# Patient Record
Sex: FEMALE | Race: WHITE | Employment: OTHER | URBAN - METROPOLITAN AREA
[De-identification: names, ages, dates, MRNs, and addresses within clinical notes are randomized per-mention and may not be internally consistent; named-entity substitution may affect disease eponyms.]

---

## 2017-01-03 ENCOUNTER — OFFICE VISIT (OUTPATIENT)
Dept: FAMILY MEDICINE CLINIC | Facility: CLINIC | Age: 82
End: 2017-01-03

## 2017-01-03 VITALS
HEART RATE: 84 BPM | DIASTOLIC BLOOD PRESSURE: 78 MMHG | SYSTOLIC BLOOD PRESSURE: 122 MMHG | WEIGHT: 190 LBS | BODY MASS INDEX: 42.74 KG/M2 | RESPIRATION RATE: 22 BRPM | HEIGHT: 56 IN | OXYGEN SATURATION: 92 % | TEMPERATURE: 98 F

## 2017-01-03 DIAGNOSIS — J40 BRONCHITIS: Primary | ICD-10-CM

## 2017-01-03 PROCEDURE — 94640 AIRWAY INHALATION TREATMENT: CPT | Performed by: FAMILY MEDICINE

## 2017-01-03 PROCEDURE — 99213 OFFICE O/P EST LOW 20 MIN: CPT | Performed by: FAMILY MEDICINE

## 2017-01-03 RX ORDER — CEFDINIR 300 MG/1
300 CAPSULE ORAL 2 TIMES DAILY
Qty: 20 CAPSULE | Refills: 0 | Status: SHIPPED | OUTPATIENT
Start: 2017-01-03 | End: 2017-01-24 | Stop reason: ALTCHOICE

## 2017-01-03 RX ORDER — IPRATROPIUM BROMIDE AND ALBUTEROL SULFATE 2.5; .5 MG/3ML; MG/3ML
3 SOLUTION RESPIRATORY (INHALATION) ONCE
Status: COMPLETED | OUTPATIENT
Start: 2017-01-03 | End: 2017-01-03

## 2017-01-03 RX ADMIN — IPRATROPIUM BROMIDE AND ALBUTEROL SULFATE 3 ML: 2.5; .5 SOLUTION RESPIRATORY (INHALATION) at 13:14:00

## 2017-01-03 NOTE — PROGRESS NOTES
HPI:    Patient ID: Azalia Quezada is a 80year old female. HPI Comments: When she was on higher dose steroids she felt much better, less cough, no wheezing, and thus less fatigue.    + history of smoking    Cough  This is a chronic problem.  The cur ONE TABLET BY MOUTH ONCE DAILY Disp: 90 tablet Rfl: 3   Levothyroxine Sodium 75 MCG Oral Tab TAKE ONE TABLET BY MOUTH ONCE DAILY BEFORE BREAKFAST Disp: 90 tablet Rfl: 3   torsemide 20 MG Oral Tab Take 1 tablet (20 mg total) by mouth 3 (three) times daily. encounter. Meds This Visit:  Signed Prescriptions Disp Refills    cefdinir 300 MG Oral Cap 20 capsule 0      Sig: Take 1 capsule (300 mg total) by mouth 2 (two) times daily.            Imaging & Referrals:  None       SQ#[id number]

## 2017-01-19 ENCOUNTER — RT VISIT (OUTPATIENT)
Dept: RESPIRATORY THERAPY | Facility: HOSPITAL | Age: 82
End: 2017-01-19
Attending: FAMILY MEDICINE
Payer: MEDICARE

## 2017-01-19 DIAGNOSIS — R05.9 COUGH: ICD-10-CM

## 2017-01-19 DIAGNOSIS — R06.2 WHEEZING: ICD-10-CM

## 2017-01-19 PROCEDURE — 94010 BREATHING CAPACITY TEST: CPT

## 2017-01-19 PROCEDURE — 94729 DIFFUSING CAPACITY: CPT

## 2017-01-19 PROCEDURE — 94726 PLETHYSMOGRAPHY LUNG VOLUMES: CPT

## 2017-01-20 NOTE — PROCEDURES
Sameer Bunch is an 54-year-old  female who stands 4 feet 10 inches tall and weighs 179 pounds. She underwent standard pulmonary function testing on 1/19/17. She carries the diagnoses of cough and wheezing.   He has a 30-pack-year smoking hist

## 2017-01-24 ENCOUNTER — OFFICE VISIT (OUTPATIENT)
Dept: FAMILY MEDICINE CLINIC | Facility: CLINIC | Age: 82
End: 2017-01-24

## 2017-01-24 VITALS
SYSTOLIC BLOOD PRESSURE: 128 MMHG | TEMPERATURE: 98 F | RESPIRATION RATE: 20 BRPM | WEIGHT: 193 LBS | BODY MASS INDEX: 43.41 KG/M2 | DIASTOLIC BLOOD PRESSURE: 76 MMHG | HEIGHT: 56 IN | HEART RATE: 68 BPM | OXYGEN SATURATION: 96 %

## 2017-01-24 DIAGNOSIS — Z13.820 ENCOUNTER FOR SCREENING FOR OSTEOPOROSIS: ICD-10-CM

## 2017-01-24 DIAGNOSIS — Z78.0 ASYMPTOMATIC MENOPAUSAL STATE: ICD-10-CM

## 2017-01-24 DIAGNOSIS — M54.6 CHRONIC BILATERAL THORACIC BACK PAIN: Primary | ICD-10-CM

## 2017-01-24 DIAGNOSIS — G89.29 CHRONIC BILATERAL THORACIC BACK PAIN: Primary | ICD-10-CM

## 2017-01-24 DIAGNOSIS — R94.2 DECREASED LUNG CAPACITY: ICD-10-CM

## 2017-01-24 PROCEDURE — 99213 OFFICE O/P EST LOW 20 MIN: CPT | Performed by: FAMILY MEDICINE

## 2017-01-24 NOTE — PROGRESS NOTES
HPI:    Patient ID: Jeanette Ortiz is a 80year old female. HPI Comments: HTN. Stable. When stopped clonidine her BP went to 160s. Shortness Of Breath  This is a chronic problem. The current episode started more than 1 month ago.  The problem oc Disp:  Rfl:    Cod Liver Oil 1000 MG Oral Cap Take 1 capsule by mouth daily. Disp:  Rfl:    Calcium Carbonate-Vitamin D (CALTRATE 600+D OR) Take 1 tablet by mouth daily.    Disp:  Rfl:    Multiple Vitamins-Minerals (CENTRUM SILVER OR) Take 1 tablet by mouth

## 2017-02-21 ENCOUNTER — HOSPITAL ENCOUNTER (OUTPATIENT)
Dept: BONE DENSITY | Age: 82
Discharge: HOME | End: 2017-02-21
Attending: FAMILY MEDICINE
Payer: MEDICARE

## 2017-02-21 DIAGNOSIS — M54.6 CHRONIC BILATERAL THORACIC BACK PAIN: ICD-10-CM

## 2017-02-21 DIAGNOSIS — Z78.0 ASYMPTOMATIC MENOPAUSAL STATE: ICD-10-CM

## 2017-02-21 DIAGNOSIS — Z13.820 ENCOUNTER FOR SCREENING FOR OSTEOPOROSIS: ICD-10-CM

## 2017-02-21 DIAGNOSIS — G89.29 CHRONIC BILATERAL THORACIC BACK PAIN: ICD-10-CM

## 2017-02-21 PROCEDURE — 77080 DXA BONE DENSITY AXIAL: CPT

## 2017-02-27 ENCOUNTER — OFFICE VISIT (OUTPATIENT)
Dept: FAMILY MEDICINE CLINIC | Facility: CLINIC | Age: 82
End: 2017-02-27

## 2017-02-27 VITALS
SYSTOLIC BLOOD PRESSURE: 118 MMHG | OXYGEN SATURATION: 93 % | DIASTOLIC BLOOD PRESSURE: 64 MMHG | TEMPERATURE: 98 F | WEIGHT: 185 LBS | RESPIRATION RATE: 18 BRPM | BODY MASS INDEX: 42 KG/M2 | HEART RATE: 76 BPM

## 2017-02-27 DIAGNOSIS — J44.9 CHRONIC OBSTRUCTIVE PULMONARY DISEASE, UNSPECIFIED COPD TYPE (HCC): Primary | ICD-10-CM

## 2017-02-27 PROCEDURE — 99213 OFFICE O/P EST LOW 20 MIN: CPT | Performed by: FAMILY MEDICINE

## 2017-02-27 NOTE — PROGRESS NOTES
Here with dhter      Had been seen by Dr. Ridge Huerta in December for bronchitis. This patient has a history of long-standing cigarette abuse having quit more than 20 years ago. She does occasionally get wheezing if provoked by weather or seasonal change.     Aamir Herrera

## 2017-02-28 ENCOUNTER — PATIENT OUTREACH (OUTPATIENT)
Dept: CASE MANAGEMENT | Age: 82
End: 2017-02-28

## 2017-02-28 NOTE — PROGRESS NOTES
Called Manoj zheng introduce CCM program. Patient is unsure if this program would be something she is interested in. Mailed letter.

## 2017-03-30 ENCOUNTER — TELEPHONE (OUTPATIENT)
Dept: FAMILY MEDICINE CLINIC | Facility: CLINIC | Age: 82
End: 2017-03-30

## 2017-04-03 ENCOUNTER — HOSPITAL ENCOUNTER (OUTPATIENT)
Dept: ULTRASOUND IMAGING | Age: 82
Discharge: HOME | End: 2017-04-03
Attending: OTOLARYNGOLOGY
Payer: MEDICARE

## 2017-04-03 DIAGNOSIS — E04.2 NONTOXIC MULTINODULAR GOITER: ICD-10-CM

## 2017-04-03 PROCEDURE — 76536 US EXAM OF HEAD AND NECK: CPT

## 2017-05-12 ENCOUNTER — APPOINTMENT (OUTPATIENT)
Dept: LAB | Age: 82
End: 2017-05-12
Attending: FAMILY MEDICINE
Payer: MEDICARE

## 2017-05-12 DIAGNOSIS — J44.9 CHRONIC OBSTRUCTIVE PULMONARY DISEASE, UNSPECIFIED COPD TYPE (HCC): ICD-10-CM

## 2017-05-12 PROCEDURE — 83036 HEMOGLOBIN GLYCOSYLATED A1C: CPT

## 2017-05-12 PROCEDURE — 36415 COLL VENOUS BLD VENIPUNCTURE: CPT

## 2017-05-12 PROCEDURE — 80053 COMPREHEN METABOLIC PANEL: CPT

## 2017-05-23 ENCOUNTER — OFFICE VISIT (OUTPATIENT)
Dept: FAMILY MEDICINE CLINIC | Facility: CLINIC | Age: 82
End: 2017-05-23

## 2017-05-23 VITALS
BODY MASS INDEX: 39 KG/M2 | TEMPERATURE: 97 F | HEART RATE: 64 BPM | DIASTOLIC BLOOD PRESSURE: 68 MMHG | WEIGHT: 175 LBS | RESPIRATION RATE: 16 BRPM | SYSTOLIC BLOOD PRESSURE: 96 MMHG

## 2017-05-23 DIAGNOSIS — I10 ESSENTIAL HYPERTENSION: ICD-10-CM

## 2017-05-23 DIAGNOSIS — E66.09 NON MORBID OBESITY DUE TO EXCESS CALORIES: ICD-10-CM

## 2017-05-23 DIAGNOSIS — E78.5 HYPERLIPIDEMIA, UNSPECIFIED HYPERLIPIDEMIA TYPE: Primary | ICD-10-CM

## 2017-05-23 DIAGNOSIS — R73.02 IMPAIRED GLUCOSE TOLERANCE: ICD-10-CM

## 2017-05-23 PROCEDURE — 99213 OFFICE O/P EST LOW 20 MIN: CPT | Performed by: FAMILY MEDICINE

## 2017-05-23 NOTE — PROGRESS NOTES
Here with daughter has been ambitious lately reducing carbs and has lost 15 pounds. Last A1c is a very excellent 5.9. We then turned our attention to her blood pressure which is starting to drop although asymptomatically.   Her systolic today was under 10

## 2017-05-26 ENCOUNTER — APPOINTMENT (OUTPATIENT)
Dept: LAB | Age: 82
End: 2017-05-26
Attending: FAMILY MEDICINE
Payer: MEDICARE

## 2017-05-26 DIAGNOSIS — E78.5 HYPERLIPIDEMIA, UNSPECIFIED HYPERLIPIDEMIA TYPE: ICD-10-CM

## 2017-05-26 DIAGNOSIS — R73.02 IMPAIRED GLUCOSE TOLERANCE: ICD-10-CM

## 2017-05-26 PROCEDURE — 83735 ASSAY OF MAGNESIUM: CPT

## 2017-05-26 PROCEDURE — 80061 LIPID PANEL: CPT

## 2017-05-26 PROCEDURE — 36415 COLL VENOUS BLD VENIPUNCTURE: CPT

## 2017-07-30 ENCOUNTER — APPOINTMENT (OUTPATIENT)
Dept: GENERAL RADIOLOGY | Age: 82
End: 2017-07-30
Attending: EMERGENCY MEDICINE
Payer: MEDICARE

## 2017-07-30 ENCOUNTER — HOSPITAL ENCOUNTER (EMERGENCY)
Age: 82
Discharge: HOME | End: 2017-07-31
Attending: EMERGENCY MEDICINE
Payer: MEDICARE

## 2017-07-30 DIAGNOSIS — J20.9 ACUTE BRONCHITIS, UNSPECIFIED ORGANISM: Primary | ICD-10-CM

## 2017-07-30 LAB
ALBUMIN SERPL-MCNC: 3.6 G/DL (ref 3.5–4.8)
ALP LIVER SERPL-CCNC: 72 U/L (ref 55–142)
ALT SERPL-CCNC: 21 U/L (ref 14–54)
AST SERPL-CCNC: 23 U/L (ref 15–41)
BASOPHILS # BLD AUTO: 0.1 X10(3) UL (ref 0–0.1)
BASOPHILS NFR BLD AUTO: 1 %
BILIRUB SERPL-MCNC: 0.3 MG/DL (ref 0.1–2)
BUN BLD-MCNC: 28 MG/DL (ref 8–20)
CALCIUM BLD-MCNC: 8.8 MG/DL (ref 8.3–10.3)
CHLORIDE: 95 MMOL/L (ref 101–111)
CO2: 30 MMOL/L (ref 22–32)
CREAT BLD-MCNC: 1.07 MG/DL (ref 0.55–1.02)
EOSINOPHIL # BLD AUTO: 0.62 X10(3) UL (ref 0–0.3)
EOSINOPHIL NFR BLD AUTO: 6.4 %
ERYTHROCYTE [DISTWIDTH] IN BLOOD BY AUTOMATED COUNT: 13.8 % (ref 11.5–16)
GLUCOSE BLD-MCNC: 117 MG/DL (ref 70–99)
HCT VFR BLD AUTO: 41.7 % (ref 34–50)
HGB BLD-MCNC: 14.1 G/DL (ref 12–16)
IMMATURE GRANULOCYTE COUNT: 0.02 X10(3) UL (ref 0–1)
IMMATURE GRANULOCYTE RATIO %: 0.2 %
LYMPHOCYTES # BLD AUTO: 3.38 X10(3) UL (ref 0.9–4)
LYMPHOCYTES NFR BLD AUTO: 34.6 %
M PROTEIN MFR SERPL ELPH: 7.9 G/DL (ref 6.1–8.3)
MCH RBC QN AUTO: 31.9 PG (ref 27–33.2)
MCHC RBC AUTO-ENTMCNC: 33.8 G/DL (ref 31–37)
MCV RBC AUTO: 94.3 FL (ref 81–100)
MONOCYTES # BLD AUTO: 0.91 X10(3) UL (ref 0.1–0.6)
MONOCYTES NFR BLD AUTO: 9.3 %
NEUTROPHIL ABS PRELIM: 4.73 X10 (3) UL (ref 1.3–6.7)
NEUTROPHILS # BLD AUTO: 4.73 X10(3) UL (ref 1.3–6.7)
NEUTROPHILS NFR BLD AUTO: 48.5 %
PLATELET # BLD AUTO: 293 10(3)UL (ref 150–450)
POTASSIUM SERPL-SCNC: 3.8 MMOL/L (ref 3.6–5.1)
PRO-BETA NATRIURETIC PEPTIDE: 290 PG/ML (ref ?–450)
RBC # BLD AUTO: 4.42 X10(6)UL (ref 3.8–5.1)
RED CELL DISTRIBUTION WIDTH-SD: 47.8 FL (ref 35.1–46.3)
SODIUM SERPL-SCNC: 132 MMOL/L (ref 136–144)
TROPONIN: <0.046 NG/ML (ref ?–0.05)
WBC # BLD AUTO: 9.8 X10(3) UL (ref 4–13)

## 2017-07-30 PROCEDURE — 93005 ELECTROCARDIOGRAM TRACING: CPT

## 2017-07-30 PROCEDURE — 94640 AIRWAY INHALATION TREATMENT: CPT

## 2017-07-30 PROCEDURE — 71010 XR CHEST AP PORTABLE  (CPT=71010): CPT | Performed by: EMERGENCY MEDICINE

## 2017-07-30 PROCEDURE — 84484 ASSAY OF TROPONIN QUANT: CPT | Performed by: EMERGENCY MEDICINE

## 2017-07-30 PROCEDURE — 96374 THER/PROPH/DIAG INJ IV PUSH: CPT

## 2017-07-30 PROCEDURE — 36415 COLL VENOUS BLD VENIPUNCTURE: CPT

## 2017-07-30 PROCEDURE — 99284 EMERGENCY DEPT VISIT MOD MDM: CPT

## 2017-07-30 PROCEDURE — 85025 COMPLETE CBC W/AUTO DIFF WBC: CPT | Performed by: EMERGENCY MEDICINE

## 2017-07-30 PROCEDURE — 94664 DEMO&/EVAL PT USE INHALER: CPT

## 2017-07-30 PROCEDURE — 80053 COMPREHEN METABOLIC PANEL: CPT | Performed by: EMERGENCY MEDICINE

## 2017-07-30 PROCEDURE — 83880 ASSAY OF NATRIURETIC PEPTIDE: CPT | Performed by: EMERGENCY MEDICINE

## 2017-07-30 PROCEDURE — 87040 BLOOD CULTURE FOR BACTERIA: CPT | Performed by: EMERGENCY MEDICINE

## 2017-07-30 PROCEDURE — 93010 ELECTROCARDIOGRAM REPORT: CPT

## 2017-07-30 RX ORDER — METHYLPREDNISOLONE SODIUM SUCCINATE 125 MG/2ML
125 INJECTION, POWDER, LYOPHILIZED, FOR SOLUTION INTRAMUSCULAR; INTRAVENOUS ONCE
Status: COMPLETED | OUTPATIENT
Start: 2017-07-30 | End: 2017-07-30

## 2017-07-31 VITALS
HEIGHT: 57 IN | SYSTOLIC BLOOD PRESSURE: 112 MMHG | WEIGHT: 175 LBS | HEART RATE: 88 BPM | RESPIRATION RATE: 20 BRPM | OXYGEN SATURATION: 95 % | DIASTOLIC BLOOD PRESSURE: 62 MMHG | BODY MASS INDEX: 37.76 KG/M2 | TEMPERATURE: 98 F

## 2017-07-31 LAB
ATRIAL RATE: 95 BPM
P AXIS: 48 DEGREES
P-R INTERVAL: 138 MS
Q-T INTERVAL: 378 MS
QRS DURATION: 72 MS
QTC CALCULATION (BEZET): 475 MS
R AXIS: -6 DEGREES
T AXIS: 86 DEGREES
VENTRICULAR RATE: 95 BPM

## 2017-07-31 RX ORDER — DOXYCYCLINE HYCLATE 100 MG/1
100 CAPSULE ORAL 2 TIMES DAILY
Qty: 20 CAPSULE | Refills: 0 | Status: SHIPPED | OUTPATIENT
Start: 2017-07-31 | End: 2017-08-10

## 2017-07-31 RX ORDER — ALBUTEROL SULFATE 2.5 MG/3ML
2.5 SOLUTION RESPIRATORY (INHALATION) EVERY 4 HOURS PRN
Qty: 30 AMPULE | Refills: 0 | Status: SHIPPED | OUTPATIENT
Start: 2017-07-31 | End: 2017-08-30

## 2017-07-31 RX ORDER — PREDNISONE 20 MG/1
60 TABLET ORAL DAILY
Qty: 15 TABLET | Refills: 0 | Status: SHIPPED | OUTPATIENT
Start: 2017-07-31 | End: 2017-08-05

## 2017-07-31 NOTE — ED PROVIDER NOTES
Patient Seen in: THE [facility] Emergency Department In [place]    History   Patient presents with:  Dyspnea ZACK SOB (respiratory)    Stated Complaint: zack    HPI    She presents with 3 days of worsening shortness of breath.   She has had cough productive of c Sodium 75 MCG Oral Tab,  TAKE ONE TABLET BY MOUTH ONCE DAILY BEFORE BREAKFAST   torsemide 20 MG Oral Tab,  Take 1 tablet (20 mg total) by mouth 3 (three) times daily.    Potassium Chloride ER 20 MEQ Oral Tab CR,  Take 1 tablet (20 mEq total) by mouth 2 (two crepitations  Heart: Apical pulse 92 and regular without murmur or rub  Abdomen: Soft and nontender without mass or HSM  Extremities: Trace ankle edema. No evidence of DVT. Neuro: Alert and oriented. Speech clear. No facial asymmetry.   Moving all 4 ext roast the mid 90s and was stable. Patient is discharged home with a nebulizer and prescriptions for albuterol, prednisone, doxycycline. Advise follow-up with family doctor in the next day or 2. Return to emergency department if symptoms worsening.

## 2017-07-31 NOTE — ED NOTES
Pt is now resting on cart in no acute resp distress. PT is able to speak in full sentences. Denies pain. Lung sounds CTA bilat. Pt was given home neb machine w/teaching from Rt. Understanding verbalized.  PT d/c home w/daughter who will transport and stay w

## 2017-08-03 ENCOUNTER — OFFICE VISIT (OUTPATIENT)
Dept: FAMILY MEDICINE CLINIC | Facility: CLINIC | Age: 82
End: 2017-08-03

## 2017-08-03 VITALS
HEART RATE: 76 BPM | TEMPERATURE: 98 F | HEIGHT: 56 IN | RESPIRATION RATE: 22 BRPM | OXYGEN SATURATION: 98 % | DIASTOLIC BLOOD PRESSURE: 80 MMHG | SYSTOLIC BLOOD PRESSURE: 138 MMHG | WEIGHT: 173 LBS | BODY MASS INDEX: 38.92 KG/M2

## 2017-08-03 DIAGNOSIS — J45.31 MILD PERSISTENT ASTHMA WITH ACUTE EXACERBATION: Primary | ICD-10-CM

## 2017-08-03 PROCEDURE — 99213 OFFICE O/P EST LOW 20 MIN: CPT | Performed by: FAMILY MEDICINE

## 2017-08-03 NOTE — PROGRESS NOTES
Here for follow-up after a visit to the ER for bronchitis. Was given inhalers sent home on prednisone and doxycycline she is tolerating these medicines well.   She feels significantly better and has been back on taking inhaled Qvar along with as needed Pro

## 2017-10-09 ENCOUNTER — OFFICE VISIT (OUTPATIENT)
Dept: FAMILY MEDICINE CLINIC | Facility: CLINIC | Age: 82
End: 2017-10-09

## 2017-10-09 VITALS
SYSTOLIC BLOOD PRESSURE: 130 MMHG | RESPIRATION RATE: 18 BRPM | HEIGHT: 56 IN | HEART RATE: 78 BPM | OXYGEN SATURATION: 98 % | WEIGHT: 178 LBS | DIASTOLIC BLOOD PRESSURE: 60 MMHG | TEMPERATURE: 98 F | BODY MASS INDEX: 40.04 KG/M2

## 2017-10-09 DIAGNOSIS — Z00.00 ROUTINE GENERAL MEDICAL EXAMINATION AT A HEALTH CARE FACILITY: Primary | ICD-10-CM

## 2017-10-09 DIAGNOSIS — Z23 NEED FOR VACCINATION: ICD-10-CM

## 2017-10-09 PROCEDURE — G0439 PPPS, SUBSEQ VISIT: HCPCS | Performed by: FAMILY MEDICINE

## 2017-10-10 NOTE — PROGRESS NOTES
Chelsi Vazquez is a 80year old female who presents for a Medicare Annual Wellness visit.     Patient Care Team: Patient Care Team:  Annette Piña DO as PCP - [facility])  Vanessa Davis MD (Otolaryngologist (ENT))    Patient Tulio Chloride ER 20 MEQ Oral Tab CR Take 1 tablet (20 mEq total) by mouth 2 (two) times daily.  Disp: 180 tablet Rfl: 3   KLOR-CON M20 20 MEQ Oral Tab CR TAKE ONE TABLET BY MOUTH ONCE DAILY Disp: 90 tablet Rfl: 0   aspirin 81 MG Oral Tab Take 81 mg by mouth madai been poor?: No         How does the patient maintain a good energy level?: Other    How would you describe your daily physical activity?: Light    How would you describe your current health state?: Fair    How do you maintain positive mental well-being?: S Please go to \"Cognitive Assessment\" under Medicare Assessment section in Charting, test patient and document. Then, refresh your progress note to see your input here.   Cognitive Assessment     What day of the week is this?: Correct    What month i Maintenance if applicable    Chlamydia  Annually if high risk No results found for: CHLAMYDIA No flowsheet data found. Screening Mammogram      Mammogram  Annually There are no preventive care reminders to display for this patient.  [facility].    COPD      Spirometry Testing Annually No results found for this or any previous visit. No flowsheet data found.         ALLERGIES:     Ace Inhibitors          Coughing  Bactrim [Sulfametho*    Nausea only    CURRENT MEDICATIONS:     Current Outpat • Visual impairment       Past Surgical History:  No date: KNEE REPLACEMENT SURGERY  No date: OTHER SURGICAL HISTORY  No date: TOTAL KNEE REPLACEMENT Bilateral      Comment: 16 yrs ago   Family History   Problem Relation Age of Onset   • Family history u

## 2017-10-10 NOTE — PROGRESS NOTES
Manda Damon is a 80year old female who presents for a Medicare Annual Wellness visit.     Patient Care Team: Patient Care Team:  Ruben Strickland DO as PCP - [facility])  Cordell Covington MD (Otolaryngologist (ENT))    Patient Acti Chloride ER 20 MEQ Oral Tab CR Take 1 tablet (20 mEq total) by mouth 2 (two) times daily.  Disp: 180 tablet Rfl: 3   KLOR-CON M20 20 MEQ Oral Tab CR TAKE ONE TABLET BY MOUTH ONCE DAILY Disp: 90 tablet Rfl: 0   aspirin 81 MG Oral Tab Take 81 mg by mouth madai been poor?: No         How does the patient maintain a good energy level?: Other    How would you describe your daily physical activity?: Light    How would you describe your current health state?: Fair    How do you maintain positive mental well-being?: S Please go to \"Cognitive Assessment\" under Medicare Assessment section in Charting, test patient and document. Then, refresh your progress note to see your input here.   Cognitive Assessment     What day of the week is this?: Correct    What month i risk No results found for: CHLAMYDIA No flowsheet data found. Screening Mammogram      Mammogram  Annually Patient declines. Update Health Maintenance if applicable   Immunizations      Influenza Patient declines.  Update Immunization Activity if applica ALLERGIES:     Ace Inhibitors          Coughing  Bactrim [Sulfametho*    Nausea only    CURRENT MEDICATIONS:     Current Outpatient Prescriptions:  Albuterol Sulfate HFA (PROAIR HFA) 108 (90 BASE) MCG/ACT Inhalation Aero Soln Inhale 2 puffs into the date:  TOTAL KNEE REPLACEMENT Bilateral      Comment: 16 yrs ago   Family History   Problem Relation Age of Onset   • Family history unknown: Yes      SOCIAL HISTORY:   Smoking status: Former Smoker 12/01/2015, 11/15/2016   • Pneumococcal (Prevnar 13) 11/29/2016   • Zoster (Shingles) 01/01/2013

## 2017-10-10 NOTE — PROGRESS NOTES
Is here today with her loving daughter for an annual Medicare well visit. She has no complaints that she is feeling well but has been struggling a bit with relatively new onset reactive airways disease.   She has been using her Symbicort 161 puff twice a d

## 2018-01-22 RX ORDER — METOPROLOL SUCCINATE 25 MG/1
TABLET, EXTENDED RELEASE ORAL
Qty: 270 TABLET | Refills: 1 | Status: SHIPPED | OUTPATIENT
Start: 2018-01-22 | End: 2018-03-02

## 2018-01-22 RX ORDER — LOSARTAN POTASSIUM 100 MG/1
TABLET ORAL
Qty: 90 TABLET | Refills: 1 | Status: SHIPPED | OUTPATIENT
Start: 2018-01-22 | End: 2018-07-19

## 2018-01-22 RX ORDER — CLONIDINE HYDROCHLORIDE 0.1 MG/1
TABLET ORAL
Qty: 180 TABLET | Refills: 1 | Status: SHIPPED | OUTPATIENT
Start: 2018-01-22 | End: 2018-10-17

## 2018-03-02 ENCOUNTER — OFFICE VISIT (OUTPATIENT)
Dept: FAMILY MEDICINE CLINIC | Facility: CLINIC | Age: 83
End: 2018-03-02

## 2018-03-02 VITALS
HEIGHT: 56 IN | SYSTOLIC BLOOD PRESSURE: 134 MMHG | WEIGHT: 184 LBS | TEMPERATURE: 98 F | HEART RATE: 56 BPM | DIASTOLIC BLOOD PRESSURE: 62 MMHG | RESPIRATION RATE: 16 BRPM | OXYGEN SATURATION: 98 % | BODY MASS INDEX: 41.39 KG/M2

## 2018-03-02 DIAGNOSIS — H35.30 MACULAR DEGENERATION OF BOTH EYES, UNSPECIFIED TYPE: ICD-10-CM

## 2018-03-02 DIAGNOSIS — I10 ESSENTIAL HYPERTENSION: Primary | ICD-10-CM

## 2018-03-02 DIAGNOSIS — R53.83 FATIGUE, UNSPECIFIED TYPE: ICD-10-CM

## 2018-03-02 PROCEDURE — 99214 OFFICE O/P EST MOD 30 MIN: CPT | Performed by: FAMILY MEDICINE

## 2018-03-02 PROCEDURE — G0009 ADMIN PNEUMOCOCCAL VACCINE: HCPCS | Performed by: FAMILY MEDICINE

## 2018-03-02 PROCEDURE — 90732 PPSV23 VACC 2 YRS+ SUBQ/IM: CPT | Performed by: FAMILY MEDICINE

## 2018-03-02 RX ORDER — LEVOTHYROXINE SODIUM 0.07 MG/1
TABLET ORAL
Qty: 90 TABLET | Refills: 3 | Status: SHIPPED | OUTPATIENT
Start: 2018-03-02 | End: 2019-05-14

## 2018-03-02 RX ORDER — BUDESONIDE AND FORMOTEROL FUMARATE DIHYDRATE 160; 4.5 UG/1; UG/1
AEROSOL RESPIRATORY (INHALATION) 2 TIMES DAILY
COMMUNITY
End: 2020-02-04 | Stop reason: ALTCHOICE

## 2018-03-02 RX ORDER — TORSEMIDE 20 MG/1
20 TABLET ORAL 3 TIMES DAILY
Qty: 270 TABLET | Refills: 3 | Status: ON HOLD | OUTPATIENT
Start: 2018-03-02 | End: 2019-01-16

## 2018-03-02 RX ORDER — METOPROLOL SUCCINATE 25 MG/1
TABLET, EXTENDED RELEASE ORAL
Qty: 270 TABLET | Refills: 1 | Status: SHIPPED | OUTPATIENT
Start: 2018-03-02 | End: 2019-01-19

## 2018-03-02 NOTE — PROGRESS NOTES
Here with daughter for blood pressure check. This is a woman who has always had a challenge with maintaining proper blood pressure.   She is on numerous medications at higher doses and has been doing very well with absolutely no symptoms of shortness of br

## 2018-06-09 ENCOUNTER — LAB ENCOUNTER (OUTPATIENT)
Dept: LAB | Age: 83
End: 2018-06-09
Attending: FAMILY MEDICINE
Payer: MEDICARE

## 2018-06-09 DIAGNOSIS — I10 ESSENTIAL HYPERTENSION: ICD-10-CM

## 2018-06-09 DIAGNOSIS — E55.9 VITAMIN D DEFICIENCY: ICD-10-CM

## 2018-06-09 DIAGNOSIS — R53.83 FATIGUE: Primary | ICD-10-CM

## 2018-06-09 PROCEDURE — 85025 COMPLETE CBC W/AUTO DIFF WBC: CPT

## 2018-06-09 PROCEDURE — 80061 LIPID PANEL: CPT

## 2018-06-09 PROCEDURE — 36415 COLL VENOUS BLD VENIPUNCTURE: CPT

## 2018-06-09 PROCEDURE — 82306 VITAMIN D 25 HYDROXY: CPT

## 2018-06-09 PROCEDURE — 80053 COMPREHEN METABOLIC PANEL: CPT

## 2018-06-09 PROCEDURE — 84443 ASSAY THYROID STIM HORMONE: CPT

## 2018-06-14 ENCOUNTER — OFFICE VISIT (OUTPATIENT)
Dept: FAMILY MEDICINE CLINIC | Facility: CLINIC | Age: 83
End: 2018-06-14

## 2018-06-14 VITALS
RESPIRATION RATE: 18 BRPM | HEART RATE: 82 BPM | BODY MASS INDEX: 41.84 KG/M2 | TEMPERATURE: 98 F | OXYGEN SATURATION: 98 % | SYSTOLIC BLOOD PRESSURE: 132 MMHG | WEIGHT: 186 LBS | DIASTOLIC BLOOD PRESSURE: 82 MMHG | HEIGHT: 56 IN

## 2018-06-14 DIAGNOSIS — I10 ESSENTIAL HYPERTENSION: Primary | ICD-10-CM

## 2018-06-14 DIAGNOSIS — Q82.0 HEREDITARY LYMPHEDEMA: ICD-10-CM

## 2018-06-14 PROCEDURE — 99213 OFFICE O/P EST LOW 20 MIN: CPT | Performed by: FAMILY MEDICINE

## 2018-06-14 NOTE — PROGRESS NOTES
Patient is here with loving daughter celebrating her 51-year-old birthday she feels healthy she is hungry she cooks and cleans she visits she is proud of her granddaughters who are now physicians or medical school bowels and bladder is fine and the rest of

## 2018-06-21 RX ORDER — POTASSIUM CHLORIDE 20 MEQ/1
TABLET, EXTENDED RELEASE ORAL
Qty: 180 TABLET | Refills: 3 | Status: SHIPPED | OUTPATIENT
Start: 2018-06-21 | End: 2019-09-27

## 2018-07-19 RX ORDER — LOSARTAN POTASSIUM 100 MG/1
TABLET ORAL
Qty: 90 TABLET | Refills: 1 | Status: SHIPPED | OUTPATIENT
Start: 2018-07-19 | End: 2019-01-26

## 2018-09-15 ENCOUNTER — HOSPITAL ENCOUNTER (OUTPATIENT)
Dept: ULTRASOUND IMAGING | Age: 83
Discharge: HOME | End: 2018-09-15
Attending: OTOLARYNGOLOGY
Payer: MEDICARE

## 2018-09-15 DIAGNOSIS — E04.1 LEFT THYROID NODULE: ICD-10-CM

## 2018-09-15 PROCEDURE — 76536 US EXAM OF HEAD AND NECK: CPT | Performed by: OTOLARYNGOLOGY

## 2018-10-12 ENCOUNTER — OFFICE VISIT (OUTPATIENT)
Dept: FAMILY MEDICINE CLINIC | Facility: CLINIC | Age: 83
End: 2018-10-12
Payer: MEDICARE

## 2018-10-12 VITALS
TEMPERATURE: 98 F | BODY MASS INDEX: 42.74 KG/M2 | RESPIRATION RATE: 16 BRPM | HEIGHT: 56 IN | SYSTOLIC BLOOD PRESSURE: 108 MMHG | WEIGHT: 190 LBS | DIASTOLIC BLOOD PRESSURE: 76 MMHG | OXYGEN SATURATION: 92 % | HEART RATE: 90 BPM

## 2018-10-12 DIAGNOSIS — Z00.00 MEDICARE ANNUAL WELLNESS VISIT, INITIAL: Primary | ICD-10-CM

## 2018-10-12 PROCEDURE — G0439 PPPS, SUBSEQ VISIT: HCPCS | Performed by: FAMILY MEDICINE

## 2018-10-12 NOTE — PROGRESS NOTES
Presents today for annual Medicare wellness visit. She has been experimenting a bit with her antihypertensive medicines and tried to come off of the Klonopin completely.   Instead she wound up on taking 1/day 0.1 mg each and this has resulted in good blood

## 2018-10-17 RX ORDER — CLONIDINE HYDROCHLORIDE 0.1 MG/1
TABLET ORAL
Qty: 180 TABLET | Refills: 1 | Status: ON HOLD | OUTPATIENT
Start: 2018-10-17 | End: 2019-01-15

## 2018-10-17 NOTE — PROGRESS NOTES
Tejinder Pruitt REASON FOR VISIT:    Manda Damon is a 80year old female who presents for a Medicare Annual Wellness visit.          Patient Care Team: Patient Care Team:  Ruben Strickland DO as PCP - [facility])  Cordell Covington MD [facility] Take 1 tablet (20 mg total) by mouth 3 (three) times daily.  (Patient taking differently: Take 20 mg by mouth 2 (two) times daily.  ) Disp: 270 tablet Rfl: 3   CLONIDINE HCL 0.1 MG Oral Tab TAKE ONE TABLET BY MOUTH TWICE DAILY (Patient taking differently: T 7/19/2014 6/15/2013 5/26/2012   TSH 0.350 - 5.500 mIU/mL 3.250 2.910 1.800 5.150 3.500 2.030 3.350        General Health      In the past six months, have you lost more than 10 pounds without trying?: 2 - No  Has your appetite been poor?: No  Type of Diet: HbgA1C   Annually HgbA1C (%)   Date Value   05/12/2017 5.9 (H)       Fasting Blood Sugar (FSB)Annually Glucose (mg/dL)   Date Value   06/09/2018 92     GLUCOSE (mg/dL)   Date Value   07/19/2014 94   09/24/2011 106 (H)      Cardiovascular Disease Screening • Unspecified essential hypertension    • Visual impairment       Past Surgical History:   Procedure Laterality Date   • KNEE REPLACEMENT SURGERY     • OTHER SURGICAL HISTORY     • TOTAL KNEE REPLACEMENT Bilateral     16 yrs ago      Family History   Fam

## 2019-01-15 ENCOUNTER — APPOINTMENT (OUTPATIENT)
Dept: GENERAL RADIOLOGY | Age: 84
End: 2019-01-15
Attending: EMERGENCY MEDICINE
Payer: MEDICARE

## 2019-01-15 ENCOUNTER — HOSPITAL ENCOUNTER (OUTPATIENT)
Facility: HOSPITAL | Age: 84
Setting detail: OBSERVATION
Discharge: HOME | End: 2019-01-16
Attending: EMERGENCY MEDICINE | Admitting: INTERNAL MEDICINE
Payer: MEDICARE

## 2019-01-15 DIAGNOSIS — R06.03 ACUTE RESPIRATORY DISTRESS: ICD-10-CM

## 2019-01-15 DIAGNOSIS — J45.41 MODERATE PERSISTENT ASTHMA WITH EXACERBATION: ICD-10-CM

## 2019-01-15 DIAGNOSIS — J06.9 VIRAL UPPER RESPIRATORY TRACT INFECTION: Primary | ICD-10-CM

## 2019-01-15 DIAGNOSIS — I49.3 VENTRICULAR ECTOPY: ICD-10-CM

## 2019-01-15 PROBLEM — R73.9 HYPERGLYCEMIA: Status: ACTIVE | Noted: 2019-01-15

## 2019-01-15 LAB
ADENOVIRUS PCR:: NEGATIVE
ALBUMIN SERPL-MCNC: 2.9 G/DL (ref 3.1–4.5)
ALBUMIN/GLOB SERPL: 0.8 {RATIO} (ref 1–2)
ALP LIVER SERPL-CCNC: 51 U/L (ref 55–142)
ALT SERPL-CCNC: 22 U/L (ref 14–54)
ANION GAP SERPL CALC-SCNC: 9 MMOL/L (ref 0–18)
AST SERPL-CCNC: 20 U/L (ref 15–41)
ATRIAL RATE: 71 BPM
B PERT DNA SPEC QL NAA+PROBE: NEGATIVE
BASOPHILS # BLD AUTO: 0.04 X10(3) UL (ref 0–0.1)
BASOPHILS NFR BLD AUTO: 0.7 %
BILIRUB SERPL-MCNC: 0.3 MG/DL (ref 0.1–2)
BUN BLD-MCNC: 19 MG/DL (ref 8–20)
BUN/CREAT SERPL: 17.1 (ref 10–20)
C PNEUM DNA SPEC QL NAA+PROBE: NEGATIVE
CALCIUM BLD-MCNC: 8.2 MG/DL (ref 8.3–10.3)
CHLORIDE SERPL-SCNC: 102 MMOL/L (ref 101–111)
CO2 SERPL-SCNC: 27 MMOL/L (ref 22–32)
CORONAVIRUS 229E PCR:: NEGATIVE
CORONAVIRUS HKU1 PCR:: NEGATIVE
CORONAVIRUS NL63 PCR:: NEGATIVE
CORONAVIRUS OC43 PCR:: POSITIVE
CREAT BLD-MCNC: 1.11 MG/DL (ref 0.55–1.02)
EOSINOPHIL # BLD AUTO: 0.18 X10(3) UL (ref 0–0.3)
EOSINOPHIL NFR BLD AUTO: 3.3 %
ERYTHROCYTE [DISTWIDTH] IN BLOOD BY AUTOMATED COUNT: 13.6 % (ref 11.5–16)
FLUAV RNA SPEC QL NAA+PROBE: NEGATIVE
FLUBV RNA SPEC QL NAA+PROBE: NEGATIVE
GLOBULIN PLAS-MCNC: 3.7 G/DL (ref 2.8–4.4)
GLUCOSE BLD-MCNC: 110 MG/DL (ref 70–99)
HCT VFR BLD AUTO: 37.4 % (ref 34–50)
HGB BLD-MCNC: 12 G/DL (ref 12–16)
IMMATURE GRANULOCYTE COUNT: 0.01 X10(3) UL (ref 0–1)
IMMATURE GRANULOCYTE RATIO %: 0.2 %
LYMPHOCYTES # BLD AUTO: 1.98 X10(3) UL (ref 0.9–4)
LYMPHOCYTES NFR BLD AUTO: 35.9 %
M PROTEIN MFR SERPL ELPH: 6.6 G/DL (ref 6.4–8.2)
MCH RBC QN AUTO: 31.2 PG (ref 27–33.2)
MCHC RBC AUTO-ENTMCNC: 32.1 G/DL (ref 31–37)
MCV RBC AUTO: 97.1 FL (ref 81–100)
METAPNEUMOVIRUS PCR:: NEGATIVE
MONOCYTES # BLD AUTO: 0.67 X10(3) UL (ref 0.1–1)
MONOCYTES NFR BLD AUTO: 12.1 %
MYCOPLASMA PNEUMONIA PCR:: NEGATIVE
NEUTROPHIL ABS PRELIM: 2.64 X10 (3) UL (ref 1.3–6.7)
NEUTROPHILS # BLD AUTO: 2.64 X10(3) UL (ref 1.3–6.7)
NEUTROPHILS NFR BLD AUTO: 47.8 %
OSMOLALITY SERPL CALC.SUM OF ELEC: 289 MOSM/KG (ref 275–295)
P AXIS: 40 DEGREES
P-R INTERVAL: 130 MS
PARAINFLUENZA 1 PCR:: NEGATIVE
PARAINFLUENZA 2 PCR:: NEGATIVE
PARAINFLUENZA 3 PCR:: NEGATIVE
PARAINFLUENZA 4 PCR:: NEGATIVE
PLATELET # BLD AUTO: 206 10(3)UL (ref 150–450)
POTASSIUM SERPL-SCNC: 3.6 MMOL/L (ref 3.6–5.1)
POTASSIUM SERPL-SCNC: 4.2 MMOL/L (ref 3.6–5.1)
Q-T INTERVAL: 410 MS
QRS DURATION: 72 MS
QTC CALCULATION (BEZET): 445 MS
R AXIS: -5 DEGREES
RBC # BLD AUTO: 3.85 X10(6)UL (ref 3.8–5.1)
RED CELL DISTRIBUTION WIDTH-SD: 48.3 FL (ref 35.1–46.3)
RHINOVIRUS/ENTERO PCR:: NEGATIVE
RSV RNA SPEC QL NAA+PROBE: NEGATIVE
SODIUM SERPL-SCNC: 138 MMOL/L (ref 136–144)
T AXIS: 54 DEGREES
TROPONIN I SERPL-MCNC: <0.046 NG/ML (ref ?–0.05)
VENTRICULAR RATE: 71 BPM
WBC # BLD AUTO: 5.5 X10(3) UL (ref 4–13)

## 2019-01-15 PROCEDURE — 71046 X-RAY EXAM CHEST 2 VIEWS: CPT | Performed by: EMERGENCY MEDICINE

## 2019-01-15 PROCEDURE — 99220 INITIAL OBSERVATION CARE,LEVL III: CPT | Performed by: INTERNAL MEDICINE

## 2019-01-15 RX ORDER — METHYLPREDNISOLONE SODIUM SUCCINATE 125 MG/2ML
125 INJECTION, POWDER, LYOPHILIZED, FOR SOLUTION INTRAMUSCULAR; INTRAVENOUS ONCE
Status: COMPLETED | OUTPATIENT
Start: 2019-01-15 | End: 2019-01-15

## 2019-01-15 RX ORDER — CLONIDINE HYDROCHLORIDE 0.1 MG/1
0.1 TABLET ORAL NIGHTLY
Status: DISCONTINUED | OUTPATIENT
Start: 2019-01-15 | End: 2019-01-16

## 2019-01-15 RX ORDER — IPRATROPIUM BROMIDE AND ALBUTEROL SULFATE 2.5; .5 MG/3ML; MG/3ML
3 SOLUTION RESPIRATORY (INHALATION) EVERY 6 HOURS
Status: DISCONTINUED | OUTPATIENT
Start: 2019-01-15 | End: 2019-01-16

## 2019-01-15 RX ORDER — LOSARTAN POTASSIUM 100 MG/1
100 TABLET ORAL DAILY
Status: DISCONTINUED | OUTPATIENT
Start: 2019-01-16 | End: 2019-01-16

## 2019-01-15 RX ORDER — ACETAMINOPHEN 325 MG/1
650 TABLET ORAL EVERY 6 HOURS PRN
Status: DISCONTINUED | OUTPATIENT
Start: 2019-01-15 | End: 2019-01-16

## 2019-01-15 RX ORDER — METHYLPREDNISOLONE SODIUM SUCCINATE 40 MG/ML
40 INJECTION, POWDER, LYOPHILIZED, FOR SOLUTION INTRAMUSCULAR; INTRAVENOUS EVERY 8 HOURS
Status: DISCONTINUED | OUTPATIENT
Start: 2019-01-15 | End: 2019-01-16

## 2019-01-15 RX ORDER — IPRATROPIUM BROMIDE AND ALBUTEROL SULFATE 2.5; .5 MG/3ML; MG/3ML
3 SOLUTION RESPIRATORY (INHALATION) ONCE
Status: COMPLETED | OUTPATIENT
Start: 2019-01-15 | End: 2019-01-15

## 2019-01-15 RX ORDER — POTASSIUM CHLORIDE 20 MEQ/1
40 TABLET, EXTENDED RELEASE ORAL EVERY 4 HOURS
Status: COMPLETED | OUTPATIENT
Start: 2019-01-15 | End: 2019-01-15

## 2019-01-15 RX ORDER — ENOXAPARIN SODIUM 100 MG/ML
0.5 INJECTION SUBCUTANEOUS DAILY
Status: DISCONTINUED | OUTPATIENT
Start: 2019-01-15 | End: 2019-01-16

## 2019-01-15 RX ORDER — IPRATROPIUM BROMIDE AND ALBUTEROL SULFATE 2.5; .5 MG/3ML; MG/3ML
3 SOLUTION RESPIRATORY (INHALATION) EVERY 6 HOURS PRN
Status: DISCONTINUED | OUTPATIENT
Start: 2019-01-15 | End: 2019-01-16

## 2019-01-15 RX ORDER — CLONIDINE HYDROCHLORIDE 0.1 MG/1
0.1 TABLET ORAL DAILY
Status: ON HOLD | COMMUNITY
End: 2019-01-16

## 2019-01-15 RX ORDER — LEVOTHYROXINE SODIUM 0.07 MG/1
75 TABLET ORAL
Status: DISCONTINUED | OUTPATIENT
Start: 2019-01-15 | End: 2019-01-16

## 2019-01-15 RX ORDER — ALBUTEROL SULFATE 90 UG/1
2 AEROSOL, METERED RESPIRATORY (INHALATION) EVERY 6 HOURS PRN
Status: DISCONTINUED | OUTPATIENT
Start: 2019-01-15 | End: 2019-01-16

## 2019-01-15 RX ORDER — TORSEMIDE 20 MG/1
20 TABLET ORAL
Status: DISCONTINUED | OUTPATIENT
Start: 2019-01-15 | End: 2019-01-16

## 2019-01-15 NOTE — ED PROVIDER NOTES
Patient Seen in: THE [facility] Emergency Department In [place]    History   Patient presents with:  Dyspnea ZACK SOB (respiratory)    Stated Complaint: cough zack    HPI    Very pleasant 80-year-old presents to the emergency department with her daughter for co zack  Other systems are as noted in HPI. Constitutional and vital signs reviewed. All other systems reviewed and negative except as noted above. Physical Exam     ED Triage Vitals [01/15/19 0555]   BP (!) 169/66   Pulse 54   Resp (!) 28   Temp 98. 8 limits   TROPONIN I - Normal   CBC WITH DIFFERENTIAL WITH PLATELET    Narrative: The following orders were created for panel order CBC WITH DIFFERENTIAL WITH PLATELET.   Procedure                               Abnormality         Status was not willing to stay with us in the hospital not willing to be admitted despite her persistent dyspnea persistent wheezing she is now only speaking in 2-3 word sentences.   Upon ambulation to the bathroom she was audibly wheezing and coughing on her way

## 2019-01-15 NOTE — ED NOTES
Dr. Fong Ring  in the room and explained all results and answered pt's questions. Agreeable for admission after so much convincing. Dr Mitul Chew was also called.

## 2019-01-15 NOTE — H&P
16 patient is Lisa Prazeres [address] Patient Status:  Observation    1932 MRN [id number]   [facility] 0SW-A Attending Sergey Yates Disorder Mother       Reviewed    Social history:   reports that she quit smoking about 27 years ago. she has never used smokeless tobacco. She reports that she does not drink alcohol or use drugs. Allergies:     Ace Inhibitors          Coughing  Bactrim by mouth daily. Disp:  Rfl:  1/14/2019 at 1200       Review of Systems:  A comprehensive 14 point review of systems was completed. Pertinent positives and negatives noted in the the HPI.     Physical Exam:     Vital signs: Blood pressure (!) 168/65, puls nose and productive cough for 1 week. Dyspnea started yesterday. Former smoker, quit 28 yrs ago.         =====  CONCLUSION:    Mild cardiomegaly. Small blunting costophrenic angles. No CHF, or pneumothorax. Mild hyperinflation.   Mild left lower lobe a

## 2019-01-15 NOTE — ED NOTES
Report given to Dia CUENCA. Pt is going to room 0025.  [facility] ambulance called for transport

## 2019-01-15 NOTE — RESPIRATORY THERAPY NOTE
Assessed patient in SSU 11. Came from [facility]. Patient states she is not in distress and breathing is fine. Diminished BS. Baseline for patient.   Flu swab completed

## 2019-01-16 VITALS
HEART RATE: 85 BPM | BODY MASS INDEX: 42.52 KG/M2 | OXYGEN SATURATION: 97 % | TEMPERATURE: 98 F | SYSTOLIC BLOOD PRESSURE: 160 MMHG | WEIGHT: 189 LBS | DIASTOLIC BLOOD PRESSURE: 66 MMHG | RESPIRATION RATE: 16 BRPM | HEIGHT: 56 IN

## 2019-01-16 LAB
ANION GAP SERPL CALC-SCNC: 9 MMOL/L (ref 0–18)
BASOPHILS # BLD AUTO: 0.01 X10(3) UL (ref 0–0.1)
BASOPHILS NFR BLD AUTO: 0.2 %
BUN BLD-MCNC: 26 MG/DL (ref 8–20)
BUN/CREAT SERPL: 23.4 (ref 10–20)
CALCIUM BLD-MCNC: 8.3 MG/DL (ref 8.3–10.3)
CHLORIDE SERPL-SCNC: 100 MMOL/L (ref 101–111)
CO2 SERPL-SCNC: 27 MMOL/L (ref 22–32)
CREAT BLD-MCNC: 1.11 MG/DL (ref 0.55–1.02)
EOSINOPHIL # BLD AUTO: 0 X10(3) UL (ref 0–0.3)
EOSINOPHIL NFR BLD AUTO: 0 %
ERYTHROCYTE [DISTWIDTH] IN BLOOD BY AUTOMATED COUNT: 13.1 % (ref 11.5–16)
GLUCOSE BLD-MCNC: 171 MG/DL (ref 70–99)
HAV IGM SER QL: 2.1 MG/DL (ref 1.8–2.5)
HCT VFR BLD AUTO: 38.7 % (ref 34–50)
HGB BLD-MCNC: 12.5 G/DL (ref 12–16)
IMMATURE GRANULOCYTE COUNT: 0.04 X10(3) UL (ref 0–1)
IMMATURE GRANULOCYTE RATIO %: 0.6 %
LYMPHOCYTES # BLD AUTO: 0.76 X10(3) UL (ref 0.9–4)
LYMPHOCYTES NFR BLD AUTO: 11.5 %
MCH RBC QN AUTO: 31 PG (ref 27–33.2)
MCHC RBC AUTO-ENTMCNC: 32.3 G/DL (ref 31–37)
MCV RBC AUTO: 96 FL (ref 81–100)
MONOCYTES # BLD AUTO: 0.2 X10(3) UL (ref 0.1–1)
MONOCYTES NFR BLD AUTO: 3 %
NEUTROPHIL ABS PRELIM: 5.6 X10 (3) UL (ref 1.3–6.7)
NEUTROPHILS # BLD AUTO: 5.6 X10(3) UL (ref 1.3–6.7)
NEUTROPHILS NFR BLD AUTO: 84.7 %
OSMOLALITY SERPL CALC.SUM OF ELEC: 291 MOSM/KG (ref 275–295)
PLATELET # BLD AUTO: 228 10(3)UL (ref 150–450)
POTASSIUM SERPL-SCNC: 4.3 MMOL/L (ref 3.6–5.1)
RBC # BLD AUTO: 4.03 X10(6)UL (ref 3.8–5.1)
RED CELL DISTRIBUTION WIDTH-SD: 46.6 FL (ref 35.1–46.3)
SODIUM SERPL-SCNC: 136 MMOL/L (ref 136–144)
WBC # BLD AUTO: 6.6 X10(3) UL (ref 4–13)

## 2019-01-16 PROCEDURE — 99217 OBSERVATION CARE DISCHARGE: CPT | Performed by: INTERNAL MEDICINE

## 2019-01-16 RX ORDER — METHYLPREDNISOLONE 4 MG/1
TABLET ORAL
Qty: 21 TABLET | Refills: 0 | Status: SHIPPED | OUTPATIENT
Start: 2019-01-16 | End: 2019-01-21 | Stop reason: ALTCHOICE

## 2019-01-16 RX ORDER — METHYLPREDNISOLONE SODIUM SUCCINATE 40 MG/ML
40 INJECTION, POWDER, LYOPHILIZED, FOR SOLUTION INTRAMUSCULAR; INTRAVENOUS EVERY 12 HOURS
Status: DISCONTINUED | OUTPATIENT
Start: 2019-01-16 | End: 2019-01-16

## 2019-01-16 RX ORDER — TORSEMIDE 20 MG/1
20 TABLET ORAL 2 TIMES DAILY
COMMUNITY
End: 2019-07-03

## 2019-01-16 RX ORDER — CLONIDINE HYDROCHLORIDE 0.1 MG/1
0.1 TABLET ORAL 2 TIMES DAILY
COMMUNITY
End: 2019-12-20

## 2019-01-16 NOTE — DISCHARGE SUMMARY
[facility]  Discharge Summary    Inga Gautam Patient Status:  Observation    1932 MRN [id number]   [facility] 0SW-A Attending Faraz Kelly MD   [facility] Day # 0 PCP Priti Lira DO     Date of Admission: 1/15/2019    Date blood pressure improved to 116//57 after patient took her home medications and after more comfortable with treatment for asthma exacerbation.   Patient did not receive any other  as needed medications for high blood pressure in hospital.   Patient imp PROAIR HFA      Inhale 2 puffs into the lungs every 6 (six) hours as needed for Wheezing.    Quantity:  1 Inhaler  Refills:  0     Budesonide-Formoterol Fumarate 160-4.5 MCG/ACT Aero  Commonly known as:  SYMBICORT      Inhale into the lungs 2 (two) times da PATIENT [42776] 15 min.  Cyril Rosenbaum, BHUMIKA Park MD    Location Instructions:      Dr. Esteban L[address]pez [address], [address] [place] [address] [phone]                     Physical Exam:  /66 (BP Location: L

## 2019-01-16 NOTE — PLAN OF CARE
Patient to 0025 from  ER. Arlyss Parents. RR 28 with activity and 20 at rest. States not as hard to breathe at rest. Lungs diminished t/o with expiratory wheezes to left upper lone noted. Nebs ordered. Patient able to tolerate walking to the BR. Vss.  Will continue t

## 2019-01-16 NOTE — PLAN OF CARE
Maintains optimal cardiac output and hemodynamic stability Progressing    BP stable, skin warm & dry. Electrolytes maintained within normal limits Progressing    K+ 4.3  Achieves optimal ventilation and oxygenation Progressing    O2 sat 100% on ra.     Ass

## 2019-01-16 NOTE — PLAN OF CARE
NURSING DISCHARGE NOTE    Discharged home via wheelchair. Accompanied by daughter. Belongings taken by patient. D/C instructions, meds, follow up appt explained to patient and daughter with understanding verbalized. IV & tele d/c'd.

## 2019-01-17 ENCOUNTER — PATIENT OUTREACH (OUTPATIENT)
Dept: CASE MANAGEMENT | Age: 84
End: 2019-01-17

## 2019-01-17 ENCOUNTER — TELEPHONE (OUTPATIENT)
Dept: FAMILY MEDICINE CLINIC | Facility: CLINIC | Age: 84
End: 2019-01-17

## 2019-01-17 DIAGNOSIS — Z02.9 ENCOUNTERS FOR UNSPECIFIED ADMINISTRATIVE PURPOSE: ICD-10-CM

## 2019-01-17 NOTE — PROGRESS NOTES
Initial Post Discharge Follow Up   Discharge Date: 1/16/19  Contact Date: 1/17/2019    Consent Verification:  Assessment Completed With: Patient  HIPAA Verified?   Yes    Discharge Dx:  Asthma exacerbation r/t viral syndrome    General:   • How have you refused to answer any other questions at this time and stated, \"I'm fine, I don't need anything and will call the Dr if I need something. Thank you for calling,\" and hung up on NCM. Pt to contact office with any further questions or concerns.   Med rec

## 2019-01-17 NOTE — TELEPHONE ENCOUNTER
Spoke to pt for TCM today. Pt does not have HFU appt scheduled at this time. TCM/HFU appt recommended by 1/23/19 as pt is a high risk for readmission. Please advise.     BOOK BY DATE (last date for TCM): 1/30/19    :  Please f/u with pt and try

## 2019-01-19 RX ORDER — METOPROLOL SUCCINATE 25 MG/1
TABLET, EXTENDED RELEASE ORAL
Qty: 270 TABLET | Refills: 1 | Status: SHIPPED | OUTPATIENT
Start: 2019-01-19 | End: 2019-07-19

## 2019-01-21 ENCOUNTER — OFFICE VISIT (OUTPATIENT)
Dept: FAMILY MEDICINE CLINIC | Facility: CLINIC | Age: 84
End: 2019-01-21
Payer: MEDICARE

## 2019-01-21 VITALS
RESPIRATION RATE: 18 BRPM | SYSTOLIC BLOOD PRESSURE: 110 MMHG | WEIGHT: 189 LBS | TEMPERATURE: 98 F | HEART RATE: 75 BPM | DIASTOLIC BLOOD PRESSURE: 84 MMHG | BODY MASS INDEX: 42.52 KG/M2 | OXYGEN SATURATION: 95 % | HEIGHT: 56 IN

## 2019-01-21 DIAGNOSIS — J12.9 PNEUMONIA, VIRAL: Primary | ICD-10-CM

## 2019-01-21 PROCEDURE — 99496 TRANSJ CARE MGMT HIGH F2F 7D: CPT | Performed by: FAMILY MEDICINE

## 2019-01-21 RX ORDER — ALBUTEROL SULFATE 2.5 MG/3ML
2.5 SOLUTION RESPIRATORY (INHALATION) EVERY 6 HOURS PRN
Qty: 75 VIAL | Refills: 3 | Status: ON HOLD | OUTPATIENT
Start: 2019-01-21 | End: 2020-01-04

## 2019-01-21 RX ORDER — AZITHROMYCIN 250 MG/1
TABLET, FILM COATED ORAL
Qty: 6 TABLET | Refills: 0 | Status: SHIPPED | OUTPATIENT
Start: 2019-01-21 | End: 2019-02-15 | Stop reason: ALTCHOICE

## 2019-01-21 NOTE — PROGRESS NOTES
Here with loving daughter. This past weekend was seen in the urgent care for cough shortness of breath and fatigue. She was subsequently hospitalized overnight and has done quite well.   She tested positively for coronavirus and was treated with reactive

## 2019-01-28 RX ORDER — LOSARTAN POTASSIUM 100 MG/1
TABLET ORAL
Qty: 90 TABLET | Refills: 1 | Status: SHIPPED | OUTPATIENT
Start: 2019-01-28 | End: 2019-06-10

## 2019-02-15 ENCOUNTER — OFFICE VISIT (OUTPATIENT)
Dept: FAMILY MEDICINE CLINIC | Facility: CLINIC | Age: 84
End: 2019-02-15
Payer: MEDICARE

## 2019-02-15 VITALS
HEIGHT: 56 IN | OXYGEN SATURATION: 92 % | RESPIRATION RATE: 18 BRPM | DIASTOLIC BLOOD PRESSURE: 80 MMHG | TEMPERATURE: 98 F | WEIGHT: 194 LBS | SYSTOLIC BLOOD PRESSURE: 124 MMHG | BODY MASS INDEX: 43.64 KG/M2 | HEART RATE: 97 BPM

## 2019-02-15 DIAGNOSIS — I10 HYPERTENSION, ACCELERATED: Primary | ICD-10-CM

## 2019-02-15 PROCEDURE — 99213 OFFICE O/P EST LOW 20 MIN: CPT | Performed by: FAMILY MEDICINE

## 2019-02-15 NOTE — PROGRESS NOTES
Follow-up for blood pressure control and peripheral edema. This is a woman who in spite of her age has often developed significant acceleration of blood pressure thankfully without complications.   She presents today however 5 pounds heavier with bilateral

## 2019-04-15 ENCOUNTER — OFFICE VISIT (OUTPATIENT)
Dept: FAMILY MEDICINE CLINIC | Facility: CLINIC | Age: 84
End: 2019-04-15
Payer: MEDICARE

## 2019-04-15 VITALS
BODY MASS INDEX: 41.17 KG/M2 | SYSTOLIC BLOOD PRESSURE: 118 MMHG | WEIGHT: 183 LBS | TEMPERATURE: 98 F | RESPIRATION RATE: 18 BRPM | OXYGEN SATURATION: 98 % | DIASTOLIC BLOOD PRESSURE: 68 MMHG | HEIGHT: 56 IN | HEART RATE: 64 BPM

## 2019-04-15 DIAGNOSIS — J44.9 BRONCHITIS WITH AIRWAY OBSTRUCTION (HCC): Primary | ICD-10-CM

## 2019-04-15 PROBLEM — J40 BRONCHITIS: Status: ACTIVE | Noted: 2019-04-15

## 2019-04-15 PROCEDURE — 99213 OFFICE O/P EST LOW 20 MIN: CPT | Performed by: FAMILY MEDICINE

## 2019-04-15 NOTE — PROGRESS NOTES
Patient is here for reevaluation. She is just now recuperating from a upper respiratory tract infection and feels moderately better. Since instituting a more aggressive program of Lasix for her she is lost 9 pounds and feels much better.   There is no c

## 2019-05-15 RX ORDER — LEVOTHYROXINE SODIUM 0.07 MG/1
TABLET ORAL
Qty: 90 TABLET | Refills: 3 | Status: SHIPPED | OUTPATIENT
Start: 2019-05-15 | End: 2020-06-17

## 2019-06-10 RX ORDER — LOSARTAN POTASSIUM 100 MG/1
TABLET ORAL
Qty: 90 TABLET | Refills: 1 | Status: SHIPPED | OUTPATIENT
Start: 2019-06-10 | End: 2019-12-02

## 2019-06-14 ENCOUNTER — OFFICE VISIT (OUTPATIENT)
Dept: FAMILY MEDICINE CLINIC | Facility: CLINIC | Age: 84
End: 2019-06-14
Payer: MEDICARE

## 2019-06-14 VITALS
HEIGHT: 56 IN | BODY MASS INDEX: 39.82 KG/M2 | DIASTOLIC BLOOD PRESSURE: 88 MMHG | OXYGEN SATURATION: 96 % | HEART RATE: 68 BPM | TEMPERATURE: 97 F | WEIGHT: 177 LBS | SYSTOLIC BLOOD PRESSURE: 130 MMHG | RESPIRATION RATE: 16 BRPM

## 2019-06-14 DIAGNOSIS — L56.8: Primary | ICD-10-CM

## 2019-06-14 PROCEDURE — 99213 OFFICE O/P EST LOW 20 MIN: CPT | Performed by: FAMILY MEDICINE

## 2019-06-14 RX ORDER — KETOCONAZOLE 20 MG/G
2 CREAM TOPICAL DAILY
Qty: 60 G | Refills: 3 | Status: SHIPPED | OUTPATIENT
Start: 2019-06-14 | End: 2021-03-24 | Stop reason: ALTCHOICE

## 2019-06-14 RX ORDER — BUDESONIDE AND FORMOTEROL FUMARATE DIHYDRATE 80; 4.5 UG/1; UG/1
2 AEROSOL RESPIRATORY (INHALATION) 2 TIMES DAILY
Qty: 3 INHALER | Refills: 3 | Status: SHIPPED | OUTPATIENT
Start: 2019-06-14 | End: 2019-12-20

## 2019-06-14 RX ORDER — FLUTICASONE PROPIONATE AND SALMETEROL 100; 50 UG/1; UG/1
1 POWDER RESPIRATORY (INHALATION) 2 TIMES DAILY
Qty: 3 EACH | Refills: 3 | Status: ON HOLD | OUTPATIENT
Start: 2019-06-14 | End: 2020-01-04

## 2019-06-14 RX ORDER — FLUTICASONE PROPIONATE AND SALMETEROL 100; 50 UG/1; UG/1
1 POWDER RESPIRATORY (INHALATION) 2 TIMES DAILY
Qty: 3 EACH | Refills: 3 | Status: SHIPPED | OUTPATIENT
Start: 2019-06-14 | End: 2019-09-20

## 2019-06-14 NOTE — PROGRESS NOTES
Here with daughter has had a chronic come and go rash in her upper extremities and proximal torso. No vesicles.   Is applying topical cortisone given to her by Dr. David Martin local dermatologist today's exam the lesions are morbilliform dermal in nature and bl

## 2019-07-03 RX ORDER — TORSEMIDE 20 MG/1
20 TABLET ORAL 2 TIMES DAILY
Qty: 60 TABLET | Refills: 1 | Status: SHIPPED | OUTPATIENT
Start: 2019-07-03 | End: 2019-09-20

## 2019-07-19 RX ORDER — METOPROLOL SUCCINATE 25 MG/1
TABLET, EXTENDED RELEASE ORAL
Qty: 270 TABLET | Refills: 1 | Status: SHIPPED | OUTPATIENT
Start: 2019-07-19 | End: 2020-01-13

## 2019-08-03 ENCOUNTER — OFFICE VISIT (OUTPATIENT)
Dept: FAMILY MEDICINE CLINIC | Facility: CLINIC | Age: 84
End: 2019-08-03
Payer: MEDICARE

## 2019-08-03 ENCOUNTER — HOSPITAL ENCOUNTER (OUTPATIENT)
Dept: GENERAL RADIOLOGY | Age: 84
Discharge: HOME | End: 2019-08-03
Attending: NURSE PRACTITIONER
Payer: MEDICARE

## 2019-08-03 VITALS
BODY MASS INDEX: 40.53 KG/M2 | DIASTOLIC BLOOD PRESSURE: 64 MMHG | SYSTOLIC BLOOD PRESSURE: 148 MMHG | TEMPERATURE: 98 F | OXYGEN SATURATION: 96 % | HEIGHT: 56 IN | RESPIRATION RATE: 20 BRPM | WEIGHT: 180.19 LBS | HEART RATE: 76 BPM

## 2019-08-03 DIAGNOSIS — J45.901 ACUTE BRONCHITIS WITH ASTHMA WITH ACUTE EXACERBATION: Primary | ICD-10-CM

## 2019-08-03 DIAGNOSIS — J20.9 ACUTE BRONCHITIS WITH ASTHMA WITH ACUTE EXACERBATION: Primary | ICD-10-CM

## 2019-08-03 DIAGNOSIS — R05.9 COUGH: ICD-10-CM

## 2019-08-03 PROCEDURE — 99214 OFFICE O/P EST MOD 30 MIN: CPT | Performed by: NURSE PRACTITIONER

## 2019-08-03 PROCEDURE — 71046 X-RAY EXAM CHEST 2 VIEWS: CPT | Performed by: NURSE PRACTITIONER

## 2019-08-03 RX ORDER — AZITHROMYCIN 250 MG/1
TABLET, FILM COATED ORAL
Qty: 6 TABLET | Refills: 0 | Status: SHIPPED | OUTPATIENT
Start: 2019-08-03 | End: 2019-12-20 | Stop reason: ALTCHOICE

## 2019-08-03 RX ORDER — PREDNISONE 10 MG/1
50 TABLET ORAL DAILY
Qty: 25 TABLET | Refills: 0 | Status: SHIPPED | OUTPATIENT
Start: 2019-08-03 | End: 2019-08-08

## 2019-08-03 NOTE — PROGRESS NOTES
CHIEF COMPLAINT:   Patient presents with:  Chest Congestion: cough, mucus, runny nose x 10 days      HPI:   Tico Nazario is a 80year old female who presents for upper respiratory symptoms for  10 days.  Patient reports congestion, cough with white/ Inhale into the lungs 2 (two) times daily. Disp:  Rfl:    Albuterol Sulfate HFA (PROAIR HFA) 108 (90 BASE) MCG/ACT Inhalation Aero Soln Inhale 2 puffs into the lungs every 6 (six) hours as needed for Wheezing.  Disp: 1 Inhaler Rfl: 0   Cod Liver Oil 1000 MG abdominal pain  NEURO: Denies headaches    EXAM:   /64 (BP Location: Right arm, Patient Position: Sitting, Cuff Size: large)   Pulse 76   Temp 97.9 °F (36.6 °C) (Oral)   Resp 20   Ht 56\"   Wt 180 lb 3.2 oz   SpO2 96%   BMI 40.40 kg/m²   GENERAL: wel return if sx's persist or worsen.

## 2019-09-20 ENCOUNTER — OFFICE VISIT (OUTPATIENT)
Dept: FAMILY MEDICINE CLINIC | Facility: CLINIC | Age: 84
End: 2019-09-20
Payer: MEDICARE

## 2019-09-20 VITALS
SYSTOLIC BLOOD PRESSURE: 128 MMHG | TEMPERATURE: 98 F | DIASTOLIC BLOOD PRESSURE: 78 MMHG | HEIGHT: 56 IN | RESPIRATION RATE: 16 BRPM | HEART RATE: 76 BPM | OXYGEN SATURATION: 98 % | BODY MASS INDEX: 39.59 KG/M2 | WEIGHT: 176 LBS

## 2019-09-20 DIAGNOSIS — M71.20 SYNOVIAL CYST OF POPLITEAL SPACE, UNSPECIFIED LATERALITY: Primary | ICD-10-CM

## 2019-09-20 DIAGNOSIS — M71.162 OTHER INFECTIVE BURSITIS, LEFT KNEE: ICD-10-CM

## 2019-09-20 PROCEDURE — G0008 ADMIN INFLUENZA VIRUS VAC: HCPCS | Performed by: FAMILY MEDICINE

## 2019-09-20 PROCEDURE — 90662 IIV NO PRSV INCREASED AG IM: CPT | Performed by: FAMILY MEDICINE

## 2019-09-20 PROCEDURE — 99214 OFFICE O/P EST MOD 30 MIN: CPT | Performed by: FAMILY MEDICINE

## 2019-09-20 RX ORDER — TORSEMIDE 20 MG/1
20 TABLET ORAL 2 TIMES DAILY
Qty: 180 TABLET | Refills: 3 | Status: SHIPPED | OUTPATIENT
Start: 2019-09-20 | End: 2020-03-18

## 2019-09-20 NOTE — PROGRESS NOTES
Here because of some nontender red spots on her proximal left lateral forearm. She denies specific trauma. She is not on high-dose aspirin nor any other anticoagulants.   She has not fallen    We then spent time discussing her inhalers I renewed her [facility]

## 2019-09-27 RX ORDER — POTASSIUM CHLORIDE 20 MEQ/1
TABLET, EXTENDED RELEASE ORAL
Qty: 180 TABLET | Refills: 3 | Status: SHIPPED | OUTPATIENT
Start: 2019-09-27 | End: 2020-03-17

## 2019-10-17 ENCOUNTER — HOSPITAL ENCOUNTER (OUTPATIENT)
Dept: ULTRASOUND IMAGING | Facility: HOSPITAL | Age: 84
Discharge: HOME | End: 2019-10-17
Attending: FAMILY MEDICINE
Payer: MEDICARE

## 2019-10-17 DIAGNOSIS — M71.20 SYNOVIAL CYST OF POPLITEAL SPACE, UNSPECIFIED LATERALITY: ICD-10-CM

## 2019-10-17 DIAGNOSIS — M71.162 OTHER INFECTIVE BURSITIS, LEFT KNEE: ICD-10-CM

## 2019-10-17 PROCEDURE — 76882 US LMTD JT/FCL EVL NVASC XTR: CPT | Performed by: FAMILY MEDICINE

## 2019-12-02 RX ORDER — CLONIDINE HYDROCHLORIDE 0.1 MG/1
TABLET ORAL
Qty: 180 TABLET | Refills: 1 | Status: ON HOLD | OUTPATIENT
Start: 2019-12-02 | End: 2020-01-03

## 2019-12-02 RX ORDER — LOSARTAN POTASSIUM 100 MG/1
TABLET ORAL
Qty: 90 TABLET | Refills: 1 | Status: SHIPPED | OUTPATIENT
Start: 2019-12-02 | End: 2020-06-08

## 2019-12-20 ENCOUNTER — OFFICE VISIT (OUTPATIENT)
Dept: FAMILY MEDICINE CLINIC | Facility: CLINIC | Age: 84
End: 2019-12-20
Payer: MEDICARE

## 2019-12-20 VITALS
HEART RATE: 108 BPM | HEIGHT: 56 IN | BODY MASS INDEX: 40.94 KG/M2 | DIASTOLIC BLOOD PRESSURE: 40 MMHG | SYSTOLIC BLOOD PRESSURE: 116 MMHG | RESPIRATION RATE: 16 BRPM | WEIGHT: 182 LBS | TEMPERATURE: 98 F | OXYGEN SATURATION: 96 %

## 2019-12-20 DIAGNOSIS — I87.2 EDEMA OF LEFT LOWER EXTREMITY DUE TO PERIPHERAL VENOUS INSUFFICIENCY: Primary | ICD-10-CM

## 2019-12-20 PROCEDURE — 99214 OFFICE O/P EST MOD 30 MIN: CPT | Performed by: FAMILY MEDICINE

## 2019-12-27 ENCOUNTER — OFFICE VISIT (OUTPATIENT)
Dept: FAMILY MEDICINE CLINIC | Facility: CLINIC | Age: 84
End: 2019-12-27
Payer: MEDICARE

## 2019-12-27 VITALS
WEIGHT: 181.19 LBS | HEIGHT: 56 IN | BODY MASS INDEX: 40.76 KG/M2 | TEMPERATURE: 98 F | HEART RATE: 78 BPM | OXYGEN SATURATION: 95 % | DIASTOLIC BLOOD PRESSURE: 58 MMHG | RESPIRATION RATE: 22 BRPM | SYSTOLIC BLOOD PRESSURE: 132 MMHG

## 2019-12-27 DIAGNOSIS — R05.9 COUGH: ICD-10-CM

## 2019-12-27 DIAGNOSIS — J01.00 ACUTE MAXILLARY SINUSITIS, RECURRENCE NOT SPECIFIED: Primary | ICD-10-CM

## 2019-12-27 PROCEDURE — 99213 OFFICE O/P EST LOW 20 MIN: CPT | Performed by: NURSE PRACTITIONER

## 2019-12-27 RX ORDER — PREDNISONE 20 MG/1
TABLET ORAL
Qty: 10 TABLET | Refills: 0 | Status: ON HOLD | OUTPATIENT
Start: 2019-12-27 | End: 2020-01-04

## 2019-12-27 RX ORDER — AZITHROMYCIN 250 MG/1
TABLET, FILM COATED ORAL
Qty: 6 TABLET | Refills: 0 | Status: ON HOLD | OUTPATIENT
Start: 2019-12-27 | End: 2020-01-03

## 2019-12-27 NOTE — PROGRESS NOTES
CHIEF COMPLAINT:   Patient presents with:  Cough: chest congestion, runny nose, over 1 week       HPI:   Heriberto Espinoza is a 80year old female who presents for upper respiratory symptoms for  10 days.  Patient reports nasal congestion, runny nose, coug • ketoconazole 2 % External Cream Apply 2 Application topically daily.  (Patient not taking: Reported on 12/27/2019 ) 60 g 3   • fluticasone-salmeterol (ADVAIR DISKUS) 100-50 MCG/DOSE Inhalation Aerosol Powder, Breath Activated Inhale 1 puff into the lungs EYES: conjunctiva clear, EOM intact  EARS: TM's grey, no bulging, no retraction, no fluid, bony landmarks visible  NOSE: Nostrils patent, clear nasal discharge, nasal mucosa mild erythema   THROAT: Oral mucosa pink, moist. Posterior pharynx is not erythema Drinking extra fluids helps thin your mucus. This lets it drain from your sinuses more easily. Have a glass of water every hour or two. A humidifier helps in much the same way. Fluids can also offset the drying effects of certain medicines.  If you use a hu

## 2019-12-29 ENCOUNTER — HOSPITAL ENCOUNTER (INPATIENT)
Facility: HOSPITAL | Age: 84
LOS: 5 days | Discharge: HOME | DRG: 193 | End: 2020-01-04
Attending: EMERGENCY MEDICINE | Admitting: STUDENT IN AN ORGANIZED HEALTH CARE EDUCATION/TRAINING PROGRAM
Payer: MEDICARE

## 2019-12-29 ENCOUNTER — APPOINTMENT (OUTPATIENT)
Dept: GENERAL RADIOLOGY | Age: 84
DRG: 193 | End: 2019-12-29
Attending: EMERGENCY MEDICINE
Payer: MEDICARE

## 2019-12-29 DIAGNOSIS — J45.52 SEVERE PERSISTENT ASTHMA WITH STATUS ASTHMATICUS: Primary | ICD-10-CM

## 2019-12-29 DIAGNOSIS — J18.9 COMMUNITY ACQUIRED PNEUMONIA OF RIGHT LUNG, UNSPECIFIED PART OF LUNG: ICD-10-CM

## 2019-12-29 DIAGNOSIS — J96.00 ACUTE RESPIRATORY FAILURE, UNSPECIFIED WHETHER WITH HYPOXIA OR HYPERCAPNIA (HCC): ICD-10-CM

## 2019-12-29 PROBLEM — J44.1 COPD EXACERBATION (HCC): Status: ACTIVE | Noted: 2019-12-29

## 2019-12-29 LAB
BASOPHILS # BLD AUTO: 0.03 X10(3) UL (ref 0–0.2)
BASOPHILS NFR BLD AUTO: 0.3 %
DEPRECATED RDW RBC AUTO: 50.6 FL (ref 35.1–46.3)
EOSINOPHIL # BLD AUTO: 0 X10(3) UL (ref 0–0.7)
EOSINOPHIL NFR BLD AUTO: 0 %
ERYTHROCYTE [DISTWIDTH] IN BLOOD BY AUTOMATED COUNT: 14.1 % (ref 11–15)
HCT VFR BLD AUTO: 45 % (ref 35–48)
HGB BLD-MCNC: 14.7 G/DL (ref 12–16)
IMM GRANULOCYTES # BLD AUTO: 0.06 X10(3) UL (ref 0–1)
IMM GRANULOCYTES NFR BLD: 0.6 %
LYMPHOCYTES # BLD AUTO: 1.52 X10(3) UL (ref 1–4)
LYMPHOCYTES NFR BLD AUTO: 15.2 %
MCH RBC QN AUTO: 31.9 PG (ref 26–34)
MCHC RBC AUTO-ENTMCNC: 32.7 G/DL (ref 31–37)
MCV RBC AUTO: 97.6 FL (ref 80–100)
MONOCYTES # BLD AUTO: 0.84 X10(3) UL (ref 0.1–1)
MONOCYTES NFR BLD AUTO: 8.4 %
NEUTROPHILS # BLD AUTO: 7.54 X10 (3) UL (ref 1.5–7.7)
NEUTROPHILS # BLD AUTO: 7.54 X10(3) UL (ref 1.5–7.7)
NEUTROPHILS NFR BLD AUTO: 75.5 %
PLATELET # BLD AUTO: 252 10(3)UL (ref 150–450)
RBC # BLD AUTO: 4.61 X10(6)UL (ref 3.8–5.3)
WBC # BLD AUTO: 10 X10(3) UL (ref 4–11)

## 2019-12-29 PROCEDURE — 71045 X-RAY EXAM CHEST 1 VIEW: CPT | Performed by: EMERGENCY MEDICINE

## 2019-12-29 RX ORDER — METHYLPREDNISOLONE SODIUM SUCCINATE 125 MG/2ML
125 INJECTION, POWDER, LYOPHILIZED, FOR SOLUTION INTRAMUSCULAR; INTRAVENOUS ONCE
Status: COMPLETED | OUTPATIENT
Start: 2019-12-29 | End: 2019-12-30

## 2019-12-29 RX ORDER — SODIUM CHLORIDE 9 MG/ML
125 INJECTION, SOLUTION INTRAVENOUS CONTINUOUS
Status: DISCONTINUED | OUTPATIENT
Start: 2019-12-29 | End: 2019-12-30

## 2019-12-30 ENCOUNTER — APPOINTMENT (OUTPATIENT)
Dept: CV DIAGNOSTICS | Facility: HOSPITAL | Age: 84
DRG: 193 | End: 2019-12-30
Attending: STUDENT IN AN ORGANIZED HEALTH CARE EDUCATION/TRAINING PROGRAM
Payer: MEDICARE

## 2019-12-30 PROBLEM — J96.00 ACUTE RESPIRATORY FAILURE, UNSPECIFIED WHETHER WITH HYPOXIA OR HYPERCAPNIA (HCC): Status: ACTIVE | Noted: 2019-12-30

## 2019-12-30 PROBLEM — J18.9 COMMUNITY ACQUIRED PNEUMONIA OF RIGHT LUNG, UNSPECIFIED PART OF LUNG: Status: ACTIVE | Noted: 2019-12-30

## 2019-12-30 PROBLEM — J45.52 SEVERE PERSISTENT ASTHMA WITH STATUS ASTHMATICUS: Status: ACTIVE | Noted: 2019-12-30

## 2019-12-30 PROBLEM — J45.52 SEVERE PERSISTENT ASTHMA WITH STATUS ASTHMATICUS (HCC): Status: ACTIVE | Noted: 2019-12-30

## 2019-12-30 LAB
ADENOVIRUS PCR:: NEGATIVE
ALBUMIN SERPL-MCNC: 3.4 G/DL (ref 3.4–5)
ALBUMIN/GLOB SERPL: 0.7 {RATIO} (ref 1–2)
ALP LIVER SERPL-CCNC: 74 U/L (ref 55–142)
ALT SERPL-CCNC: 42 U/L (ref 13–56)
ANION GAP SERPL CALC-SCNC: 10 MMOL/L (ref 0–18)
ANION GAP SERPL CALC-SCNC: 12 MMOL/L (ref 0–18)
APTT PPP: 20.7 SECONDS (ref 25.4–36.1)
AST SERPL-CCNC: 43 U/L (ref 15–37)
ATRIAL RATE: 75 BPM
ATRIAL RATE: 93 BPM
ATRIAL RATE: 96 BPM
B PERT DNA SPEC QL NAA+PROBE: NEGATIVE
BILIRUB SERPL-MCNC: 0.3 MG/DL (ref 0.1–2)
BILIRUB UR QL STRIP.AUTO: NEGATIVE
BUN BLD-MCNC: 23 MG/DL (ref 7–18)
BUN BLD-MCNC: 24 MG/DL (ref 7–18)
BUN/CREAT SERPL: 19.2 (ref 10–20)
BUN/CREAT SERPL: 19.7 (ref 10–20)
C PNEUM DNA SPEC QL NAA+PROBE: NEGATIVE
CALCIUM BLD-MCNC: 8.7 MG/DL (ref 8.5–10.1)
CALCIUM BLD-MCNC: 8.9 MG/DL (ref 8.5–10.1)
CHLORIDE SERPL-SCNC: 101 MMOL/L (ref 98–112)
CHLORIDE SERPL-SCNC: 99 MMOL/L (ref 98–112)
CLARITY UR REFRACT.AUTO: CLEAR
CO2 SERPL-SCNC: 24 MMOL/L (ref 21–32)
CO2 SERPL-SCNC: 24 MMOL/L (ref 21–32)
COLOR UR AUTO: YELLOW
CORONAVIRUS 229E PCR:: NEGATIVE
CORONAVIRUS HKU1 PCR:: NEGATIVE
CORONAVIRUS NL63 PCR:: NEGATIVE
CORONAVIRUS OC43 PCR:: NEGATIVE
CREAT BLD-MCNC: 1.2 MG/DL (ref 0.55–1.02)
CREAT BLD-MCNC: 1.22 MG/DL (ref 0.55–1.02)
DEPRECATED RDW RBC AUTO: 47.9 FL (ref 35.1–46.3)
ERYTHROCYTE [DISTWIDTH] IN BLOOD BY AUTOMATED COUNT: 13.5 % (ref 11–15)
FLUAV RNA SPEC QL NAA+PROBE: NEGATIVE
FLUBV RNA SPEC QL NAA+PROBE: NEGATIVE
GLOBULIN PLAS-MCNC: 4.6 G/DL (ref 2.8–4.4)
GLUCOSE BLD-MCNC: 134 MG/DL (ref 70–99)
GLUCOSE BLD-MCNC: 202 MG/DL (ref 70–99)
GLUCOSE UR STRIP.AUTO-MCNC: NEGATIVE MG/DL
HCT VFR BLD AUTO: 41.1 % (ref 35–48)
HGB BLD-MCNC: 13.6 G/DL (ref 12–16)
INR BLD: 0.97 (ref 0.9–1.1)
ISTAT BLOOD GAS BASE EXCESS: 3 MMOL/L (ref ?–30)
ISTAT BLOOD GAS HCO3: 28.1 MEQ/L (ref 22–26)
ISTAT BLOOD GAS O2 SATURATION: 100 % (ref 92–100)
ISTAT BLOOD GAS PCO2: 47.3 MMHG (ref 35–45)
ISTAT BLOOD GAS PH: 7.38 (ref 7.35–7.45)
ISTAT BLOOD GAS PO2: 215 MMHG (ref 80–105)
ISTAT BLOOD GAS TCO2: 30 MMOL/L (ref 22–32)
KETONES UR STRIP.AUTO-MCNC: NEGATIVE MG/DL
L PNEUMO AG UR QL: NEGATIVE
LACTATE SERPL-SCNC: 2.7 MMOL/L (ref 0.4–2)
LACTATE SERPL-SCNC: 2.9 MMOL/L (ref 0.4–2)
LACTATE SERPL-SCNC: 3.7 MMOL/L (ref 0.4–2)
M PROTEIN MFR SERPL ELPH: 8 G/DL (ref 6.4–8.2)
MCH RBC QN AUTO: 31.5 PG (ref 26–34)
MCHC RBC AUTO-ENTMCNC: 33.1 G/DL (ref 31–37)
MCV RBC AUTO: 95.1 FL (ref 80–100)
METAPNEUMOVIRUS PCR:: NEGATIVE
MYCOPLASMA PNEUMONIA PCR:: NEGATIVE
NITRITE UR QL STRIP.AUTO: NEGATIVE
NT-PROBNP SERPL-MCNC: 4292 PG/ML (ref ?–450)
OSMOLALITY SERPL CALC.SUM OF ELEC: 286 MOSM/KG (ref 275–295)
OSMOLALITY SERPL CALC.SUM OF ELEC: 289 MOSM/KG (ref 275–295)
P AXIS: 38 DEGREES
P AXIS: 38 DEGREES
P AXIS: 56 DEGREES
P-R INTERVAL: 118 MS
P-R INTERVAL: 130 MS
P-R INTERVAL: 140 MS
PARAINFLUENZA 1 PCR:: NEGATIVE
PARAINFLUENZA 2 PCR:: NEGATIVE
PARAINFLUENZA 3 PCR:: NEGATIVE
PARAINFLUENZA 4 PCR:: NEGATIVE
PH UR STRIP.AUTO: 5 [PH] (ref 4.5–8)
PLATELET # BLD AUTO: 220 10(3)UL (ref 150–450)
POCT INFLUENZA A: NEGATIVE
POCT INFLUENZA B: NEGATIVE
POTASSIUM SERPL-SCNC: 4 MMOL/L (ref 3.5–5.1)
POTASSIUM SERPL-SCNC: 4 MMOL/L (ref 3.5–5.1)
PROCALCITONIN SERPL-MCNC: 0.29 NG/ML
PROT UR STRIP.AUTO-MCNC: NEGATIVE MG/DL
PSA SERPL DL<=0.01 NG/ML-MCNC: 13 SECONDS (ref 12.2–14.4)
Q-T INTERVAL: 358 MS
Q-T INTERVAL: 376 MS
Q-T INTERVAL: 426 MS
QRS DURATION: 68 MS
QTC CALCULATION (BEZET): 452 MS
QTC CALCULATION (BEZET): 467 MS
QTC CALCULATION (BEZET): 475 MS
R AXIS: -12 DEGREES
R AXIS: -7 DEGREES
R AXIS: 7 DEGREES
RBC # BLD AUTO: 4.32 X10(6)UL (ref 3.8–5.3)
RHINOVIRUS/ENTERO PCR:: NEGATIVE
RSV RNA SPEC QL NAA+PROBE: POSITIVE
SODIUM SERPL-SCNC: 135 MMOL/L (ref 136–145)
SODIUM SERPL-SCNC: 135 MMOL/L (ref 136–145)
SP GR UR STRIP.AUTO: 1.02 (ref 1–1.03)
STREP PNEUMO ANTIGEN, URINE: NEGATIVE
T AXIS: -22 DEGREES
T AXIS: 75 DEGREES
T AXIS: 85 DEGREES
TROPONIN I SERPL-MCNC: 0.35 NG/ML (ref ?–0.04)
TROPONIN I SERPL-MCNC: 0.75 NG/ML (ref ?–0.04)
TSI SER-ACNC: 1.4 MIU/ML (ref 0.36–3.74)
UROBILINOGEN UR STRIP.AUTO-MCNC: <2 MG/DL
VENTRICULAR RATE: 75 BPM
VENTRICULAR RATE: 93 BPM
VENTRICULAR RATE: 96 BPM
WBC # BLD AUTO: 9.2 X10(3) UL (ref 4–11)

## 2019-12-30 PROCEDURE — 93306 TTE W/DOPPLER COMPLETE: CPT | Performed by: STUDENT IN AN ORGANIZED HEALTH CARE EDUCATION/TRAINING PROGRAM

## 2019-12-30 PROCEDURE — 99291 CRITICAL CARE FIRST HOUR: CPT | Performed by: NURSE PRACTITIONER

## 2019-12-30 PROCEDURE — 99221 1ST HOSP IP/OBS SF/LOW 40: CPT | Performed by: INTERNAL MEDICINE

## 2019-12-30 PROCEDURE — 99223 1ST HOSP IP/OBS HIGH 75: CPT | Performed by: STUDENT IN AN ORGANIZED HEALTH CARE EDUCATION/TRAINING PROGRAM

## 2019-12-30 PROCEDURE — 5A09357 ASSISTANCE WITH RESPIRATORY VENTILATION, LESS THAN 24 CONSECUTIVE HOURS, CONTINUOUS POSITIVE AIRWAY PRESSURE: ICD-10-PCS | Performed by: HOSPITALIST

## 2019-12-30 RX ORDER — ACETAMINOPHEN 325 MG/1
650 TABLET ORAL EVERY 6 HOURS PRN
Status: DISCONTINUED | OUTPATIENT
Start: 2019-12-30 | End: 2020-01-04

## 2019-12-30 RX ORDER — SODIUM CHLORIDE 9 MG/ML
INJECTION, SOLUTION INTRAVENOUS CONTINUOUS
Status: DISCONTINUED | OUTPATIENT
Start: 2019-12-30 | End: 2020-01-02

## 2019-12-30 RX ORDER — ENOXAPARIN SODIUM 100 MG/ML
40 INJECTION SUBCUTANEOUS DAILY
Status: DISCONTINUED | OUTPATIENT
Start: 2019-12-30 | End: 2020-01-04

## 2019-12-30 RX ORDER — FAMOTIDINE 10 MG/ML
20 INJECTION, SOLUTION INTRAVENOUS DAILY
Status: DISCONTINUED | OUTPATIENT
Start: 2019-12-30 | End: 2020-01-04

## 2019-12-30 RX ORDER — ALBUTEROL SULFATE 2.5 MG/3ML
2.5 SOLUTION RESPIRATORY (INHALATION) EVERY 2 HOUR PRN
Status: DISCONTINUED | OUTPATIENT
Start: 2019-12-30 | End: 2020-01-04

## 2019-12-30 RX ORDER — FUROSEMIDE 10 MG/ML
20 INJECTION INTRAMUSCULAR; INTRAVENOUS ONCE
Status: COMPLETED | OUTPATIENT
Start: 2019-12-30 | End: 2019-12-30

## 2019-12-30 RX ORDER — DEXTROSE AND SODIUM CHLORIDE 5; .45 G/100ML; G/100ML
INJECTION, SOLUTION INTRAVENOUS CONTINUOUS
Status: DISCONTINUED | OUTPATIENT
Start: 2019-12-30 | End: 2019-12-30

## 2019-12-30 RX ORDER — ASPIRIN 81 MG/1
TABLET, CHEWABLE ORAL
Status: DISPENSED
Start: 2019-12-30 | End: 2019-12-30

## 2019-12-30 RX ORDER — ASPIRIN 325 MG
TABLET ORAL
Status: COMPLETED
Start: 2019-12-30 | End: 2019-12-30

## 2019-12-30 RX ORDER — POLYETHYLENE GLYCOL 3350 17 G/17G
17 POWDER, FOR SOLUTION ORAL DAILY PRN
Status: DISCONTINUED | OUTPATIENT
Start: 2019-12-30 | End: 2020-01-04

## 2019-12-30 RX ORDER — LEVOTHYROXINE SODIUM 0.07 MG/1
75 TABLET ORAL
Status: DISCONTINUED | OUTPATIENT
Start: 2019-12-30 | End: 2020-01-04

## 2019-12-30 RX ORDER — ONDANSETRON 2 MG/ML
4 INJECTION INTRAMUSCULAR; INTRAVENOUS EVERY 6 HOURS PRN
Status: DISCONTINUED | OUTPATIENT
Start: 2019-12-30 | End: 2020-01-04

## 2019-12-30 RX ORDER — METHYLPREDNISOLONE SODIUM SUCCINATE 125 MG/2ML
80 INJECTION, POWDER, LYOPHILIZED, FOR SOLUTION INTRAMUSCULAR; INTRAVENOUS EVERY 6 HOURS
Status: DISCONTINUED | OUTPATIENT
Start: 2019-12-30 | End: 2019-12-31

## 2019-12-30 RX ORDER — IPRATROPIUM BROMIDE AND ALBUTEROL SULFATE 2.5; .5 MG/3ML; MG/3ML
3 SOLUTION RESPIRATORY (INHALATION)
Status: DISCONTINUED | OUTPATIENT
Start: 2019-12-30 | End: 2020-01-04

## 2019-12-30 RX ORDER — METOPROLOL SUCCINATE 50 MG/1
50 TABLET, EXTENDED RELEASE ORAL
Status: DISCONTINUED | OUTPATIENT
Start: 2019-12-30 | End: 2020-01-04

## 2019-12-30 RX ORDER — METOCLOPRAMIDE HYDROCHLORIDE 5 MG/ML
10 INJECTION INTRAMUSCULAR; INTRAVENOUS EVERY 8 HOURS PRN
Status: DISCONTINUED | OUTPATIENT
Start: 2019-12-30 | End: 2020-01-04

## 2019-12-30 RX ORDER — SODIUM PHOSPHATE, DIBASIC AND SODIUM PHOSPHATE, MONOBASIC 7; 19 G/133ML; G/133ML
1 ENEMA RECTAL ONCE AS NEEDED
Status: DISCONTINUED | OUTPATIENT
Start: 2019-12-30 | End: 2020-01-04

## 2019-12-30 RX ORDER — METOPROLOL SUCCINATE 25 MG/1
25 TABLET, EXTENDED RELEASE ORAL NIGHTLY
Status: DISCONTINUED | OUTPATIENT
Start: 2019-12-30 | End: 2020-01-04

## 2019-12-30 RX ORDER — CLONIDINE HYDROCHLORIDE 0.1 MG/1
0.1 TABLET ORAL NIGHTLY
Status: DISCONTINUED | OUTPATIENT
Start: 2019-12-30 | End: 2020-01-04

## 2019-12-30 RX ORDER — BISACODYL 10 MG
10 SUPPOSITORY, RECTAL RECTAL
Status: DISCONTINUED | OUTPATIENT
Start: 2019-12-30 | End: 2020-01-04

## 2019-12-30 RX ORDER — LOSARTAN POTASSIUM 100 MG/1
100 TABLET ORAL
Status: DISCONTINUED | OUTPATIENT
Start: 2019-12-30 | End: 2020-01-02

## 2019-12-30 RX ORDER — METOPROLOL SUCCINATE 25 MG/1
25 TABLET, EXTENDED RELEASE ORAL
Status: DISCONTINUED | OUTPATIENT
Start: 2019-12-30 | End: 2019-12-30

## 2019-12-30 RX ORDER — ASPIRIN 81 MG/1
324 TABLET, CHEWABLE ORAL ONCE
Status: COMPLETED | OUTPATIENT
Start: 2019-12-30 | End: 2019-12-30

## 2019-12-30 RX ORDER — ASPIRIN 81 MG/1
324 TABLET, CHEWABLE ORAL ONCE
Status: DISCONTINUED | OUTPATIENT
Start: 2019-12-30 | End: 2019-12-30

## 2019-12-30 NOTE — CONSULTS
[facility] [address] Note [facility]  Report of Consultation    Nataly Cameron Patient Status:  Inpatient    1932 MRN [id number]   [place] 4SW-A Attending Ramon, about 28 years ago. She has never used smokeless tobacco. She reports current alcohol use. She reports that she does not use drugs. Allergies:     Ace Inhibitors          Coughing  Bactrim [Sulfametho*    NAUSEA ONLY    Medications:  • piperacillin-tazob swallowing, speech problems, gait problems and weakness. : Denies dysuria, hematuria, urinary retention. Behavioral/Psych: Normal affect, mood, speech. No AVH, No SI/HI    All other review of systems are negative.     Vital signs in last 24 hours:  Yohana Normal strength and sensation in all extremities  EXT: No overt deformities, No clubbing, +BLE edema, 2+ DP/PT pulses b/l   SKIN: No rashes, ulcers, nodules    Lab Data Review:  Recent Labs   Lab 12/30/19  0010 12/30/19  0452   * 202*   BUN 24* 23* acquired  · RSV infection  · Asthma exacerbation due to above   · Chest pain/pressure, elevated troponin - likely type II MI due to above, doubt ACS  · HTN  · Hypothyroidism  · obesity    PLAN    · Continue empiric abx for possible pneumonia, zosyn day 1

## 2019-12-30 NOTE — PROGRESS NOTES
12/30/19 0977   Clinical Encounter Type   Visited With Patient and family together   Routine Visit Introduction   Referral From Other (Comment)  (Consult. )   Referral To    [employer] Encounters   Episcopalian Needs Prayer

## 2019-12-30 NOTE — CONSULTS
[facility] Pharmacy Note:  Renal Dose Adjustment    Inga Gautam has been prescribed famotidine (PEPCID) 20 mg intravenously every 12 hours. Estimated Creatinine Clearance: 42.8 mL/min (A) (based on SCr of 1.2 mg/dL (H)).     Her calculated creatinine brannon

## 2019-12-30 NOTE — H&P
[facility]  History and Physical     Inga Gautam Patient Status:  Inpatient    1932 MRN [id number]   [facility] 4SW-A Attending Aylin Yanes MD   Hosp Day # 0 PCP Priti Lira DO     Chief Complaint: SOB    Histo tab PO QD x 4 days, Disp: 6 tablet, Rfl: 0  predniSONE 20 MG Oral Tab, 2 tabs PO QAM x 5 days, Disp: 10 tablet, Rfl: 0  LOSARTAN 100 MG Oral Tab, TAKE 1 TABLET BY MOUTH ONCE DAILY, Disp: 90 tablet, Rfl: 1  CLONIDINE HCL 0.1 MG Oral Tab, TAKE 1 TABLET BY MO times daily. (Patient not taking: Reported on 12/27/2019 ), Disp: 3 each, Rfl: 3        Review of Systems:   A comprehensive 14 point review of systems was completed. Pertinent positives and negatives noted in the HPI.     Physical Exam:    /43 (BP levothyroxine 75 mcg QD   4. CKD  5. Hypertension  1. Resume ARB, clonidine , BB   6. Obesity   7. Elevated troponin  1.  Trend CE  2. Echo  3. CArds CS    Quality:  · DVT Prophylaxis: lovenox  · CODE status: full  · Smith: no    Plan of care discussed with

## 2019-12-30 NOTE — PROGRESS NOTES
ICU  Critical Care APRN Progress Note    NAME: Vanesa Johnson - ROOM: [facility]J  MRN: [id number] - Age: 80year old - :1932    History Of Present Illness:  Vanesa Johnson is a 80year old female with PMHx significant for asthma, hypothyroid 147/78   Pulse 100   Temp 97.2 °F (36.2 °C) (Temporal)   Resp (!) 27   Ht 142.2 cm (4' 8\")   Wt 181 lb (82.1 kg)   SpO2 100%   BMI 40.58 kg/m²     Physical Exam:    General Appearance: Alert, cooperative, on BiPAP, appears stated age  Neck: No JVD, neck s demand but did have chest pressure  -Chest pressure relieved by BiPAP  -EKG nonspecific t wave changes  - mg given in ED  -Repeat level in 6h  -Repeat EKG in am    4. HTN, CHF - right sided with preserved EF  -pBNP 4292  -Resume home meds    5.  COPD

## 2019-12-30 NOTE — PROGRESS NOTES
12/30/19 0524   BiPAP   $ RT Standby Charge (per 15 min) 1   Device V60   BiPAP / CPAP CE# 6085   Mode Spontaneous/Timed   Interface Full face mask   Mask Size Medium   Control Settings   Set Rate 14 breaths/min   Set IPAP 12   Set EPAP 5   Oxygen Perce

## 2019-12-30 NOTE — RESPIRATORY THERAPY NOTE
RT Lynda Miller was contacted with ABG results and Bipap settings. Will call before patient is in transport.

## 2019-12-30 NOTE — RESPIRATORY THERAPY NOTE
i-STAT Testing  Site RR    Time 0012    [facility] test result positive    Clinical data    Ph 7.38  PCO2 47.3  PO2 215  BE 3  HCO3 28.1  sP02 100%    Results called and verified. Results read back.

## 2019-12-30 NOTE — RESPIRATORY THERAPY NOTE
RT Dada Lovelace was informed of ETA as patient left in transport. Patient finished hour long neb. Patient was sent over on Bipap 12/5 R 16 and 30% Fio2.

## 2019-12-30 NOTE — CONSULTS
[facility]    Report of Consultation    Minal Splinter Patient Status:  Inpatient    1932 MRN [id number]   [facility] 4SW-A Attending Mague Daigle MD   [facility] Day # 0 PCP Brian Rivera DO     Date of Admission:   Enoxaparin Sodium (LOVENOX) 40 MG/0.4ML injection 40 mg, 40 mg, Subcutaneous, Daily  •  acetaminophen (TYLENOL) tab 650 mg, 650 mg, Oral, Q6H PRN  •  ondansetron HCl (ZOFRAN) injection 4 mg, 4 mg, Intravenous, Q6H PRN  •  Metoclopramide HCl (REGLAN) inject (82.1 kg), SpO2 97 %, not currently breastfeeding.   Temp (24hrs), Av.9 °F (36.6 °C), Min:97.2 °F (36.2 °C), Max:99 °F (37.2 °C)    Wt Readings from Last 3 Encounters:  19 : 181 lb (82.1 kg)  19 : 181 lb 3.2 oz (82.2 kg)  19 : 182 lb ( Dyspnea, unspecified type     Extremity edema     Cough     Hyperglycemia     Viral upper respiratory tract infection     Moderate persistent asthma with exacerbation     Ventricular ectopy     Acute respiratory distress     Bronchitis     COPD exacerbatio

## 2019-12-30 NOTE — PLAN OF CARE
Received pt this am aox4. Daughter, Pj Aguiar, at bedside. Pt on bipap this am, to nc late this am per RT; tolerating well. Allowing sips and to clear liquid diet per pulm.      Echo completed this am.       Problem: RESPIRATORY - ADULT  Goal: Achieves optima

## 2019-12-30 NOTE — PLAN OF CARE
Pt from [place] ED to room 471. CRISTIAN FALCON aware of pt arrival to unit. Pt alert and oriented x4. Maintaining adequate O2 saturations on BIPAP. No complaints of pain, afebrile. Pt oriented to unit, all questions answered.  Pt resting comfortably in bed,

## 2019-12-30 NOTE — ED PROVIDER NOTES
Patient Seen in: [facility] 4sw-a      History   Patient presents with:  Dyspnea ZACK SOB    Stated Complaint: zack    HPI    This is an 70-year-old female with past medical history of asthma, hypothyroidism, osteoarthritis, pneumonia, hypertension, v 0001 134/31   Pulse 12/30/19 0001 84   Resp 12/30/19 0001 (!) 28   Temp 12/30/19 0108 99 °F (37.2 °C)   Temp src 12/30/19 0108 Temporal   SpO2 12/30/19 0001 (!) 80 %   O2 Device 12/30/19 0001 None (Room air)       Current:/52   Pulse 78   Temp 97.2 ° within normal limits   POCT ISTAT G3 CARTRIDGE - Abnormal; Notable for the following components:    ISTAT Blood Gas PCO2 47.3 (*)     ISTAT Blood Gas PO2 215 (*)     ISTAT Blood Gas HCO3 28.1 (*)     All other components within normal limits   CBC W/ DIFFE She became conversational.  She was able to answer questions. Basic labs were obtained. CBC: White blood cell count 10. Hemoglobin 14.7. Platelet 863. CMP: BUN 24. Creatinine 1.2. Glucose 134. Bicarb 24. Troponin was slightly elevated at 0.353. Discharge Medication List                   Present on Admission  Date Reviewed: 12/27/2019          ICD-10-CM Noted POA    * (Principal) Severe persistent asthma with status asthmaticus J45.52 12/30/2019 Unknown    Acute respiratory failure, unspecified w

## 2019-12-30 NOTE — RESPIRATORY THERAPY NOTE
Patient came in SOB sats were 77%. Patient placed on Bipap V60 12/5 at 50% Fio2. Patient has a continuous Albuterol 10 mg neb going through in line.   ABG was resulted and Fio2 was lowered to 21% while the Continuous neb is still going because of PO2 of 2

## 2019-12-30 NOTE — PROGRESS NOTES
[facility]  Progress Note     Salma Sanders Patient Status:  Inpatient    1932 MRN [id number]   [facility] 4SW-A Attending Heidy Storey MD   Hosp Day # 0 PCP Otf Rabago DO     Chief Complaint: SOB    S: Patient rep 12/30/19  0010   PTP 13.0   INR 0.97       Recent Labs   Lab 12/30/19  0010 12/30/19  0636   TROP 0.353* 0.751*       Lab Results   Component Value Date    TSH 1.400 12/30/2019       Imaging: Imaging data reviewed in Epic.     Medications:   • piperacillin-

## 2019-12-31 ENCOUNTER — APPOINTMENT (OUTPATIENT)
Dept: GENERAL RADIOLOGY | Facility: HOSPITAL | Age: 84
DRG: 193 | End: 2019-12-31
Attending: INTERNAL MEDICINE
Payer: MEDICARE

## 2019-12-31 LAB
ALLENS TEST: POSITIVE
ANION GAP SERPL CALC-SCNC: 7 MMOL/L (ref 0–18)
ARTERIAL BLD GAS O2 SATURATION: 96 % (ref 92–100)
ARTERIAL BLOOD GAS BASE EXCESS: 0.8 MMOL/L (ref ?–2)
ARTERIAL BLOOD GAS HCO3: 26.1 MEQ/L (ref 22–26)
ARTERIAL BLOOD GAS PCO2: 43 MM HG (ref 35–45)
ARTERIAL BLOOD GAS PH: 7.4 (ref 7.35–7.45)
ARTERIAL BLOOD GAS PO2: 97 MM HG (ref 80–105)
ATRIAL RATE: 80 BPM
BASOPHILS # BLD AUTO: 0.01 X10(3) UL (ref 0–0.2)
BASOPHILS NFR BLD AUTO: 0.1 %
BUN BLD-MCNC: 20 MG/DL (ref 7–18)
BUN/CREAT SERPL: 20.6 (ref 10–20)
CALCIUM BLD-MCNC: 8.6 MG/DL (ref 8.5–10.1)
CALCULATED O2 SATURATION: 98 % (ref 92–100)
CARBOXYHEMOGLOBIN: 0.9 % SAT (ref 0–3)
CHLORIDE SERPL-SCNC: 102 MMOL/L (ref 98–112)
CO2 SERPL-SCNC: 30 MMOL/L (ref 21–32)
CREAT BLD-MCNC: 0.97 MG/DL (ref 0.55–1.02)
DEPRECATED RDW RBC AUTO: 49.3 FL (ref 35.1–46.3)
EOSINOPHIL # BLD AUTO: 0 X10(3) UL (ref 0–0.7)
EOSINOPHIL NFR BLD AUTO: 0 %
ERYTHROCYTE [DISTWIDTH] IN BLOOD BY AUTOMATED COUNT: 13.5 % (ref 11–15)
EST. AVERAGE GLUCOSE BLD GHB EST-MCNC: 143 MG/DL (ref 68–126)
GLUCOSE BLD-MCNC: 127 MG/DL (ref 70–99)
GLUCOSE BLD-MCNC: 168 MG/DL (ref 70–99)
GLUCOSE BLD-MCNC: 187 MG/DL (ref 70–99)
GLUCOSE BLD-MCNC: 198 MG/DL (ref 70–99)
GLUCOSE BLD-MCNC: 207 MG/DL (ref 70–99)
HBA1C MFR BLD HPLC: 6.6 % (ref ?–5.7)
HCT VFR BLD AUTO: 39 % (ref 35–48)
HGB BLD-MCNC: 12.7 G/DL (ref 12–16)
IMM GRANULOCYTES # BLD AUTO: 0.03 X10(3) UL (ref 0–1)
IMM GRANULOCYTES NFR BLD: 0.4 %
IONIZED CALCIUM: 1.18 MMOL/L (ref 1.12–1.32)
L PNEUMO AG UR QL: NEGATIVE
L/M: 2 L/MIN
LACTIC ACID ARTERIAL: 3.7 MMOL/L (ref 0.5–2)
LYMPHOCYTES # BLD AUTO: 0.49 X10(3) UL (ref 1–4)
LYMPHOCYTES NFR BLD AUTO: 7.2 %
MCH RBC QN AUTO: 31.9 PG (ref 26–34)
MCHC RBC AUTO-ENTMCNC: 32.6 G/DL (ref 31–37)
MCV RBC AUTO: 98 FL (ref 80–100)
METHEMOGLOBIN: 0.5 % SAT (ref 0.4–1.5)
MONOCYTES # BLD AUTO: 0.24 X10(3) UL (ref 0.1–1)
MONOCYTES NFR BLD AUTO: 3.5 %
NEUTROPHILS # BLD AUTO: 6.03 X10 (3) UL (ref 1.5–7.7)
NEUTROPHILS # BLD AUTO: 6.03 X10(3) UL (ref 1.5–7.7)
NEUTROPHILS NFR BLD AUTO: 88.8 %
OSMOLALITY SERPL CALC.SUM OF ELEC: 296 MOSM/KG (ref 275–295)
P AXIS: 58 DEGREES
P-R INTERVAL: 140 MS
P/F RATIO: 365.3 MMHG
PATIENT TEMPERATURE: 97.1 F
PLATELET # BLD AUTO: 209 10(3)UL (ref 150–450)
POTASSIUM BLOOD GAS: 3.5 MMOL/L (ref 3.6–5.1)
POTASSIUM SERPL-SCNC: 3.5 MMOL/L (ref 3.5–5.1)
POTASSIUM SERPL-SCNC: 4.1 MMOL/L (ref 3.5–5.1)
Q-T INTERVAL: 404 MS
QRS DURATION: 68 MS
QTC CALCULATION (BEZET): 465 MS
R AXIS: -3 DEGREES
RBC # BLD AUTO: 3.98 X10(6)UL (ref 3.8–5.3)
SODIUM BLOOD GAS: 137 MMOL/L (ref 136–144)
SODIUM SERPL-SCNC: 139 MMOL/L (ref 136–145)
STREP PNEUMO ANTIGEN, URINE: NEGATIVE
T AXIS: -44 DEGREES
TOTAL HEMOGLOBIN: 13.3 G/DL (ref 11.7–16)
VENTRICULAR RATE: 80 BPM
WBC # BLD AUTO: 6.8 X10(3) UL (ref 4–11)

## 2019-12-31 PROCEDURE — 99232 SBSQ HOSP IP/OBS MODERATE 35: CPT | Performed by: INTERNAL MEDICINE

## 2019-12-31 PROCEDURE — 71045 X-RAY EXAM CHEST 1 VIEW: CPT | Performed by: INTERNAL MEDICINE

## 2019-12-31 PROCEDURE — 99232 SBSQ HOSP IP/OBS MODERATE 35: CPT | Performed by: HOSPITALIST

## 2019-12-31 RX ORDER — FUROSEMIDE 10 MG/ML
20 INJECTION INTRAMUSCULAR; INTRAVENOUS ONCE
Status: COMPLETED | OUTPATIENT
Start: 2019-12-31 | End: 2019-12-31

## 2019-12-31 RX ORDER — DEXTROSE MONOHYDRATE 25 G/50ML
50 INJECTION, SOLUTION INTRAVENOUS
Status: DISCONTINUED | OUTPATIENT
Start: 2019-12-31 | End: 2020-01-04

## 2019-12-31 RX ORDER — FUROSEMIDE 10 MG/ML
40 INJECTION INTRAMUSCULAR; INTRAVENOUS ONCE
Status: COMPLETED | OUTPATIENT
Start: 2019-12-31 | End: 2019-12-31

## 2019-12-31 RX ORDER — POTASSIUM CHLORIDE 20 MEQ/1
40 TABLET, EXTENDED RELEASE ORAL EVERY 4 HOURS
Status: COMPLETED | OUTPATIENT
Start: 2019-12-31 | End: 2019-12-31

## 2019-12-31 RX ORDER — METHYLPREDNISOLONE SODIUM SUCCINATE 125 MG/2ML
60 INJECTION, POWDER, LYOPHILIZED, FOR SOLUTION INTRAMUSCULAR; INTRAVENOUS EVERY 8 HOURS
Status: DISCONTINUED | OUTPATIENT
Start: 2019-12-31 | End: 2020-01-01

## 2019-12-31 NOTE — PROGRESS NOTES
[facility] Pulmonary/Critical Care Progress Note     SUBJECTIVE/Interval history: All events, procedures, notes reviewed.  No acute events overnight, she was weaned off of bpap yesterday but overnight felt worsening dysp edema   Skin:No rashes or lesions.   Lymph nodes:Not examined          Lab Data Review:   Recent Labs   Lab 12/30/19  0010 12/30/19  0452 12/31/19  0449   * 202* 198*   BUN 24* 23* 20*   CREATSERUM 1.22* 1.20* 0.97   GFRAA 46* 47* 61   GFRNAA 40* 41* 12/30/2019  CONCLUSION:  1. Right basilar opacity representing atelectasis versus infiltrates. 2. Mild interstitial opacities which may represent chronic interstitial changes versus mild edema.      Dictated by: Gisela Oleary MD on 12/30/2019 at 5:30 Obtain ABG in am  · Wean o2 for sats >89%  · Appreciate cardiology input - echo without acute findings  · Agree with continued diuretic  · Mobilize/OOBTC as tolerated  · Ppx: SCDs, LMWH, H2B  · Dispo: transfer to floor today    Karuna Gould MD  [facility]

## 2019-12-31 NOTE — RESPIRATORY THERAPY NOTE
Patient received on nasal cannula sating 97%. Breath sounds- diminished/expiratory wheezes. Neb treatments given as ordered. Will continue to monitor patient.

## 2019-12-31 NOTE — PROGRESS NOTES
Seen and examined earlier today. Feeling better. Was on NC oxygen when I saw her.     Good urine output yesterday    Afebrile  132/55  81 regular    Lungs clear anteriorly - mild expiratory wheeze  Ht RRR  abd soft  Ext SCD's    20/1.0    CXR rotated - left

## 2019-12-31 NOTE — PLAN OF CARE
Patient received alert and oriented x4. No c/o pain or discomfort. SOB noted with exertion. Weaned to 2L/NC. Plan to transfer out of the ICU. Daughter and son at bedside throughout the day, updated and agreeable with plan of care.

## 2019-12-31 NOTE — CDS QUERY
Pneumonia Specificity  CLINICAL DOCUMENTATION CLARIFICATION FORM    Clinical information (provided below) indicates pneumonia.  For accurate ICD-10-CM code assignment to reflect severity of illness and risk of mortality,   PLEASE (X) ALL DIAGNOSES THAT APPL PERMANENT PART OF THE MEDICAL RECORD

## 2019-12-31 NOTE — PROGRESS NOTES
12/31/19 0521   BiPAP   $ RT Standby Charge (per 15 min) 1   Device V60   BiPAP / CPAP CE# [id number]   Mode Spontaneous/Timed   Interface Full face mask   Mask Size Medium   Control Settings   Set Rate 14 breaths/min   Set IPAP 12   Set EPAP 5   Oxygen P

## 2019-12-31 NOTE — PLAN OF CARE
Received pt resting in the bed, visiting with her daughter Carolann Hernandezs, alert, oriented x 4, follows all commands, denies pain. Pt requested to use bedside commode, needs assist with getting out of bed, short of breath and wheezy with activities.  For the night heart rate control medications as ordered  - Initiate emergency measures for life threatening arrhythmias  - Monitor electrolytes and administer replacement therapy as ordered  Outcome: Progressing

## 2019-12-31 NOTE — PROGRESS NOTES
[facility]  Progress Note     Laya Kam Patient Status:  Inpatient    1932 MRN [id number]   [facility] 4SW-A Attending Dariusz Noel MD   [facility] Day # 1 PCP Fritz Ackermanch,      Chief Complaint: SOB    S: Patient rep 0.97 mg/dL). Recent Labs   Lab 12/30/19  0010   PTP 13.0   INR 0.97       Recent Labs   Lab 12/30/19  0010 12/30/19  0636   TROP 0.353* 0.751*            Imaging: Imaging data reviewed in Epic.     Medications:   • Insulin Aspart Pen  1-5 Units Subcutane Resp 24   Ht 142.2 cm (4' 8\")   Wt 186 lb 15.2 oz (84.8 kg)   SpO2 97%   BMI 41.91 kg/m²   Gen: NAD  CV: RRR  Lungs: diminished  GI: soft, Samantha Gallardo MD

## 2020-01-01 ENCOUNTER — APPOINTMENT (OUTPATIENT)
Dept: GENERAL RADIOLOGY | Facility: HOSPITAL | Age: 85
DRG: 193 | End: 2020-01-01
Attending: INTERNAL MEDICINE
Payer: MEDICARE

## 2020-01-01 LAB
ANION GAP SERPL CALC-SCNC: 8 MMOL/L (ref 0–18)
BUN BLD-MCNC: 30 MG/DL (ref 7–18)
BUN/CREAT SERPL: 24.4 (ref 10–20)
CALCIUM BLD-MCNC: 8.2 MG/DL (ref 8.5–10.1)
CHLORIDE SERPL-SCNC: 104 MMOL/L (ref 98–112)
CO2 SERPL-SCNC: 27 MMOL/L (ref 21–32)
CREAT BLD-MCNC: 1.23 MG/DL (ref 0.55–1.02)
GLUCOSE BLD-MCNC: 132 MG/DL (ref 70–99)
GLUCOSE BLD-MCNC: 158 MG/DL (ref 70–99)
GLUCOSE BLD-MCNC: 173 MG/DL (ref 70–99)
GLUCOSE BLD-MCNC: 180 MG/DL (ref 70–99)
GLUCOSE BLD-MCNC: 197 MG/DL (ref 70–99)
HISTOPLASMA AG DETECTION,URINE: NOT DETECTED
HISTOPLASMA AG EIA, URINE: NOT DETECTED NG/ML
OSMOLALITY SERPL CALC.SUM OF ELEC: 300 MOSM/KG (ref 275–295)
POTASSIUM SERPL-SCNC: 4.1 MMOL/L (ref 3.5–5.1)
SODIUM SERPL-SCNC: 139 MMOL/L (ref 136–145)

## 2020-01-01 PROCEDURE — 99232 SBSQ HOSP IP/OBS MODERATE 35: CPT | Performed by: HOSPITALIST

## 2020-01-01 PROCEDURE — 71045 X-RAY EXAM CHEST 1 VIEW: CPT | Performed by: INTERNAL MEDICINE

## 2020-01-01 PROCEDURE — 99232 SBSQ HOSP IP/OBS MODERATE 35: CPT | Performed by: INTERNAL MEDICINE

## 2020-01-01 RX ORDER — FUROSEMIDE 10 MG/ML
40 INJECTION INTRAMUSCULAR; INTRAVENOUS ONCE
Status: COMPLETED | OUTPATIENT
Start: 2020-01-01 | End: 2020-01-01

## 2020-01-01 RX ORDER — TORSEMIDE 20 MG/1
20 TABLET ORAL DAILY
Status: DISCONTINUED | OUTPATIENT
Start: 2020-01-02 | End: 2020-01-02

## 2020-01-01 RX ORDER — METHYLPREDNISOLONE SODIUM SUCCINATE 40 MG/ML
40 INJECTION, POWDER, LYOPHILIZED, FOR SOLUTION INTRAMUSCULAR; INTRAVENOUS EVERY 8 HOURS
Status: COMPLETED | OUTPATIENT
Start: 2020-01-01 | End: 2020-01-02

## 2020-01-01 NOTE — PROGRESS NOTES
[facility] Pulmonary/Critical Care Progress Note     SUBJECTIVE/Interval history: All events, procedures, notes reviewed. No acute events overnight, transferred out of ICU. Feels better today, less dyspnea and cough.  Am nodes:Not examined          Lab Data Review:   Recent Labs   Lab 12/30/19  0452 12/31/19  0449 12/31/19  1734 01/01/20  0703   * 198*  --  197*   BUN 23* 20*  --  30*   CREATSERUM 1.20* 0.97  --  1.23*   GFRAA 47* 61  --  46*   GFRNAA 41* 53*  --  4 Hyperexpansion of the lungs. 2. Interstitial opacities bilaterally likely representing mild edema. 3. Small left-sided pleural effusion. 4. Bilateral basilar opacities, left greater than right, representing atelectasis versus infiltrates.  5. Stable cardio respiratory failure due to pneumonia and RSV infection  · Possible pneumonia, community acquired  · RSV infection  · Asthma exacerbation due to above   · Chest pain/pressure, elevated troponin - likely type II MI due to above, doubt ACS  · HTN  · Hypothyro

## 2020-01-01 NOTE — OCCUPATIONAL THERAPY NOTE
OCCUPATIONAL THERAPY EVALUATION - INPATIENT     Room Number: 000/426-X  Evaluation Date: 1/1/2020  Type of Evaluation: Initial  Presenting Problem: resp.  distress    Physician Order: IP Consult to Occupational Therapy  Reason for Therapy: ADL/IADL Dysfunct home     OBJECTIVE     Fall Risk: High fall risk    WEIGHT BEARING RESTRICTION  Weight Bearing Restriction: None       PAIN ASSESSMENT  Rating: Unable to rate  Location: R shoulder  Management Techniques:  Other (Comment)(heating pack)    [facility] is a 80year old female admitted on 12/29/2019 for respiratory distress . Complete medical history and occupational profile noted above.  Functional outcome measures completed include The AM-KIM ' '6-Clicks' Inpatient Daily Activity Short Form was completed toileting: with supervision    Functional Transfer Goals  Patient will transfer from sit to supine:  with supervision  Patient will transfer from supine to sit:  with supervision  Patient will transfer to toilet:  with supervision

## 2020-01-01 NOTE — PLAN OF CARE
Problem: RESPIRATORY - ADULT  Goal: Achieves optimal ventilation and oxygenation  Description  INTERVENTIONS:  - Assess for changes in respiratory status  - Assess for changes in mentation and behavior  - Position to facilitate oxygenation and minimize r any c/o pain or n/v/d. Will continue to monitor. 0330: pt request 2LNC at this time Bipap as needed at bedside. Maintaining sats >95% at this time. WCTM.

## 2020-01-01 NOTE — PHYSICAL THERAPY NOTE
PHYSICAL THERAPY EVALUATION - INPATIENT     Room Number: 594/072-F  Evaluation Date: 1/1/2020  Type of Evaluation: Initial  Physician Order: PT Eval and Treat    Presenting Problem: resp distress  Reason for Therapy: Mobility Dysfunction and Discharg Fall Risk: Standard fall risk    WEIGHT BEARING RESTRICTION  Weight Bearing Restriction: None                PAIN ASSESSMENT  Ratin  Location: denies at this time  Management Techniques:  Activity promotion    COGNITION  · Overall Cognitive Status:  W Pt recd in bedside chair, dtr present. Pt educated in role of PT, goals for session, ankle pumps for DVT prevention. Pt stood to rw with supervision, gait training with rw with emphasis on pacing, upright posture.   Pt required standing rest breaks x3 due Recommendations: Home with home health PT(24 hour supervision)    PLAN  PT Treatment Plan: Bed mobility; Endurance; Energy conservation;Patient education;Gait training;Range of motion;Strengthening;Stoop training  Rehab Potential : Good  Frequency (Obs): 5x/

## 2020-01-01 NOTE — PLAN OF CARE
Pt transferred from ICU, alert and oriented, denies any chest pain or shortness of breath, some wheezing upon ascultation, saturating in hte high 90's on 2L of O2 via nasal cannula. Pt instructed on floor routine and made comfortable.

## 2020-01-01 NOTE — PROGRESS NOTES
Sleeping comfortably at present.  Not requiring Bipap support    Afebrile  119/61  93 regular    Lungs mild end expiratory wheeze  Ht RRR  abd soft  Ext no edema    Labs pending    CXR reviewed - probable small left effusion    A/P: Acute respiratory failur

## 2020-01-01 NOTE — PROGRESS NOTES
[facility]  Progress Note     [facility] Patient Status:  Inpatient    1932 MRN [id number]   [facility] 4SW-A Attending Yuridia Mcduffie MD   [facility] Day # 2 PCP Liana Rodas DO     Chief Complaint: SOB    S: Patient report --   --   --    ALT 42  --   --   --   --    BILT 0.3  --   --   --   --    TP 8.0  --   --   --   --        Estimated Creatinine Clearance: 43.7 mL/min (A) (based on SCr of 1.23 mg/dL (H)).     Recent Labs   Lab 12/30/19  0010   PTP 13.0   INR 0.97       R baseline.      CRESENCIO Alves  10:43 AM   Patient seen and examined agree with the above  Chest decreased bs b/l  LEs mild edema  Asthma exac  CAP  Volume overload  Improving slowly on steroids nebs abx

## 2020-01-02 LAB
ANION GAP SERPL CALC-SCNC: 5 MMOL/L (ref 0–18)
BUN BLD-MCNC: 34 MG/DL (ref 7–18)
BUN/CREAT SERPL: 29.1 (ref 10–20)
CALCIUM BLD-MCNC: 8.6 MG/DL (ref 8.5–10.1)
CHLORIDE SERPL-SCNC: 101 MMOL/L (ref 98–112)
CO2 SERPL-SCNC: 32 MMOL/L (ref 21–32)
CREAT BLD-MCNC: 1.17 MG/DL (ref 0.55–1.02)
GLUCOSE BLD-MCNC: 140 MG/DL (ref 70–99)
GLUCOSE BLD-MCNC: 141 MG/DL (ref 70–99)
GLUCOSE BLD-MCNC: 166 MG/DL (ref 70–99)
GLUCOSE BLD-MCNC: 166 MG/DL (ref 70–99)
GLUCOSE BLD-MCNC: 174 MG/DL (ref 70–99)
OSMOLALITY SERPL CALC.SUM OF ELEC: 297 MOSM/KG (ref 275–295)
POTASSIUM SERPL-SCNC: 3.3 MMOL/L (ref 3.5–5.1)
POTASSIUM SERPL-SCNC: 3.9 MMOL/L (ref 3.5–5.1)
SODIUM SERPL-SCNC: 138 MMOL/L (ref 136–145)

## 2020-01-02 PROCEDURE — 99233 SBSQ HOSP IP/OBS HIGH 50: CPT | Performed by: HOSPITALIST

## 2020-01-02 PROCEDURE — 99232 SBSQ HOSP IP/OBS MODERATE 35: CPT | Performed by: INTERNAL MEDICINE

## 2020-01-02 RX ORDER — LOSARTAN POTASSIUM 100 MG/1
100 TABLET ORAL EVERY EVENING
Status: DISCONTINUED | OUTPATIENT
Start: 2020-01-02 | End: 2020-01-03

## 2020-01-02 RX ORDER — TORSEMIDE 20 MG/1
20 TABLET ORAL
Status: DISCONTINUED | OUTPATIENT
Start: 2020-01-02 | End: 2020-01-04

## 2020-01-02 RX ORDER — GUAIFENESIN 600 MG
600 TABLET, EXTENDED RELEASE 12 HR ORAL 2 TIMES DAILY
Status: DISCONTINUED | OUTPATIENT
Start: 2020-01-02 | End: 2020-01-04

## 2020-01-02 RX ORDER — POTASSIUM CHLORIDE 20 MEQ/1
40 TABLET, EXTENDED RELEASE ORAL EVERY 4 HOURS
Status: COMPLETED | OUTPATIENT
Start: 2020-01-02 | End: 2020-01-02

## 2020-01-02 RX ORDER — PREDNISONE 20 MG/1
40 TABLET ORAL
Status: DISCONTINUED | OUTPATIENT
Start: 2020-01-03 | End: 2020-01-04

## 2020-01-02 NOTE — PROGRESS NOTES
[facility] Pulmonary/Critical Care Progress Note     SUBJECTIVE/Interval history: All events, procedures, notes reviewed. No acute events overnight, feels a bit better.  Still w cough      Review of Systems:   A comprehe 34*   CREATSERUM 0.97  --  1.23* 1.17*   GFRAA 61  --  46* 48*   GFRNAA 53*  --  40* 42*   CA 8.6  --  8.2* 8.6     --  139 138   K 3.5 4.1 4.1 3.3*     --  104 101   CO2 30.0  --  27.0 32.0     Recent Labs   Lab 12/29/19  9227 12/30/19  0452 1 greater than right, representing atelectasis versus infiltrates. 5. Stable cardiomegaly.      Dictated by: Kory Bates MD on 12/31/2019 at 6:00     Approved by: Kory Bates MD on 12/31/2019 at 6:01            • guaiFENesin ER  600 mg Oral BID   • without acute findings  · Continue lasix diuresis  · Mobilize/OOBTC as tolerated  · Ppx: SCDs, LMWH, H2B    Jono Jose MD  [facility]

## 2020-01-02 NOTE — CM/SW NOTE
ELLIOTT followed up with the [facility] order. Patient lives with her daughter in 1 level home with 1 step to get in. She is independent with all ADL's but is not driving, she is using a rollator  walker PRN.  Patient  states that since she lives with her daught

## 2020-01-02 NOTE — PROGRESS NOTES
[facility]  Cardiology Progress Note    Subjective:  Very pleasant patient sitting comfortably in the room. She looks great. Improving daily. Still having some SOB when ambulating.      Objective:  BP (!) 165/63 (BP Location: Left arm)   Pulse 82   Tem was no diagnostic evidence for regional wall motion     abnormalities. Doppler parameters are consistent with abnormal left     ventricular relaxation - grade 1 diastolic dysfunction. 2. Aortic valve: Trivial regurgitation. 3. Mitral valve:  Moderately ca

## 2020-01-02 NOTE — PROGRESS NOTES
SPO2 % ON ROOM AIR AT REST  92  SPO2 % AMBULATION ON ROOM AIR 86  SPO2 % AMBULATION ON 02 95 ON: 2 LITERS PER MINUTE

## 2020-01-02 NOTE — RESPIRATORY THERAPY NOTE
DANE - Equipment Use Daily Summary:                  . Set Mode:                . Usage in hours:                . 90% Pressure (EPAP) level:                . 90% Insp. Pressure (IPAP): Aranza Pizarro AHI:                .  Supplemental Oxygen:    LPM

## 2020-01-02 NOTE — PROGRESS NOTES
[facility]  Progress Note     Ethel Ken Patient Status:  Inpatient    1932 MRN [id number]   [facility] 4SW-A Attending Constantino Roy MD   [facility] Day # 3 PCP Lisa Bolaños DO     Chief Complaint: SOB    S: Patient report --   --   --    AST 43*  --   --   --   --   --    ALT 42  --   --   --   --   --    BILT 0.3  --   --   --   --   --    TP 8.0  --   --   --   --   --     < > = values in this interval not displayed.        Estimated Creatinine Clearance: 44.7 mL/min (A) ( overload  Improving slowly

## 2020-01-02 NOTE — OCCUPATIONAL THERAPY NOTE
OCCUPATIONAL THERAPY TREATMENT NOTE - INPATIENT     Room Number: 194/631-H  Session: 1  Number of Visits to Meet Established Goals: 2    Presenting Problem: resp.  distress    History related to current admission: Pt is a 80year old female with PMHx signif Toileting, which includes using toilet, bedpan or urinal? : None  -   Putting on and taking off regular upper body clothing?: None  -   Taking care of personal grooming such as brushing teeth?: None  -   Eating meals?: None    AM-PAC Score:  Score: 24  A supervision  Patient will perform toileting: with supervision     Functional Transfer Goals  Patient will transfer from sit to supine:  with supervision  Patient will transfer from supine to sit:  with supervision  Patient will transfer to toilet:  with younger

## 2020-01-03 LAB
ANION GAP SERPL CALC-SCNC: 8 MMOL/L (ref 0–18)
BUN BLD-MCNC: 40 MG/DL (ref 7–18)
BUN/CREAT SERPL: 33.1 (ref 10–20)
CALCIUM BLD-MCNC: 8.5 MG/DL (ref 8.5–10.1)
CHLORIDE SERPL-SCNC: 98 MMOL/L (ref 98–112)
CO2 SERPL-SCNC: 29 MMOL/L (ref 21–32)
CREAT BLD-MCNC: 1.21 MG/DL (ref 0.55–1.02)
GLUCOSE BLD-MCNC: 138 MG/DL (ref 70–99)
GLUCOSE BLD-MCNC: 159 MG/DL (ref 70–99)
GLUCOSE BLD-MCNC: 169 MG/DL (ref 70–99)
GLUCOSE BLD-MCNC: 191 MG/DL (ref 70–99)
GLUCOSE BLD-MCNC: 203 MG/DL (ref 70–99)
OSMOLALITY SERPL CALC.SUM OF ELEC: 294 MOSM/KG (ref 275–295)
POTASSIUM SERPL-SCNC: 3.7 MMOL/L (ref 3.5–5.1)
SODIUM SERPL-SCNC: 135 MMOL/L (ref 136–145)

## 2020-01-03 PROCEDURE — 99239 HOSP IP/OBS DSCHRG MGMT >30: CPT | Performed by: HOSPITALIST

## 2020-01-03 PROCEDURE — 99232 SBSQ HOSP IP/OBS MODERATE 35: CPT | Performed by: INTERNAL MEDICINE

## 2020-01-03 RX ORDER — POTASSIUM CHLORIDE 20 MEQ/1
40 TABLET, EXTENDED RELEASE ORAL ONCE
Status: COMPLETED | OUTPATIENT
Start: 2020-01-03 | End: 2020-01-03

## 2020-01-03 RX ORDER — CLONIDINE HYDROCHLORIDE 0.1 MG/1
0.1 TABLET ORAL NIGHTLY
Qty: 30 TABLET | Refills: 3 | Status: SHIPPED | OUTPATIENT
Start: 2020-01-03 | End: 2020-12-28

## 2020-01-03 RX ORDER — LOSARTAN POTASSIUM 100 MG/1
100 TABLET ORAL DAILY
Status: DISCONTINUED | OUTPATIENT
Start: 2020-01-03 | End: 2020-01-04

## 2020-01-03 RX ORDER — GUAIFENESIN 600 MG
600 TABLET, EXTENDED RELEASE 12 HR ORAL 2 TIMES DAILY
Qty: 14 TABLET | Refills: 0 | Status: SHIPPED | OUTPATIENT
Start: 2020-01-03 | End: 2020-01-10

## 2020-01-03 NOTE — PROGRESS NOTES
[facility]  Cardiology Progress Note    Subjective:  No chest pain or shortness of breath. Doing great this AM. We talked about her historical roots and immigration to this [place] in the 1950s.     Objective:  BP (!) 169/66 (BP Location: Left arm)   P Plan:  · Breathing better today. Walking in the hallway without issue. · Zosyn/steroids/bronchodilators per pulm. · Restart home dose cozaar 100mg daily. · Patient can be discharged home when ok with pulmonary.  Cardiology will follow peripherally at

## 2020-01-03 NOTE — PROGRESS NOTES
[facility] Pulmonary/Critical Care Progress Note     SUBJECTIVE/Interval history: All events, procedures, notes reviewed. No acute events overnight, she feels better today. Less dyspnea and cough. No pain. No fevers.  A * 166*  --  169*   BUN 30* 34*  --  40*   CREATSERUM 1.23* 1.17*  --  1.21*   GFRAA 46* 48*  --  46*   GFRNAA 40* 42*  --  40*   CA 8.2* 8.6  --  8.5    138  --  135*   K 4.1 3.3* 3.9 3.7    101  --  98   CO2 27.0 32.0  --  29.0     Re Subcutaneous TID CC and HS   • piperacillin-tazobactam  3.375 g Intravenous Q8H   • enoxaparin  40 mg Subcutaneous Daily   • ipratropium-albuterol  3 mL Nebulization 6 times per day   • Fluticasone Furoate-Vilanterol  1 puff Inhalation Daily   • cloNIDine

## 2020-01-03 NOTE — PLAN OF CARE
AxO x4. Weaned to room air this AM, lungs clear bilaterally but diminished, non-productive cough. PO steroids, nebs. Ambulated on room air this AM. On tele, NSR. BP elevated this AM, home meds adjusted per cards. IV abx. Voids. BM today. Denies pain.  Up SB

## 2020-01-03 NOTE — PROGRESS NOTES
[facility]  Progress Note     Jess Sharp Patient Status:  Inpatient    1932 MRN [id number]   [facility] 4SW-A Attending Cedrick Christian MD   [facility] Day # 4 PCP Ildefonso George DO     Chief Complaint: SOB    S: Patient report AST 43*  --   --   --   --   --    ALT 42  --   --   --   --   --    BILT 0.3  --   --   --   --   --    TP 8.0  --   --   --   --   --     < > = values in this interval not displayed.        Estimated Creatinine Clearance: 41.8 mL/min (A) (based on SCr

## 2020-01-04 VITALS
HEIGHT: 56 IN | SYSTOLIC BLOOD PRESSURE: 159 MMHG | OXYGEN SATURATION: 92 % | WEIGHT: 178.19 LBS | DIASTOLIC BLOOD PRESSURE: 79 MMHG | RESPIRATION RATE: 20 BRPM | BODY MASS INDEX: 40.09 KG/M2 | HEART RATE: 67 BPM | TEMPERATURE: 98 F

## 2020-01-04 LAB
GLUCOSE BLD-MCNC: 101 MG/DL (ref 70–99)
GLUCOSE BLD-MCNC: 147 MG/DL (ref 70–99)
POTASSIUM SERPL-SCNC: 3.4 MMOL/L (ref 3.5–5.1)

## 2020-01-04 RX ORDER — PREDNISONE 10 MG/1
TABLET ORAL
Qty: 30 TABLET | Refills: 0 | Status: SHIPPED | OUTPATIENT
Start: 2020-01-04 | End: 2020-02-04 | Stop reason: ALTCHOICE

## 2020-01-04 RX ORDER — POTASSIUM CHLORIDE 20 MEQ/1
40 TABLET, EXTENDED RELEASE ORAL EVERY 4 HOURS
Status: COMPLETED | OUTPATIENT
Start: 2020-01-04 | End: 2020-01-04

## 2020-01-04 RX ORDER — AMOXICILLIN AND CLAVULANATE POTASSIUM 875; 125 MG/1; MG/1
1 TABLET, FILM COATED ORAL 2 TIMES DAILY
Qty: 2 TABLET | Refills: 0 | Status: SHIPPED | OUTPATIENT
Start: 2020-01-04 | End: 2020-01-05

## 2020-01-04 RX ORDER — IPRATROPIUM BROMIDE AND ALBUTEROL SULFATE 2.5; .5 MG/3ML; MG/3ML
3 SOLUTION RESPIRATORY (INHALATION)
Qty: 90 VIAL | Refills: 2 | Status: SHIPPED | OUTPATIENT
Start: 2020-01-04

## 2020-01-04 NOTE — PROGRESS NOTES
[facility] Pulmonary/Critical Care Progress Note     SUBJECTIVE/Interval history: All events, procedures, notes reviewed. No acute events overnight, she feels well today. Cough and dyspnea continue to improve.   No pain 169*  --    BUN 30* 34*  --  40*  --    CREATSERUM 1.23* 1.17*  --  1.21*  --    GFRAA 46* 48*  --  46*  --    GFRNAA 40* 42*  --  40*  --    CA 8.2* 8.6  --  8.5  --     138  --  135*  --    K 4.1 3.3* 3.9 3.7 3.4*    101  --  98  --    CO2 27 Furoate-Vilanterol  1 puff Inhalation Daily   • cloNIDine HCl  0.1 mg Oral Nightly   • Levothyroxine Sodium  75 mcg Oral Before breakfast   • Metoprolol Succinate ER  50 mg Oral Daily Beta Blocker   • famoTIDine  20 mg Intravenous Daily   • metoprolol succ

## 2020-01-04 NOTE — PROGRESS NOTES
Pt is being discharged home with scripts and f/u appts, IVs dcd, daughter verb understanding of all dc instructions, waiting for resp to bring tank from [facility]

## 2020-01-04 NOTE — CM/SW NOTE
O2 confirmed. Asked respiratory to bring portable tank to the pt's room.      Pt aware to contact Serena at [employer]   once you are discharged from the hospital so they can deliver your oxygen concentrator to you home

## 2020-01-04 NOTE — CM/SW NOTE
Isreal Boykin stating documentation is in correct. Order states COPD, but CODP dx is not documented in the chart. They said the MD's note needs to indicate a dx of COPD. They said it also needs to indicate the O2 is for COPD and not pneumonia.     RN page

## 2020-01-04 NOTE — PROGRESS NOTES
SPO2 % ON ROOM AIR AT REST 92  SPO2 % AMBULATION ON ROOM AIR 86  SPO2 % AMBULATION ON 02 94 ON: 2 LITERS PER MINUTE

## 2020-01-04 NOTE — CM/SW NOTE
SW received an order for O2. Sent referral to [employer] via [address]. Awaiting response.

## 2020-01-06 ENCOUNTER — TELEPHONE (OUTPATIENT)
Dept: FAMILY MEDICINE CLINIC | Facility: CLINIC | Age: 85
End: 2020-01-06

## 2020-01-06 ENCOUNTER — PATIENT OUTREACH (OUTPATIENT)
Dept: CASE MANAGEMENT | Age: 85
End: 2020-01-06

## 2020-01-06 DIAGNOSIS — J96.00 ACUTE RESPIRATORY FAILURE, UNSPECIFIED WHETHER WITH HYPOXIA OR HYPERCAPNIA (HCC): ICD-10-CM

## 2020-01-06 DIAGNOSIS — Z02.9 ENCOUNTERS FOR UNSPECIFIED ADMINISTRATIVE PURPOSE: ICD-10-CM

## 2020-01-06 PROCEDURE — 1111F DSCHRG MED/CURRENT MED MERGE: CPT

## 2020-01-06 NOTE — PROGRESS NOTES
Initial Post Discharge Follow Up   Discharge Date: 1/4/20  Contact Date: 1/6/2020    Consent Verification:  Assessment Completed With: Patient  HIPAA Verified? Yes    Discharge Dx:    1.  Acute resp failure resolved  2. RSV, asthma exacerbation, possibl ipratropium-albuterol 0.5-2.5 (3) MG/3ML Inhalation Solution Take 3 mL by nebulization 4 (four) times daily. 90 vial 2   • guaiFENesin  MG Oral Tablet 12 Hr Take 1 tablet (600 mg total) by mouth 2 (two) times daily for 7 days.  14 tablet 0   • cloNIDi your discharge medications when you left the hospital? Yes  • May I go over your medications with you to make sure we are not missing anything? yes  • Are there any reasons that keep you from taking your medication as prescribed?  Yes, pt stopped taking Muci addressed before your next visit with your PCP?  (DME, meds, disease concerns, Etc): No     Follow up appointments:   NCM attempted to schedule TCC and/or PCP apt for pt.   Pt refused stating she needs to schedule f/u with pulmonology first and then will ca abx/steroids, as well as close f/u with PCP/specialists. Pt verbalized understanding and will contact office with any further questions or concerns. TE sent to PCP office regarding HFU appt.     BOOK BY DATE: 1/18/2020    [x]  Discharge Summary, Discharge

## 2020-01-06 NOTE — TELEPHONE ENCOUNTER
Spoke to pt for TCM today. Pt does not have HFU appt scheduled at this time. TCM/HFU appt recommended by 1/11/2020 as pt is a high risk for readmission. Please advise.     BOOK BY DATE (last date for TCM): 1/18/2020    TRIAGE:  Please f/u with pt and try

## 2020-01-07 NOTE — TELEPHONE ENCOUNTER
Pt was in the hospital for Asthma and needs a HFU sent by TCM. You only have 15 min appts okay to schedule in a 15 min slot?

## 2020-01-10 NOTE — DISCHARGE SUMMARY
[facility]  DISCHARGE SUMMARY     Manoj Guajardo Patient Status:  Inpatient    1932 MRN [id number]   [facility] 5NW-A Attending No att. providers found   Hosp Day # 5 PCP Pedro Ortega DO     Date of Admission:  Base) MCG/ACT Aers  Commonly known as:  PROAIR HFA  What changed:  Another medication with the same name was removed. Continue taking this medication, and follow the directions you see here.       Inhale 2 puffs into the lungs every 6 (six) hours as needed tablet  Refills:  3     Selenium Sulfide 2.25 % Sham      Apply 1 Application topically every other day. Quantity:  180 mL  Refills:  5     torsemide 20 MG Tabs  Commonly known as:  DEMADEX      Take 1 tablet (20 mg total) by mouth 2 (two) times daily. rhythm. No murmurs, rubs or gallops. Abdomen: Soft, nontender, nondistended. Positive bowel sounds. No rebound or guarding. Neurologic: No focal neurological deficits. Musculoskeletal: Moves all extremities. Extremities: No edema.   -----------------

## 2020-01-13 RX ORDER — METOPROLOL SUCCINATE 25 MG/1
TABLET, EXTENDED RELEASE ORAL
Qty: 270 TABLET | Refills: 1 | Status: SHIPPED | OUTPATIENT
Start: 2020-01-13 | End: 2020-07-20

## 2020-01-20 ENCOUNTER — HOSPITAL ENCOUNTER (OUTPATIENT)
Dept: GENERAL RADIOLOGY | Age: 85
Discharge: HOME | End: 2020-01-20
Attending: INTERNAL MEDICINE
Payer: MEDICARE

## 2020-01-20 DIAGNOSIS — J18.9 COMMUNITY ACQUIRED PNEUMONIA OF RIGHT LUNG, UNSPECIFIED PART OF LUNG: ICD-10-CM

## 2020-01-20 DIAGNOSIS — J45.52 SEVERE PERSISTENT ASTHMA WITH STATUS ASTHMATICUS: ICD-10-CM

## 2020-01-20 DIAGNOSIS — J96.00 ACUTE RESPIRATORY FAILURE, UNSPECIFIED WHETHER WITH HYPOXIA OR HYPERCAPNIA (HCC): ICD-10-CM

## 2020-01-20 PROCEDURE — 71046 X-RAY EXAM CHEST 2 VIEWS: CPT | Performed by: INTERNAL MEDICINE

## 2020-01-22 NOTE — TELEPHONE ENCOUNTER
Spoke to pt she states she is going to see pulmonologist next week and she does not wish to schedule with Dr Blake Blanco right now. Pt states she will call us back later to schedule.

## 2020-02-03 ENCOUNTER — TELEPHONE (OUTPATIENT)
Dept: FAMILY MEDICINE CLINIC | Facility: CLINIC | Age: 85
End: 2020-02-03

## 2020-02-03 NOTE — TELEPHONE ENCOUNTER
Fax recd from [address] [place] [address] needing the Certificate of Medical Necessity [id number] Oxygen completed. Form placed in [facility] triage folder.

## 2020-02-04 ENCOUNTER — OFFICE VISIT (OUTPATIENT)
Dept: FAMILY MEDICINE CLINIC | Facility: CLINIC | Age: 85
End: 2020-02-04
Payer: MEDICARE

## 2020-02-04 VITALS
DIASTOLIC BLOOD PRESSURE: 80 MMHG | BODY MASS INDEX: 41.39 KG/M2 | HEIGHT: 56 IN | TEMPERATURE: 99 F | HEART RATE: 85 BPM | WEIGHT: 184 LBS | SYSTOLIC BLOOD PRESSURE: 130 MMHG | OXYGEN SATURATION: 94 % | RESPIRATION RATE: 18 BRPM

## 2020-02-04 DIAGNOSIS — B97.4 RSV INFECTION: Primary | ICD-10-CM

## 2020-02-04 PROBLEM — J45.52 SEVERE PERSISTENT ASTHMA WITH STATUS ASTHMATICUS (HCC): Status: RESOLVED | Noted: 2019-12-30 | Resolved: 2020-02-04

## 2020-02-04 PROBLEM — I49.3 VENTRICULAR ECTOPY: Status: RESOLVED | Noted: 2019-01-15 | Resolved: 2020-02-04

## 2020-02-04 PROBLEM — J45.52 SEVERE PERSISTENT ASTHMA WITH STATUS ASTHMATICUS: Status: RESOLVED | Noted: 2019-12-30 | Resolved: 2020-02-04

## 2020-02-04 PROBLEM — J40 BRONCHITIS: Status: RESOLVED | Noted: 2019-04-15 | Resolved: 2020-02-04

## 2020-02-04 PROCEDURE — 99213 OFFICE O/P EST LOW 20 MIN: CPT | Performed by: FAMILY MEDICINE

## 2020-02-04 PROCEDURE — 1111F DSCHRG MED/CURRENT MED MERGE: CPT | Performed by: FAMILY MEDICINE

## 2020-02-04 RX ORDER — CLOTRIMAZOLE 10 MG/1
10 LOZENGE ORAL; TOPICAL
Qty: 20 TROCHE | Refills: 0 | Status: SHIPPED | OUTPATIENT
Start: 2020-02-04 | End: 2021-03-24 | Stop reason: ALTCHOICE

## 2020-02-04 NOTE — PROGRESS NOTES
Patient is here with her daughter. At the beginning of January she came down with an RSV-like illness and was hospitalized including a brief stay in the respiratory intensive care area. Endotracheal intubation was not required.   Oral and inhaled corticos

## 2020-02-08 LAB
ALLENS TEST: POSITIVE
ARTERIAL BLD GAS O2 SATURATION: 94 % (ref 92–100)
ARTERIAL BLOOD GAS BASE EXCESS: 1.3 MMOL/L (ref ?–2)
ARTERIAL BLOOD GAS HCO3: 26 MEQ/L (ref 22–26)
ARTERIAL BLOOD GAS PCO2: 42 MM HG (ref 35–45)
ARTERIAL BLOOD GAS PH: 7.41 (ref 7.35–7.45)
ARTERIAL BLOOD GAS PO2: 77 MM HG (ref 80–105)
CALCULATED O2 SATURATION: 96 % (ref 92–100)
CARBOXYHEMOGLOBIN: 0.9 % SAT (ref 0–3)
IONIZED CALCIUM: 1.19 MMOL/L (ref 1.12–1.32)
L/M: 1 L/MIN
LACTIC ACID ARTERIAL: 4.2 MMOL/L (ref 0.5–2)
METHEMOGLOBIN: 0.6 % SAT (ref 0.4–1.5)
PATIENT TEMPERATURE: 98.6 F
POTASSIUM BLOOD GAS: 4.1 MMOL/L (ref 3.6–5.1)
SODIUM BLOOD GAS: 137 MMOL/L (ref 136–144)
TOTAL HEMOGLOBIN: 13.7 G/DL (ref 11.7–16)

## 2020-02-22 ENCOUNTER — LAB ENCOUNTER (OUTPATIENT)
Dept: LAB | Age: 85
End: 2020-02-22
Attending: INTERNAL MEDICINE
Payer: MEDICARE

## 2020-02-22 ENCOUNTER — HOSPITAL ENCOUNTER (OUTPATIENT)
Dept: GENERAL RADIOLOGY | Age: 85
Discharge: HOME | End: 2020-02-22
Attending: INTERNAL MEDICINE
Payer: MEDICARE

## 2020-02-22 DIAGNOSIS — J18.9 PNEUMONIA: ICD-10-CM

## 2020-02-22 DIAGNOSIS — J45.909 ASTHMA: ICD-10-CM

## 2020-02-22 DIAGNOSIS — J18.9 PNEUMONIA: Primary | ICD-10-CM

## 2020-02-22 DIAGNOSIS — J45.909 ASTHMATIC BRONCHITIS: ICD-10-CM

## 2020-02-22 LAB
ALBUMIN SERPL-MCNC: 3.6 G/DL (ref 3.4–5)
ALBUMIN/GLOB SERPL: 1 {RATIO} (ref 1–2)
ALP LIVER SERPL-CCNC: 59 U/L (ref 55–142)
ALT SERPL-CCNC: 26 U/L (ref 13–56)
ANION GAP SERPL CALC-SCNC: 8 MMOL/L (ref 0–18)
AST SERPL-CCNC: 25 U/L (ref 15–37)
BASOPHILS # BLD AUTO: 0.07 X10(3) UL (ref 0–0.2)
BASOPHILS NFR BLD AUTO: 1.1 %
BILIRUB SERPL-MCNC: 0.4 MG/DL (ref 0.1–2)
BUN BLD-MCNC: 38 MG/DL (ref 7–18)
BUN/CREAT SERPL: 32.2 (ref 10–20)
CALCIUM BLD-MCNC: 9.8 MG/DL (ref 8.5–10.1)
CHLORIDE SERPL-SCNC: 102 MMOL/L (ref 98–112)
CO2 SERPL-SCNC: 26 MMOL/L (ref 21–32)
CREAT BLD-MCNC: 1.18 MG/DL (ref 0.55–1.02)
DEPRECATED RDW RBC AUTO: 55.8 FL (ref 35.1–46.3)
EOSINOPHIL # BLD AUTO: 0.35 X10(3) UL (ref 0–0.7)
EOSINOPHIL NFR BLD AUTO: 5.4 %
ERYTHROCYTE [DISTWIDTH] IN BLOOD BY AUTOMATED COUNT: 14.9 % (ref 11–15)
GLOBULIN PLAS-MCNC: 3.5 G/DL (ref 2.8–4.4)
GLUCOSE BLD-MCNC: 99 MG/DL (ref 70–99)
HCT VFR BLD AUTO: 41.4 % (ref 35–48)
HGB BLD-MCNC: 13.4 G/DL (ref 12–16)
IMM GRANULOCYTES # BLD AUTO: 0.02 X10(3) UL (ref 0–1)
IMM GRANULOCYTES NFR BLD: 0.3 %
IMMUNOGLOBULIN E: 43.8 IU/ML (ref 3.6–114)
LYMPHOCYTES # BLD AUTO: 2.45 X10(3) UL (ref 1–4)
LYMPHOCYTES NFR BLD AUTO: 37.5 %
M PROTEIN MFR SERPL ELPH: 7.1 G/DL (ref 6.4–8.2)
MCH RBC QN AUTO: 32.9 PG (ref 26–34)
MCHC RBC AUTO-ENTMCNC: 32.4 G/DL (ref 31–37)
MCV RBC AUTO: 101.7 FL (ref 80–100)
MONOCYTES # BLD AUTO: 0.67 X10(3) UL (ref 0.1–1)
MONOCYTES NFR BLD AUTO: 10.2 %
NEUTROPHILS # BLD AUTO: 2.98 X10 (3) UL (ref 1.5–7.7)
NEUTROPHILS # BLD AUTO: 2.98 X10(3) UL (ref 1.5–7.7)
NEUTROPHILS NFR BLD AUTO: 45.5 %
NT-PROBNP SERPL-MCNC: 329 PG/ML (ref ?–450)
OSMOLALITY SERPL CALC.SUM OF ELEC: 291 MOSM/KG (ref 275–295)
PATIENT FASTING Y/N/NP: YES
PLATELET # BLD AUTO: 252 10(3)UL (ref 150–450)
POTASSIUM SERPL-SCNC: 4.1 MMOL/L (ref 3.5–5.1)
RBC # BLD AUTO: 4.07 X10(6)UL (ref 3.8–5.3)
SODIUM SERPL-SCNC: 136 MMOL/L (ref 136–145)
WBC # BLD AUTO: 6.5 X10(3) UL (ref 4–11)

## 2020-02-22 PROCEDURE — 36415 COLL VENOUS BLD VENIPUNCTURE: CPT

## 2020-02-22 PROCEDURE — 71046 X-RAY EXAM CHEST 2 VIEWS: CPT | Performed by: INTERNAL MEDICINE

## 2020-02-22 PROCEDURE — 85025 COMPLETE CBC W/AUTO DIFF WBC: CPT

## 2020-02-22 PROCEDURE — 83880 ASSAY OF NATRIURETIC PEPTIDE: CPT

## 2020-02-22 PROCEDURE — 80053 COMPREHEN METABOLIC PANEL: CPT

## 2020-02-22 PROCEDURE — 82785 ASSAY OF IGE: CPT

## 2020-03-04 ENCOUNTER — TELEPHONE (OUTPATIENT)
Dept: FAMILY MEDICINE CLINIC | Facility: CLINIC | Age: 85
End: 2020-03-04

## 2020-03-11 NOTE — TELEPHONE ENCOUNTER
Received fax stating medication has been denied? Looks like previously approved.   Put paperwork in [place] Triage

## 2020-03-17 ENCOUNTER — TELEPHONE (OUTPATIENT)
Dept: FAMILY MEDICINE CLINIC | Facility: CLINIC | Age: 85
End: 2020-03-17

## 2020-03-17 RX ORDER — POTASSIUM CHLORIDE 20 MEQ/1
20 TABLET, EXTENDED RELEASE ORAL 2 TIMES DAILY
Qty: 180 TABLET | Refills: 3 | Status: SHIPPED | OUTPATIENT
Start: 2020-03-17 | End: 2021-03-24

## 2020-03-17 NOTE — TELEPHONE ENCOUNTER
Pt's daughter stated she gave the wrong date to the nurse of the last visit. Pt was here last in 02/04/20.

## 2020-03-17 NOTE — TELEPHONE ENCOUNTER
Pt's daughter stated pt's potassium and water pill was changed from 2 to 3 x a day for the dosage, however the prescription has not been changed at the pharmacy, pt's daughter will NO longer use [facility] and is asking to send a new prescription with the rig

## 2020-03-17 NOTE — TELEPHONE ENCOUNTER
Spoke to daughter, states Potassium was changed to TID on 2/4/20    Pt had been retaining water,  No change since appt, torsemide is at 20mg TID    Please advise if you want to make any changes in meds?

## 2020-03-17 NOTE — TELEPHONE ENCOUNTER
Spoke to daughter, states Potassium was changed to TID on March 2     Pt had been retaining water,  No change since appt, torsemide is at 20mg TID     Please advise if you want to make any changes in meds?

## 2020-03-18 RX ORDER — TORSEMIDE 20 MG/1
20 TABLET ORAL 3 TIMES DAILY
Qty: 180 TABLET | Refills: 3 | Status: SHIPPED | OUTPATIENT
Start: 2020-03-18 | End: 2021-01-16

## 2020-05-21 NOTE — TELEPHONE ENCOUNTER
Pt had f/u appt on 2/4/2020 with PCP. Please close encounter ASAP. Will not allow me to close stating there is an incomplete/open note (from office/PCP). Thank you!

## 2020-06-08 RX ORDER — LOSARTAN POTASSIUM 100 MG/1
100 TABLET ORAL DAILY
Qty: 90 TABLET | Refills: 1 | Status: SHIPPED | OUTPATIENT
Start: 2020-06-08 | End: 2020-12-02

## 2020-06-08 NOTE — TELEPHONE ENCOUNTER
Patients daughter called. Needs a new prescription for Losartan 100mg sent to [facility] [address] [facility], [address] [place] [address] [facility] [address] [place] [address], [phone], [phone]      Please advise.

## 2020-06-17 RX ORDER — LEVOTHYROXINE SODIUM 0.07 MG/1
TABLET ORAL
Qty: 90 TABLET | Refills: 3 | Status: SHIPPED | OUTPATIENT
Start: 2020-06-17 | End: 2021-12-13 | Stop reason: ALTCHOICE

## 2020-06-22 ENCOUNTER — OFFICE VISIT (OUTPATIENT)
Dept: FAMILY MEDICINE CLINIC | Facility: CLINIC | Age: 85
End: 2020-06-22
Payer: MEDICARE

## 2020-06-22 VITALS — RESPIRATION RATE: 16 BRPM | HEIGHT: 56 IN | BODY MASS INDEX: 40.04 KG/M2 | WEIGHT: 178 LBS

## 2020-06-22 DIAGNOSIS — I10 ESSENTIAL HYPERTENSION: Primary | ICD-10-CM

## 2020-06-22 PROCEDURE — G0439 PPPS, SUBSEQ VISIT: HCPCS | Performed by: FAMILY MEDICINE

## 2020-06-22 NOTE — PROGRESS NOTES
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   She is difficu

## 2020-07-20 RX ORDER — METOPROLOL SUCCINATE 25 MG/1
TABLET, EXTENDED RELEASE ORAL
Qty: 270 TABLET | Refills: 1 | Status: SHIPPED | OUTPATIENT
Start: 2020-07-20 | End: 2021-01-22

## 2020-09-01 DIAGNOSIS — R06.02 SHORTNESS OF BREATH: Primary | ICD-10-CM

## 2020-09-01 DIAGNOSIS — J45.901 MODERATE ASTHMA WITH ACUTE EXACERBATION, UNSPECIFIED WHETHER PERSISTENT: ICD-10-CM

## 2020-09-02 ENCOUNTER — HOSPITAL ENCOUNTER (OUTPATIENT)
Dept: GENERAL RADIOLOGY | Age: 85
Discharge: HOME | End: 2020-09-02
Attending: INTERNAL MEDICINE
Payer: MEDICARE

## 2020-09-02 ENCOUNTER — APPOINTMENT (OUTPATIENT)
Dept: LAB | Age: 85
End: 2020-09-02
Attending: INTERNAL MEDICINE
Payer: MEDICARE

## 2020-09-02 DIAGNOSIS — J45.901 MODERATE ASTHMA WITH ACUTE EXACERBATION, UNSPECIFIED WHETHER PERSISTENT: ICD-10-CM

## 2020-09-02 DIAGNOSIS — R06.02 SHORTNESS OF BREATH: ICD-10-CM

## 2020-09-02 DIAGNOSIS — J45.901 EXACERBATION OF ASTHMA, UNSPECIFIED ASTHMA SEVERITY, UNSPECIFIED WHETHER PERSISTENT: ICD-10-CM

## 2020-09-02 DIAGNOSIS — R09.02 HYPOXIA: ICD-10-CM

## 2020-09-02 PROCEDURE — 71046 X-RAY EXAM CHEST 2 VIEWS: CPT | Performed by: INTERNAL MEDICINE

## 2020-09-04 LAB — SARS-COV-2 RNA RESP QL NAA+PROBE: NOT DETECTED

## 2020-10-17 ENCOUNTER — LAB ENCOUNTER (OUTPATIENT)
Dept: LAB | Age: 85
End: 2020-10-17
Attending: INTERNAL MEDICINE
Payer: MEDICARE

## 2020-10-17 ENCOUNTER — HOSPITAL ENCOUNTER (OUTPATIENT)
Dept: ULTRASOUND IMAGING | Age: 85
Discharge: HOME | End: 2020-10-17
Attending: OTOLARYNGOLOGY
Payer: MEDICARE

## 2020-10-17 DIAGNOSIS — J18.9 UNRESOLVED PNEUMONIA: ICD-10-CM

## 2020-10-17 DIAGNOSIS — E04.2 MULTINODULAR GOITER: ICD-10-CM

## 2020-10-17 DIAGNOSIS — J45.909 ASTHMATIC BRONCHITIS: ICD-10-CM

## 2020-10-17 DIAGNOSIS — R09.02 HYPOXEMIA: Primary | ICD-10-CM

## 2020-10-17 PROCEDURE — 76536 US EXAM OF HEAD AND NECK: CPT | Performed by: OTOLARYNGOLOGY

## 2020-10-17 PROCEDURE — 85025 COMPLETE CBC W/AUTO DIFF WBC: CPT

## 2020-10-17 PROCEDURE — 82785 ASSAY OF IGE: CPT

## 2020-10-17 PROCEDURE — 36415 COLL VENOUS BLD VENIPUNCTURE: CPT

## 2020-10-28 ENCOUNTER — OFFICE VISIT (OUTPATIENT)
Dept: FAMILY MEDICINE CLINIC | Facility: CLINIC | Age: 85
End: 2020-10-28
Payer: MEDICARE

## 2020-10-28 VITALS
BODY MASS INDEX: 41 KG/M2 | SYSTOLIC BLOOD PRESSURE: 124 MMHG | OXYGEN SATURATION: 98 % | DIASTOLIC BLOOD PRESSURE: 78 MMHG | WEIGHT: 185 LBS | HEART RATE: 75 BPM

## 2020-10-28 DIAGNOSIS — T78.40XA ALLERGY, INITIAL ENCOUNTER: Primary | ICD-10-CM

## 2020-10-28 PROCEDURE — 99213 OFFICE O/P EST LOW 20 MIN: CPT | Performed by: FAMILY MEDICINE

## 2020-10-28 PROCEDURE — G0008 ADMIN INFLUENZA VIRUS VAC: HCPCS | Performed by: FAMILY MEDICINE

## 2020-10-28 PROCEDURE — 90662 IIV NO PRSV INCREASED AG IM: CPT | Performed by: FAMILY MEDICINE

## 2020-10-28 RX ORDER — BUDESONIDE 0.5 MG/2ML
INHALANT ORAL
COMMUNITY
Start: 2020-10-13 | End: 2021-03-24

## 2020-10-28 RX ORDER — BUDESONIDE AND FORMOTEROL FUMARATE DIHYDRATE 160; 4.5 UG/1; UG/1
2 AEROSOL RESPIRATORY (INHALATION) 2 TIMES DAILY
COMMUNITY
Start: 2020-10-20

## 2020-10-28 RX ORDER — PREDNISONE 1 MG/1
TABLET ORAL
COMMUNITY
Start: 2020-10-12 | End: 2021-03-24 | Stop reason: ALTCHOICE

## 2020-10-28 RX ORDER — MONTELUKAST SODIUM 10 MG/1
10 TABLET ORAL DAILY
COMMUNITY
Start: 2020-10-12

## 2020-10-28 RX ORDER — AZITHROMYCIN 250 MG/1
TABLET, FILM COATED ORAL
COMMUNITY
Start: 2020-09-02 | End: 2021-03-26

## 2020-10-28 NOTE — PROGRESS NOTES
Here with daughter   Needs referral to allergist.   Exam normal   Imp.  reactiv3 airways  Plan   Referral given .  Exam unremarkable      jg

## 2020-12-02 RX ORDER — LOSARTAN POTASSIUM 100 MG/1
TABLET ORAL
Qty: 90 TABLET | Refills: 1 | Status: SHIPPED | OUTPATIENT
Start: 2020-12-02 | End: 2021-06-04

## 2020-12-28 RX ORDER — CLONIDINE HYDROCHLORIDE 0.1 MG/1
TABLET ORAL
Qty: 180 TABLET | Refills: 0 | Status: SHIPPED | OUTPATIENT
Start: 2020-12-28 | End: 2021-07-09

## 2021-01-16 RX ORDER — TORSEMIDE 20 MG/1
20 TABLET ORAL 3 TIMES DAILY
Qty: 90 TABLET | Refills: 0 | Status: SHIPPED | OUTPATIENT
Start: 2021-01-16 | End: 2021-01-18

## 2021-01-18 RX ORDER — TORSEMIDE 20 MG/1
TABLET ORAL
Qty: 270 TABLET | Refills: 0 | Status: SHIPPED | OUTPATIENT
Start: 2021-01-18 | End: 2021-04-23

## 2021-01-22 RX ORDER — METOPROLOL SUCCINATE 25 MG/1
TABLET, EXTENDED RELEASE ORAL
Qty: 270 TABLET | Refills: 1 | Status: SHIPPED | OUTPATIENT
Start: 2021-01-22 | End: 2021-07-24

## 2021-01-29 DIAGNOSIS — Z23 NEED FOR VACCINATION: ICD-10-CM

## 2021-03-08 ENCOUNTER — IMMUNIZATION (OUTPATIENT)
Dept: LAB | Age: 86
End: 2021-03-08
Attending: HOSPITALIST
Payer: MEDICARE

## 2021-03-08 DIAGNOSIS — Z23 NEED FOR VACCINATION: Primary | ICD-10-CM

## 2021-03-08 PROCEDURE — 0001A SARSCOV2 VAC 30MCG/0.3ML IM: CPT

## 2021-03-17 ENCOUNTER — TELEPHONE (OUTPATIENT)
Dept: CARDIOLOGY CLINIC | Facility: HOSPITAL | Age: 86
End: 2021-03-17

## 2021-03-17 ENCOUNTER — TELEPHONE (OUTPATIENT)
Dept: CARDIOLOGY | Age: 86
End: 2021-03-17

## 2021-03-17 NOTE — TELEPHONE ENCOUNTER
RN received a call from the patient's daughter, Tara Rose, who asked to schedule a new patient appointment for her mother. The patient was referred by Dr. Jean Claude Sanders who the patient saw in office yesterday.  Upon reviewing the chart, RN discovered that the jefferson

## 2021-03-23 NOTE — TELEPHONE ENCOUNTER
Last ov 10/28/2020  Next ov 3/28/2021    Last refill 3/17/2020      .   Potassium:    Lab Results   Component Value Date    K 4.1 02/22/2020    K 3.4 (L) 01/04/2020    K 3.7 01/03/2020

## 2021-03-24 ENCOUNTER — TELEPHONE (OUTPATIENT)
Dept: FAMILY MEDICINE CLINIC | Facility: CLINIC | Age: 86
End: 2021-03-24

## 2021-03-24 ENCOUNTER — OFFICE VISIT (OUTPATIENT)
Dept: FAMILY MEDICINE CLINIC | Facility: CLINIC | Age: 86
End: 2021-03-24
Payer: MEDICARE

## 2021-03-24 VITALS
BODY MASS INDEX: 40.05 KG/M2 | HEART RATE: 87 BPM | SYSTOLIC BLOOD PRESSURE: 142 MMHG | RESPIRATION RATE: 22 BRPM | HEIGHT: 57 IN | DIASTOLIC BLOOD PRESSURE: 78 MMHG | OXYGEN SATURATION: 97 % | TEMPERATURE: 99 F | WEIGHT: 185.63 LBS

## 2021-03-24 DIAGNOSIS — Z11.52 ENCOUNTER FOR SCREENING FOR COVID-19: Primary | ICD-10-CM

## 2021-03-24 DIAGNOSIS — R60.0 LOWER EXTREMITY EDEMA: ICD-10-CM

## 2021-03-24 DIAGNOSIS — R05.9 COUGH: ICD-10-CM

## 2021-03-24 LAB
OPERATOR ID: NORMAL
POCT LOT NUMBER: NORMAL
RAPID SARS-COV-2 BY PCR: NOT DETECTED

## 2021-03-24 PROCEDURE — 99213 OFFICE O/P EST LOW 20 MIN: CPT | Performed by: NURSE PRACTITIONER

## 2021-03-24 RX ORDER — POTASSIUM CHLORIDE 20 MEQ/1
20 TABLET, EXTENDED RELEASE ORAL 2 TIMES DAILY
Qty: 180 TABLET | Refills: 3 | Status: SHIPPED | OUTPATIENT
Start: 2021-03-24 | End: 2021-10-22

## 2021-03-24 NOTE — TELEPHONE ENCOUNTER
Spoke to pt she is c/o shortness  of breath,wheezing, bilateral legs edematous cough and sore throat. Appointment was scheduled previously for Friday. Informed pt and daughter she needs to go to immediate care or ER. Cancelled appointment for Friday

## 2021-03-24 NOTE — TELEPHONE ENCOUNTER
Dilma (daughter) called to see if she could move up her mom's appointment as she is having issues. 1. Shortness of Breath  2. Fluid Retention (per Respiratory Specialist)  3. No fever  4. No chills  5. Cough  (? Water retention ?)  6.  Sore throat  (fr

## 2021-03-24 NOTE — PROGRESS NOTES
CHIEF COMPLAINT:   Patient presents with:  Cough: cough x 2-3 weeks      HPI:   Jess Sharp is a 80year old female who presents for cough x 2-3 weeks. She has hx of asthma and COPD. She states she saw her pulmonologist for this on 3/17.  She was lungs 2 (two) times daily. • Montelukast Sodium 10 MG Oral Tab Take 10 mg by mouth daily.      • Levothyroxine Sodium 75 MCG Oral Tab TAKE 1 TABLET BY MOUTH ONCE DAILY BEFORE BREAKFAST 90 tablet 3   • ipratropium-albuterol 0.5-2.5 (3) MG/3ML Inhalation NEURO: Denies dizziness or weakness    EXAM:   /72   Pulse 87   Temp 98.8 °F (37.1 °C)   Resp 22   Ht 4' 9\" (1.448 m)   Wt 185 lb 9.6 oz (84.2 kg)   SpO2 97%   BMI 40.16 kg/m²   GENERAL: well developed, well nourished,in no apparent distress.  Jey Pat & Refills for this Visit:  Requested Prescriptions      No prescriptions requested or ordered in this encounter       Risks, benefits, and side effects of medication explained and discussed. Patient Instructions     Your COVID test was negative.      Joseph your legs. · Keep your legs up while lying or sitting. · If infection, injury, or recent surgery is causing the swelling, stay off your legs as much as possible until symptoms get better.   · If your healthcare provider says that your leg swelling is caus bronchitis. This kind of cough occurs along with an achy feeling, low-grade fever, nasal and sinus congestion, and a scratchy or sore throat. This usually gets better in 2 to 3 weeks. A cough that lasts longer than 3 weeks may be due to other causes.  Your The chronic cough may mean your medicine needs to be changed. Asthma  Cough may be the only sign of mild asthma. You may have tests to find out if asthma is causing your cough. You may also take asthma medicine on a trial basis.    Acid reflux (heartburn, content on 6/1/2018  © 3574-3017 [employer]. All rights reserved. This information is not intended as a substitute for professional medical care. Always follow your healthcare professional's instructions.             The patient indicates und

## 2021-03-25 ENCOUNTER — TELEPHONE (OUTPATIENT)
Dept: FAMILY MEDICINE CLINIC | Facility: CLINIC | Age: 86
End: 2021-03-25

## 2021-03-25 NOTE — PATIENT INSTRUCTIONS
Your COVID test was negative. Call your doctor tomorrow for further evaluation. Go to the ER For any difficulty breathing or any other worsening symptoms. Leg Swelling in Both Legs    Swelling of the feet, ankles, and legs is called edema.  It i provider says that your leg swelling is caused by venous insufficiency or varicose veins, don't sit or  one place for long periods of time. Take breaks and walk about every few hours. Brisk walking is a good exercise.  It helps circulate the blood t 3 weeks may be due to other causes. Your healthcare provider may refer to this as a chronic cough. If your cough does not improve over the next 2 weeks, further testing may be needed. Follow up with your healthcare provider as advised.  Cough suppressants a trial basis. Acid reflux (heartburn, GERD)  The esophagus is the tube that carries food from the mouth to the stomach. A valve at its lower end prevents stomach acids from flowing upward.  If this valve does not work properly, acid from the stomach ente instructions.

## 2021-03-25 NOTE — TELEPHONE ENCOUNTER
Spoke to daughter Rey Esquivel. Mom seen in [facility] yesterday - Covid negative. Breathing is good today. appt given for Dr. Bill Dumont tomorrow.

## 2021-03-26 ENCOUNTER — OFFICE VISIT (OUTPATIENT)
Dept: FAMILY MEDICINE CLINIC | Facility: CLINIC | Age: 86
End: 2021-03-26
Payer: MEDICARE

## 2021-03-26 VITALS
BODY MASS INDEX: 40.13 KG/M2 | WEIGHT: 186 LBS | SYSTOLIC BLOOD PRESSURE: 124 MMHG | TEMPERATURE: 98 F | DIASTOLIC BLOOD PRESSURE: 78 MMHG | HEART RATE: 82 BPM | HEIGHT: 57 IN | OXYGEN SATURATION: 97 % | RESPIRATION RATE: 18 BRPM

## 2021-03-26 DIAGNOSIS — Z00.00 LABORATORY EXAMINATION ORDERED AS PART OF A COMPLETE PHYSICAL EXAMINATION: ICD-10-CM

## 2021-03-26 DIAGNOSIS — E03.9 HYPOTHYROIDISM, UNSPECIFIED TYPE: ICD-10-CM

## 2021-03-26 DIAGNOSIS — I50.32 CHRONIC DIASTOLIC (CONGESTIVE) HEART FAILURE (HCC): ICD-10-CM

## 2021-03-26 DIAGNOSIS — R60.0 BILATERAL LEG EDEMA: ICD-10-CM

## 2021-03-26 DIAGNOSIS — E11.8 CONTROLLED TYPE 2 DIABETES MELLITUS WITH COMPLICATION, WITHOUT LONG-TERM CURRENT USE OF INSULIN (HCC): ICD-10-CM

## 2021-03-26 DIAGNOSIS — E55.9 VITAMIN D DEFICIENCY: ICD-10-CM

## 2021-03-26 DIAGNOSIS — R05.3 CHRONIC COUGH: Primary | ICD-10-CM

## 2021-03-26 PROCEDURE — 99214 OFFICE O/P EST MOD 30 MIN: CPT | Performed by: FAMILY MEDICINE

## 2021-03-26 RX ORDER — NEBULIZER
1 EACH MISCELLANEOUS AS DIRECTED
COMMUNITY
Start: 2020-11-20

## 2021-03-26 NOTE — PROGRESS NOTES
HPI/Subjective:   Patient ID: Chacho Bower is a 80year old female. Vitamin D Deficiency  Hx of vitamin D deficiency. This is a chronic problem. This problem onset over 1 year ago. This problem occurs constantly.  Pt does not have any current fractur glucose trend. An ACE inhibitor/angiotensin II receptor blocker is being taken. She does not see a podiatrist.Eye exam is current. Thyroid Problem  This is a chronic problem. The current episode started more than 1 year ago.  The problem occurs constantly tablet by mouth daily. • Multiple Vitamins-Minerals (CENTRUM SILVER OR) Take 1 tablet by mouth daily. Allergies: Ace Inhibitors          Coughing  Bactrim [Sulfametho*    NAUSEA ONLY    Objective:   Physical Exam  Vitals reviewed.    Constitu diabetes mellitus with complication, without long-term current use of insulin (HCC)  Avoidance of concentrated sweets is encouraged as well as the change from white to wheat grains. Due for repeat blood work at this time.  Follow up based on results.  - HEM

## 2021-03-27 ENCOUNTER — LAB ENCOUNTER (OUTPATIENT)
Dept: LAB | Age: 86
End: 2021-03-27
Attending: FAMILY MEDICINE
Payer: MEDICARE

## 2021-03-27 DIAGNOSIS — Z00.00 LABORATORY EXAMINATION ORDERED AS PART OF A COMPLETE PHYSICAL EXAMINATION: ICD-10-CM

## 2021-03-27 DIAGNOSIS — E03.9 HYPOTHYROIDISM, UNSPECIFIED TYPE: ICD-10-CM

## 2021-03-27 DIAGNOSIS — E11.8 CONTROLLED TYPE 2 DIABETES MELLITUS WITH COMPLICATION, WITHOUT LONG-TERM CURRENT USE OF INSULIN (HCC): ICD-10-CM

## 2021-03-27 DIAGNOSIS — R60.0 BILATERAL LEG EDEMA: ICD-10-CM

## 2021-03-27 DIAGNOSIS — R05.3 CHRONIC COUGH: ICD-10-CM

## 2021-03-27 DIAGNOSIS — E55.9 VITAMIN D DEFICIENCY: ICD-10-CM

## 2021-03-27 DIAGNOSIS — I50.32 CHRONIC DIASTOLIC (CONGESTIVE) HEART FAILURE (HCC): ICD-10-CM

## 2021-03-27 LAB
ALBUMIN SERPL-MCNC: 3.6 G/DL (ref 3.4–5)
ALBUMIN/GLOB SERPL: 0.9 {RATIO} (ref 1–2)
ALP LIVER SERPL-CCNC: 44 U/L
ALT SERPL-CCNC: 35 U/L
ANION GAP SERPL CALC-SCNC: 5 MMOL/L (ref 0–18)
AST SERPL-CCNC: 41 U/L (ref 15–37)
BASOPHILS # BLD AUTO: 0.07 X10(3) UL (ref 0–0.2)
BASOPHILS NFR BLD AUTO: 0.7 %
BILIRUB SERPL-MCNC: 0.4 MG/DL (ref 0.1–2)
BUN BLD-MCNC: 27 MG/DL (ref 7–18)
BUN/CREAT SERPL: 26.7 (ref 10–20)
CALCIUM BLD-MCNC: 9.6 MG/DL (ref 8.5–10.1)
CHLORIDE SERPL-SCNC: 101 MMOL/L (ref 98–112)
CHOLEST SMN-MCNC: 281 MG/DL (ref ?–200)
CO2 SERPL-SCNC: 29 MMOL/L (ref 21–32)
CREAT BLD-MCNC: 1.01 MG/DL
DEPRECATED RDW RBC AUTO: 52.3 FL (ref 35.1–46.3)
EOSINOPHIL # BLD AUTO: 0.26 X10(3) UL (ref 0–0.7)
EOSINOPHIL NFR BLD AUTO: 2.7 %
ERYTHROCYTE [DISTWIDTH] IN BLOOD BY AUTOMATED COUNT: 13.7 % (ref 11–15)
EST. AVERAGE GLUCOSE BLD GHB EST-MCNC: 126 MG/DL (ref 68–126)
GLOBULIN PLAS-MCNC: 3.8 G/DL (ref 2.8–4.4)
GLUCOSE BLD-MCNC: 93 MG/DL (ref 70–99)
HBA1C MFR BLD HPLC: 6 % (ref ?–5.7)
HCT VFR BLD AUTO: 42.5 %
HDLC SERPL-MCNC: 58 MG/DL (ref 40–59)
HGB BLD-MCNC: 13.6 G/DL
IMM GRANULOCYTES # BLD AUTO: 0.03 X10(3) UL (ref 0–1)
IMM GRANULOCYTES NFR BLD: 0.3 %
LDLC SERPL CALC-MCNC: 179 MG/DL (ref ?–100)
LYMPHOCYTES # BLD AUTO: 2.02 X10(3) UL (ref 1–4)
LYMPHOCYTES NFR BLD AUTO: 21.2 %
M PROTEIN MFR SERPL ELPH: 7.4 G/DL (ref 6.4–8.2)
MCH RBC QN AUTO: 32.9 PG (ref 26–34)
MCHC RBC AUTO-ENTMCNC: 32 G/DL (ref 31–37)
MCV RBC AUTO: 102.9 FL
MONOCYTES # BLD AUTO: 0.57 X10(3) UL (ref 0.1–1)
MONOCYTES NFR BLD AUTO: 6 %
NEUTROPHILS # BLD AUTO: 6.56 X10 (3) UL (ref 1.5–7.7)
NEUTROPHILS # BLD AUTO: 6.56 X10(3) UL (ref 1.5–7.7)
NEUTROPHILS NFR BLD AUTO: 69.1 %
NONHDLC SERPL-MCNC: 223 MG/DL (ref ?–130)
NT-PROBNP SERPL-MCNC: 182 PG/ML (ref ?–450)
OSMOLALITY SERPL CALC.SUM OF ELEC: 285 MOSM/KG (ref 275–295)
PATIENT FASTING Y/N/NP: YES
PATIENT FASTING Y/N/NP: YES
PLATELET # BLD AUTO: 285 10(3)UL (ref 150–450)
POTASSIUM SERPL-SCNC: 4.1 MMOL/L (ref 3.5–5.1)
RBC # BLD AUTO: 4.13 X10(6)UL
SODIUM SERPL-SCNC: 135 MMOL/L (ref 136–145)
T4 FREE SERPL-MCNC: 0.7 NG/DL (ref 0.8–1.7)
TRIGL SERPL-MCNC: 218 MG/DL (ref 30–149)
TSI SER-ACNC: 30 MIU/ML (ref 0.36–3.74)
VIT D+METAB SERPL-MCNC: 54.2 NG/ML (ref 30–100)
VLDLC SERPL CALC-MCNC: 44 MG/DL (ref 0–30)
WBC # BLD AUTO: 9.5 X10(3) UL (ref 4–11)

## 2021-03-27 PROCEDURE — 83036 HEMOGLOBIN GLYCOSYLATED A1C: CPT

## 2021-03-27 PROCEDURE — 84439 ASSAY OF FREE THYROXINE: CPT

## 2021-03-27 PROCEDURE — 83880 ASSAY OF NATRIURETIC PEPTIDE: CPT

## 2021-03-27 PROCEDURE — 80053 COMPREHEN METABOLIC PANEL: CPT

## 2021-03-27 PROCEDURE — 84443 ASSAY THYROID STIM HORMONE: CPT

## 2021-03-27 PROCEDURE — 85025 COMPLETE CBC W/AUTO DIFF WBC: CPT

## 2021-03-27 PROCEDURE — 82306 VITAMIN D 25 HYDROXY: CPT

## 2021-03-27 PROCEDURE — 80061 LIPID PANEL: CPT

## 2021-03-27 PROCEDURE — 36415 COLL VENOUS BLD VENIPUNCTURE: CPT

## 2021-03-29 ENCOUNTER — IMMUNIZATION (OUTPATIENT)
Dept: LAB | Age: 86
End: 2021-03-29
Attending: HOSPITALIST
Payer: MEDICARE

## 2021-03-29 DIAGNOSIS — Z23 NEED FOR VACCINATION: Primary | ICD-10-CM

## 2021-03-29 PROCEDURE — 0002A SARSCOV2 VAC 30MCG/0.3ML IM: CPT

## 2021-04-07 ENCOUNTER — HOSPITAL ENCOUNTER (OUTPATIENT)
Dept: GENERAL RADIOLOGY | Age: 86
Discharge: HOME | End: 2021-04-07
Attending: INTERNAL MEDICINE
Payer: MEDICARE

## 2021-04-07 ENCOUNTER — HOSPITAL ENCOUNTER (OUTPATIENT)
Dept: CV DIAGNOSTICS | Age: 86
Discharge: HOME | End: 2021-04-07
Attending: INTERNAL MEDICINE
Payer: MEDICARE

## 2021-04-07 DIAGNOSIS — R06.00 DYSPNEA, UNSPECIFIED TYPE: ICD-10-CM

## 2021-04-07 DIAGNOSIS — R09.02 HYPOXIA: ICD-10-CM

## 2021-04-07 DIAGNOSIS — J45.909 ASTHMA: ICD-10-CM

## 2021-04-07 PROCEDURE — 93306 TTE W/DOPPLER COMPLETE: CPT | Performed by: INTERNAL MEDICINE

## 2021-04-07 PROCEDURE — 71046 X-RAY EXAM CHEST 2 VIEWS: CPT | Performed by: INTERNAL MEDICINE

## 2021-04-16 PROBLEM — R73.9 HYPERGLYCEMIA: Status: RESOLVED | Noted: 2019-01-15 | Resolved: 2021-04-16

## 2021-04-23 RX ORDER — TORSEMIDE 20 MG/1
TABLET ORAL
Qty: 270 TABLET | Refills: 0 | Status: SHIPPED | OUTPATIENT
Start: 2021-04-23 | End: 2021-07-24

## 2021-04-28 ENCOUNTER — OFFICE VISIT (OUTPATIENT)
Dept: FAMILY MEDICINE CLINIC | Facility: CLINIC | Age: 86
End: 2021-04-28
Payer: MEDICARE

## 2021-04-28 VITALS
RESPIRATION RATE: 18 BRPM | HEART RATE: 89 BPM | BODY MASS INDEX: 40.13 KG/M2 | HEIGHT: 57 IN | WEIGHT: 186 LBS | TEMPERATURE: 98 F | OXYGEN SATURATION: 96 % | SYSTOLIC BLOOD PRESSURE: 136 MMHG | DIASTOLIC BLOOD PRESSURE: 74 MMHG

## 2021-04-28 DIAGNOSIS — I89.0 CHRONIC ACQUIRED LYMPHEDEMA: ICD-10-CM

## 2021-04-28 DIAGNOSIS — E03.9 HYPOTHYROIDISM, UNSPECIFIED TYPE: Primary | ICD-10-CM

## 2021-04-28 DIAGNOSIS — N18.32 STAGE 3B CHRONIC KIDNEY DISEASE (HCC): ICD-10-CM

## 2021-04-28 PROCEDURE — 99214 OFFICE O/P EST MOD 30 MIN: CPT | Performed by: FAMILY MEDICINE

## 2021-04-28 NOTE — PROGRESS NOTES
HPI/Subjective:   Patient ID: Emily Tracy is a 80year old female. Swelling  This is a chronic problem. The problem occurs constantly. Progression since onset:waxing and waning. Associated symptoms include swelling of b/l lower extremities.  Exacerb TORSEMIDE 20 MG Oral Tab TAKE 1 TABLET(20 MG) BY MOUTH THREE TIMES DAILY 270 tablet 0   • Nebulizers ([employer] LC PLUS NEBULIZER) Does not apply Misc Take 1 Bottle by mouth As Directed.      • Potassium Chloride ER 20 MEQ Oral Tab CR Take 1 tablet (20 mEq total (14); Future    3. Chronic acquired lymphedema  Pt should decrease torsemide to 20 MG 2x daily for 2 months before decreasing to 20 MG daily.  Referral was given previously for lymphedema clinic and she is reminded to f/u    Meds This Visit:  Requested Pres

## 2021-05-14 ENCOUNTER — HOSPITAL ENCOUNTER (OUTPATIENT)
Age: 86
Discharge: HOME | End: 2021-05-14
Payer: MEDICARE

## 2021-05-14 ENCOUNTER — OFFICE VISIT (OUTPATIENT)
Dept: FAMILY MEDICINE CLINIC | Facility: CLINIC | Age: 86
End: 2021-05-14
Payer: MEDICARE

## 2021-05-14 VITALS
RESPIRATION RATE: 20 BRPM | WEIGHT: 195 LBS | SYSTOLIC BLOOD PRESSURE: 161 MMHG | OXYGEN SATURATION: 98 % | TEMPERATURE: 100 F | HEIGHT: 56 IN | DIASTOLIC BLOOD PRESSURE: 93 MMHG | BODY MASS INDEX: 43.87 KG/M2 | HEART RATE: 90 BPM

## 2021-05-14 VITALS — BODY MASS INDEX: 40.49 KG/M2 | HEIGHT: 56 IN | WEIGHT: 180 LBS

## 2021-05-14 DIAGNOSIS — Z02.9 ENCOUNTER FOR ADMINISTRATIVE EXAMINATIONS: Primary | ICD-10-CM

## 2021-05-14 DIAGNOSIS — I89.0 LYMPHEDEMA: Primary | ICD-10-CM

## 2021-05-14 DIAGNOSIS — L03.119 CELLULITIS OF LOWER EXTREMITY, UNSPECIFIED LATERALITY: ICD-10-CM

## 2021-05-14 PROCEDURE — 99214 OFFICE O/P EST MOD 30 MIN: CPT

## 2021-05-14 PROCEDURE — 10060 I&D ABSCESS SIMPLE/SINGLE: CPT

## 2021-05-14 PROCEDURE — 99213 OFFICE O/P EST LOW 20 MIN: CPT

## 2021-05-14 RX ORDER — CEPHALEXIN 500 MG/1
500 CAPSULE ORAL 2 TIMES DAILY
Qty: 20 CAPSULE | Refills: 0 | Status: SHIPPED | OUTPATIENT
Start: 2021-05-14 | End: 2021-05-24

## 2021-05-14 NOTE — PROGRESS NOTES
HPI/Subjective:   Patient ID: Maryellen Maurer is a 80year old female. C/o blister on lower right lower leg onset 2 days ago      History/Other:   Review of Systems   Constitutional: Negative for activity change, appetite change, chills and fever. Erythema and lesion present. Comments: Right posterior lower leg with 2 blisters, fluid filled, with 4+ pitting edema         Assessment & Plan:   No diagnosis found. No orders of the defined types were placed in this encounter.       Meds This Visi

## 2021-05-15 NOTE — ED INITIAL ASSESSMENT (HPI)
Pt presents tonight with c/o swelling, redness, and blisters to bilateral LE's x 2 days. Pt denies any drainage from the legs or fevers. Pt denies any chest pain or SOB.

## 2021-05-15 NOTE — ED PROVIDER NOTES
Patient Seen in: [facility]mediate [facility]      History   Patient presents with:  Leg Swelling    Stated Complaint: lower right leg blisters x 2 days    HPI/Subjective:   HPI  Patient is an 60-year-old female past medical history of chronic lymphedema, Comment: 2 times per month    Drug use: No             Review of Systems    Positive for stated complaint: lower right leg blisters x 2 days  Other systems are as noted in HPI. Constitutional and vital signs reviewed.       All other systems reviewed and n MDM     Impression; worsening chronic lymphedema/ cellulitis lower extremities. Plan; cephalexin, meticulous wound care. Increase torsemide to 3 times daily. Close follow-up recommended PCP. Go to the ER for any worsening symptoms.   Discussed with abhishek

## 2021-05-26 ENCOUNTER — OFFICE VISIT (OUTPATIENT)
Dept: OCCUPATIONAL MEDICINE | Facility: HOSPITAL | Age: 86
End: 2021-05-26
Attending: FAMILY MEDICINE
Payer: MEDICARE

## 2021-05-26 DIAGNOSIS — R60.0 BILATERAL LEG EDEMA: ICD-10-CM

## 2021-05-26 PROCEDURE — 97165 OT EVAL LOW COMPLEX 30 MIN: CPT

## 2021-05-26 NOTE — PROGRESS NOTES
LE LYMPHEDEMA EVALUATION:   Referring Physician: Dr. Ridge Huerta  Diagnosis:  Bilateral leg edema (R60.0)  Q82.0 Date of Service: 5/26/2021     PATIENT SUMMARY   tEhel Ken is a 80year old female who presents to therapy today : with lingering, long sta DVTs.  It was recommended patient proceed to the emergency department. Patient declined saying that she does not feel safe and has no history of clots. She agrees to higher level of care if her symptoms persist or worsen.   Pt comes to [facility] for evaluati Fransisco Mclaughlin been large her adult life\" since having children. KNEES: Medial > lateral , fair mobility, boggy  Lower Legs: poor skin mobility  R > L. Densely boggy. ANKLES: (+1) pitting, poor mobility, densely boggy bilaterally.   Feet: dorsally (+1) pitting, stretch compression bandaging. 6 sessions   7- Reduce R LE lymphedema volume by 12 cm and improve tissue quality to soft and mobile to allow pt to ambulate safely  and to reduce pt's infection risk.  12 sessions  8- begin same MLD and compression tx for R L

## 2021-05-29 ENCOUNTER — LAB ENCOUNTER (OUTPATIENT)
Dept: LAB | Age: 86
End: 2021-05-29
Attending: FAMILY MEDICINE
Payer: MEDICARE

## 2021-05-29 DIAGNOSIS — N18.32 STAGE 3B CHRONIC KIDNEY DISEASE (HCC): ICD-10-CM

## 2021-05-29 DIAGNOSIS — E03.9 HYPOTHYROIDISM, UNSPECIFIED TYPE: ICD-10-CM

## 2021-05-29 PROCEDURE — 84443 ASSAY THYROID STIM HORMONE: CPT

## 2021-05-29 PROCEDURE — 36415 COLL VENOUS BLD VENIPUNCTURE: CPT

## 2021-05-29 PROCEDURE — 80053 COMPREHEN METABOLIC PANEL: CPT

## 2021-05-29 PROCEDURE — 84439 ASSAY OF FREE THYROXINE: CPT

## 2021-06-02 ENCOUNTER — OFFICE VISIT (OUTPATIENT)
Dept: OCCUPATIONAL MEDICINE | Facility: HOSPITAL | Age: 86
End: 2021-06-02
Attending: FAMILY MEDICINE
Payer: MEDICARE

## 2021-06-02 PROCEDURE — 97140 MANUAL THERAPY 1/> REGIONS: CPT

## 2021-06-03 NOTE — PROGRESS NOTES
Dx: Bilateral leg edema (R60.0)        Insurance (Authorized # of Visits): 2/21    Next MD/Plan Renewal Date:  8/24/21  Authorizing Physician: Dr. Epi Ambriz   Fall Risk: moderate       Precautions: bilat lower leg lymphedema, cellulitis R lower leg.         Sub lymph drainage, decongestive exercises and lymphedema precautions to BLEs for life-long self-management of lymphedema.  21 sessions     Plan:   Continue per OT POC       Charges:4x MT   Total Timed Treatment:60 min  Total Treatment Time: 60 min

## 2021-06-04 ENCOUNTER — OFFICE VISIT (OUTPATIENT)
Dept: OCCUPATIONAL MEDICINE | Facility: HOSPITAL | Age: 86
End: 2021-06-04
Attending: FAMILY MEDICINE
Payer: MEDICARE

## 2021-06-04 ENCOUNTER — TELEPHONE (OUTPATIENT)
Dept: FAMILY MEDICINE CLINIC | Facility: CLINIC | Age: 86
End: 2021-06-04

## 2021-06-04 PROCEDURE — 97140 MANUAL THERAPY 1/> REGIONS: CPT

## 2021-06-04 RX ORDER — LOSARTAN POTASSIUM 100 MG/1
100 TABLET ORAL DAILY
Qty: 90 TABLET | Refills: 1 | Status: SHIPPED | OUTPATIENT
Start: 2021-06-04 | End: 2021-11-18

## 2021-06-04 NOTE — TELEPHONE ENCOUNTER
Spoke to daughter she states pt seen in [facility] for cellulitis and sx have improved but have not gone away totally. Appointment scheduled with Dr Evelyne Horton 6/7/21 and instructed to go to IC for any worsening or new SX.

## 2021-06-04 NOTE — PROGRESS NOTES
Dx: :  Bilateral leg edema (R60.0)        Insurance (Authorized # of Visits): 3/21     Next MD/Plan Renewal Date:  8/24/21.   Authorizing Physician: Dr. Ana Lew Risk:moderate        Precautions: bilateral lower leg lymphedema, cellulitis History CHELO johnson garments, self-manual lymph drainage, decongestive exercises and lymphedema precautions to BLEs for life-long self-management of lymphedema.  21 sessions      Plan:   Continue per OT POC              Charges: 2x MT   Total Timed Treatment: 35 min  Total Jonathan

## 2021-06-04 NOTE — TELEPHONE ENCOUNTER
Patients daughter called. Patient was seen at [address] [place] [address] on 5/14 and prescribed antibiotic for cellulitis. States that the redness has decreased however has not gone away.     Wants to know if another round of antibiotic needs to be prescribed or if she would nee

## 2021-06-07 ENCOUNTER — OFFICE VISIT (OUTPATIENT)
Dept: FAMILY MEDICINE CLINIC | Facility: CLINIC | Age: 86
End: 2021-06-07
Payer: MEDICARE

## 2021-06-07 ENCOUNTER — OFFICE VISIT (OUTPATIENT)
Dept: OCCUPATIONAL MEDICINE | Facility: HOSPITAL | Age: 86
End: 2021-06-07
Attending: FAMILY MEDICINE
Payer: MEDICARE

## 2021-06-07 VITALS
SYSTOLIC BLOOD PRESSURE: 136 MMHG | OXYGEN SATURATION: 96 % | TEMPERATURE: 98 F | DIASTOLIC BLOOD PRESSURE: 76 MMHG | HEIGHT: 56 IN | WEIGHT: 187 LBS | HEART RATE: 82 BPM | BODY MASS INDEX: 42.07 KG/M2 | RESPIRATION RATE: 18 BRPM

## 2021-06-07 DIAGNOSIS — L03.115 CELLULITIS OF RIGHT LOWER EXTREMITY: Primary | ICD-10-CM

## 2021-06-07 DIAGNOSIS — S69.92XA FINGER INJURY, LEFT, INITIAL ENCOUNTER: ICD-10-CM

## 2021-06-07 DIAGNOSIS — R05.9 COUGH: ICD-10-CM

## 2021-06-07 DIAGNOSIS — I87.2 CHRONIC VENOUS STASIS DERMATITIS OF BOTH LOWER EXTREMITIES: ICD-10-CM

## 2021-06-07 DIAGNOSIS — E78.5 HYPERLIPIDEMIA, UNSPECIFIED HYPERLIPIDEMIA TYPE: ICD-10-CM

## 2021-06-07 DIAGNOSIS — L29.9 ITCHING: ICD-10-CM

## 2021-06-07 DIAGNOSIS — E03.9 HYPOTHYROIDISM, UNSPECIFIED TYPE: ICD-10-CM

## 2021-06-07 PROCEDURE — 97140 MANUAL THERAPY 1/> REGIONS: CPT

## 2021-06-07 PROCEDURE — 99214 OFFICE O/P EST MOD 30 MIN: CPT | Performed by: FAMILY MEDICINE

## 2021-06-07 RX ORDER — OMEPRAZOLE 20 MG/1
CAPSULE, DELAYED RELEASE ORAL
Qty: 141 CAPSULE | Refills: 0 | Status: SHIPPED | OUTPATIENT
Start: 2021-06-07 | End: 2021-12-13 | Stop reason: ALTCHOICE

## 2021-06-07 RX ORDER — CEPHALEXIN 500 MG/1
500 CAPSULE ORAL 3 TIMES DAILY
Qty: 21 CAPSULE | Refills: 0 | Status: SHIPPED | OUTPATIENT
Start: 2021-06-07 | End: 2021-12-13 | Stop reason: ALTCHOICE

## 2021-06-07 RX ORDER — OMEPRAZOLE 20 MG/1
CAPSULE, DELAYED RELEASE ORAL
Qty: 60 CAPSULE | Refills: 1 | Status: SHIPPED | OUTPATIENT
Start: 2021-06-07 | End: 2021-06-07

## 2021-06-07 NOTE — PROGRESS NOTES
HPI/Subjective:   Patient ID: Minal Novak is a 80year old female. Skin  This is a new problem. Episode onset: 5/12/21. The problem occurs constantly. The problem has been gradually improving. Associated symptoms include coughing and a rash.  Pertin adherence to exercise. Risk factors for coronary artery disease include dyslipidemia, obesity, stress, a sedentary lifestyle and post-menopausal.   Thyroid Problem  This is a chronic problem. The current episode started more than 1 year ago.  The problem o by mouth daily. 90 tablet 3   • TORSEMIDE 20 MG Oral Tab TAKE 1 TABLET(20 MG) BY MOUTH THREE TIMES DAILY 270 tablet 0   • Nebulizers (BBE LC PLUS NEBULIZER) Does not apply Misc Take 1 Bottle by mouth As Directed.      • Potassium Chloride ER 20 MEQ Oral Ta sounds: Normal breath sounds. No wheezing. Musculoskeletal:      Comments: Left hand, 4th digit, MCP joint. Difficulty with extension. Tenderness. Swelling and bruising of left hand.    Skin:     Comments: Skin of right posterior extremity is faintly red TSH W REFLEX TO FREE T4; Future    5. Hyperlipidemia, unspecified hyperlipidemia type  Pt refuses medication. Pt should work to avoid fired foods, fatty foods, red meat. He should work to increase fiber intake. Regular aerobic exercise is recommended.  Due

## 2021-06-08 ENCOUNTER — HOSPITAL ENCOUNTER (OUTPATIENT)
Dept: GENERAL RADIOLOGY | Age: 86
Discharge: HOME | End: 2021-06-08
Attending: FAMILY MEDICINE
Payer: MEDICARE

## 2021-06-08 DIAGNOSIS — S69.92XA FINGER INJURY, LEFT, INITIAL ENCOUNTER: ICD-10-CM

## 2021-06-08 PROCEDURE — 73140 X-RAY EXAM OF FINGER(S): CPT | Performed by: FAMILY MEDICINE

## 2021-06-08 NOTE — PROGRESS NOTES
Dx: :  Bilateral leg edema (R60.0)        Insurance (Authorized # of Visits): 4/21     Next MD/Plan Renewal Date:  8/24/21.   Authorizing Physician: Dr. Bhargav Thacker Risk:moderate        Precautions: bilateral lower leg lymphedema, cellulitis history CHELO johnson garments ASAP IF affordable and Pt allows. 10- Pt will be independent in use of compression garments, self-manual lymph drainage, decongestive exercises and lymphedema precautions to BLEs for life-long self-management of lymphedema.  21 sessions      Plan:

## 2021-06-09 ENCOUNTER — OFFICE VISIT (OUTPATIENT)
Dept: OCCUPATIONAL MEDICINE | Facility: HOSPITAL | Age: 86
End: 2021-06-09
Attending: FAMILY MEDICINE
Payer: MEDICARE

## 2021-06-09 PROCEDURE — 97140 MANUAL THERAPY 1/> REGIONS: CPT

## 2021-06-09 NOTE — PROGRESS NOTES
Dx: :  Bilateral leg edema (R60.0)        Insurance (Authorized # of Visits): 5/21     Next MD/Plan Renewal Date:  8/24/21.   Authorizing Physician: Dr. Montrell Briseno Risk:moderate        Precautions: bilateral lower leg lymphedema, cellulitis History CHELO johnson ordering bilat LE velcro compression garments ASAP IF affordable and Pt allows.   10- Pt will be independent in use of compression garments, self-manual lymph drainage, decongestive exercises and lymphedema precautions to BLEs for life-long self-management

## 2021-06-11 ENCOUNTER — OFFICE VISIT (OUTPATIENT)
Dept: OCCUPATIONAL MEDICINE | Facility: HOSPITAL | Age: 86
End: 2021-06-11
Attending: FAMILY MEDICINE
Payer: MEDICARE

## 2021-06-11 PROCEDURE — 97140 MANUAL THERAPY 1/> REGIONS: CPT

## 2021-06-12 NOTE — PROGRESS NOTES
Dx: :  Bilateral leg edema (R60.0)        Insurance (Authorized # of Visits): 6/21     Next MD/Plan Renewal Date:  8/24/21.   Authorizing Physician: Dr. Shanelle Morgan Risk:moderate        Precautions: bilateral lower leg lymphedema, cellulitis History CHELO johnson short stretch compression bandaging. 6 sessions   7- Reduce R LE lymphedema volume by 12 cm and improve tissue quality to soft and mobile to allow pt to ambulate safely  and to reduce pt's infection risk.  12 sessions  8- begin same MLD and compression tx f

## 2021-06-14 ENCOUNTER — OFFICE VISIT (OUTPATIENT)
Dept: OCCUPATIONAL MEDICINE | Facility: HOSPITAL | Age: 86
End: 2021-06-14
Attending: FAMILY MEDICINE
Payer: MEDICARE

## 2021-06-14 ENCOUNTER — TELEPHONE (OUTPATIENT)
Dept: FAMILY MEDICINE CLINIC | Facility: CLINIC | Age: 86
End: 2021-06-14

## 2021-06-14 PROCEDURE — 97140 MANUAL THERAPY 1/> REGIONS: CPT

## 2021-06-14 NOTE — PROGRESS NOTES
Dx: :  Bilateral leg edema (R60.0)        Insurance (Authorized # of Visits): 7/21     Next MD/Plan Renewal Date:  8/24/21.   Authorizing Physician: Dr. Bri Pryor Risk:moderate        Precautions: bilateral lower leg lymphedema, cellulitis History CHELO johnson stretch compression bandaging. 6 sessions   7- Reduce R LE lymphedema volume by 12 cm and improve tissue quality to soft and mobile to allow pt to ambulate safely  and to reduce pt's infection risk.  12 sessions  8- begin same MLD and compression tx for R L

## 2021-06-14 NOTE — TELEPHONE ENCOUNTER
Put finger in extension from base of hand to tip of finger. Also have her see hand surgeon right away.

## 2021-06-14 NOTE — TELEPHONE ENCOUNTER
Patients daughter calling in regards to left ring finger being fractured. Her therapist was requesting an order from doctor to know what splint to put on her. Please advise. Also there was a mychart message from 6/9 that was not read.

## 2021-06-16 ENCOUNTER — OFFICE VISIT (OUTPATIENT)
Dept: OCCUPATIONAL MEDICINE | Facility: HOSPITAL | Age: 86
End: 2021-06-16
Attending: FAMILY MEDICINE
Payer: MEDICARE

## 2021-06-16 PROCEDURE — 97140 MANUAL THERAPY 1/> REGIONS: CPT

## 2021-06-17 NOTE — PROGRESS NOTES
Dx: :  Bilateral leg edema (R60.0)        Insurance (Authorized # of Visits): 8/21     Next MD/Plan Renewal Date:  8/24/21.   Authorizing Physician: Dr. eKlley Co Risk:moderate        Precautions: bilateral lower leg lymphedema, cellulitis History CHELO johnson Bilat LE short stretch compression bandaging. 6 sessions   7- Reduce R LE lymphedema volume by 12 cm and improve tissue quality to soft and mobile to allow pt to ambulate safely  and to reduce pt's infection risk.  12 sessions  8- begin same MLD and santa

## 2021-06-18 ENCOUNTER — APPOINTMENT (OUTPATIENT)
Dept: OCCUPATIONAL MEDICINE | Facility: HOSPITAL | Age: 86
End: 2021-06-18
Attending: FAMILY MEDICINE
Payer: MEDICARE

## 2021-06-21 ENCOUNTER — OFFICE VISIT (OUTPATIENT)
Dept: OCCUPATIONAL MEDICINE | Facility: HOSPITAL | Age: 86
End: 2021-06-21
Attending: FAMILY MEDICINE
Payer: MEDICARE

## 2021-06-21 PROCEDURE — 97140 MANUAL THERAPY 1/> REGIONS: CPT

## 2021-06-22 NOTE — PROGRESS NOTES
Dx: :  Bilateral leg edema (R60.0)        Insurance (Authorized # of Visits): 9/21     Next MD/Plan Renewal Date:  8/24/21.   Authorizing Physician: Dr. Mart High Risk:moderate        Precautions: bilateral lower leg lymphedema, cellulitis History CHELO johnson care. Judie Angulo MET  2-  Pt will tolerate bilat modified compression via Tensogrip sleeve for 18 hours. 1 session. MET  3-  Pt will be independent in decongestive exercises.  3 sessions MET   4-  Pt/daughter (if willing) will be independent in self-manual

## 2021-06-23 ENCOUNTER — OFFICE VISIT (OUTPATIENT)
Dept: OCCUPATIONAL MEDICINE | Facility: HOSPITAL | Age: 86
End: 2021-06-23
Attending: FAMILY MEDICINE
Payer: MEDICARE

## 2021-06-23 PROCEDURE — 97140 MANUAL THERAPY 1/> REGIONS: CPT

## 2021-06-24 NOTE — PROGRESS NOTES
Dx: :  Bilateral leg edema (R60.0)        Insurance (Authorized # of Visits): 10/21     Next MD/Plan Renewal Date:  8/24/21.   Authorizing Physician: Dr. Milana Keating Risk:moderate        Precautions: bilateral lower leg lymphedema, cellulitis History R low improve safe ambulation. Observation:  * Bilat leg skin coloration is  barely pink , almost normal flesh color by end of each MLD session. * Has been w/o 3 layer bandages for 4 days, bilat lower leg lymphedema has increased by ~20% since last tx 6/16. measurement appt upon request of Pt. Will revisit purchasing bilat lower leg compression in the Fall. ( against the advice of Therapist)   Pt is interested in home use  of a pneumatic compression pump if Ins. allows.      Goals:  (to be met in 21 visits) From:6/23/2021  To:10/2/2021                                                    Charges: 4x MT   Total Timed Treatment: 55 min  Total Treatment Time: 60 min

## 2021-06-25 ENCOUNTER — APPOINTMENT (OUTPATIENT)
Dept: OCCUPATIONAL MEDICINE | Facility: HOSPITAL | Age: 86
End: 2021-06-25
Attending: FAMILY MEDICINE
Payer: MEDICARE

## 2021-07-09 RX ORDER — CLONIDINE HYDROCHLORIDE 0.1 MG/1
0.1 TABLET ORAL 2 TIMES DAILY
Qty: 180 TABLET | Refills: 0 | Status: SHIPPED | OUTPATIENT
Start: 2021-07-09 | End: 2021-12-11

## 2021-07-09 NOTE — TELEPHONE ENCOUNTER
Rx Request  CLONIDINE HCL 0.1 MG Oral Tab    Disp:    180                R: 0    Last Refilled: 12/28/2020    Last Visit: 06/07/2021

## 2021-07-24 RX ORDER — METOPROLOL SUCCINATE 25 MG/1
TABLET, EXTENDED RELEASE ORAL
Qty: 270 TABLET | Refills: 1 | Status: SHIPPED | OUTPATIENT
Start: 2021-07-24 | End: 2021-12-09

## 2021-07-24 RX ORDER — TORSEMIDE 20 MG/1
TABLET ORAL
Qty: 270 TABLET | Refills: 0 | Status: SHIPPED | OUTPATIENT
Start: 2021-07-24 | End: 2021-11-18

## 2021-08-11 NOTE — PROGRESS NOTES
Dx: :  Bilateral leg edema (R60.0), Q82.0       Insurance (Authorized # of Visits): 10/21     Next MD/Plan Renewal Date:  8/24/21.   Authorizing Physician: Dr. Earle Antonio Risk:moderate        Precautions: bilateral lower leg lymphedema, cellulitis History not fully maintain desired excess fluid and tissue health though will assist until Fall when Pt returns for MLD tx compression garment measurements.   * final bilateral lower leg fluid  Volume measurements indicate sizeable fluid reduction however reduction mobility, densely boggy bilaterally.   Feet: dorsally (+1) pitting, boggy with fair mobility.     Stemmer's Sign: no  COMPRESSION:  Size F tensogrip DOUBLE layer foot to knee bilat lower legs since Pt does not want to order formal leg compression at this ti Plan:   Discharge this date to day/night application of 2 ply Tensogrip size F foot to knee. Will see how Pt responds to double layer Tensogrip compression.    Therapist expects to see Pt for another round of MLD and compression in the Fall and hopeful

## 2021-08-26 ENCOUNTER — TELEPHONE (OUTPATIENT)
Dept: FAMILY MEDICINE CLINIC | Facility: CLINIC | Age: 86
End: 2021-08-26

## 2021-08-26 NOTE — TELEPHONE ENCOUNTER
Pt care notes faxed here on Tuesday, 8/24. Note needs to be signed by doctor and faxed asap as it is time sensitive resulting in pt's medication needs.

## 2021-09-02 ENCOUNTER — TELEPHONE (OUTPATIENT)
Dept: FAMILY MEDICINE CLINIC | Facility: CLINIC | Age: 86
End: 2021-09-02

## 2021-09-02 NOTE — TELEPHONE ENCOUNTER
George Perez calling now for paperwork faxed here for some compression stockings.   pls complete and Fax to [phone]

## 2021-10-02 ENCOUNTER — LAB ENCOUNTER (OUTPATIENT)
Dept: LAB | Age: 86
End: 2021-10-02
Attending: FAMILY MEDICINE
Payer: MEDICARE

## 2021-10-02 DIAGNOSIS — J45.909 ASTHMATIC BRONCHITIS: Primary | ICD-10-CM

## 2021-10-02 DIAGNOSIS — E78.5 HYPERLIPIDEMIA, UNSPECIFIED HYPERLIPIDEMIA TYPE: ICD-10-CM

## 2021-10-02 DIAGNOSIS — E03.9 HYPOTHYROIDISM, UNSPECIFIED TYPE: ICD-10-CM

## 2021-10-02 DIAGNOSIS — R09.02 HYPOXEMIA: ICD-10-CM

## 2021-10-02 PROCEDURE — 82785 ASSAY OF IGE: CPT

## 2021-10-02 PROCEDURE — 80061 LIPID PANEL: CPT

## 2021-10-02 PROCEDURE — 36415 COLL VENOUS BLD VENIPUNCTURE: CPT

## 2021-10-02 PROCEDURE — 85025 COMPLETE CBC W/AUTO DIFF WBC: CPT

## 2021-10-02 PROCEDURE — 84439 ASSAY OF FREE THYROXINE: CPT

## 2021-10-02 PROCEDURE — 84443 ASSAY THYROID STIM HORMONE: CPT

## 2021-10-12 ENCOUNTER — LAB ENCOUNTER (OUTPATIENT)
Dept: LAB | Age: 86
End: 2021-10-12
Attending: FAMILY MEDICINE
Payer: MEDICARE

## 2021-10-12 ENCOUNTER — OFFICE VISIT (OUTPATIENT)
Dept: FAMILY MEDICINE CLINIC | Facility: CLINIC | Age: 86
End: 2021-10-12
Payer: MEDICARE

## 2021-10-12 VITALS
RESPIRATION RATE: 20 BRPM | SYSTOLIC BLOOD PRESSURE: 134 MMHG | HEIGHT: 56 IN | DIASTOLIC BLOOD PRESSURE: 68 MMHG | TEMPERATURE: 98 F | WEIGHT: 188 LBS | HEART RATE: 86 BPM | OXYGEN SATURATION: 98 % | BODY MASS INDEX: 42.29 KG/M2

## 2021-10-12 DIAGNOSIS — Z23 NEED FOR VACCINATION: ICD-10-CM

## 2021-10-12 DIAGNOSIS — E66.01 OBESITY, MORBID (HCC): ICD-10-CM

## 2021-10-12 DIAGNOSIS — Z00.00 MEDICARE ANNUAL WELLNESS VISIT, SUBSEQUENT: Primary | ICD-10-CM

## 2021-10-12 DIAGNOSIS — R05.3 CHRONIC COUGH: ICD-10-CM

## 2021-10-12 DIAGNOSIS — M79.671 BILATERAL FOOT PAIN: ICD-10-CM

## 2021-10-12 DIAGNOSIS — R73.03 PREDIABETES: ICD-10-CM

## 2021-10-12 DIAGNOSIS — Z00.00 MEDICARE ANNUAL WELLNESS VISIT, SUBSEQUENT: ICD-10-CM

## 2021-10-12 DIAGNOSIS — E03.9 HYPOTHYROIDISM, UNSPECIFIED TYPE: ICD-10-CM

## 2021-10-12 DIAGNOSIS — Z00.00 ENCOUNTER FOR ANNUAL HEALTH EXAMINATION: ICD-10-CM

## 2021-10-12 DIAGNOSIS — M79.672 BILATERAL FOOT PAIN: ICD-10-CM

## 2021-10-12 DIAGNOSIS — J44.1 CHRONIC OBSTRUCTIVE PULMONARY DISEASE WITH (ACUTE) EXACERBATION (HCC): ICD-10-CM

## 2021-10-12 PROCEDURE — 90662 IIV NO PRSV INCREASED AG IM: CPT | Performed by: FAMILY MEDICINE

## 2021-10-12 PROCEDURE — 80053 COMPREHEN METABOLIC PANEL: CPT

## 2021-10-12 PROCEDURE — 85025 COMPLETE CBC W/AUTO DIFF WBC: CPT

## 2021-10-12 PROCEDURE — G0008 ADMIN INFLUENZA VIRUS VAC: HCPCS | Performed by: FAMILY MEDICINE

## 2021-10-12 PROCEDURE — G0439 PPPS, SUBSEQ VISIT: HCPCS | Performed by: FAMILY MEDICINE

## 2021-10-12 PROCEDURE — 36415 COLL VENOUS BLD VENIPUNCTURE: CPT

## 2021-10-12 RX ORDER — DESOXIMETASONE 2.5 MG/G
CREAM TOPICAL
Qty: 1 EACH | Refills: 1 | Status: SHIPPED | OUTPATIENT
Start: 2021-10-12

## 2021-10-12 RX ORDER — BENZONATATE 200 MG/1
200 CAPSULE ORAL EVERY 8 HOURS PRN
Qty: 30 CAPSULE | Refills: 0 | Status: SHIPPED | OUTPATIENT
Start: 2021-10-12 | End: 2021-12-13 | Stop reason: ALTCHOICE

## 2021-10-12 RX ORDER — BUDESONIDE 0.5 MG/2ML
INHALANT ORAL
COMMUNITY
Start: 2021-07-20

## 2021-10-12 RX ORDER — DESOXIMETASONE 2.5 MG/G
CREAM TOPICAL 2 TIMES DAILY
COMMUNITY

## 2021-10-12 NOTE — PATIENT INSTRUCTIONS
Get CBC and CMP done now. Get a1c, CBC, CMP, TSH&T4 again in February. Bryce Hensley's SCREENING SCHEDULE   Tests on this list are recommended by your physician but may not be covered, or covered at this frequency, by your insurer.    Please ch glucocorticoid medication use (Steroids) Last Dexa Scan:    XR DEXA BONE DENSITOMETRY (CPT=77080) 02/21/2017      No recommendations at this time   Pap and Pelvic    Pap   Covered every 2 years for women at normal risk;  Annually if at high risk -  No recom Results   Component Value Date    K 4.1 10/12/2021         Creatinine   Annually Lab Results   Component Value Date    CREATSERUM 0.95 10/12/2021         BUN Annually Lab Results   Component Value Date    BUN 26 (H) 10/12/2021       Drug Serum Conc Annuall

## 2021-10-20 ENCOUNTER — TELEPHONE (OUTPATIENT)
Dept: FAMILY MEDICINE CLINIC | Facility: CLINIC | Age: 86
End: 2021-10-20

## 2021-10-20 DIAGNOSIS — Z86.39 HISTORY OF LOW POTASSIUM: Primary | ICD-10-CM

## 2021-10-20 NOTE — TELEPHONE ENCOUNTER
Results are stable.  Continue same dose of potassium.  You dont need more or less.  We will recheck in 4 months. Written by Abelardo French DO on 10/13/2021 12:01 PM CDT    Spoke with patient:  She states she is alternating the 20meq and 40meq daily.    Was

## 2021-10-21 NOTE — PROGRESS NOTES
HPI:   Salma Sanders is a 80year old female who presents for a Medicare Subsequent Annual Wellness visit (Pt already had Initial Annual Wellness). Chronic obstructive pulmonary disease with (acute) exacerbation (HCC)  Stable. With inhalers. explanation and discussion of advance directives standard forms performed Face to Face with patient and Family/surrogate (if present), and forms available to patient in AVS       She smoked tobacco in the past but quit greater than 12 months ago.   Social H kg)  05/14/21 : 195 lb (88.5 kg)     Last Cholesterol Labs:   Lab Results   Component Value Date    CHOLEST 244 (H) 10/02/2021    HDL 61 (H) 10/02/2021     (H) 10/02/2021    TRIG 179 (H) 10/02/2021          Last Chemistry Labs:   Lab Results   [place] apply Misc, Take 1 Bottle by mouth As Directed. Potassium Chloride ER 20 MEQ Oral Tab CR, Take 1 tablet (20 mEq total) by mouth 2 (two) times daily. SYMBICORT 160-4.5 MCG/ACT Inhalation Aerosol, Inhale 2 puffs into the lungs 2 (two) times daily.   Montelu negative.          EXAM:   /68   Pulse 86   Temp 98 °F (36.7 °C) (Temporal)   Resp 20   Ht 4' 8\" (1.422 m)   Wt 188 lb (85.3 kg)   SpO2 98%   BMI 42.15 kg/m²  Estimated body mass index is 42.15 kg/m² as calculated from the following:    Height as of annual wellness visit, subsequent  -     Cancel: HEMOGLOBIN A1C; Future  -     Cancel: TSH+FREE T4; Future  -     CBC WITH DIFFERENTIAL WITH PLATELET; Future  -     COMP METABOLIC PANEL (14);  Future    Chronic obstructive pulmonary disease with (acute) exa screening every 12 months if never tested or if previously tested but not diagnosed with pre-diabetes   One screening every 6 months if diagnosed with pre-diabetes Lab Results   Component Value Date     (H) 10/12/2021        Cardiovascular Disease S covered for patients aged 32-38 -    No recommendations at this time    Immunizations    Influenza Covered once per flu season  Please get every year 10/12/2021  No recommendations at this time    Pneumococcal Each vaccine ([id number] & [id number]) covered

## 2021-10-22 RX ORDER — POTASSIUM CHLORIDE 20 MEQ/1
20 TABLET, EXTENDED RELEASE ORAL DAILY
Qty: 180 TABLET | Refills: 3 | COMMUNITY
Start: 2021-10-22

## 2021-11-18 RX ORDER — LOSARTAN POTASSIUM 100 MG/1
TABLET ORAL
Qty: 90 TABLET | Refills: 1 | Status: SHIPPED | OUTPATIENT
Start: 2021-11-18 | End: 2021-12-09 | Stop reason: DRUGHIGH

## 2021-11-18 RX ORDER — TORSEMIDE 20 MG/1
TABLET ORAL
Qty: 270 TABLET | Refills: 0 | Status: SHIPPED | OUTPATIENT
Start: 2021-11-18

## 2021-12-08 ENCOUNTER — TELEPHONE (OUTPATIENT)
Dept: FAMILY MEDICINE CLINIC | Facility: CLINIC | Age: 86
End: 2021-12-08

## 2021-12-08 NOTE — TELEPHONE ENCOUNTER
See previous message pt concerned regarding her BP. Pt states in the morning it is 189/80 then 2 hours after Metoprolol 50 mg and losartan 100 mg her pressure is 148/57 . Pt states in the evening she takes Metoprolol 25 mg and Clonidine 0.1mg.   Pt states she

## 2021-12-09 ENCOUNTER — HOSPITAL ENCOUNTER (EMERGENCY)
Age: 86
Discharge: LEFT BEFORE DISCHARGE | End: 2021-12-09
Attending: EMERGENCY MEDICINE
Payer: MEDICARE

## 2021-12-09 ENCOUNTER — APPOINTMENT (OUTPATIENT)
Dept: CT IMAGING | Age: 86
End: 2021-12-09
Attending: EMERGENCY MEDICINE
Payer: MEDICARE

## 2021-12-09 ENCOUNTER — APPOINTMENT (OUTPATIENT)
Dept: GENERAL RADIOLOGY | Age: 86
End: 2021-12-09
Attending: EMERGENCY MEDICINE
Payer: MEDICARE

## 2021-12-09 VITALS
RESPIRATION RATE: 20 BRPM | TEMPERATURE: 97 F | HEIGHT: 55 IN | DIASTOLIC BLOOD PRESSURE: 46 MMHG | HEART RATE: 78 BPM | BODY MASS INDEX: 41.66 KG/M2 | SYSTOLIC BLOOD PRESSURE: 148 MMHG | OXYGEN SATURATION: 99 % | WEIGHT: 180 LBS

## 2021-12-09 DIAGNOSIS — R06.00 EXERTIONAL DYSPNEA: ICD-10-CM

## 2021-12-09 DIAGNOSIS — R94.31 ABNORMAL ECG: Primary | ICD-10-CM

## 2021-12-09 DIAGNOSIS — R42 DIZZINESS: ICD-10-CM

## 2021-12-09 PROBLEM — R79.89 AZOTEMIA: Status: ACTIVE | Noted: 2021-12-09

## 2021-12-09 PROCEDURE — 70450 CT HEAD/BRAIN W/O DYE: CPT | Performed by: EMERGENCY MEDICINE

## 2021-12-09 PROCEDURE — 84484 ASSAY OF TROPONIN QUANT: CPT | Performed by: EMERGENCY MEDICINE

## 2021-12-09 PROCEDURE — 93005 ELECTROCARDIOGRAM TRACING: CPT

## 2021-12-09 PROCEDURE — 71260 CT THORAX DX C+: CPT | Performed by: EMERGENCY MEDICINE

## 2021-12-09 PROCEDURE — 85025 COMPLETE CBC W/AUTO DIFF WBC: CPT | Performed by: EMERGENCY MEDICINE

## 2021-12-09 PROCEDURE — 36415 COLL VENOUS BLD VENIPUNCTURE: CPT

## 2021-12-09 PROCEDURE — 80053 COMPREHEN METABOLIC PANEL: CPT | Performed by: EMERGENCY MEDICINE

## 2021-12-09 PROCEDURE — 93010 ELECTROCARDIOGRAM REPORT: CPT

## 2021-12-09 PROCEDURE — 85379 FIBRIN DEGRADATION QUANT: CPT | Performed by: EMERGENCY MEDICINE

## 2021-12-09 PROCEDURE — 99285 EMERGENCY DEPT VISIT HI MDM: CPT

## 2021-12-09 PROCEDURE — 83880 ASSAY OF NATRIURETIC PEPTIDE: CPT | Performed by: EMERGENCY MEDICINE

## 2021-12-09 PROCEDURE — 71045 X-RAY EXAM CHEST 1 VIEW: CPT | Performed by: EMERGENCY MEDICINE

## 2021-12-09 RX ORDER — MECLIZINE HCL 12.5 MG/1
12.5 TABLET ORAL EVERY 8 HOURS PRN
Qty: 60 TABLET | Refills: 0 | Status: SHIPPED | OUTPATIENT
Start: 2021-12-09

## 2021-12-09 RX ORDER — LOSARTAN POTASSIUM 50 MG/1
50 TABLET ORAL 2 TIMES DAILY
Qty: 60 TABLET | Refills: 0 | Status: SHIPPED | OUTPATIENT
Start: 2021-12-09 | End: 2022-01-03

## 2021-12-09 RX ORDER — METOPROLOL TARTRATE 100 MG/1
100 TABLET ORAL 2 TIMES DAILY
Qty: 60 TABLET | Refills: 1 | Status: SHIPPED | OUTPATIENT
Start: 2021-12-09 | End: 2021-12-10

## 2021-12-09 NOTE — ED INITIAL ASSESSMENT (HPI)
Started feeling dizzy in the mornings, last 2 days is worse, has hypertension, gets short of breath with walking, denies chest pain

## 2021-12-09 NOTE — TELEPHONE ENCOUNTER
I called them and left message as below. Please call them and make sure they get right message and also find out about her HR and pulse to see if irregular beats or skipping beats? Stop old metoprolol succinate.  Start new metoprolol tartrate at 100mg B

## 2021-12-09 NOTE — ED PROVIDER NOTES
Patient Seen in: [facility] Department In [place]      History   Patient presents with:  Dizziness    Stated Complaint: dizziness, high bp    Subjective:   HPI    80year-old woman history of hypertension, COPD, here for evaluation of elevated b Use      Vaping Use: Never used    Alcohol use: Not Currently      Comment: 2 times per month    Drug use: No             Review of Systems    Positive for stated complaint: dizziness, high bp  Other systems are as noted in HPI.   Constitutional and vital s SENSITIVITY - Normal   RAPID SARS-COV-2 BY PCR - Normal   CBC WITH DIFFERENTIAL WITH PLATELET    Narrative: The following orders were created for panel order CBC With Differential With Platelet.   Procedure                               Abnormality abnormality. OTHER:             None. CONCLUSION:  There is chronic small vessel ischemic change and atrophy. There is no evidence of an acute abnormality on the noncontrast CT of the head.    Dictated by (CST): Aubrey Desir MD on 12/09/2021 at bronchi. Mild peribronchial thickening is present. No bronchiectasis or or focal consolidation is seen. Motion artifact limits assessment of the  lungs. VASCULATURE:  There is no pulmonary embolism to the first subsegmental arterial level.  KARRIE:  No mas negative, D-dimer however was positive at 1.31 she is awaiting a CTA of the chest to rule out pulmonary embolism. She is neurologically intact on exam CT of the head shows no acute abnormalities.     I planned to admit the patient to the main hospital, tra complaints develop.                              Disposition and Plan     Clinical Impression:  Abnormal ECG  (primary encounter diagnosis)  Exertional dyspnea  Dizziness     Disposition:  [place]  12/9/2021  3:59 pm    Follow-up:  Jemima Zavala DO  [address]

## 2021-12-09 NOTE — TELEPHONE ENCOUNTER
Pt was in ER today and left AMA. Per Dr Gabby Tuttle advised pt  of med change . Stop Metoprolol Succinate and start metoprolol Tartrate 100mg BID. Pt also instructed on Meclizine 12.5mg every 8 hrs PRN dizziness. Pt verbalized understanding  Pt also instructed to

## 2021-12-10 RX ORDER — METOPROLOL TARTRATE 100 MG/1
TABLET ORAL
Qty: 180 TABLET | Refills: 0 | Status: SHIPPED | OUTPATIENT
Start: 2021-12-10

## 2021-12-11 RX ORDER — CLONIDINE HYDROCHLORIDE 0.1 MG/1
TABLET ORAL
Qty: 180 TABLET | Refills: 0 | Status: SHIPPED | OUTPATIENT
Start: 2021-12-11

## 2021-12-13 ENCOUNTER — OFFICE VISIT (OUTPATIENT)
Dept: FAMILY MEDICINE CLINIC | Facility: CLINIC | Age: 86
End: 2021-12-13
Payer: MEDICARE

## 2021-12-13 VITALS
HEART RATE: 78 BPM | WEIGHT: 183 LBS | OXYGEN SATURATION: 98 % | DIASTOLIC BLOOD PRESSURE: 70 MMHG | BODY MASS INDEX: 41.17 KG/M2 | SYSTOLIC BLOOD PRESSURE: 162 MMHG | RESPIRATION RATE: 20 BRPM | HEIGHT: 56 IN

## 2021-12-13 DIAGNOSIS — I10 ESSENTIAL HYPERTENSION: Primary | ICD-10-CM

## 2021-12-13 PROCEDURE — 99213 OFFICE O/P EST LOW 20 MIN: CPT | Performed by: FAMILY MEDICINE

## 2021-12-13 NOTE — PROGRESS NOTES
[facility] Progress Note    SUBJECTIVE: Salma Sanders 80year old female is here today for Patient presents with:  Blood Pressure    . One day she got up and felt like head was squeezing.    Blood pressure has been up    A week passed, litzy in alertness      OBJECTIVE:  BP (!) 162/70   Pulse 78   Resp 20   Ht 4' 8\" (1.422 m)   Wt 183 lb (83 kg)   SpO2 98%   BMI 41.03 kg/m²     Exam    CV: RRR, s1 and s2 present, no murmurs clicks or rubs  Resp: clear to auscultation bilaterally  Abd: BS+, no (PROAIR HFA) 108 (90 BASE) MCG/ACT Inhalation Aero Soln Inhale 2 puffs into the lungs every 6 (six) hours as needed for Wheezing. 1 Inhaler 0   • Cod Liver Oil 1000 MG Oral Cap Take 1 capsule by mouth daily.      • Calcium Carbonate-Vitamin D (CALTRATE 600+

## 2022-01-03 RX ORDER — LOSARTAN POTASSIUM 50 MG/1
TABLET ORAL
Qty: 180 TABLET | Refills: 0 | Status: SHIPPED | OUTPATIENT
Start: 2022-01-03

## 2022-03-29 ENCOUNTER — LAB ENCOUNTER (OUTPATIENT)
Dept: LAB | Age: 87
End: 2022-03-29
Attending: FAMILY MEDICINE
Payer: MEDICARE

## 2022-03-29 ENCOUNTER — OFFICE VISIT (OUTPATIENT)
Dept: FAMILY MEDICINE CLINIC | Facility: CLINIC | Age: 87
End: 2022-03-29
Payer: MEDICARE

## 2022-03-29 VITALS
RESPIRATION RATE: 20 BRPM | HEART RATE: 91 BPM | TEMPERATURE: 98 F | WEIGHT: 200.81 LBS | HEIGHT: 56 IN | OXYGEN SATURATION: 97 % | DIASTOLIC BLOOD PRESSURE: 74 MMHG | SYSTOLIC BLOOD PRESSURE: 138 MMHG | BODY MASS INDEX: 45.17 KG/M2

## 2022-03-29 DIAGNOSIS — R73.03 PREDIABETES: ICD-10-CM

## 2022-03-29 DIAGNOSIS — R42 DIZZY: ICD-10-CM

## 2022-03-29 DIAGNOSIS — I10 PRIMARY HYPERTENSION: Primary | ICD-10-CM

## 2022-03-29 DIAGNOSIS — I10 PRIMARY HYPERTENSION: ICD-10-CM

## 2022-03-29 LAB
ALBUMIN SERPL-MCNC: 3.4 G/DL (ref 3.4–5)
ALBUMIN/GLOB SERPL: 1.2 {RATIO} (ref 1–2)
ALP LIVER SERPL-CCNC: 44 U/L
ALT SERPL-CCNC: 32 U/L
ANION GAP SERPL CALC-SCNC: 4 MMOL/L (ref 0–18)
AST SERPL-CCNC: 29 U/L (ref 15–37)
BASOPHILS # BLD AUTO: 0.04 X10(3) UL (ref 0–0.2)
BASOPHILS NFR BLD AUTO: 0.5 %
BILIRUB SERPL-MCNC: 0.3 MG/DL (ref 0.1–2)
BUN BLD-MCNC: 27 MG/DL (ref 7–18)
CALCIUM BLD-MCNC: 9 MG/DL (ref 8.5–10.1)
CHLORIDE SERPL-SCNC: 97 MMOL/L (ref 98–112)
CO2 SERPL-SCNC: 31 MMOL/L (ref 21–32)
CREAT BLD-MCNC: 0.95 MG/DL
EOSINOPHIL # BLD AUTO: 0.17 X10(3) UL (ref 0–0.7)
EOSINOPHIL NFR BLD AUTO: 2 %
ERYTHROCYTE [DISTWIDTH] IN BLOOD BY AUTOMATED COUNT: 14.3 %
EST. AVERAGE GLUCOSE BLD GHB EST-MCNC: 134 MG/DL (ref 68–126)
FASTING STATUS PATIENT QL REPORTED: NO
GLOBULIN PLAS-MCNC: 2.9 G/DL (ref 2.8–4.4)
GLUCOSE BLD-MCNC: 85 MG/DL (ref 70–99)
HBA1C MFR BLD: 6.3 % (ref ?–5.7)
HCT VFR BLD AUTO: 40.7 %
HGB BLD-MCNC: 12.8 G/DL
IMM GRANULOCYTES # BLD AUTO: 0.04 X10(3) UL (ref 0–1)
IMM GRANULOCYTES NFR BLD: 0.5 %
LYMPHOCYTES # BLD AUTO: 2.87 X10(3) UL (ref 1–4)
LYMPHOCYTES NFR BLD AUTO: 34.4 %
MCH RBC QN AUTO: 32 PG (ref 26–34)
MCHC RBC AUTO-ENTMCNC: 31.4 G/DL (ref 31–37)
MCV RBC AUTO: 101.8 FL
MONOCYTES # BLD AUTO: 0.93 X10(3) UL (ref 0.1–1)
MONOCYTES NFR BLD AUTO: 11.2 %
NEUTROPHILS # BLD AUTO: 4.29 X10 (3) UL (ref 1.5–7.7)
NEUTROPHILS # BLD AUTO: 4.29 X10(3) UL (ref 1.5–7.7)
NEUTROPHILS NFR BLD AUTO: 51.4 %
OSMOLALITY SERPL CALC.SUM OF ELEC: 278 MOSM/KG (ref 275–295)
PLATELET # BLD AUTO: 293 10(3)UL (ref 150–450)
POTASSIUM SERPL-SCNC: 4.7 MMOL/L (ref 3.5–5.1)
PROT SERPL-MCNC: 6.3 G/DL (ref 6.4–8.2)
RBC # BLD AUTO: 4 X10(6)UL
SODIUM SERPL-SCNC: 132 MMOL/L (ref 136–145)
WBC # BLD AUTO: 8.3 X10(3) UL (ref 4–11)

## 2022-03-29 PROCEDURE — 83036 HEMOGLOBIN GLYCOSYLATED A1C: CPT

## 2022-03-29 PROCEDURE — 99214 OFFICE O/P EST MOD 30 MIN: CPT | Performed by: FAMILY MEDICINE

## 2022-03-29 PROCEDURE — 80053 COMPREHEN METABOLIC PANEL: CPT

## 2022-03-29 PROCEDURE — 36415 COLL VENOUS BLD VENIPUNCTURE: CPT

## 2022-03-29 PROCEDURE — 85025 COMPLETE CBC W/AUTO DIFF WBC: CPT

## 2022-03-29 RX ORDER — HYDRALAZINE HYDROCHLORIDE 25 MG/1
TABLET, FILM COATED ORAL
COMMUNITY
Start: 2021-12-20 | End: 2022-03-29 | Stop reason: ALTCHOICE

## 2022-03-29 RX ORDER — METOPROLOL SUCCINATE 50 MG/1
50 TABLET, EXTENDED RELEASE ORAL DAILY
Qty: 60 TABLET | Refills: 1 | Status: SHIPPED | OUTPATIENT
Start: 2022-03-29

## 2022-03-29 RX ORDER — PREDNISONE 10 MG/1
TABLET ORAL
COMMUNITY
Start: 2022-02-16 | End: 2022-03-29

## 2022-03-29 RX ORDER — HYDRALAZINE HYDROCHLORIDE 25 MG/1
25 TABLET, FILM COATED ORAL 3 TIMES DAILY
COMMUNITY
Start: 2022-03-21

## 2022-03-29 NOTE — PATIENT INSTRUCTIONS
Take hydralazine 25mg every 8 hrs  Metoprolol tartrate 100mg every 12 hrs    If still having BP issues after 1 week, try switching to metoprolol succinate 50mg every 12 hrs. Which I just sent over.

## 2022-04-06 ENCOUNTER — OFFICE VISIT (OUTPATIENT)
Dept: FAMILY MEDICINE CLINIC | Facility: CLINIC | Age: 87
End: 2022-04-06
Payer: MEDICARE

## 2022-04-06 VITALS
HEART RATE: 63 BPM | BODY MASS INDEX: 43.87 KG/M2 | WEIGHT: 195 LBS | TEMPERATURE: 97 F | HEIGHT: 56 IN | RESPIRATION RATE: 22 BRPM | SYSTOLIC BLOOD PRESSURE: 130 MMHG | OXYGEN SATURATION: 97 % | DIASTOLIC BLOOD PRESSURE: 62 MMHG

## 2022-04-06 DIAGNOSIS — R30.0 DYSURIA: Primary | ICD-10-CM

## 2022-04-06 LAB
APPEARANCE: CLEAR
BILIRUBIN: NEGATIVE
GLUCOSE (URINE DIPSTICK): NEGATIVE MG/DL
KETONES (URINE DIPSTICK): NEGATIVE MG/DL
MULTISTIX LOT#: ABNORMAL NUMERIC
NITRITE, URINE: NEGATIVE
OCCULT BLOOD: NEGATIVE
PH, URINE: 5.5 (ref 4.5–8)
PROTEIN (URINE DIPSTICK): NEGATIVE MG/DL
SPECIFIC GRAVITY: 1.01 (ref 1–1.03)
URINE-COLOR: YELLOW
UROBILINOGEN,SEMI-QN: 0.2 MG/DL (ref 0–1.9)

## 2022-04-06 PROCEDURE — 81003 URINALYSIS AUTO W/O SCOPE: CPT | Performed by: NURSE PRACTITIONER

## 2022-04-06 PROCEDURE — 99213 OFFICE O/P EST LOW 20 MIN: CPT | Performed by: NURSE PRACTITIONER

## 2022-04-06 PROCEDURE — 87086 URINE CULTURE/COLONY COUNT: CPT | Performed by: NURSE PRACTITIONER

## 2022-04-28 RX ORDER — METOPROLOL TARTRATE 100 MG/1
100 TABLET ORAL 2 TIMES DAILY
Qty: 180 TABLET | Refills: 0 | Status: SHIPPED | OUTPATIENT
Start: 2022-04-28

## 2022-04-28 RX ORDER — TORSEMIDE 20 MG/1
20 TABLET ORAL DAILY
Qty: 90 TABLET | Refills: 2 | Status: SHIPPED | OUTPATIENT
Start: 2022-04-28

## 2022-05-27 RX ORDER — LOSARTAN POTASSIUM 50 MG/1
TABLET ORAL
Qty: 180 TABLET | Refills: 0 | Status: SHIPPED | OUTPATIENT
Start: 2022-05-27

## 2022-06-06 DIAGNOSIS — E03.9 HYPOTHYROIDISM, UNSPECIFIED TYPE: ICD-10-CM

## 2022-06-06 RX ORDER — LEVOTHYROXINE SODIUM 0.1 MG/1
TABLET ORAL
Qty: 90 TABLET | Refills: 3 | Status: SHIPPED | OUTPATIENT
Start: 2022-06-06

## 2022-06-14 ENCOUNTER — OFFICE VISIT (OUTPATIENT)
Dept: FAMILY MEDICINE CLINIC | Facility: CLINIC | Age: 87
End: 2022-06-14
Payer: MEDICARE

## 2022-06-14 VITALS
RESPIRATION RATE: 22 BRPM | SYSTOLIC BLOOD PRESSURE: 132 MMHG | BODY MASS INDEX: 44.32 KG/M2 | TEMPERATURE: 98 F | WEIGHT: 197 LBS | HEART RATE: 84 BPM | HEIGHT: 56 IN | DIASTOLIC BLOOD PRESSURE: 82 MMHG | OXYGEN SATURATION: 96 %

## 2022-06-14 DIAGNOSIS — I50.9 CHRONIC CONGESTIVE HEART FAILURE, UNSPECIFIED HEART FAILURE TYPE (HCC): ICD-10-CM

## 2022-06-14 DIAGNOSIS — I10 PRIMARY HYPERTENSION: Primary | ICD-10-CM

## 2022-06-14 PROCEDURE — 99214 OFFICE O/P EST MOD 30 MIN: CPT | Performed by: FAMILY MEDICINE

## 2022-06-14 RX ORDER — TORSEMIDE 20 MG/1
20 TABLET ORAL 2 TIMES DAILY
Qty: 180 TABLET | Refills: 2 | Status: SHIPPED | OUTPATIENT
Start: 2022-06-14

## 2022-06-22 ENCOUNTER — TELEPHONE (OUTPATIENT)
Dept: FAMILY MEDICINE CLINIC | Facility: CLINIC | Age: 87
End: 2022-06-22

## 2022-06-22 NOTE — TELEPHONE ENCOUNTER
Pt states she saw Dr Elvie De La Paz last week and forgot to mention she was prescribed Celebrex 200mg by Dr Logan Sin for back pain due to arthritis. Pain intermittent pt would like new script prescribed. Please advise.   Pt had OV with Dr Elvie De La Paz 6/14/22  That last time this was filled I see was 2014(pt states it was filled in 2019)

## 2022-06-22 NOTE — TELEPHONE ENCOUNTER
Pt's celebrex (for back pain) is a \"few yrs old\". She wants to know if okay to take or should she get a new script?

## 2022-06-24 RX ORDER — CELECOXIB 200 MG/1
200 CAPSULE ORAL DAILY PRN
Qty: 30 CAPSULE | Refills: 0 | Status: SHIPPED | OUTPATIENT
Start: 2022-06-24

## 2022-06-24 NOTE — TELEPHONE ENCOUNTER
I will sign, should consider taking pepcid or PPI if taking this regularly, recommend review soon in visit or with Dr. Hannah Bianchi prior to refills. Medication can make GI bleeds more likely.     Hari Ott MD

## 2022-06-24 NOTE — TELEPHONE ENCOUNTER
Spoke with patient, advised YP is out of office. She is begging for this medication. RC, are you able to review for patient?

## 2022-07-23 ENCOUNTER — LAB ENCOUNTER (OUTPATIENT)
Dept: LAB | Age: 87
End: 2022-07-23
Attending: INTERNAL MEDICINE
Payer: MEDICARE

## 2022-07-23 DIAGNOSIS — I10 HTN (HYPERTENSION): ICD-10-CM

## 2022-07-23 DIAGNOSIS — D75.89 MACROCYTOSIS: ICD-10-CM

## 2022-07-23 DIAGNOSIS — J44.9 COPD (CHRONIC OBSTRUCTIVE PULMONARY DISEASE) (HCC): Primary | ICD-10-CM

## 2022-07-23 DIAGNOSIS — Z86.39 HISTORY OF LOW POTASSIUM: ICD-10-CM

## 2022-07-23 LAB
ANION GAP SERPL CALC-SCNC: 4 MMOL/L (ref 0–18)
BUN BLD-MCNC: 36 MG/DL (ref 7–18)
CALCIUM BLD-MCNC: 9.8 MG/DL (ref 8.5–10.1)
CHLORIDE SERPL-SCNC: 101 MMOL/L (ref 98–112)
CO2 SERPL-SCNC: 31 MMOL/L (ref 21–32)
CREAT BLD-MCNC: 1.34 MG/DL
FASTING STATUS PATIENT QL REPORTED: YES
FOLATE SERPL-MCNC: 44.7 NG/ML (ref 8.7–?)
GLUCOSE BLD-MCNC: 82 MG/DL (ref 70–99)
NT-PROBNP SERPL-MCNC: 1330 PG/ML (ref ?–450)
OSMOLALITY SERPL CALC.SUM OF ELEC: 289 MOSM/KG (ref 275–295)
POTASSIUM SERPL-SCNC: 4.2 MMOL/L (ref 3.5–5.1)
SODIUM SERPL-SCNC: 136 MMOL/L (ref 136–145)
VIT B12 SERPL-MCNC: 1687 PG/ML (ref 193–986)

## 2022-07-23 PROCEDURE — 36415 COLL VENOUS BLD VENIPUNCTURE: CPT

## 2022-07-23 PROCEDURE — 83880 ASSAY OF NATRIURETIC PEPTIDE: CPT

## 2022-07-23 PROCEDURE — 82746 ASSAY OF FOLIC ACID SERUM: CPT

## 2022-07-23 PROCEDURE — 80048 BASIC METABOLIC PNL TOTAL CA: CPT

## 2022-07-23 PROCEDURE — 82607 VITAMIN B-12: CPT

## 2022-07-29 ENCOUNTER — TELEPHONE (OUTPATIENT)
Dept: ORTHOPEDICS CLINIC | Facility: CLINIC | Age: 87
End: 2022-07-29

## 2022-07-29 DIAGNOSIS — M79.672 LEFT FOOT PAIN: Primary | ICD-10-CM

## 2022-07-29 DIAGNOSIS — M79.671 RIGHT FOOT PAIN: ICD-10-CM

## 2022-07-29 RX ORDER — METOPROLOL TARTRATE 100 MG/1
TABLET ORAL
Qty: 180 TABLET | Refills: 0 | Status: SHIPPED | OUTPATIENT
Start: 2022-07-29

## 2022-07-29 NOTE — TELEPHONE ENCOUNTER
Patient is scheduled with Dr. Judy Turner for bilateral foot pain. Please advise if imaging is needed.

## 2022-07-29 NOTE — TELEPHONE ENCOUNTER
Reviewed patients chart, xray orders are required. Order placed for bilateral feet xrays.  Please contact patient advise to arrive 30 mins prior to patients appt to complete x-ray order and schedule patients xray appt-Thank you

## 2022-08-26 RX ORDER — LOSARTAN POTASSIUM 50 MG/1
TABLET ORAL
Qty: 180 TABLET | Refills: 0 | Status: SHIPPED | OUTPATIENT
Start: 2022-08-26

## 2022-08-31 ENCOUNTER — HOSPITAL ENCOUNTER (OUTPATIENT)
Dept: GENERAL RADIOLOGY | Age: 87
Discharge: HOME | End: 2022-08-31
Attending: PODIATRIST
Payer: MEDICARE

## 2022-08-31 ENCOUNTER — OFFICE VISIT (OUTPATIENT)
Dept: ORTHOPEDICS CLINIC | Facility: CLINIC | Age: 87
End: 2022-08-31
Payer: MEDICARE

## 2022-08-31 VITALS — WEIGHT: 197 LBS | HEIGHT: 56 IN | BODY MASS INDEX: 44.32 KG/M2

## 2022-08-31 DIAGNOSIS — L85.9 HYPERKERATOSIS: ICD-10-CM

## 2022-08-31 DIAGNOSIS — M77.51 BURSITIS OF BOTH FEET: Primary | ICD-10-CM

## 2022-08-31 DIAGNOSIS — M79.671 RIGHT FOOT PAIN: ICD-10-CM

## 2022-08-31 DIAGNOSIS — M79.672 LEFT FOOT PAIN: ICD-10-CM

## 2022-08-31 DIAGNOSIS — M77.52 BURSITIS OF BOTH FEET: Primary | ICD-10-CM

## 2022-08-31 DIAGNOSIS — R60.0 EXTREMITY EDEMA: ICD-10-CM

## 2022-08-31 PROCEDURE — 73630 X-RAY EXAM OF FOOT: CPT | Performed by: PODIATRIST

## 2022-08-31 PROCEDURE — 1125F AMNT PAIN NOTED PAIN PRSNT: CPT | Performed by: PODIATRIST

## 2022-08-31 PROCEDURE — 99203 OFFICE O/P NEW LOW 30 MIN: CPT | Performed by: PODIATRIST

## 2022-08-31 RX ORDER — FLUTICASONE PROPIONATE AND SALMETEROL XINAFOATE 230; 21 UG/1; UG/1
2 AEROSOL, METERED RESPIRATORY (INHALATION) 2 TIMES DAILY
COMMUNITY
Start: 2022-08-14

## 2022-10-19 ENCOUNTER — OFFICE VISIT (OUTPATIENT)
Facility: CLINIC | Age: 87
End: 2022-10-19
Payer: MEDICARE

## 2022-10-19 VITALS
OXYGEN SATURATION: 96 % | RESPIRATION RATE: 16 BRPM | SYSTOLIC BLOOD PRESSURE: 128 MMHG | WEIGHT: 199 LBS | HEART RATE: 53 BPM | DIASTOLIC BLOOD PRESSURE: 64 MMHG | BODY MASS INDEX: 44.76 KG/M2 | HEIGHT: 56 IN

## 2022-10-19 DIAGNOSIS — J45.50 SEVERE PERSISTENT ASTHMA WITHOUT COMPLICATION: Primary | ICD-10-CM

## 2022-10-19 DIAGNOSIS — J96.11 CHRONIC RESPIRATORY FAILURE WITH HYPOXIA (HCC): ICD-10-CM

## 2022-10-19 PROCEDURE — 99214 OFFICE O/P EST MOD 30 MIN: CPT | Performed by: INTERNAL MEDICINE

## 2022-10-19 RX ORDER — IPRATROPIUM BROMIDE AND ALBUTEROL SULFATE 2.5; .5 MG/3ML; MG/3ML
SOLUTION RESPIRATORY (INHALATION) EVERY 6 HOURS PRN
COMMUNITY
Start: 2022-09-15 | End: 2022-10-19

## 2022-10-19 RX ORDER — IPRATROPIUM BROMIDE AND ALBUTEROL SULFATE 2.5; .5 MG/3ML; MG/3ML
3 SOLUTION RESPIRATORY (INHALATION) EVERY 6 HOURS PRN
Qty: 1060 ML | Refills: 3 | Status: SHIPPED | OUTPATIENT
Start: 2022-10-19

## 2022-10-19 RX ORDER — SODIUM CHLORIDE FOR INHALATION 3 %
2 VIAL, NEBULIZER (ML) INHALATION AS NEEDED
Qty: 360 ML | Refills: 3 | Status: SHIPPED | OUTPATIENT
Start: 2022-10-19

## 2022-10-19 RX ORDER — PREDNISONE 10 MG/1
TABLET ORAL
COMMUNITY
Start: 2022-08-02

## 2022-10-19 RX ORDER — BUDESONIDE 0.5 MG/2ML
0.5 INHALANT ORAL 2 TIMES DAILY
Qty: 360 ML | Refills: 3 | Status: SHIPPED | OUTPATIENT
Start: 2022-10-19

## 2022-11-03 RX ORDER — METOPROLOL TARTRATE 100 MG/1
TABLET ORAL
Qty: 180 TABLET | Refills: 0 | Status: SHIPPED | OUTPATIENT
Start: 2022-11-03

## 2022-11-18 ENCOUNTER — OFFICE VISIT (OUTPATIENT)
Dept: FAMILY MEDICINE CLINIC | Facility: CLINIC | Age: 87
End: 2022-11-18
Payer: MEDICARE

## 2022-11-18 VITALS
RESPIRATION RATE: 16 BRPM | SYSTOLIC BLOOD PRESSURE: 124 MMHG | HEIGHT: 56 IN | OXYGEN SATURATION: 97 % | BODY MASS INDEX: 44.54 KG/M2 | WEIGHT: 198 LBS | DIASTOLIC BLOOD PRESSURE: 76 MMHG

## 2022-11-18 DIAGNOSIS — Z00.00 ENCOUNTER FOR ANNUAL HEALTH EXAMINATION: ICD-10-CM

## 2022-11-18 DIAGNOSIS — Z13.1 SCREENING FOR DIABETES MELLITUS (DM): ICD-10-CM

## 2022-11-18 DIAGNOSIS — E11.8 CONTROLLED TYPE 2 DIABETES MELLITUS WITH COMPLICATION, WITHOUT LONG-TERM CURRENT USE OF INSULIN (HCC): ICD-10-CM

## 2022-11-18 DIAGNOSIS — Z13.6 SCREENING FOR CARDIOVASCULAR CONDITION: ICD-10-CM

## 2022-11-18 DIAGNOSIS — I10 ESSENTIAL HYPERTENSION: Primary | ICD-10-CM

## 2022-11-18 DIAGNOSIS — Z13.0 SCREENING FOR DEFICIENCY ANEMIA: ICD-10-CM

## 2022-11-18 DIAGNOSIS — R73.09 ELEVATED GLUCOSE: ICD-10-CM

## 2022-11-18 DIAGNOSIS — E66.01 OBESITY, MORBID (HCC): ICD-10-CM

## 2022-11-18 DIAGNOSIS — E03.9 ACQUIRED HYPOTHYROIDISM: ICD-10-CM

## 2022-11-18 PROBLEM — J44.1 COPD EXACERBATION (HCC): Status: RESOLVED | Noted: 2019-12-29 | Resolved: 2022-11-18

## 2022-11-18 PROBLEM — R06.03 ACUTE RESPIRATORY DISTRESS: Status: RESOLVED | Noted: 2019-01-15 | Resolved: 2022-11-18

## 2022-11-18 PROBLEM — J96.00 ACUTE RESPIRATORY FAILURE, UNSPECIFIED WHETHER WITH HYPOXIA OR HYPERCAPNIA (HCC): Status: RESOLVED | Noted: 2019-12-30 | Resolved: 2022-11-18

## 2022-11-18 PROBLEM — J18.9 COMMUNITY ACQUIRED PNEUMONIA OF RIGHT LUNG, UNSPECIFIED PART OF LUNG: Status: RESOLVED | Noted: 2019-12-30 | Resolved: 2022-11-18

## 2022-11-18 RX ORDER — LOSARTAN POTASSIUM 50 MG/1
50 TABLET ORAL 2 TIMES DAILY
Qty: 180 TABLET | Refills: 1 | Status: SHIPPED | OUTPATIENT
Start: 2022-11-18

## 2022-12-26 ENCOUNTER — HOSPITAL ENCOUNTER (OUTPATIENT)
Facility: HOSPITAL | Age: 87
Setting detail: OBSERVATION
Discharge: HOME HEALTH | End: 2022-12-28
Attending: EMERGENCY MEDICINE | Admitting: HOSPITALIST
Payer: MEDICARE

## 2022-12-26 ENCOUNTER — APPOINTMENT (OUTPATIENT)
Dept: CT IMAGING | Facility: HOSPITAL | Age: 87
End: 2022-12-26
Attending: EMERGENCY MEDICINE
Payer: MEDICARE

## 2022-12-26 ENCOUNTER — APPOINTMENT (OUTPATIENT)
Dept: GENERAL RADIOLOGY | Facility: HOSPITAL | Age: 87
End: 2022-12-26
Attending: EMERGENCY MEDICINE
Payer: MEDICARE

## 2022-12-26 DIAGNOSIS — S42.211A CLOSED DISPLACED FRACTURE OF SURGICAL NECK OF RIGHT HUMERUS, UNSPECIFIED FRACTURE MORPHOLOGY, INITIAL ENCOUNTER: Primary | ICD-10-CM

## 2022-12-26 DIAGNOSIS — S20.229A CONTUSION OF BACK, UNSPECIFIED LATERALITY, INITIAL ENCOUNTER: ICD-10-CM

## 2022-12-26 LAB
ALBUMIN SERPL-MCNC: 2.9 G/DL (ref 3.4–5)
ALBUMIN/GLOB SERPL: 0.9 {RATIO} (ref 1–2)
ALP LIVER SERPL-CCNC: 39 U/L
ALT SERPL-CCNC: 42 U/L
ANION GAP SERPL CALC-SCNC: 6 MMOL/L (ref 0–18)
AST SERPL-CCNC: 37 U/L (ref 15–37)
BASOPHILS # BLD AUTO: 0.04 X10(3) UL (ref 0–0.2)
BASOPHILS NFR BLD AUTO: 0.5 %
BILIRUB SERPL-MCNC: 0.6 MG/DL (ref 0.1–2)
BUN BLD-MCNC: 28 MG/DL (ref 7–18)
CALCIUM BLD-MCNC: 9.1 MG/DL (ref 8.5–10.1)
CHLORIDE SERPL-SCNC: 96 MMOL/L (ref 98–112)
CO2 SERPL-SCNC: 31 MMOL/L (ref 21–32)
CREAT BLD-MCNC: 1.1 MG/DL
EOSINOPHIL # BLD AUTO: 0.13 X10(3) UL (ref 0–0.7)
EOSINOPHIL NFR BLD AUTO: 1.6 %
ERYTHROCYTE [DISTWIDTH] IN BLOOD BY AUTOMATED COUNT: 13.6 %
GFR SERPLBLD BASED ON 1.73 SQ M-ARVRAT: 48 ML/MIN/1.73M2 (ref 60–?)
GLOBULIN PLAS-MCNC: 3.2 G/DL (ref 2.8–4.4)
GLUCOSE BLD-MCNC: 106 MG/DL (ref 70–99)
HCT VFR BLD AUTO: 40.8 %
HGB BLD-MCNC: 13.4 G/DL
IMM GRANULOCYTES # BLD AUTO: 0.09 X10(3) UL (ref 0–1)
IMM GRANULOCYTES NFR BLD: 1.1 %
LYMPHOCYTES # BLD AUTO: 2.91 X10(3) UL (ref 1–4)
LYMPHOCYTES NFR BLD AUTO: 34.9 %
MCH RBC QN AUTO: 32.9 PG (ref 26–34)
MCHC RBC AUTO-ENTMCNC: 32.8 G/DL (ref 31–37)
MCV RBC AUTO: 100.2 FL
MONOCYTES # BLD AUTO: 0.84 X10(3) UL (ref 0.1–1)
MONOCYTES NFR BLD AUTO: 10.1 %
NEUTROPHILS # BLD AUTO: 4.32 X10 (3) UL (ref 1.5–7.7)
NEUTROPHILS # BLD AUTO: 4.32 X10(3) UL (ref 1.5–7.7)
NEUTROPHILS NFR BLD AUTO: 51.8 %
OSMOLALITY SERPL CALC.SUM OF ELEC: 282 MOSM/KG (ref 275–295)
PLATELET # BLD AUTO: 234 10(3)UL (ref 150–450)
POTASSIUM SERPL-SCNC: 4.1 MMOL/L (ref 3.5–5.1)
PROT SERPL-MCNC: 6.1 G/DL (ref 6.4–8.2)
RBC # BLD AUTO: 4.07 X10(6)UL
SARS-COV-2 RNA RESP QL NAA+PROBE: NOT DETECTED
SODIUM SERPL-SCNC: 133 MMOL/L (ref 136–145)
WBC # BLD AUTO: 8.3 X10(3) UL (ref 4–11)

## 2022-12-26 PROCEDURE — 99220 INITIAL OBSERVATION CARE,LEVL III: CPT | Performed by: INTERNAL MEDICINE

## 2022-12-26 PROCEDURE — 73060 X-RAY EXAM OF HUMERUS: CPT | Performed by: EMERGENCY MEDICINE

## 2022-12-26 PROCEDURE — 72131 CT LUMBAR SPINE W/O DYE: CPT | Performed by: EMERGENCY MEDICINE

## 2022-12-26 PROCEDURE — 72100 X-RAY EXAM L-S SPINE 2/3 VWS: CPT | Performed by: EMERGENCY MEDICINE

## 2022-12-26 RX ORDER — TORSEMIDE 20 MG/1
20 TABLET ORAL 2 TIMES DAILY
Status: DISCONTINUED | OUTPATIENT
Start: 2022-12-26 | End: 2022-12-27

## 2022-12-26 RX ORDER — HYDRALAZINE HYDROCHLORIDE 25 MG/1
25 TABLET, FILM COATED ORAL 2 TIMES DAILY
Status: DISCONTINUED | OUTPATIENT
Start: 2022-12-26 | End: 2022-12-28

## 2022-12-26 RX ORDER — IPRATROPIUM BROMIDE AND ALBUTEROL SULFATE 2.5; .5 MG/3ML; MG/3ML
3 SOLUTION RESPIRATORY (INHALATION) EVERY 6 HOURS PRN
Status: DISCONTINUED | OUTPATIENT
Start: 2022-12-26 | End: 2022-12-28

## 2022-12-26 RX ORDER — ONDANSETRON 2 MG/ML
4 INJECTION INTRAMUSCULAR; INTRAVENOUS EVERY 4 HOURS PRN
Status: DISCONTINUED | OUTPATIENT
Start: 2022-12-26 | End: 2022-12-26

## 2022-12-26 RX ORDER — LOSARTAN POTASSIUM 50 MG/1
50 TABLET ORAL 2 TIMES DAILY
Status: DISCONTINUED | OUTPATIENT
Start: 2022-12-26 | End: 2022-12-28

## 2022-12-26 RX ORDER — HYDROMORPHONE HYDROCHLORIDE 1 MG/ML
0.5 INJECTION, SOLUTION INTRAMUSCULAR; INTRAVENOUS; SUBCUTANEOUS ONCE
Status: COMPLETED | OUTPATIENT
Start: 2022-12-26 | End: 2022-12-26

## 2022-12-26 RX ORDER — HEPARIN SODIUM 5000 [USP'U]/ML
5000 INJECTION, SOLUTION INTRAVENOUS; SUBCUTANEOUS EVERY 12 HOURS SCHEDULED
Status: DISCONTINUED | OUTPATIENT
Start: 2022-12-26 | End: 2022-12-28

## 2022-12-26 RX ORDER — MONTELUKAST SODIUM 10 MG/1
10 TABLET ORAL DAILY
Status: DISCONTINUED | OUTPATIENT
Start: 2022-12-26 | End: 2022-12-26

## 2022-12-26 RX ORDER — ACETAMINOPHEN 500 MG
500 TABLET ORAL EVERY 4 HOURS PRN
Status: DISCONTINUED | OUTPATIENT
Start: 2022-12-26 | End: 2022-12-28

## 2022-12-26 RX ORDER — ONDANSETRON 2 MG/ML
INJECTION INTRAMUSCULAR; INTRAVENOUS
Status: COMPLETED
Start: 2022-12-26 | End: 2022-12-26

## 2022-12-26 RX ORDER — BUDESONIDE 0.5 MG/2ML
0.5 INHALANT ORAL 2 TIMES DAILY
Status: DISCONTINUED | OUTPATIENT
Start: 2022-12-26 | End: 2022-12-28

## 2022-12-26 RX ORDER — LEVOTHYROXINE SODIUM 0.1 MG/1
100 TABLET ORAL
Status: DISCONTINUED | OUTPATIENT
Start: 2022-12-27 | End: 2022-12-28

## 2022-12-26 RX ORDER — PREDNISONE 1 MG/1
10 TABLET ORAL DAILY
Status: DISCONTINUED | OUTPATIENT
Start: 2022-12-26 | End: 2022-12-28

## 2022-12-26 RX ORDER — MORPHINE SULFATE 4 MG/ML
4 INJECTION, SOLUTION INTRAMUSCULAR; INTRAVENOUS EVERY 30 MIN PRN
Status: ACTIVE | OUTPATIENT
Start: 2022-12-26 | End: 2022-12-26

## 2022-12-26 RX ORDER — ALBUTEROL SULFATE 90 UG/1
2 AEROSOL, METERED RESPIRATORY (INHALATION) EVERY 6 HOURS PRN
Status: DISCONTINUED | OUTPATIENT
Start: 2022-12-26 | End: 2022-12-28

## 2022-12-26 RX ORDER — MELATONIN
3 NIGHTLY PRN
Status: DISCONTINUED | OUTPATIENT
Start: 2022-12-26 | End: 2022-12-28

## 2022-12-26 RX ORDER — ONDANSETRON 2 MG/ML
4 INJECTION INTRAMUSCULAR; INTRAVENOUS ONCE
Status: COMPLETED | OUTPATIENT
Start: 2022-12-26 | End: 2022-12-26

## 2022-12-26 RX ORDER — ONDANSETRON 2 MG/ML
4 INJECTION INTRAMUSCULAR; INTRAVENOUS EVERY 6 HOURS PRN
Status: DISCONTINUED | OUTPATIENT
Start: 2022-12-26 | End: 2022-12-28

## 2022-12-26 RX ORDER — FLUTICASONE FUROATE AND VILANTEROL 200; 25 UG/1; UG/1
1 POWDER RESPIRATORY (INHALATION) DAILY
Status: DISCONTINUED | OUTPATIENT
Start: 2022-12-27 | End: 2022-12-28

## 2022-12-26 RX ORDER — METOPROLOL TARTRATE 50 MG/1
100 TABLET, FILM COATED ORAL 2 TIMES DAILY
Status: DISCONTINUED | OUTPATIENT
Start: 2022-12-26 | End: 2022-12-28

## 2022-12-26 NOTE — CM/SW NOTE
Spoke to patient and family at bedside. Patient comes from home with daughter. Patient now has humerus fracture and is unable to use the walker securely. Patient would benefit from [facility]. Patient being admitted for pain control. Social work consult placed  PT eval placed  PASRR completed. [facility] referral iniated.

## 2022-12-26 NOTE — ED INITIAL ASSESSMENT (HPI)
Pt presents by ems after a fall last week Tuesday. Pt states she tripped and fell on a rug in the kitchen. Pt reporting right arm pain and low back pain. Able to move arm/fingers appropriately.  Uses cane and walker at home

## 2022-12-26 NOTE — ED QUICK NOTES
Orders for admission, patient is aware of plan and ready to go upstairs. Any questions, please call ED RN [phone] .      Patient Covid vaccination status: Fully vaccinated     COVID Test Ordered in ED: Rapid SARS-CoV-2 by PCR    COVID Suspicion at Admission: No risk with negative rapid    Running Infusions:  None    Mental Status/LOC at time of transport: x3    Other pertinent information:   CIWA score: N/A   NIH score:  N/A

## 2022-12-27 ENCOUNTER — APPOINTMENT (OUTPATIENT)
Dept: GENERAL RADIOLOGY | Facility: HOSPITAL | Age: 87
End: 2022-12-27
Attending: INTERNAL MEDICINE
Payer: MEDICARE

## 2022-12-27 LAB
ANION GAP SERPL CALC-SCNC: 2 MMOL/L (ref 0–18)
BASOPHILS # BLD AUTO: 0.03 X10(3) UL (ref 0–0.2)
BASOPHILS NFR BLD AUTO: 0.4 %
BUN BLD-MCNC: 23 MG/DL (ref 7–18)
CALCIUM BLD-MCNC: 8.5 MG/DL (ref 8.5–10.1)
CHLORIDE SERPL-SCNC: 99 MMOL/L (ref 98–112)
CO2 SERPL-SCNC: 31 MMOL/L (ref 21–32)
CREAT BLD-MCNC: 0.89 MG/DL
EOSINOPHIL # BLD AUTO: 0.17 X10(3) UL (ref 0–0.7)
EOSINOPHIL NFR BLD AUTO: 2.4 %
ERYTHROCYTE [DISTWIDTH] IN BLOOD BY AUTOMATED COUNT: 13.7 %
GFR SERPLBLD BASED ON 1.73 SQ M-ARVRAT: 62 ML/MIN/1.73M2 (ref 60–?)
GLUCOSE BLD-MCNC: 104 MG/DL (ref 70–99)
HCT VFR BLD AUTO: 38.1 %
HGB BLD-MCNC: 12.6 G/DL
IMM GRANULOCYTES # BLD AUTO: 0.07 X10(3) UL (ref 0–1)
IMM GRANULOCYTES NFR BLD: 1 %
LYMPHOCYTES # BLD AUTO: 2.54 X10(3) UL (ref 1–4)
LYMPHOCYTES NFR BLD AUTO: 35.5 %
MCH RBC QN AUTO: 34.2 PG (ref 26–34)
MCHC RBC AUTO-ENTMCNC: 33.1 G/DL (ref 31–37)
MCV RBC AUTO: 103.5 FL
MONOCYTES # BLD AUTO: 0.76 X10(3) UL (ref 0.1–1)
MONOCYTES NFR BLD AUTO: 10.6 %
NEUTROPHILS # BLD AUTO: 3.59 X10 (3) UL (ref 1.5–7.7)
NEUTROPHILS # BLD AUTO: 3.59 X10(3) UL (ref 1.5–7.7)
NEUTROPHILS NFR BLD AUTO: 50.1 %
NT-PROBNP SERPL-MCNC: 2493 PG/ML (ref ?–450)
OSMOLALITY SERPL CALC.SUM OF ELEC: 278 MOSM/KG (ref 275–295)
PLATELET # BLD AUTO: 239 10(3)UL (ref 150–450)
POTASSIUM SERPL-SCNC: 4.3 MMOL/L (ref 3.5–5.1)
RBC # BLD AUTO: 3.68 X10(6)UL
SODIUM SERPL-SCNC: 132 MMOL/L (ref 136–145)
WBC # BLD AUTO: 7.2 X10(3) UL (ref 4–11)

## 2022-12-27 PROCEDURE — 71100 X-RAY EXAM RIBS UNI 2 VIEWS: CPT | Performed by: INTERNAL MEDICINE

## 2022-12-27 PROCEDURE — 23600 CLTX PROX HUMRL FX W/O MNPJ: CPT | Performed by: ORTHOPAEDIC SURGERY

## 2022-12-27 PROCEDURE — 99226 SUBSEQUENT OBSERVATION CARE: CPT | Performed by: HOSPITALIST

## 2022-12-27 PROCEDURE — 99204 OFFICE O/P NEW MOD 45 MIN: CPT | Performed by: ORTHOPAEDIC SURGERY

## 2022-12-27 RX ORDER — TRAMADOL HYDROCHLORIDE 50 MG/1
50 TABLET ORAL EVERY 6 HOURS PRN
Status: DISCONTINUED | OUTPATIENT
Start: 2022-12-27 | End: 2022-12-28

## 2022-12-27 RX ORDER — MORPHINE SULFATE 2 MG/ML
0.5 INJECTION, SOLUTION INTRAMUSCULAR; INTRAVENOUS EVERY 2 HOUR PRN
Status: DISCONTINUED | OUTPATIENT
Start: 2022-12-27 | End: 2022-12-28

## 2022-12-27 RX ORDER — TORSEMIDE 20 MG/1
20 TABLET ORAL
Status: DISCONTINUED | OUTPATIENT
Start: 2022-12-27 | End: 2022-12-28

## 2022-12-27 RX ORDER — MORPHINE SULFATE 2 MG/ML
2 INJECTION, SOLUTION INTRAMUSCULAR; INTRAVENOUS EVERY 2 HOUR PRN
Status: DISCONTINUED | OUTPATIENT
Start: 2022-12-27 | End: 2022-12-28

## 2022-12-27 RX ORDER — DOCUSATE SODIUM 100 MG/1
100 CAPSULE, LIQUID FILLED ORAL 2 TIMES DAILY
Status: DISCONTINUED | OUTPATIENT
Start: 2022-12-27 | End: 2022-12-28

## 2022-12-27 RX ORDER — MORPHINE SULFATE 2 MG/ML
1 INJECTION, SOLUTION INTRAMUSCULAR; INTRAVENOUS EVERY 2 HOUR PRN
Status: DISCONTINUED | OUTPATIENT
Start: 2022-12-27 | End: 2022-12-28

## 2022-12-27 NOTE — CM/SW NOTE
12/27/22 1400   CM/SW Referral Data   Referral Source Social Work (self-referral)   Reason for Referral Discharge planning   Informant Patient;Son;EMR;Clinical Staff Member   Patient Info   Patient's Current Mental Status at Time of Assessment Alert;Oriented   Patient lives with Daughter   Discharge Needs   Anticipated D/C needs Subacute rehab;Home health care;Caregiver services;Transportation services; To be determined   Services Requested   PASRR Level 1 Submitted Yes   Choice of Post-Acute Provider   Informed patient of right to choose their preferred provider Yes   List of appropriate post-acute services provided to patient/family with quality data Yes   Information given to Patient; Son       Patient is a 79 y/o woman admitted s/p fall with humerus fracture. Ortho consult and PT/OT evals pending. Referrals for [facility] initiated in ED. PASRR completed. Met with pt, pt's son and DIL at bedside to discuss DC planning. List of accepting [facility] facilities given. Pt expressed concern about going to [facility] facility as she feels she needs to have the freedom to get up and move often in order to not lose strength. Discussed HHC vs [facility] and plan for PT/OT evals in order to determine pt's needs. Pt stated her daughter works but she could arrange to have someone stay with her to assist.  List of private hire caregiver agencies given. All questions/concerns addressed. [facility] initiated in [address]. Await MD and therapy recommendations for further DC planning. / to remain available for support and/or discharge planning.      Juany Gan LCSW  Discharge Planner  [phone]

## 2022-12-27 NOTE — PLAN OF CARE
A&Ox4. On 2L NC. Denies pain at the moment. Sling on her Rt arm. Bruising noted on her RUE. Denies any numbness/tingling. Tolerating diet. Bilateral feet swollen from hx of lymphedema. Plan to see ortho in the morning. Discussed plan of care with patient. Safety precautions in place.

## 2022-12-27 NOTE — PLAN OF CARE
A&Ox4. VSS. On room air. . IS encouraged. Telemetry monitoring - SB. Pt. declined SCDs on BLE. Ankle pumps encouraged. Tolerating regular diet. Last BM 12/26. Voiding freely via [address]. Pain controlled. Sling in place on RUE. Bedfast. Plan is TBD. Patient updated on plan of care. Safety precautions in place. Call light within reach.

## 2022-12-27 NOTE — PHYSICAL THERAPY NOTE
PT orders received and chart reviewed. OT discussed with RN. Per RN pt is on bed rest, is awaiting ortho consult, and requested therapy hold at this time. Will follow and re-attempt as able and appropriate.

## 2022-12-27 NOTE — OCCUPATIONAL THERAPY NOTE
OCCUPATIONAL THERAPY                   OT order received and chart reviewed. Per RN, patient is on bedrest and the orthopedic surgeon consulted may not be able to see the patient until later today. Will hold therapy evalaution for now and re-attempt after medical plan of care is established and activity orders are in place.

## 2022-12-28 VITALS
WEIGHT: 179.88 LBS | SYSTOLIC BLOOD PRESSURE: 154 MMHG | OXYGEN SATURATION: 95 % | DIASTOLIC BLOOD PRESSURE: 44 MMHG | BODY MASS INDEX: 40 KG/M2 | TEMPERATURE: 98 F | RESPIRATION RATE: 14 BRPM | HEART RATE: 51 BPM

## 2022-12-28 LAB
ANION GAP SERPL CALC-SCNC: 6 MMOL/L (ref 0–18)
BASOPHILS # BLD AUTO: 0.02 X10(3) UL (ref 0–0.2)
BASOPHILS NFR BLD AUTO: 0.3 %
BUN BLD-MCNC: 21 MG/DL (ref 7–18)
CALCIUM BLD-MCNC: 8.3 MG/DL (ref 8.5–10.1)
CHLORIDE SERPL-SCNC: 95 MMOL/L (ref 98–112)
CO2 SERPL-SCNC: 31 MMOL/L (ref 21–32)
CREAT BLD-MCNC: 0.76 MG/DL
EOSINOPHIL # BLD AUTO: 0.15 X10(3) UL (ref 0–0.7)
EOSINOPHIL NFR BLD AUTO: 2 %
ERYTHROCYTE [DISTWIDTH] IN BLOOD BY AUTOMATED COUNT: 13.2 %
GFR SERPLBLD BASED ON 1.73 SQ M-ARVRAT: 74 ML/MIN/1.73M2 (ref 60–?)
GLUCOSE BLD-MCNC: 117 MG/DL (ref 70–99)
HCT VFR BLD AUTO: 36.8 %
HGB BLD-MCNC: 12.2 G/DL
IMM GRANULOCYTES # BLD AUTO: 0.05 X10(3) UL (ref 0–1)
IMM GRANULOCYTES NFR BLD: 0.7 %
LYMPHOCYTES # BLD AUTO: 2.02 X10(3) UL (ref 1–4)
LYMPHOCYTES NFR BLD AUTO: 27.2 %
MCH RBC QN AUTO: 33.2 PG (ref 26–34)
MCHC RBC AUTO-ENTMCNC: 33.2 G/DL (ref 31–37)
MCV RBC AUTO: 100.3 FL
MONOCYTES # BLD AUTO: 0.67 X10(3) UL (ref 0.1–1)
MONOCYTES NFR BLD AUTO: 9 %
NEUTROPHILS # BLD AUTO: 4.53 X10 (3) UL (ref 1.5–7.7)
NEUTROPHILS # BLD AUTO: 4.53 X10(3) UL (ref 1.5–7.7)
NEUTROPHILS NFR BLD AUTO: 60.8 %
OSMOLALITY SERPL CALC.SUM OF ELEC: 278 MOSM/KG (ref 275–295)
PLATELET # BLD AUTO: 254 10(3)UL (ref 150–450)
POTASSIUM SERPL-SCNC: 4.3 MMOL/L (ref 3.5–5.1)
RBC # BLD AUTO: 3.67 X10(6)UL
SODIUM SERPL-SCNC: 132 MMOL/L (ref 136–145)
WBC # BLD AUTO: 7.4 X10(3) UL (ref 4–11)

## 2022-12-28 PROCEDURE — 99217 OBSERVATION CARE DISCHARGE: CPT | Performed by: HOSPITALIST

## 2022-12-28 RX ORDER — TRAMADOL HYDROCHLORIDE 50 MG/1
50 TABLET ORAL EVERY 8 HOURS PRN
Qty: 30 TABLET | Refills: 0 | Status: SHIPPED | OUTPATIENT
Start: 2022-12-28 | End: 2023-01-09

## 2022-12-28 RX ORDER — ACETAMINOPHEN 500 MG
1000 TABLET ORAL EVERY 6 HOURS PRN
Status: DISCONTINUED | OUTPATIENT
Start: 2022-12-28 | End: 2022-12-28

## 2022-12-28 RX ORDER — IPRATROPIUM BROMIDE AND ALBUTEROL SULFATE 2.5; .5 MG/3ML; MG/3ML
3 SOLUTION RESPIRATORY (INHALATION)
Status: DISCONTINUED | OUTPATIENT
Start: 2022-12-28 | End: 2022-12-28

## 2022-12-28 RX ORDER — ALBUTEROL SULFATE 90 UG/1
2 AEROSOL, METERED RESPIRATORY (INHALATION) EVERY 6 HOURS
Status: DISCONTINUED | OUTPATIENT
Start: 2022-12-28 | End: 2022-12-28

## 2022-12-28 RX ORDER — ALBUTEROL SULFATE 90 UG/1
2 AEROSOL, METERED RESPIRATORY (INHALATION) EVERY 6 HOURS PRN
Status: DISCONTINUED | OUTPATIENT
Start: 2022-12-28 | End: 2022-12-28

## 2022-12-28 NOTE — PLAN OF CARE
Patient alert and oriented x4. [place] [facility] speaking. Room air. PT/OT evaluations completed. Non-weightbearing to right upper extremity. Neb treatments 2x/day. Metoprolol parameters in place. Up with walker and one assist. Cardiac diet. Glasses and dentures at bedside. [facility] to provide transportation for discharge. Patient provided discharge packet that included new medications, medications to continue, follow-up appointments, and post-injury instructions. Son is at bedside and active in care.

## 2022-12-28 NOTE — CM/SW NOTE
Informed by therapy of their recommendation for [address] and 24 hour supervision. Family working on obtaining 24 hour care at home. [facility]. Darien Anguiano from [facility] to meet with pt/family today to discuss [address] and pt's choice in providers. / to remain available for support and/or discharge planning.      Juany Gan, [facility]  Discharge Planner  [phone]

## 2022-12-28 NOTE — PROGRESS NOTES
[facility] w/ [employer] set up for 330p pick-up, son updated w/ transportation details, Nicholas Servin RN updated on discharge details .

## 2022-12-28 NOTE — HOME CARE LIAISON
Received referral via [facility] for Home Health services. Spoke w/ patient and her son Anderson Lemus and provided with list of Aracelis Carrera providers from [facility], choice is [facility]. Agency reserved in [facility] and contact information placed on AVS.Financial interest disclosure provided.  Notified Jan Childers

## 2022-12-28 NOTE — PLAN OF CARE
A&O x4, VSS, stable on RA, 2L at night prn, on tele  Pain stable with Tramadol prn, Rt arm in sling  RT called for scheduled neb treatment  Pt refusing heparin  Last BM 12/26/22  Neha in place    Plan- PT/OT to see today

## 2022-12-29 ENCOUNTER — TELEPHONE (OUTPATIENT)
Dept: FAMILY MEDICINE CLINIC | Facility: CLINIC | Age: 87
End: 2022-12-29

## 2022-12-29 ENCOUNTER — PATIENT OUTREACH (OUTPATIENT)
Dept: CASE MANAGEMENT | Age: 87
End: 2022-12-29

## 2022-12-29 DIAGNOSIS — Z02.9 ENCOUNTERS FOR UNSPECIFIED ADMINISTRATIVE PURPOSE: ICD-10-CM

## 2022-12-29 PROCEDURE — 1111F DSCHRG MED/CURRENT MED MERGE: CPT

## 2022-12-31 ENCOUNTER — TELEPHONE (OUTPATIENT)
Dept: FAMILY MEDICINE CLINIC | Facility: CLINIC | Age: 87
End: 2022-12-31

## 2022-12-31 DIAGNOSIS — R11.0 NAUSEA: Primary | ICD-10-CM

## 2022-12-31 RX ORDER — ONDANSETRON 4 MG/1
4 TABLET, FILM COATED ORAL EVERY 8 HOURS PRN
Qty: 12 TABLET | Refills: 1 | Status: SHIPPED | OUTPATIENT
Start: 2022-12-31

## 2022-12-31 NOTE — TELEPHONE ENCOUNTER
Mio Larsen calls in regarding Sammie Pérez, because she is suffering from significant nausea. Possibly pain medication related.     She did well with zofran in the hospital, and will order for outpatient use    Flakito Amanda MD

## 2022-12-31 NOTE — TELEPHONE ENCOUNTER
Heart rate is in the 40s, but not symptomatic, call in from [place]. Had a humeral fracture recently. [place] is from home health. Has been taking stool softener, colace without success. Did just have some prune juice. She is having some discomfort from lack of bowel movements, does have bowel sounds in all 4 quadrants. Recommended miralax daily and ok'd sennokot.     Jorge Mitchell MD

## 2023-01-01 ENCOUNTER — HOSPITAL ENCOUNTER (INPATIENT)
Facility: HOSPITAL | Age: 88
LOS: 2 days | Discharge: HOME HEALTH | DRG: 190 | End: 2023-01-01
Attending: EMERGENCY MEDICINE | Admitting: INTERNAL MEDICINE
Payer: MEDICARE

## 2023-01-01 ENCOUNTER — APPOINTMENT (OUTPATIENT)
Dept: GENERAL RADIOLOGY | Facility: HOSPITAL | Age: 88
DRG: 190 | End: 2023-01-01
Attending: EMERGENCY MEDICINE
Payer: MEDICARE

## 2023-01-01 VITALS
RESPIRATION RATE: 18 BRPM | BODY MASS INDEX: 41 KG/M2 | HEART RATE: 75 BPM | TEMPERATURE: 98 F | WEIGHT: 183.88 LBS | OXYGEN SATURATION: 94 % | SYSTOLIC BLOOD PRESSURE: 159 MMHG | DIASTOLIC BLOOD PRESSURE: 56 MMHG

## 2023-01-01 DIAGNOSIS — J44.1 COPD EXACERBATION (HCC): Primary | ICD-10-CM

## 2023-01-01 DIAGNOSIS — E87.1 HYPONATREMIA: ICD-10-CM

## 2023-01-01 DIAGNOSIS — R00.1 BRADYCARDIA: ICD-10-CM

## 2023-01-01 LAB
ADENOVIRUS PCR:: NOT DETECTED
ALBUMIN SERPL-MCNC: 3.2 G/DL (ref 3.4–5)
ALBUMIN/GLOB SERPL: 1.1 {RATIO} (ref 1–2)
ALP LIVER SERPL-CCNC: 38 U/L
ALT SERPL-CCNC: 42 U/L
ANION GAP SERPL CALC-SCNC: 11 MMOL/L (ref 0–18)
ANION GAP SERPL CALC-SCNC: 13 MMOL/L (ref 0–18)
ANION GAP SERPL CALC-SCNC: 8 MMOL/L (ref 0–18)
ANION GAP SERPL CALC-SCNC: 9 MMOL/L (ref 0–18)
AST SERPL-CCNC: 37 U/L (ref 15–37)
ATRIAL RATE: 50 BPM
ATRIAL RATE: 95 BPM
B PARAPERT DNA SPEC QL NAA+PROBE: NOT DETECTED
B PERT DNA SPEC QL NAA+PROBE: NOT DETECTED
BASOPHILS # BLD AUTO: 0.02 X10(3) UL (ref 0–0.2)
BASOPHILS NFR BLD AUTO: 0.3 %
BILIRUB SERPL-MCNC: 0.4 MG/DL (ref 0.1–2)
BUN BLD-MCNC: 45 MG/DL (ref 7–18)
BUN BLD-MCNC: 48 MG/DL (ref 7–18)
BUN BLD-MCNC: 54 MG/DL (ref 7–18)
BUN BLD-MCNC: 55 MG/DL (ref 7–18)
C PNEUM DNA SPEC QL NAA+PROBE: NOT DETECTED
CALCIUM BLD-MCNC: 10.2 MG/DL (ref 8.5–10.1)
CALCIUM BLD-MCNC: 8.4 MG/DL (ref 8.5–10.1)
CALCIUM BLD-MCNC: 8.9 MG/DL (ref 8.5–10.1)
CALCIUM BLD-MCNC: 9.8 MG/DL (ref 8.5–10.1)
CHLORIDE SERPL-SCNC: 93 MMOL/L (ref 98–112)
CHLORIDE SERPL-SCNC: 93 MMOL/L (ref 98–112)
CHLORIDE SERPL-SCNC: 94 MMOL/L (ref 98–112)
CHLORIDE SERPL-SCNC: 95 MMOL/L (ref 98–112)
CO2 SERPL-SCNC: 22 MMOL/L (ref 21–32)
CO2 SERPL-SCNC: 24 MMOL/L (ref 21–32)
CO2 SERPL-SCNC: 25 MMOL/L (ref 21–32)
CO2 SERPL-SCNC: 31 MMOL/L (ref 21–32)
CORONAVIRUS 229E PCR:: NOT DETECTED
CORONAVIRUS HKU1 PCR:: NOT DETECTED
CORONAVIRUS NL63 PCR:: NOT DETECTED
CORONAVIRUS OC43 PCR:: NOT DETECTED
CREAT BLD-MCNC: 1.28 MG/DL
CREAT BLD-MCNC: 1.33 MG/DL
CREAT BLD-MCNC: 1.99 MG/DL
CREAT BLD-MCNC: 2.11 MG/DL
EGFRCR SERPLBLD CKD-EPI 2021: 22 ML/MIN/1.73M2 (ref 60–?)
EGFRCR SERPLBLD CKD-EPI 2021: 23 ML/MIN/1.73M2 (ref 60–?)
EGFRCR SERPLBLD CKD-EPI 2021: 38 ML/MIN/1.73M2 (ref 60–?)
EGFRCR SERPLBLD CKD-EPI 2021: 40 ML/MIN/1.73M2 (ref 60–?)
EOSINOPHIL # BLD AUTO: 0.03 X10(3) UL (ref 0–0.7)
EOSINOPHIL NFR BLD AUTO: 0.4 %
ERYTHROCYTE [DISTWIDTH] IN BLOOD BY AUTOMATED COUNT: 14.7 %
FLUAV + FLUBV RNA SPEC NAA+PROBE: NEGATIVE
FLUAV + FLUBV RNA SPEC NAA+PROBE: NEGATIVE
FLUAV RNA SPEC QL NAA+PROBE: NOT DETECTED
FLUBV RNA SPEC QL NAA+PROBE: NOT DETECTED
GLOBULIN PLAS-MCNC: 3 G/DL (ref 2.8–4.4)
GLUCOSE BLD-MCNC: 157 MG/DL (ref 70–99)
GLUCOSE BLD-MCNC: 160 MG/DL (ref 70–99)
GLUCOSE BLD-MCNC: 172 MG/DL (ref 70–99)
GLUCOSE BLD-MCNC: 246 MG/DL (ref 70–99)
HCT VFR BLD AUTO: 36.5 %
HGB BLD-MCNC: 12.3 G/DL
IMM GRANULOCYTES # BLD AUTO: 0.06 X10(3) UL (ref 0–1)
IMM GRANULOCYTES NFR BLD: 0.9 %
LYMPHOCYTES # BLD AUTO: 0.96 X10(3) UL (ref 1–4)
LYMPHOCYTES NFR BLD AUTO: 13.9 %
MCH RBC QN AUTO: 33.2 PG (ref 26–34)
MCHC RBC AUTO-ENTMCNC: 33.7 G/DL (ref 31–37)
MCV RBC AUTO: 98.6 FL
METAPNEUMOVIRUS PCR:: NOT DETECTED
MONOCYTES # BLD AUTO: 0.25 X10(3) UL (ref 0.1–1)
MONOCYTES NFR BLD AUTO: 3.6 %
MYCOPLASMA PNEUMONIA PCR:: NOT DETECTED
NEUTROPHILS # BLD AUTO: 5.61 X10 (3) UL (ref 1.5–7.7)
NEUTROPHILS # BLD AUTO: 5.61 X10(3) UL (ref 1.5–7.7)
NEUTROPHILS NFR BLD AUTO: 80.9 %
NT-PROBNP SERPL-MCNC: 2497 PG/ML (ref ?–450)
OSMOLALITY SERPL CALC.SUM OF ELEC: 280 MOSM/KG (ref 275–295)
OSMOLALITY SERPL CALC.SUM OF ELEC: 286 MOSM/KG (ref 275–295)
OSMOLALITY SERPL CALC.SUM OF ELEC: 289 MOSM/KG (ref 275–295)
OSMOLALITY SERPL CALC.SUM OF ELEC: 294 MOSM/KG (ref 275–295)
OSMOLALITY UR: 385 MOSM/KG (ref 300–1300)
P AXIS: 49 DEGREES
P AXIS: 62 DEGREES
P-R INTERVAL: 112 MS
P-R INTERVAL: 150 MS
PARAINFLUENZA 1 PCR:: NOT DETECTED
PARAINFLUENZA 2 PCR:: NOT DETECTED
PARAINFLUENZA 3 PCR:: NOT DETECTED
PARAINFLUENZA 4 PCR:: NOT DETECTED
PLATELET # BLD AUTO: 256 10(3)UL (ref 150–450)
POTASSIUM SERPL-SCNC: 4.2 MMOL/L (ref 3.5–5.1)
POTASSIUM SERPL-SCNC: 5.1 MMOL/L (ref 3.5–5.1)
POTASSIUM SERPL-SCNC: 5.4 MMOL/L (ref 3.5–5.1)
POTASSIUM SERPL-SCNC: 5.4 MMOL/L (ref 3.5–5.1)
PROT SERPL-MCNC: 6.2 G/DL (ref 6.4–8.2)
Q-T INTERVAL: 420 MS
Q-T INTERVAL: 552 MS
QRS DURATION: 140 MS
QRS DURATION: 154 MS
QTC CALCULATION (BEZET): 503 MS
QTC CALCULATION (BEZET): 527 MS
R AXIS: -33 DEGREES
R AXIS: -41 DEGREES
RBC # BLD AUTO: 3.7 X10(6)UL
RHINOVIRUS/ENTERO PCR:: NOT DETECTED
RSV RNA SPEC NAA+PROBE: NEGATIVE
RSV RNA SPEC QL NAA+PROBE: NOT DETECTED
SARS-COV-2 RNA NPH QL NAA+NON-PROBE: NOT DETECTED
SARS-COV-2 RNA RESP QL NAA+PROBE: NOT DETECTED
SODIUM SERPL-SCNC: 126 MMOL/L (ref 136–145)
SODIUM SERPL-SCNC: 128 MMOL/L (ref 136–145)
SODIUM SERPL-SCNC: 130 MMOL/L (ref 136–145)
SODIUM SERPL-SCNC: 134 MMOL/L (ref 136–145)
SODIUM SERPL-SCNC: 7 MMOL/L
T AXIS: 131 DEGREES
T AXIS: 68 DEGREES
TROPONIN I HIGH SENSITIVITY: 23 NG/L
TSI SER-ACNC: 1.94 MIU/ML (ref 0.36–3.74)
URATE SERPL-MCNC: 8.1 MG/DL
VENTRICULAR RATE: 50 BPM
VENTRICULAR RATE: 95 BPM
WBC # BLD AUTO: 6.9 X10(3) UL (ref 4–11)

## 2023-01-01 PROCEDURE — 99233 SBSQ HOSP IP/OBS HIGH 50: CPT | Performed by: INTERNAL MEDICINE

## 2023-01-01 PROCEDURE — 99232 SBSQ HOSP IP/OBS MODERATE 35: CPT | Performed by: HOSPITALIST

## 2023-01-01 PROCEDURE — 99223 1ST HOSP IP/OBS HIGH 75: CPT | Performed by: HOSPITALIST

## 2023-01-01 PROCEDURE — 71045 X-RAY EXAM CHEST 1 VIEW: CPT | Performed by: EMERGENCY MEDICINE

## 2023-01-01 PROCEDURE — 99223 1ST HOSP IP/OBS HIGH 75: CPT | Performed by: INTERNAL MEDICINE

## 2023-01-01 PROCEDURE — 99239 HOSP IP/OBS DSCHRG MGMT >30: CPT | Performed by: HOSPITALIST

## 2023-01-01 RX ORDER — GUAIFENESIN 600 MG/1
600 TABLET, EXTENDED RELEASE ORAL 2 TIMES DAILY
Status: DISCONTINUED | OUTPATIENT
Start: 2023-01-01 | End: 2023-01-01

## 2023-01-01 RX ORDER — IPRATROPIUM BROMIDE AND ALBUTEROL SULFATE 2.5; .5 MG/3ML; MG/3ML
3 SOLUTION RESPIRATORY (INHALATION) EVERY 6 HOURS PRN
Status: DISCONTINUED | OUTPATIENT
Start: 2023-01-01 | End: 2023-01-01

## 2023-01-01 RX ORDER — HYDRALAZINE HYDROCHLORIDE 25 MG/1
25 TABLET, FILM COATED ORAL 2 TIMES DAILY
Status: DISCONTINUED | OUTPATIENT
Start: 2023-01-01 | End: 2023-01-01

## 2023-01-01 RX ORDER — METOPROLOL TARTRATE 50 MG/1
50 TABLET, FILM COATED ORAL 2 TIMES DAILY
Status: DISCONTINUED | OUTPATIENT
Start: 2023-01-01 | End: 2023-01-01

## 2023-01-01 RX ORDER — ACETAMINOPHEN 500 MG
1000 TABLET ORAL ONCE
Status: COMPLETED | OUTPATIENT
Start: 2023-01-01 | End: 2023-01-01

## 2023-01-01 RX ORDER — HYDRALAZINE HYDROCHLORIDE 25 MG/1
25 TABLET, FILM COATED ORAL EVERY 8 HOURS PRN
Status: DISCONTINUED | OUTPATIENT
Start: 2023-01-01 | End: 2023-01-01

## 2023-01-01 RX ORDER — LOSARTAN POTASSIUM 25 MG/1
50 TABLET ORAL 2 TIMES DAILY
Status: DISCONTINUED | OUTPATIENT
Start: 2023-01-01 | End: 2023-01-01

## 2023-01-01 RX ORDER — ACETAMINOPHEN 500 MG
500 TABLET ORAL EVERY 4 HOURS PRN
Status: DISCONTINUED | OUTPATIENT
Start: 2023-01-01 | End: 2023-01-01

## 2023-01-01 RX ORDER — TOLVAPTAN 15 MG/1
15 TABLET ORAL ONCE
Status: COMPLETED | OUTPATIENT
Start: 2023-01-01 | End: 2023-01-01

## 2023-01-01 RX ORDER — ONDANSETRON 2 MG/ML
4 INJECTION INTRAMUSCULAR; INTRAVENOUS EVERY 6 HOURS PRN
Status: DISCONTINUED | OUTPATIENT
Start: 2023-01-01 | End: 2023-01-01

## 2023-01-01 RX ORDER — SENNOSIDES 8.6 MG
17.2 TABLET ORAL NIGHTLY PRN
Status: DISCONTINUED | OUTPATIENT
Start: 2023-01-01 | End: 2023-01-01

## 2023-01-01 RX ORDER — BUDESONIDE 0.25 MG/2ML
0.25 INHALANT ORAL
Status: DISCONTINUED | OUTPATIENT
Start: 2023-01-01 | End: 2023-01-01

## 2023-01-01 RX ORDER — PREDNISONE 10 MG/1
TABLET ORAL
Qty: 15 TABLET | Refills: 0 | Status: SHIPPED | OUTPATIENT
Start: 2023-01-01 | End: 2023-01-01

## 2023-01-01 RX ORDER — LEVOTHYROXINE SODIUM 0.12 MG/1
125 TABLET ORAL
Status: DISCONTINUED | OUTPATIENT
Start: 2023-01-01 | End: 2023-01-01

## 2023-01-01 RX ORDER — BISACODYL 10 MG
10 SUPPOSITORY, RECTAL RECTAL
Status: DISCONTINUED | OUTPATIENT
Start: 2023-01-01 | End: 2023-01-01

## 2023-01-01 RX ORDER — METHYLPREDNISOLONE SODIUM SUCCINATE 40 MG/ML
40 INJECTION, POWDER, LYOPHILIZED, FOR SOLUTION INTRAMUSCULAR; INTRAVENOUS EVERY 8 HOURS
Status: DISCONTINUED | OUTPATIENT
Start: 2023-01-01 | End: 2023-01-01

## 2023-01-01 RX ORDER — ENOXAPARIN SODIUM 100 MG/ML
30 INJECTION SUBCUTANEOUS DAILY
Status: DISCONTINUED | OUTPATIENT
Start: 2023-01-01 | End: 2023-01-01

## 2023-01-01 RX ORDER — TORSEMIDE 20 MG/1
20 TABLET ORAL
Qty: 360 TABLET | Refills: 0 | Status: SHIPPED | OUTPATIENT
Start: 2023-01-01 | End: 2023-01-01

## 2023-01-01 RX ORDER — AZITHROMYCIN 250 MG/1
500 TABLET, FILM COATED ORAL DAILY
Status: COMPLETED | OUTPATIENT
Start: 2023-01-01 | End: 2023-01-01

## 2023-01-01 RX ORDER — TORSEMIDE 20 MG/1
20 TABLET ORAL
Status: DISCONTINUED | OUTPATIENT
Start: 2023-01-01 | End: 2023-01-01

## 2023-01-01 RX ORDER — PREDNISONE 5 MG/1
TABLET ORAL
Qty: 30 TABLET | Refills: 0 | Status: SHIPPED | OUTPATIENT
Start: 2023-01-01 | End: 2023-01-01 | Stop reason: ALTCHOICE

## 2023-01-01 RX ORDER — METHYLPREDNISOLONE SODIUM SUCCINATE 125 MG/2ML
125 INJECTION, POWDER, LYOPHILIZED, FOR SOLUTION INTRAMUSCULAR; INTRAVENOUS ONCE
Status: COMPLETED | OUTPATIENT
Start: 2023-01-01 | End: 2023-01-01

## 2023-01-01 RX ORDER — HYDROCODONE BITARTRATE AND HOMATROPINE METHYLBROMIDE ORAL SOLUTION 5; 1.5 MG/5ML; MG/5ML
5 LIQUID ORAL EVERY 4 HOURS PRN
Status: DISCONTINUED | OUTPATIENT
Start: 2023-01-01 | End: 2023-01-01

## 2023-01-01 RX ORDER — METOCLOPRAMIDE HYDROCHLORIDE 5 MG/ML
5 INJECTION INTRAMUSCULAR; INTRAVENOUS EVERY 8 HOURS PRN
Status: DISCONTINUED | OUTPATIENT
Start: 2023-01-01 | End: 2023-01-01

## 2023-01-01 RX ORDER — POLYETHYLENE GLYCOL 3350 17 G/17G
17 POWDER, FOR SOLUTION ORAL DAILY PRN
Status: DISCONTINUED | OUTPATIENT
Start: 2023-01-01 | End: 2023-01-01

## 2023-01-01 RX ORDER — ENOXAPARIN SODIUM 100 MG/ML
40 INJECTION SUBCUTANEOUS DAILY
Status: DISCONTINUED | OUTPATIENT
Start: 2023-01-01 | End: 2023-01-01

## 2023-01-01 RX ORDER — PREDNISONE 10 MG/1
40 TABLET ORAL
Status: DISCONTINUED | OUTPATIENT
Start: 2023-01-01 | End: 2023-01-01

## 2023-01-01 RX ORDER — POTASSIUM CHLORIDE 20 MEQ/1
20 TABLET, EXTENDED RELEASE ORAL 2 TIMES DAILY
Status: DISCONTINUED | OUTPATIENT
Start: 2023-01-01 | End: 2023-01-01

## 2023-01-04 ENCOUNTER — APPOINTMENT (OUTPATIENT)
Dept: GENERAL RADIOLOGY | Facility: HOSPITAL | Age: 88
End: 2023-01-04
Attending: STUDENT IN AN ORGANIZED HEALTH CARE EDUCATION/TRAINING PROGRAM
Payer: MEDICARE

## 2023-01-04 ENCOUNTER — HOSPITAL ENCOUNTER (INPATIENT)
Facility: HOSPITAL | Age: 88
LOS: 5 days | Discharge: NURSING, REHAB OR LONG-TERM CARE | End: 2023-01-09
Attending: STUDENT IN AN ORGANIZED HEALTH CARE EDUCATION/TRAINING PROGRAM | Admitting: HOSPITALIST
Payer: MEDICARE

## 2023-01-04 DIAGNOSIS — R62.7 FTT (FAILURE TO THRIVE) IN ADULT: ICD-10-CM

## 2023-01-04 DIAGNOSIS — E87.1 HYPONATREMIA: ICD-10-CM

## 2023-01-04 DIAGNOSIS — J45.41 MODERATE PERSISTENT ASTHMA WITH EXACERBATION: ICD-10-CM

## 2023-01-04 DIAGNOSIS — E87.70 HYPERVOLEMIA, UNSPECIFIED HYPERVOLEMIA TYPE: Primary | ICD-10-CM

## 2023-01-04 LAB
ADENOVIRUS PCR:: NOT DETECTED
ALBUMIN SERPL-MCNC: 2.5 G/DL (ref 3.4–5)
ALBUMIN/GLOB SERPL: 0.7 {RATIO} (ref 1–2)
ALP LIVER SERPL-CCNC: 91 U/L
ALT SERPL-CCNC: 17 U/L
ANION GAP SERPL CALC-SCNC: 6 MMOL/L (ref 0–18)
AST SERPL-CCNC: 22 U/L (ref 15–37)
ATRIAL RATE: 54 BPM
B PARAPERT DNA SPEC QL NAA+PROBE: NOT DETECTED
B PERT DNA SPEC QL NAA+PROBE: NOT DETECTED
BASOPHILS # BLD AUTO: 0.03 X10(3) UL (ref 0–0.2)
BASOPHILS NFR BLD AUTO: 0.4 %
BILIRUB SERPL-MCNC: 0.6 MG/DL (ref 0.1–2)
BILIRUB UR QL STRIP.AUTO: NEGATIVE
BUN BLD-MCNC: 17 MG/DL (ref 7–18)
C PNEUM DNA SPEC QL NAA+PROBE: NOT DETECTED
CALCIUM BLD-MCNC: 8.5 MG/DL (ref 8.5–10.1)
CHLORIDE SERPL-SCNC: 87 MMOL/L (ref 98–112)
CLARITY UR REFRACT.AUTO: CLEAR
CO2 SERPL-SCNC: 29 MMOL/L (ref 21–32)
COLOR UR AUTO: YELLOW
CORONAVIRUS 229E PCR:: NOT DETECTED
CORONAVIRUS HKU1 PCR:: NOT DETECTED
CORONAVIRUS NL63 PCR:: NOT DETECTED
CORONAVIRUS OC43 PCR:: NOT DETECTED
CREAT BLD-MCNC: 0.88 MG/DL
EOSINOPHIL # BLD AUTO: 0.1 X10(3) UL (ref 0–0.7)
EOSINOPHIL NFR BLD AUTO: 1.2 %
ERYTHROCYTE [DISTWIDTH] IN BLOOD BY AUTOMATED COUNT: 13.4 %
FLUAV RNA SPEC QL NAA+PROBE: NOT DETECTED
FLUBV RNA SPEC QL NAA+PROBE: NOT DETECTED
GFR SERPLBLD BASED ON 1.73 SQ M-ARVRAT: 62 ML/MIN/1.73M2 (ref 60–?)
GLOBULIN PLAS-MCNC: 3.7 G/DL (ref 2.8–4.4)
GLUCOSE BLD-MCNC: 99 MG/DL (ref 70–99)
GLUCOSE UR STRIP.AUTO-MCNC: NEGATIVE MG/DL
HCT VFR BLD AUTO: 38 %
HGB BLD-MCNC: 13.2 G/DL
IMM GRANULOCYTES # BLD AUTO: 0.05 X10(3) UL (ref 0–1)
IMM GRANULOCYTES NFR BLD: 0.6 %
KETONES UR STRIP.AUTO-MCNC: NEGATIVE MG/DL
LYMPHOCYTES # BLD AUTO: 1.78 X10(3) UL (ref 1–4)
LYMPHOCYTES NFR BLD AUTO: 21.2 %
MCH RBC QN AUTO: 34.3 PG (ref 26–34)
MCHC RBC AUTO-ENTMCNC: 34.7 G/DL (ref 31–37)
MCV RBC AUTO: 98.7 FL
METAPNEUMOVIRUS PCR:: NOT DETECTED
MONOCYTES # BLD AUTO: 0.91 X10(3) UL (ref 0.1–1)
MONOCYTES NFR BLD AUTO: 10.8 %
MYCOPLASMA PNEUMONIA PCR:: NOT DETECTED
NEUTROPHILS # BLD AUTO: 5.52 X10 (3) UL (ref 1.5–7.7)
NEUTROPHILS # BLD AUTO: 5.52 X10(3) UL (ref 1.5–7.7)
NEUTROPHILS NFR BLD AUTO: 65.8 %
NITRITE UR QL STRIP.AUTO: POSITIVE
NT-PROBNP SERPL-MCNC: 3329 PG/ML (ref ?–450)
OSMOLALITY SERPL CALC.SUM OF ELEC: 256 MOSM/KG (ref 275–295)
P AXIS: 32 DEGREES
P-R INTERVAL: 140 MS
PARAINFLUENZA 1 PCR:: NOT DETECTED
PARAINFLUENZA 2 PCR:: NOT DETECTED
PARAINFLUENZA 3 PCR:: NOT DETECTED
PARAINFLUENZA 4 PCR:: NOT DETECTED
PH UR STRIP.AUTO: 7 [PH] (ref 5–8)
PLATELET # BLD AUTO: 295 10(3)UL (ref 150–450)
POTASSIUM SERPL-SCNC: 4.7 MMOL/L (ref 3.5–5.1)
PROT SERPL-MCNC: 6.2 G/DL (ref 6.4–8.2)
PROT UR STRIP.AUTO-MCNC: NEGATIVE MG/DL
Q-T INTERVAL: 502 MS
QRS DURATION: 150 MS
QTC CALCULATION (BEZET): 476 MS
R AXIS: -34 DEGREES
RBC # BLD AUTO: 3.85 X10(6)UL
RBC UR QL AUTO: NEGATIVE
RHINOVIRUS/ENTERO PCR:: NOT DETECTED
RSV RNA SPEC QL NAA+PROBE: NOT DETECTED
SARS-COV-2 RNA NPH QL NAA+NON-PROBE: NOT DETECTED
SARS-COV-2 RNA RESP QL NAA+PROBE: NOT DETECTED
SODIUM SERPL-SCNC: 122 MMOL/L (ref 136–145)
SP GR UR STRIP.AUTO: 1.01 (ref 1–1.03)
T AXIS: 96 DEGREES
TROPONIN I HIGH SENSITIVITY: 22 NG/L
UROBILINOGEN UR STRIP.AUTO-MCNC: <2 MG/DL
VENTRICULAR RATE: 54 BPM
WBC # BLD AUTO: 8.4 X10(3) UL (ref 4–11)

## 2023-01-04 PROCEDURE — 71045 X-RAY EXAM CHEST 1 VIEW: CPT | Performed by: STUDENT IN AN ORGANIZED HEALTH CARE EDUCATION/TRAINING PROGRAM

## 2023-01-04 PROCEDURE — 99223 1ST HOSP IP/OBS HIGH 75: CPT | Performed by: HOSPITALIST

## 2023-01-04 RX ORDER — MORPHINE SULFATE 2 MG/ML
2 INJECTION, SOLUTION INTRAMUSCULAR; INTRAVENOUS ONCE
Status: COMPLETED | OUTPATIENT
Start: 2023-01-04 | End: 2023-01-04

## 2023-01-04 RX ORDER — FLUTICASONE FUROATE AND VILANTEROL 100; 25 UG/1; UG/1
1 POWDER RESPIRATORY (INHALATION) DAILY
Status: DISCONTINUED | OUTPATIENT
Start: 2023-01-04 | End: 2023-01-09

## 2023-01-04 RX ORDER — TORSEMIDE 20 MG/1
20 TABLET ORAL
Status: DISCONTINUED | OUTPATIENT
Start: 2023-01-04 | End: 2023-01-09

## 2023-01-04 RX ORDER — ACETAMINOPHEN 500 MG
500 TABLET ORAL EVERY 6 HOURS PRN
Status: DISCONTINUED | OUTPATIENT
Start: 2023-01-04 | End: 2023-01-06

## 2023-01-04 RX ORDER — SODIUM CHLORIDE 9 MG/ML
INJECTION, SOLUTION INTRAVENOUS CONTINUOUS
Status: DISCONTINUED | OUTPATIENT
Start: 2023-01-04 | End: 2023-01-04

## 2023-01-04 RX ORDER — METOPROLOL TARTRATE 100 MG/1
100 TABLET ORAL
Status: DISCONTINUED | OUTPATIENT
Start: 2023-01-04 | End: 2023-01-09

## 2023-01-04 RX ORDER — HYDRALAZINE HYDROCHLORIDE 25 MG/1
25 TABLET, FILM COATED ORAL 3 TIMES DAILY
Status: DISCONTINUED | OUTPATIENT
Start: 2023-01-04 | End: 2023-01-09

## 2023-01-04 RX ORDER — LOSARTAN POTASSIUM 50 MG/1
50 TABLET ORAL 2 TIMES DAILY
Status: DISCONTINUED | OUTPATIENT
Start: 2023-01-04 | End: 2023-01-09

## 2023-01-04 RX ORDER — MORPHINE SULFATE 2 MG/ML
2 INJECTION, SOLUTION INTRAMUSCULAR; INTRAVENOUS ONCE
Status: CANCELLED | OUTPATIENT
Start: 2023-01-04 | End: 2023-01-04

## 2023-01-04 RX ORDER — LEVOTHYROXINE SODIUM 0.1 MG/1
100 TABLET ORAL
Status: DISCONTINUED | OUTPATIENT
Start: 2023-01-05 | End: 2023-01-09

## 2023-01-04 RX ORDER — FUROSEMIDE 10 MG/ML
20 INJECTION INTRAMUSCULAR; INTRAVENOUS ONCE
Status: COMPLETED | OUTPATIENT
Start: 2023-01-04 | End: 2023-01-04

## 2023-01-04 RX ORDER — ALBUTEROL SULFATE 90 UG/1
2 AEROSOL, METERED RESPIRATORY (INHALATION) EVERY 6 HOURS PRN
Status: DISCONTINUED | OUTPATIENT
Start: 2023-01-04 | End: 2023-01-09

## 2023-01-04 RX ORDER — OXYCODONE HYDROCHLORIDE 5 MG/1
2.5 TABLET ORAL EVERY 4 HOURS PRN
Status: DISCONTINUED | OUTPATIENT
Start: 2023-01-04 | End: 2023-01-09

## 2023-01-04 RX ORDER — BUDESONIDE 0.5 MG/2ML
0.5 INHALANT ORAL 2 TIMES DAILY
Status: DISCONTINUED | OUTPATIENT
Start: 2023-01-04 | End: 2023-01-09

## 2023-01-04 RX ORDER — HYDRALAZINE HYDROCHLORIDE 20 MG/ML
10 INJECTION INTRAMUSCULAR; INTRAVENOUS ONCE
Status: CANCELLED | OUTPATIENT
Start: 2023-01-04 | End: 2023-01-04

## 2023-01-04 RX ORDER — HEPARIN SODIUM 5000 [USP'U]/ML
7500 INJECTION, SOLUTION INTRAVENOUS; SUBCUTANEOUS EVERY 8 HOURS SCHEDULED
Status: DISCONTINUED | OUTPATIENT
Start: 2023-01-04 | End: 2023-01-09

## 2023-01-04 RX ORDER — CELECOXIB 100 MG/1
100 CAPSULE ORAL 2 TIMES DAILY PRN
Status: DISCONTINUED | OUTPATIENT
Start: 2023-01-04 | End: 2023-01-09

## 2023-01-04 RX ORDER — PREDNISONE 10 MG/1
10 TABLET ORAL DAILY
Status: DISCONTINUED | OUTPATIENT
Start: 2023-01-04 | End: 2023-01-09

## 2023-01-04 NOTE — ED INITIAL ASSESSMENT (HPI)
Pt states MARCELLO, sob, generalized edema and weakness over last day. Pt states normally wears 2L via NC. Pt states she fell x 2 weeks ago and broke right arm. Pt states since then having difficult time. Pt also notes urge to urinate without results.

## 2023-01-04 NOTE — ED QUICK NOTES
Orders for admission, patient is aware of plan and ready to go upstairs. Any questions, please call ED RN Kayce Torres at extension [phone].      Patient Covid vaccination status: Fully vaccinated     COVID Test Ordered in ED: Rapid SARS-CoV-2 by PCR    COVID Suspicion at Admission: No risk with negative rapid    Running Infusions:  None    Mental Status/LOC at time of transport: a/ox4    Other pertinent information: none  CIWA score: N/A   NIH score:  N/A

## 2023-01-05 ENCOUNTER — APPOINTMENT (OUTPATIENT)
Dept: CT IMAGING | Facility: HOSPITAL | Age: 88
End: 2023-01-05
Attending: HOSPITALIST
Payer: MEDICARE

## 2023-01-05 DIAGNOSIS — R06.2 WHEEZING: ICD-10-CM

## 2023-01-05 DIAGNOSIS — J45.50 SEVERE PERSISTENT ASTHMA WITHOUT COMPLICATION: Primary | ICD-10-CM

## 2023-01-05 DIAGNOSIS — R05.9 COUGH: ICD-10-CM

## 2023-01-05 PROBLEM — Z71.89 COUNSELING REGARDING ADVANCE CARE PLANNING AND GOALS OF CARE: Status: ACTIVE | Noted: 2023-01-05

## 2023-01-05 PROBLEM — Z51.5 PALLIATIVE CARE ENCOUNTER: Status: ACTIVE | Noted: 2023-01-05

## 2023-01-05 LAB
ANION GAP SERPL CALC-SCNC: 3 MMOL/L (ref 0–18)
BUN BLD-MCNC: 15 MG/DL (ref 7–18)
CALCIUM BLD-MCNC: 8.2 MG/DL (ref 8.5–10.1)
CHLORIDE SERPL-SCNC: 90 MMOL/L (ref 98–112)
CO2 SERPL-SCNC: 30 MMOL/L (ref 21–32)
CREAT BLD-MCNC: 0.84 MG/DL
GFR SERPLBLD BASED ON 1.73 SQ M-ARVRAT: 66 ML/MIN/1.73M2 (ref 60–?)
GLUCOSE BLD-MCNC: 114 MG/DL (ref 70–99)
OSMOLALITY SERPL CALC.SUM OF ELEC: 258 MOSM/KG (ref 275–295)
POTASSIUM SERPL-SCNC: 4.2 MMOL/L (ref 3.5–5.1)
SODIUM SERPL-SCNC: 123 MMOL/L (ref 136–145)

## 2023-01-05 PROCEDURE — 99232 SBSQ HOSP IP/OBS MODERATE 35: CPT | Performed by: HOSPITALIST

## 2023-01-05 PROCEDURE — 72131 CT LUMBAR SPINE W/O DYE: CPT | Performed by: HOSPITALIST

## 2023-01-05 PROCEDURE — 99223 1ST HOSP IP/OBS HIGH 75: CPT | Performed by: NURSE PRACTITIONER

## 2023-01-05 RX ORDER — SODIUM PHOSPHATE, DIBASIC AND SODIUM PHOSPHATE, MONOBASIC 7; 19 G/133ML; G/133ML
1 ENEMA RECTAL ONCE AS NEEDED
Status: DISCONTINUED | OUTPATIENT
Start: 2023-01-05 | End: 2023-01-09

## 2023-01-05 RX ORDER — ALBUTEROL SULFATE 90 UG/1
2 AEROSOL, METERED RESPIRATORY (INHALATION) EVERY 6 HOURS PRN
Qty: 1 EACH | Refills: 11 | Status: SHIPPED | OUTPATIENT
Start: 2023-01-05

## 2023-01-05 RX ORDER — FUROSEMIDE 10 MG/ML
20 INJECTION INTRAMUSCULAR; INTRAVENOUS ONCE
Status: COMPLETED | OUTPATIENT
Start: 2023-01-05 | End: 2023-01-05

## 2023-01-05 RX ORDER — ECHINACEA PURPUREA EXTRACT 125 MG
1 TABLET ORAL
Status: DISCONTINUED | OUTPATIENT
Start: 2023-01-05 | End: 2023-01-09

## 2023-01-05 RX ORDER — DOCUSATE SODIUM 100 MG/1
100 CAPSULE, LIQUID FILLED ORAL 2 TIMES DAILY
Status: DISCONTINUED | OUTPATIENT
Start: 2023-01-05 | End: 2023-01-09

## 2023-01-05 RX ORDER — BISACODYL 10 MG
10 SUPPOSITORY, RECTAL RECTAL
Status: DISCONTINUED | OUTPATIENT
Start: 2023-01-05 | End: 2023-01-09

## 2023-01-05 RX ORDER — POLYETHYLENE GLYCOL 3350 17 G/17G
17 POWDER, FOR SOLUTION ORAL DAILY PRN
Status: DISCONTINUED | OUTPATIENT
Start: 2023-01-05 | End: 2023-01-06

## 2023-01-05 RX ORDER — IPRATROPIUM BROMIDE AND ALBUTEROL SULFATE 2.5; .5 MG/3ML; MG/3ML
3 SOLUTION RESPIRATORY (INHALATION) EVERY 4 HOURS PRN
Status: DISCONTINUED | OUTPATIENT
Start: 2023-01-05 | End: 2023-01-09

## 2023-01-05 NOTE — TELEPHONE ENCOUNTER
Last office visit 10/19/22. Call to pt's daughter. Michael Estrada states pt had her albuterol inhaler in hospital with her, but dropped it and it broke. Needs new albuterol called in.

## 2023-01-05 NOTE — PLAN OF CARE
Problem: SAFETY ADULT - FALL  Goal: Free from fall injury  Description: INTERVENTIONS:  - Assess pt frequently for physical needs  - Identify cognitive and physical deficits and behaviors that affect risk of falls.   - [place] fall precautions as indicated by assessment.  - Educate pt/family on patient safety including physical limitations  - Instruct pt to call for assistance with activity based on assessment  - Modify environment to reduce risk of injury  - Provide assistive devices as appropriate  - Consider OT/PT consult to assist with strengthening/mobility  - Encourage toileting schedule  Outcome: Progressing     Problem: RESPIRATORY - ADULT  Goal: Achieves optimal ventilation and oxygenation  Description: INTERVENTIONS:  - Assess for changes in respiratory status  - Assess for changes in mentation and behavior  - Position to facilitate oxygenation and minimize respiratory effort  - Oxygen supplementation based on oxygen saturation or ABGs  - Provide Smoking Cessation handout, if applicable  - Encourage broncho-pulmonary hygiene including cough, deep breathe, Incentive Spirometry  - Assess the need for suctioning and perform as needed  - Assess and instruct to report SOB or any respiratory difficulty  - Respiratory Therapy support as indicated  - Manage/alleviate anxiety  - Monitor for signs/symptoms of CO2 retention  Outcome: Progressing     Problem: PAIN - ADULT  Goal: Verbalizes/displays adequate comfort level or patient's stated pain goal  Description: INTERVENTIONS:  - Encourage pt to monitor pain and request assistance  - Assess pain using appropriate pain scale  - Administer analgesics based on type and severity of pain and evaluate response  - Implement non-pharmacological measures as appropriate and evaluate response  - Consider cultural and social influences on pain and pain management  - Manage/alleviate anxiety  - Utilize distraction and/or relaxation techniques  - Monitor for opioid side effects  - Notify MD/LIP if interventions unsuccessful or patient reports new pain  - Anticipate increased pain with activity and pre-medicate as appropriate  Outcome: Progressing     Problem: Patient/Family Goals  Goal: Patient/Family Long Term Goal  Description: Patient's Long Term Goal: Discharge    Interventions:  - PT/OT  -diuretics  -Tele/  -supplemental O2  -Nebs, steroids  - See additional Care Plan goals for specific interventions  Outcome: Progressing  Goal: Patient/Family Short Term Goal  Description: Patient's Short Term Goal:   1/4 noc: rest, pain control    Interventions:   - PRN pain intervention  -Purewick for increased urinarary frequency    - See additional Care Plan goals for specific interventions  Outcome: Progressing     Problem: CARDIOVASCULAR - ADULT  Goal: Maintains optimal cardiac output and hemodynamic stability  Description: INTERVENTIONS:  - Monitor vital signs, rhythm, and trends  - Monitor for bleeding, hypotension and signs of decreased cardiac output  - Evaluate effectiveness of vasoactive medications to optimize hemodynamic stability  - Monitor arterial and/or venous puncture sites for bleeding and/or hematoma  - Assess quality of pulses, skin color and temperature  - Assess for signs of decreased coronary artery perfusion - ex. Angina  - Evaluate fluid balance, assess for edema, trend weights  Outcome: Progressing     A&Ox4. VSS. 2L O2(baseline), . Patient requesting a neb treatment, respiratory therapy notified. BLE edema. Telemetry: NSR  GI: Abdomen soft, nondistended. Denies nausea. : Purewick   Pain controlled with PRN pain medications  Up with assistance, PT/OT ordered. Right arm with hx of fracture, in sling. Diet:general  IV saline locked. All appropriate safety measures in place. All questions and concerns addressed. Will continue to monitor     0955: Notified by PT/OT of leg pain due to Arian hose. Tubogrip stockings removed due to pain, redness to BLE.

## 2023-01-05 NOTE — PROGRESS NOTES
01/04/23 2100   Clinical Encounter Type   Visited With Patient and family together  (daughter at bedside)   Routine Visit Introduction   Continue Visiting No   Family Spiritual Encounters   Family Coping Accepting; Fearful; Sadness   Family Participation in Care Consistently   Taxonomy   Intended Effects Demonstrate caring and concern   Methods Offer spiritual/Restoration support; Offer emotional support;Assist with spiritual/Restoration practices   Interventions Acknowledge response to difficult experience; Active listening; Ask guided questions about dustin;Provide compassionate touch; Share words of hope and inspiration;[place]

## 2023-01-05 NOTE — HOME CARE LIAISON
Patient is current with [facility]. Please enter BRANDY order upon discharge. RN PT OT.

## 2023-01-05 NOTE — CM/SW NOTE
PT recs for [facility]. CM met with patient and diana Muller at bedside to discuss recommendations. Estrella Keys or patient are not familiar with SARs and do not have a preference at this time. Referral sent via [facility] and will present list when available. PASSR completed and uploaded. Estrella Keys also asking about PC. Will send order to [facility] which pt is current with. Once facility is chosen will also request PC to see in facility.     Saranya Culver, MSN RN, [address]  X [phone]

## 2023-01-05 NOTE — PLAN OF CARE
Patient A&Ox4. Patient resting in bed, no distress, daughter at bedside. Patient resting on 2 L NC, this is baseline. Patient on tele and  monitoring. Patient does complain of pain, PRN Tylenol and oxycodone given. Patient does complain of dry mouth, MD notified, PRN lozenge ordered and administered, see MAR. ELISA bruised in sling. Patient with decreased mobility and increased urinary frequency, purewick in place. Resp panel and Rapid COVID negative, see Results. IV lasix. PO prednisone. PT/OT eval pending. Fall precautions in place. Call light in reach. Problem: SAFETY ADULT - FALL  Goal: Free from fall injury  Description: INTERVENTIONS:  - Assess pt frequently for physical needs  - Identify cognitive and physical deficits and behaviors that affect risk of falls.   - [place] fall precautions as indicated by assessment.  - Educate pt/family on patient safety including physical limitations  - Instruct pt to call for assistance with activity based on assessment  - Modify environment to reduce risk of injury  - Provide assistive devices as appropriate  - Consider OT/PT consult to assist with strengthening/mobility  - Encourage toileting schedule  1/4/2023 2027 by Colette Aly RN  Outcome: Progressing  1/4/2023 2026 by Colette Aly RN  Outcome: Progressing     Problem: RESPIRATORY - ADULT  Goal: Achieves optimal ventilation and oxygenation  Description: INTERVENTIONS:  - Assess for changes in respiratory status  - Assess for changes in mentation and behavior  - Position to facilitate oxygenation and minimize respiratory effort  - Oxygen supplementation based on oxygen saturation or ABGs  - Provide Smoking Cessation handout, if applicable  - Encourage broncho-pulmonary hygiene including cough, deep breathe, Incentive Spirometry  - Assess the need for suctioning and perform as needed  - Assess and instruct to report SOB or any respiratory difficulty  - Respiratory Therapy support as indicated  - Manage/alleviate anxiety  - Monitor for signs/symptoms of CO2 retention  1/4/2023 2027 by Hernandez Claros RN  Outcome: Progressing  1/4/2023 2026 by Hernandez Claros RN  Outcome: Progressing     Problem: PAIN - ADULT  Goal: Verbalizes/displays adequate comfort level or patient's stated pain goal  Description: INTERVENTIONS:  - Encourage pt to monitor pain and request assistance  - Assess pain using appropriate pain scale  - Administer analgesics based on type and severity of pain and evaluate response  - Implement non-pharmacological measures as appropriate and evaluate response  - Consider cultural and social influences on pain and pain management  - Manage/alleviate anxiety  - Utilize distraction and/or relaxation techniques  - Monitor for opioid side effects  - Notify MD/LIP if interventions unsuccessful or patient reports new pain  - Anticipate increased pain with activity and pre-medicate as appropriate  1/4/2023 2027 by Hernandez Claros RN  Outcome: Progressing  1/4/2023 2026 by Hernandez Claros RN  Outcome: Progressing     Problem: Patient/Family Goals  Goal: Patient/Family Long Term Goal  Description: Patient's Long Term Goal: Discharge    Interventions:  - PT/OT  -diuretics  -Tele/  -supplemental O2  -Nebs, steroids  - See additional Care Plan goals for specific interventions  1/4/2023 2027 by Hernandez Claros RN  Outcome: Progressing  1/4/2023 2026 by Hernandez Claros RN  Outcome: Progressing  Goal: Patient/Family Short Term Goal  Description: Patient's Short Term Goal:   1/4 noc: rest, pain control    Interventions:   - PRN pain intervention  -Purewick for increased urinarary frequency    - See additional Care Plan goals for specific interventions  1/4/2023 2027 by Hernandez Claros RN  Outcome: Progressing  1/4/2023 2026 by Hernandez Claros RN  Outcome: Progressing     Problem: CARDIOVASCULAR - ADULT  Goal: Maintains optimal cardiac output and hemodynamic stability  Description: INTERVENTIONS:  - Monitor vital signs, rhythm, and trends  - Monitor for bleeding, hypotension and signs of decreased cardiac output  - Evaluate effectiveness of vasoactive medications to optimize hemodynamic stability  - Monitor arterial and/or venous puncture sites for bleeding and/or hematoma  - Assess quality of pulses, skin color and temperature  - Assess for signs of decreased coronary artery perfusion - ex.  Angina  - Evaluate fluid balance, assess for edema, trend weights  1/4/2023 2027 by Charles Pierre RN  Outcome: Progressing  1/4/2023 2026 by Charles Pierre RN  Outcome: Progressing

## 2023-01-05 NOTE — TELEPHONE ENCOUNTER
Pt broke her ProAir HFA and needs a new script sent to a [facility] at [facility] at [facility] in [place].  This is not the one she originally filled the script at is the issue with requesting renewal.

## 2023-01-06 ENCOUNTER — TELEPHONE (OUTPATIENT)
Dept: ORTHOPEDICS CLINIC | Facility: CLINIC | Age: 88
End: 2023-01-06

## 2023-01-06 LAB
ANION GAP SERPL CALC-SCNC: 6 MMOL/L (ref 0–18)
BUN BLD-MCNC: 16 MG/DL (ref 7–18)
CALCIUM BLD-MCNC: 8.6 MG/DL (ref 8.5–10.1)
CHLORIDE SERPL-SCNC: 90 MMOL/L (ref 98–112)
CO2 SERPL-SCNC: 31 MMOL/L (ref 21–32)
CREAT BLD-MCNC: 0.89 MG/DL
GFR SERPLBLD BASED ON 1.73 SQ M-ARVRAT: 62 ML/MIN/1.73M2 (ref 60–?)
GLUCOSE BLD-MCNC: 182 MG/DL (ref 70–99)
OSMOLALITY SERPL CALC.SUM OF ELEC: 270 MOSM/KG (ref 275–295)
POTASSIUM SERPL-SCNC: 3.9 MMOL/L (ref 3.5–5.1)
SODIUM SERPL-SCNC: 127 MMOL/L (ref 136–145)

## 2023-01-06 PROCEDURE — 99233 SBSQ HOSP IP/OBS HIGH 50: CPT | Performed by: HOSPITALIST

## 2023-01-06 PROCEDURE — 99233 SBSQ HOSP IP/OBS HIGH 50: CPT | Performed by: NURSE PRACTITIONER

## 2023-01-06 RX ORDER — POLYETHYLENE GLYCOL 3350 17 G/17G
17 POWDER, FOR SOLUTION ORAL DAILY
Status: DISCONTINUED | OUTPATIENT
Start: 2023-01-06 | End: 2023-01-09

## 2023-01-06 RX ORDER — ACETAMINOPHEN 500 MG
500 TABLET ORAL EVERY 6 HOURS
Status: DISCONTINUED | OUTPATIENT
Start: 2023-01-06 | End: 2023-01-09

## 2023-01-06 RX ORDER — MORPHINE SULFATE 4 MG/ML
1 INJECTION, SOLUTION INTRAMUSCULAR; INTRAVENOUS EVERY 2 HOUR PRN
Status: DISCONTINUED | OUTPATIENT
Start: 2023-01-06 | End: 2023-01-09

## 2023-01-06 RX ORDER — MORPHINE SULFATE 4 MG/ML
2 INJECTION, SOLUTION INTRAMUSCULAR; INTRAVENOUS EVERY 2 HOUR PRN
Status: DISCONTINUED | OUTPATIENT
Start: 2023-01-06 | End: 2023-01-09

## 2023-01-06 NOTE — PLAN OF CARE
Pt resting in bed. C/o of back pain,meds given. Rt arm in sling, pt states that her rt arm is not bothering her. Edema noted to BLE. Pt does have good urine output. Poc updated, pt verbalized understanding. Problem: SAFETY ADULT - FALL  Goal: Free from fall injury  Description: INTERVENTIONS:  - Assess pt frequently for physical needs  - Identify cognitive and physical deficits and behaviors that affect risk of falls.   - [place] fall precautions as indicated by assessment.  - Educate pt/family on patient safety including physical limitations  - Instruct pt to call for assistance with activity based on assessment  - Modify environment to reduce risk of injury  - Provide assistive devices as appropriate  - Consider OT/PT consult to assist with strengthening/mobility  - Encourage toileting schedule  Outcome: Progressing     Problem: RESPIRATORY - ADULT  Goal: Achieves optimal ventilation and oxygenation  Description: INTERVENTIONS:  - Assess for changes in respiratory status  - Assess for changes in mentation and behavior  - Position to facilitate oxygenation and minimize respiratory effort  - Oxygen supplementation based on oxygen saturation or ABGs  - Provide Smoking Cessation handout, if applicable  - Encourage broncho-pulmonary hygiene including cough, deep breathe, Incentive Spirometry  - Assess the need for suctioning and perform as needed  - Assess and instruct to report SOB or any respiratory difficulty  - Respiratory Therapy support as indicated  - Manage/alleviate anxiety  - Monitor for signs/symptoms of CO2 retention  Outcome: Progressing     Problem: PAIN - ADULT  Goal: Verbalizes/displays adequate comfort level or patient's stated pain goal  Description: INTERVENTIONS:  - Encourage pt to monitor pain and request assistance  - Assess pain using appropriate pain scale  - Administer analgesics based on type and severity of pain and evaluate response  - Implement non-pharmacological measures as appropriate and evaluate response  - Consider cultural and social influences on pain and pain management  - Manage/alleviate anxiety  - Utilize distraction and/or relaxation techniques  - Monitor for opioid side effects  - Notify MD/LIP if interventions unsuccessful or patient reports new pain  - Anticipate increased pain with activity and pre-medicate as appropriate  Outcome: Progressing     Problem: CARDIOVASCULAR - ADULT  Goal: Maintains optimal cardiac output and hemodynamic stability  Description: INTERVENTIONS:  - Monitor vital signs, rhythm, and trends  - Monitor for bleeding, hypotension and signs of decreased cardiac output  - Evaluate effectiveness of vasoactive medications to optimize hemodynamic stability  - Monitor arterial and/or venous puncture sites for bleeding and/or hematoma  - Assess quality of pulses, skin color and temperature  - Assess for signs of decreased coronary artery perfusion - ex.  Angina  - Evaluate fluid balance, assess for edema, trend weights  Outcome: Progressing

## 2023-01-06 NOTE — TELEPHONE ENCOUNTER
I was paged regarding patient's L2 compression fracture. Patient is neurologically intact. Fracture appears stable. Recommend multimodal pain management, including early mobilizing and minimizing narcotics. Outpatient DEXA scan and bone health work up. Consider pain clinic for kyphoplasty.  F/u in spine clinic in 2 weeks with upright lumbar XR (AP/Lat)

## 2023-01-06 NOTE — CM/SW NOTE
CM presented list to pt who requests that I speak to Augusto Ma who was at bedside. List presented. LM for daughter Felipe Viera to inform her of above and that choice will be needed. Pt is not medically cleared at this time. / to remain available for support and/or discharge planning. 17:00  Chose [facility]. Reserved. Not medically ready.     Som Yepez, MSN RN, [address]  X [phone]

## 2023-01-06 NOTE — CDS QUERY
Hyponatremia  CLINICAL DOCUMENTATION CLARIFICATION FORM    Dear   Clinical information (provided below) includes documentation of hyponatremia. For accurate ICD-10-CM code assignment to reflect severity of illness and risk of mortality,  BASED ON YOUR CLINICAL JUDGMENT, PLEASE SELECT  THE MOST APPRORIATE RESPONSE:    [x  ] Hyponatremia   [  ] Clinically insignificant below normal serum sodium level  [  ] Other (please specify):     Documentation from the Medical Record:     Risk Factors: Poor Appetite, limited mobility    Clinical indicators:      >>1/4 ED note: presents with shortness of breath and generalized pain with poor appetite                  Clinical Impression: Hypervolemia, FTT, Hyponatremia:       >>1/4 Serum sodium level: 122     >>1/5 Serum sodium level: 123    Treatment: Monitor Chemistries       For questions regarding this query, please contact Clinical Documentation :   Delores Brown RN [address] [place] [address], Cell# [phone]                          Thank you!                                                          THIS FORM IS A PERMANENT PART OF THE MEDICAL RECORD

## 2023-01-06 NOTE — CDS QUERY
Urine Culture Results  Gustavus Spurling    Dear Doctor:  Clinical information (provided below) includes growth reported on urine culture results. For accurate ICD-10-CM code assignment to reflect severity of illness and risk of mortality,  Based on your clinical judgment, please select the most appropriate response:    [   ] E coli urinary tract infection  [   ] Clinically insignificant urine culture results   [   ] Other (please specify): ______________________________________      Documentation from the Medical Record:     Urine culture collected on 1/4, reported on 1/5:    >100,000 CFU/ML Escherichia coli             For questions regarding this query, please contact Clinical Documentation :   Nestor Johnson  [address]    Cell# [phone]                          Thank you!                                                            THIS FORM IS A PERMANENT PART OF THE MEDICAL RECORD

## 2023-01-06 NOTE — PLAN OF CARE
Patient A&Ox4. Patient resting in bed, no distress, daughter at bedside. Patient resting on 2 L NC, this is baseline. Patient on tele and  monitoring. Patient does complain of pain, PRN Tylenol and oxycodone given. RUE bruised in sling. Patient with decreased mobility and increased urinary frequency, purewick in place. PO diuretic. PO prednisone. Fall precautions in place. Call light in reach. Notified of critical result on Ct spine, L2 fracture, hospitalist notified. POA paperwork in room, to be uploaded to chart    Problem: SAFETY ADULT - FALL  Goal: Free from fall injury  Description: INTERVENTIONS:  - Assess pt frequently for physical needs  - Identify cognitive and physical deficits and behaviors that affect risk of falls.   - [place] fall precautions as indicated by assessment.  - Educate pt/family on patient safety including physical limitations  - Instruct pt to call for assistance with activity based on assessment  - Modify environment to reduce risk of injury  - Provide assistive devices as appropriate  - Consider OT/PT consult to assist with strengthening/mobility  - Encourage toileting schedule  Outcome: Progressing     Problem: RESPIRATORY - ADULT  Goal: Achieves optimal ventilation and oxygenation  Description: INTERVENTIONS:  - Assess for changes in respiratory status  - Assess for changes in mentation and behavior  - Position to facilitate oxygenation and minimize respiratory effort  - Oxygen supplementation based on oxygen saturation or ABGs  - Provide Smoking Cessation handout, if applicable  - Encourage broncho-pulmonary hygiene including cough, deep breathe, Incentive Spirometry  - Assess the need for suctioning and perform as needed  - Assess and instruct to report SOB or any respiratory difficulty  - Respiratory Therapy support as indicated  - Manage/alleviate anxiety  - Monitor for signs/symptoms of CO2 retention  Outcome: Progressing     Problem: PAIN - ADULT  Goal: Verbalizes/displays adequate comfort level or patient's stated pain goal  Description: INTERVENTIONS:  - Encourage pt to monitor pain and request assistance  - Assess pain using appropriate pain scale  - Administer analgesics based on type and severity of pain and evaluate response  - Implement non-pharmacological measures as appropriate and evaluate response  - Consider cultural and social influences on pain and pain management  - Manage/alleviate anxiety  - Utilize distraction and/or relaxation techniques  - Monitor for opioid side effects  - Notify MD/LIP if interventions unsuccessful or patient reports new pain  - Anticipate increased pain with activity and pre-medicate as appropriate  Outcome: Progressing     Problem: Patient/Family Goals  Goal: Patient/Family Long Term Goal  Description: Patient's Long Term Goal: Discharge    Interventions:  - PT/OT  -diuretics  -Tele/  -supplemental O2  -Nebs, steroids  - See additional Care Plan goals for specific interventions  Outcome: Progressing  Goal: Patient/Family Short Term Goal  Description: Patient's Short Term Goal:   1/4 noc: rest, pain control  1/5: rest, pain control    Interventions:   - PRN pain intervention  -Purewick for increased urinarary frequency    - See additional Care Plan goals for specific interventions  Outcome: Progressing     Problem: CARDIOVASCULAR - ADULT  Goal: Maintains optimal cardiac output and hemodynamic stability  Description: INTERVENTIONS:  - Monitor vital signs, rhythm, and trends  - Monitor for bleeding, hypotension and signs of decreased cardiac output  - Evaluate effectiveness of vasoactive medications to optimize hemodynamic stability  - Monitor arterial and/or venous puncture sites for bleeding and/or hematoma  - Assess quality of pulses, skin color and temperature  - Assess for signs of decreased coronary artery perfusion - ex.  Angina  - Evaluate fluid balance, assess for edema, trend weights  Outcome: Progressing

## 2023-01-07 PROCEDURE — 99233 SBSQ HOSP IP/OBS HIGH 50: CPT | Performed by: HOSPITALIST

## 2023-01-07 PROCEDURE — 99223 1ST HOSP IP/OBS HIGH 75: CPT | Performed by: STUDENT IN AN ORGANIZED HEALTH CARE EDUCATION/TRAINING PROGRAM

## 2023-01-07 RX ORDER — FUROSEMIDE 10 MG/ML
20 INJECTION INTRAMUSCULAR; INTRAVENOUS
Status: DISCONTINUED | OUTPATIENT
Start: 2023-01-07 | End: 2023-01-08

## 2023-01-07 NOTE — DISCHARGE PLANNING
Spoke with pt's daughter/POA, Dilma, via phone to confirm discharge plan. Pt's dtr confirmed plan for pt to discharge to [facility] when medically cleared. CM/SW to update Dilma ([phone]) at time of discharge. Addendum: Referral sent and pending for [facility].      JOSE MuñizN, RN-BC    [id number]

## 2023-01-07 NOTE — PLAN OF CARE
Pt AOx4. VS stable on room air. C/o moderate-severe lower back pain, moderate relief shown with PO pain meds and IV pain medication prior to therapy. Sling to RUE. Bruising to BUE. Edema to BUE/BLE. IM MD made aware, see MAR. Voiding freely via purewick d/t increased frequency. Last BM 1/3, see MAR. Tolerating diet, denies nausea. Worked with therapy, up x2 max assist stand-pivot to chair/commode. Pt tolerated sitting chair during shift. Ankle pumps encouraged. Plan to discharge to [facility] when medically cleared and pending ins auth/placement. Daughter bedside. Pt and daughter updated on plan of care, all questions answered. Belongings within reach, instructed to call for assistance.

## 2023-01-07 NOTE — PLAN OF CARE
The patient is alert and orientated with family at bedside. Denies pain unless she is moving. Back pain and right arm pain with movement, right arm is in sling. Bilateral lower extremity edema. Purwick in place. Patient takes pills whole with water. Plan of care discussed with family and patient. No further questions or concerns at this time.

## 2023-01-08 LAB
ANION GAP SERPL CALC-SCNC: 4 MMOL/L (ref 0–18)
BUN BLD-MCNC: 19 MG/DL (ref 7–18)
CALCIUM BLD-MCNC: 9 MG/DL (ref 8.5–10.1)
CHLORIDE SERPL-SCNC: 93 MMOL/L (ref 98–112)
CO2 SERPL-SCNC: 31 MMOL/L (ref 21–32)
CREAT BLD-MCNC: 0.82 MG/DL
GFR SERPLBLD BASED ON 1.73 SQ M-ARVRAT: 68 ML/MIN/1.73M2 (ref 60–?)
GLUCOSE BLD-MCNC: 86 MG/DL (ref 70–99)
OSMOLALITY SERPL CALC.SUM OF ELEC: 268 MOSM/KG (ref 275–295)
POTASSIUM SERPL-SCNC: 4.2 MMOL/L (ref 3.5–5.1)
SODIUM SERPL-SCNC: 128 MMOL/L (ref 136–145)

## 2023-01-08 PROCEDURE — 99233 SBSQ HOSP IP/OBS HIGH 50: CPT | Performed by: HOSPITALIST

## 2023-01-08 RX ORDER — ALBUTEROL SULFATE 2.5 MG/3ML
2.5 SOLUTION RESPIRATORY (INHALATION) EVERY 6 HOURS PRN
Status: DISCONTINUED | OUTPATIENT
Start: 2023-01-08 | End: 2023-01-09

## 2023-01-08 RX ORDER — HYDRALAZINE HYDROCHLORIDE 20 MG/ML
10 INJECTION INTRAMUSCULAR; INTRAVENOUS EVERY 6 HOURS PRN
Status: DISCONTINUED | OUTPATIENT
Start: 2023-01-08 | End: 2023-01-09

## 2023-01-08 RX ORDER — CEPHALEXIN 500 MG/1
500 CAPSULE ORAL EVERY 6 HOURS
Status: DISCONTINUED | OUTPATIENT
Start: 2023-01-08 | End: 2023-01-09

## 2023-01-08 NOTE — PROGRESS NOTES
[facility] Note:  Renal Adjustment for cephalexin (Eyal Sumit)    Christopher Britton is a 80year old patient who has been prescribed cephalexin (KEFLEX) 500 mg every 8 hrs. The estimated creatinine clearance is 58.7 mL/min (based on SCr of 0.82 mg/dL). The dose has been adjusted to cephalexin (KEFLEX) 500 mg every 6 hrs per hospital renal dose adjustment protocol for treatment of UTI. Pharmacy will follow and adjust dose as warranted for additional renal function changes.     Thank you,    [address] [facility]  1/8/2023  12:41 PM

## 2023-01-08 NOTE — PLAN OF CARE
Patient is alert and orientated lying in bed with family at bedside. Patient states that she's feeling better today, like her old self. Patient is on room air, purwick in place. Denies pain at this time. Right arm is in sling. Bilateral lower extremities have 1+ edema. Patient states she participated in PT/OT today and took a few steps and felt good. Plan of care and pain management discussed with patient. No further questions or concerns at this time.

## 2023-01-08 NOTE — PLAN OF CARE
Pt AOx4. VS stable on room air. C/o moderate-severe lower back pain, moderate relief shown with PO pain meds. Sling to RUE. Bruising to BUE. Edema to BUE/BLE, see MAR. Voiding freely via purewick d/t increased frequency. BM today. Tolerating diet, denies nausea. Up x1 mod assist to bedside commode and chair. Pt tolerated sitting chair throughout morning/afternoon. Ankle pumps encouraged. Plan to discharge to [facility] when medically cleared and pending ins auth/placement. Daughter bedside. Pt and daughter updated on plan of care, all questions answered. Belongings within reach, instructed to call for assistance. Addendum- BP elevated this afternoon, see MAR. ALEX GALLEGOS paged to make aware.

## 2023-01-09 ENCOUNTER — PATIENT OUTREACH (OUTPATIENT)
Dept: CASE MANAGEMENT | Age: 88
End: 2023-01-09

## 2023-01-09 VITALS
TEMPERATURE: 99 F | SYSTOLIC BLOOD PRESSURE: 153 MMHG | WEIGHT: 180 LBS | RESPIRATION RATE: 18 BRPM | BODY MASS INDEX: 40.49 KG/M2 | DIASTOLIC BLOOD PRESSURE: 55 MMHG | OXYGEN SATURATION: 95 % | HEART RATE: 69 BPM | HEIGHT: 56 IN

## 2023-01-09 LAB — SARS-COV-2 RNA RESP QL NAA+PROBE: NOT DETECTED

## 2023-01-09 PROCEDURE — 99239 HOSP IP/OBS DSCHRG MGMT >30: CPT | Performed by: HOSPITALIST

## 2023-01-09 RX ORDER — ACETAMINOPHEN 500 MG
500 TABLET ORAL EVERY 6 HOURS
Qty: 30 TABLET | Refills: 0 | Status: SHIPPED | OUTPATIENT
Start: 2023-01-09

## 2023-01-09 RX ORDER — CELECOXIB 100 MG/1
100 CAPSULE ORAL 2 TIMES DAILY PRN
Qty: 60 CAPSULE | Refills: 0 | Status: SHIPPED | OUTPATIENT
Start: 2023-01-09

## 2023-01-09 RX ORDER — CEPHALEXIN 500 MG/1
500 CAPSULE ORAL 3 TIMES DAILY
Qty: 12 CAPSULE | Refills: 0 | Status: SHIPPED | OUTPATIENT
Start: 2023-01-09 | End: 2023-01-13

## 2023-01-09 NOTE — PROGRESS NOTES
Pt c/o SOB and requesting nebulizer treatment. ALEX GALLEGOS paged, see MAR. RT called, nebulizer complete. Pt denies SOB s/p neb tx.

## 2023-01-09 NOTE — PLAN OF CARE
Pt Aox4, RA . Tele NSR. SCD off, on heparin. BM today. Voiding per Neha. Tylenol and celebrex for pain to back. Sling to RUE. Up with x1 assist. IV SL. Plan to DC to [facility] today.

## 2023-01-09 NOTE — CM/SW NOTE
Spoke with pt's RN who stated pt can discharge to [facility] today. [id number] [facility] and received confirmation of bed availability for today. They are requesting rapid COVID testing prior to discharge. Medicar transport scheduled for 1pm.  PCS form completed and available for RN to print. Spoke with pt's dtr/FRED Perera who expressed agreement with DC plan for today. Informed her of costs for medicar transport not covered by insurance. Updated pt's RN who will complete COVID testing prior to DC. No further needs at this time. / to remain available for support and/or discharge planning.      [facility]  [phone]    [facility]  [phone]    Erasmo Catherine LCSW  Discharge Planner  [phone]

## 2023-01-09 NOTE — DISCHARGE INSTRUCTIONS
Sometimes managing your health at home requires assistance. The [facility] team has recognized your preference to use [facility]. They can be reached by phone at [phone]. A representative from the palliative agency will contact you or your family to schedule your first visit after rehab.

## 2023-01-09 NOTE — PLAN OF CARE
The patient is alert and oriented X4, on room air sitting in bed with family at bedside. Right arm in sling, complaints of mild pain at this time. Patient is able to walk with 1 assist, BLE has trace edema. Up to the bedside commode this evening for a BM. Plan of care and pain education discussed with patient. No further questions or concerns.

## 2023-01-09 NOTE — PROGRESS NOTES
[facility] Liaison received palliative referral for community PC services. Met with Bert bazzi to discuss Residential PC services. Residential PC services discussed and patient is agreeable to our [facility] services upon DC home.       Gustavo Castro  Residential Pall[facility] Liaison  [phone]

## 2023-01-09 NOTE — PROGRESS NOTES
NURSING DISCHARGE NOTE    Discharged Rehab facility via Wheelchair. Accompanied by Family member  Belongings Taken by patient/family. Pt Dc to [facility]- report given to Sadi. Son at bedside during transfer. All transfer paperwork provided and sent with patient.

## 2023-01-09 NOTE — PROGRESS NOTES
TCM chart review. Patient was readmitted on 1/4/23. Patient was then discharged to SNF. Encounter closing.

## 2023-01-13 ENCOUNTER — TELEPHONE (OUTPATIENT)
Dept: ORTHOPEDICS CLINIC | Facility: CLINIC | Age: 88
End: 2023-01-13

## 2023-01-13 DIAGNOSIS — M54.50 BILATERAL LOW BACK PAIN WITHOUT SCIATICA, UNSPECIFIED CHRONICITY: Primary | ICD-10-CM

## 2023-01-13 NOTE — TELEPHONE ENCOUNTER
NPT scheduled with Dr. Anderson Coffman for a L2 compression fracture. Patient was seen by Dr. Anderson Coffman in the hospital for a consult (Dr. Korina Matias 1/6/23). Please advise if new images needed.      Future Appointments   Date Time Provider Harmeet Raven   1/19/2023  2:30 PM Abel Jones MD [facility]   3/8/2023  2:30 PM Lorna Buck MD [facility] Pulm [facility]

## 2023-01-16 ENCOUNTER — TELEPHONE (OUTPATIENT)
Dept: FAMILY MEDICINE CLINIC | Facility: CLINIC | Age: 88
End: 2023-01-16

## 2023-01-16 NOTE — TELEPHONE ENCOUNTER
Saranya Chatman from home health wants to let Dr. Margy Hatch they have not been able to reach pt for the start of care

## 2023-01-16 NOTE — TELEPHONE ENCOUNTER
Spoke to pt's caregiver, she states to talk to pt's daughter, Yolanda Manrique [phone]    Spoke to pt also, she states she is not aware she is supposed to get [facility].  Can reach pt at [phone]    Left message for [facility] to give her these phone numbers

## 2023-01-18 ENCOUNTER — TELEPHONE (OUTPATIENT)
Dept: FAMILY MEDICINE CLINIC | Facility: CLINIC | Age: 88
End: 2023-01-18

## 2023-01-18 NOTE — TELEPHONE ENCOUNTER
Vania from [facility] calling stating that patients grand daughter is wanting to know if patient can take something for her stomach like pantoprazole due to other medications that patient is on. Please advise.

## 2023-01-18 NOTE — TELEPHONE ENCOUNTER
[place] nurse called back, she states patient family states since patient is on celebrex, does she need PPI ? Patient has no symptoms.    YP please see message

## 2023-01-20 ENCOUNTER — TELEPHONE (OUTPATIENT)
Facility: CLINIC | Age: 88
End: 2023-01-20

## 2023-01-20 RX ORDER — AZITHROMYCIN 250 MG/1
TABLET, FILM COATED ORAL
Qty: 6 TABLET | Refills: 0 | Status: SHIPPED | OUTPATIENT
Start: 2023-01-20

## 2023-01-20 RX ORDER — PREDNISONE 10 MG/1
TABLET ORAL
Qty: 30 TABLET | Refills: 0 | Status: SHIPPED | OUTPATIENT
Start: 2023-01-20

## 2023-01-20 NOTE — TELEPHONE ENCOUNTER
Please advise regarding Pantoprazole dose  40 mg?   Home Health nurse states this would be a new prescription

## 2023-01-20 NOTE — TELEPHONE ENCOUNTER
Patient is experiencing some tightness in her chest.  When this usually happens Dr. Kelvin Hugo usually prescribes Predisone and Zpack. Can that be called in to Jonatan johnson?

## 2023-01-20 NOTE — TELEPHONE ENCOUNTER
Pt has broken arem  C/o chest congestion. Also has cough which is productive of only scant amount of white mucus. Using daily and prn meds/neb treatments as instructed as well as 5 mg of prednisone daily. Unable to afford saline nebs so not doing that. Also c/o sob. No fevers, headaches, or body aches. States that Dr. Jessica Chopra usually prescribes steroids and abx which clears these sx up for her. Please advise.

## 2023-01-21 RX ORDER — PANTOPRAZOLE SODIUM 40 MG/1
40 TABLET, DELAYED RELEASE ORAL
Qty: 30 TABLET | Refills: 1 | Status: SHIPPED | OUTPATIENT
Start: 2023-01-21 | End: 2023-01-23

## 2023-01-23 RX ORDER — PANTOPRAZOLE SODIUM 40 MG/1
TABLET, DELAYED RELEASE ORAL
Qty: 90 TABLET | Refills: 0 | Status: SHIPPED | OUTPATIENT
Start: 2023-01-23

## 2023-01-30 ENCOUNTER — TELEPHONE (OUTPATIENT)
Dept: ORTHOPEDICS CLINIC | Facility: CLINIC | Age: 88
End: 2023-01-30

## 2023-01-30 ENCOUNTER — OFFICE VISIT (OUTPATIENT)
Dept: ORTHOPEDICS CLINIC | Facility: CLINIC | Age: 88
End: 2023-01-30
Payer: MEDICARE

## 2023-01-30 ENCOUNTER — HOSPITAL ENCOUNTER (OUTPATIENT)
Dept: GENERAL RADIOLOGY | Age: 88
Discharge: HOME | End: 2023-01-30
Attending: STUDENT IN AN ORGANIZED HEALTH CARE EDUCATION/TRAINING PROGRAM
Payer: MEDICARE

## 2023-01-30 VITALS — HEIGHT: 56 IN | BODY MASS INDEX: 40.49 KG/M2 | WEIGHT: 180 LBS

## 2023-01-30 DIAGNOSIS — M25.511 RIGHT SHOULDER PAIN, UNSPECIFIED CHRONICITY: Primary | ICD-10-CM

## 2023-01-30 DIAGNOSIS — S32.020A COMPRESSION FRACTURE OF L2 VERTEBRA, INITIAL ENCOUNTER (HCC): Primary | ICD-10-CM

## 2023-01-30 DIAGNOSIS — M54.50 BILATERAL LOW BACK PAIN WITHOUT SCIATICA, UNSPECIFIED CHRONICITY: ICD-10-CM

## 2023-01-30 PROCEDURE — 72100 X-RAY EXAM L-S SPINE 2/3 VWS: CPT | Performed by: STUDENT IN AN ORGANIZED HEALTH CARE EDUCATION/TRAINING PROGRAM

## 2023-01-30 NOTE — TELEPHONE ENCOUNTER
NP Comminuted, displaced, and angulated fracture involving the surgical neck and proximal diaphysis of the right humerus. Soft tissue swelling surrounding the fracture site. Imaging in Epic. Please advise if additional imaging is needed.   Future Appointments   Date Time Provider Harmeet Raven   2/6/2023 11:00 AM Yuridia Rodriguez MD [facility] [id number]   3/8/2023  2:30 PM Alie Cristobal MD [facility] Pul [facility]

## 2023-01-31 NOTE — TELEPHONE ENCOUNTER
Initial Xr was 12/26/22 in ER, Dr. Lucien Lion saw her in the hospital. No additional imaging since but he did write in his consult to follow up in 2 weeks with right shoulder xr. XR ordered.

## 2023-02-06 ENCOUNTER — OFFICE VISIT (OUTPATIENT)
Dept: ORTHOPEDICS CLINIC | Facility: CLINIC | Age: 88
End: 2023-02-06
Payer: MEDICARE

## 2023-02-06 ENCOUNTER — HOSPITAL ENCOUNTER (OUTPATIENT)
Dept: GENERAL RADIOLOGY | Age: 88
Discharge: HOME | End: 2023-02-06
Attending: ORTHOPAEDIC SURGERY
Payer: MEDICARE

## 2023-02-06 VITALS — WEIGHT: 180 LBS | HEIGHT: 56 IN | BODY MASS INDEX: 40.49 KG/M2

## 2023-02-06 DIAGNOSIS — M25.511 RIGHT SHOULDER PAIN, UNSPECIFIED CHRONICITY: ICD-10-CM

## 2023-02-06 DIAGNOSIS — S42.291A OTHER CLOSED DISPLACED FRACTURE OF PROXIMAL END OF RIGHT HUMERUS, INITIAL ENCOUNTER: Primary | ICD-10-CM

## 2023-02-06 PROCEDURE — 73030 X-RAY EXAM OF SHOULDER: CPT | Performed by: ORTHOPAEDIC SURGERY

## 2023-02-06 RX ORDER — POTASSIUM CHLORIDE 20 MEQ/1
TABLET, EXTENDED RELEASE ORAL
Qty: 180 TABLET | Refills: 3 | Status: SHIPPED | OUTPATIENT
Start: 2023-02-06

## 2023-02-08 ENCOUNTER — TELEPHONE (OUTPATIENT)
Facility: CLINIC | Age: 88
End: 2023-02-08

## 2023-02-09 ENCOUNTER — TELEPHONE (OUTPATIENT)
Dept: FAMILY MEDICINE CLINIC | Facility: CLINIC | Age: 88
End: 2023-02-09

## 2023-02-09 NOTE — TELEPHONE ENCOUNTER
Per Dr. Nina Benjamin make sure she is using her Saline tid if her symptoms increase should see us. Patient states she is using it twice daily so will increase to tid and call if no improvement.   Her f/u is 3/8/23

## 2023-02-09 NOTE — TELEPHONE ENCOUNTER
Spoke to patient states she is coughing uo yellow at times and has chest congestion. No temp or sorethroat. Using her Advair bid, Budesonide Bid and Duoneb q 6 hours. Thinking she needs an antibiotic.  [facility] is the Pharmacy

## 2023-02-09 NOTE — TELEPHONE ENCOUNTER
Heart rate in 30's yesterday  Stating has a cold, congested, etc, not tested for covid    Being discharged early effective today.

## 2023-02-09 NOTE — TELEPHONE ENCOUNTER
Daughter called stating no one called her mother back yesterday. Did inform her that they did try but the line was busy.   Please try calling again

## 2023-02-10 NOTE — TELEPHONE ENCOUNTER
Spoke with Jose Antonio Power and states pt is doing much better and does not feel she needs a f/u at this time. Advised Dilma if sx worsen to go to ER.  Harriet Ann verbalized understanding

## 2023-02-13 ENCOUNTER — TELEPHONE (OUTPATIENT)
Dept: FAMILY MEDICINE CLINIC | Facility: CLINIC | Age: 88
End: 2023-02-13

## 2023-02-13 NOTE — TELEPHONE ENCOUNTER
Pt's HR 40, bp 124/56. Asymtomatic, feels fine. HR has been in the 40s and 50s. Pt does not want to leave house to get blood work done. Nurse said she will get phlebotomist to home. Pls advise if okay and what blood work should be done.

## 2023-02-14 ENCOUNTER — TELEPHONE (OUTPATIENT)
Facility: CLINIC | Age: 88
End: 2023-02-14

## 2023-02-14 RX ORDER — SODIUM CHLORIDE FOR INHALATION 3 %
15 VIAL, NEBULIZER (ML) INHALATION EVERY 6 HOURS
Qty: 120 EACH | Refills: 1 | OUTPATIENT
Start: 2023-02-14

## 2023-02-14 RX ORDER — PROMETHAZINE HYDROCHLORIDE 25 MG/1
3 TABLET ORAL AS NEEDED
Qty: 360 ML | Refills: 3 | Status: SHIPPED | OUTPATIENT
Start: 2023-02-14

## 2023-02-14 NOTE — TELEPHONE ENCOUNTER
Call placed to [facility] and NaCl 3% 3ml vials are on back order. They currently only have NaCl 3% 15 ml vials. Spoke with daughter and she wants Rx to go to [facility]. Discussed with daughter that she will only be using 3 or 4 ml from each 15 ml vial and the per the pharmacist the remaining has to be discarded as they are single use vials.

## 2023-02-15 ENCOUNTER — TELEPHONE (OUTPATIENT)
Facility: CLINIC | Age: 88
End: 2023-02-15

## 2023-02-15 RX ORDER — PREDNISONE 10 MG/1
TABLET ORAL
Qty: 30 TABLET | Refills: 0 | Status: SHIPPED | OUTPATIENT
Start: 2023-02-15

## 2023-02-15 RX ORDER — AZITHROMYCIN 250 MG/1
TABLET, FILM COATED ORAL
Qty: 6 TABLET | Refills: 0 | Status: SHIPPED | OUTPATIENT
Start: 2023-02-15

## 2023-02-15 NOTE — TELEPHONE ENCOUNTER
Pt called c/o having difficulty breathing. Coughing up dark yellow sputum. Coughed all night last night. No fever, headaches or body aches. Using advair and nebulized medications and saline nebs. Pt advised to go to ER if sx worsen. Pt states she is not bad enough to go to ER - thinks she needs prednisone.

## 2023-02-15 NOTE — TELEPHONE ENCOUNTER
Spoke with Nick Hardy, home care nurse. She will have blood draw as ordered by Dr Vasyl Leonard. Agreed to note.

## 2023-02-15 NOTE — TELEPHONE ENCOUNTER
Per Dr. Wilmer Leavitt:  I will order zpak and steroids for patient. Please let her know and schedule her to see me in the next 1-2 weeks   Pt states she has an appt for 3-8-23. Has broken arm and doesn't think she can get in sooner. Spoke with daughter and she will try to schedule something sooner.

## 2023-02-16 ENCOUNTER — LAB REQUISITION (OUTPATIENT)
Dept: LAB | Age: 88
End: 2023-02-16
Payer: MEDICARE

## 2023-02-16 DIAGNOSIS — J45.40 MODERATE PERSISTENT ASTHMA, UNCOMPLICATED: ICD-10-CM

## 2023-02-16 DIAGNOSIS — I10 ESSENTIAL (PRIMARY) HYPERTENSION: ICD-10-CM

## 2023-02-16 LAB
ALBUMIN SERPL-MCNC: 3.2 G/DL (ref 3.4–5)
ALBUMIN/GLOB SERPL: 0.9 {RATIO} (ref 1–2)
ALP LIVER SERPL-CCNC: 100 U/L
ALT SERPL-CCNC: 41 U/L
ANION GAP SERPL CALC-SCNC: 8 MMOL/L (ref 0–18)
AST SERPL-CCNC: 32 U/L (ref 15–37)
BASOPHILS # BLD AUTO: 0.02 X10(3) UL (ref 0–0.2)
BASOPHILS NFR BLD AUTO: 0.3 %
BILIRUB SERPL-MCNC: 0.4 MG/DL (ref 0.1–2)
BUN BLD-MCNC: 52 MG/DL (ref 7–18)
CALCIUM BLD-MCNC: 9.1 MG/DL (ref 8.5–10.1)
CHLORIDE SERPL-SCNC: 92 MMOL/L (ref 98–112)
CO2 SERPL-SCNC: 24 MMOL/L (ref 21–32)
CREAT BLD-MCNC: 1.45 MG/DL
EOSINOPHIL # BLD AUTO: 0.14 X10(3) UL (ref 0–0.7)
EOSINOPHIL NFR BLD AUTO: 2.2 %
ERYTHROCYTE [DISTWIDTH] IN BLOOD BY AUTOMATED COUNT: 14.1 %
GFR SERPLBLD BASED ON 1.73 SQ M-ARVRAT: 34 ML/MIN/1.73M2 (ref 60–?)
GLOBULIN PLAS-MCNC: 3.4 G/DL (ref 2.8–4.4)
GLUCOSE BLD-MCNC: 93 MG/DL (ref 70–99)
HCT VFR BLD AUTO: 38.3 %
HGB BLD-MCNC: 12.7 G/DL
IMM GRANULOCYTES # BLD AUTO: 0.06 X10(3) UL (ref 0–1)
IMM GRANULOCYTES NFR BLD: 0.9 %
LYMPHOCYTES # BLD AUTO: 0.95 X10(3) UL (ref 1–4)
LYMPHOCYTES NFR BLD AUTO: 14.7 %
MAGNESIUM SERPL-MCNC: 2.4 MG/DL (ref 1.6–2.6)
MCH RBC QN AUTO: 33.4 PG (ref 26–34)
MCHC RBC AUTO-ENTMCNC: 33.2 G/DL (ref 31–37)
MCV RBC AUTO: 100.8 FL
MONOCYTES # BLD AUTO: 0.67 X10(3) UL (ref 0.1–1)
MONOCYTES NFR BLD AUTO: 10.4 %
NEUTROPHILS # BLD AUTO: 4.63 X10 (3) UL (ref 1.5–7.7)
NEUTROPHILS # BLD AUTO: 4.63 X10(3) UL (ref 1.5–7.7)
NEUTROPHILS NFR BLD AUTO: 71.5 %
OSMOLALITY SERPL CALC.SUM OF ELEC: 272 MOSM/KG (ref 275–295)
PLATELET # BLD AUTO: 298 10(3)UL (ref 150–450)
POTASSIUM SERPL-SCNC: 4.3 MMOL/L (ref 3.5–5.1)
PROT SERPL-MCNC: 6.6 G/DL (ref 6.4–8.2)
RBC # BLD AUTO: 3.8 X10(6)UL
SODIUM SERPL-SCNC: 124 MMOL/L (ref 136–145)
WBC # BLD AUTO: 6.5 X10(3) UL (ref 4–11)

## 2023-02-16 PROCEDURE — 83735 ASSAY OF MAGNESIUM: CPT | Performed by: FAMILY MEDICINE

## 2023-02-16 PROCEDURE — 85025 COMPLETE CBC W/AUTO DIFF WBC: CPT | Performed by: FAMILY MEDICINE

## 2023-02-16 PROCEDURE — 80053 COMPREHEN METABOLIC PANEL: CPT | Performed by: FAMILY MEDICINE

## 2023-02-24 ENCOUNTER — TELEPHONE (OUTPATIENT)
Dept: FAMILY MEDICINE CLINIC | Facility: CLINIC | Age: 88
End: 2023-02-24

## 2023-02-24 DIAGNOSIS — E87.1 HYPONATREMIA: Primary | ICD-10-CM

## 2023-02-24 DIAGNOSIS — N17.9 AKI (ACUTE KIDNEY INJURY) (HCC): ICD-10-CM

## 2023-02-24 RX ORDER — METOPROLOL TARTRATE 100 MG/1
TABLET ORAL
Qty: 180 TABLET | Refills: 0 | Status: SHIPPED | OUTPATIENT
Start: 2023-02-24

## 2023-03-06 ENCOUNTER — LAB REQUISITION (OUTPATIENT)
Dept: LAB | Age: 88
End: 2023-03-06
Payer: MEDICARE

## 2023-03-06 ENCOUNTER — HOSPITAL ENCOUNTER (INPATIENT)
Facility: HOSPITAL | Age: 88
LOS: 5 days | Discharge: HOME HEALTH | End: 2023-03-11
Attending: EMERGENCY MEDICINE | Admitting: HOSPITALIST
Payer: MEDICARE

## 2023-03-06 ENCOUNTER — APPOINTMENT (OUTPATIENT)
Dept: GENERAL RADIOLOGY | Facility: HOSPITAL | Age: 88
End: 2023-03-06
Attending: EMERGENCY MEDICINE
Payer: MEDICARE

## 2023-03-06 DIAGNOSIS — J44.1 COPD EXACERBATION (HCC): Primary | ICD-10-CM

## 2023-03-06 DIAGNOSIS — I10 PRIMARY HYPERTENSION: ICD-10-CM

## 2023-03-06 DIAGNOSIS — E87.1 HYPO-OSMOLALITY AND HYPONATREMIA: ICD-10-CM

## 2023-03-06 DIAGNOSIS — R00.1 BRADYCARDIA: ICD-10-CM

## 2023-03-06 LAB
ADENOVIRUS PCR:: NOT DETECTED
ALBUMIN SERPL-MCNC: 3.6 G/DL (ref 3.4–5)
ALBUMIN/GLOB SERPL: 1 {RATIO} (ref 1–2)
ALP LIVER SERPL-CCNC: 89 U/L
ALT SERPL-CCNC: 61 U/L
ANION GAP SERPL CALC-SCNC: 7 MMOL/L (ref 0–18)
AST SERPL-CCNC: 58 U/L (ref 15–37)
B PARAPERT DNA SPEC QL NAA+PROBE: NOT DETECTED
B PERT DNA SPEC QL NAA+PROBE: NOT DETECTED
BASOPHILS # BLD AUTO: 0.02 X10(3) UL (ref 0–0.2)
BASOPHILS NFR BLD AUTO: 0.3 %
BILIRUB SERPL-MCNC: 0.5 MG/DL (ref 0.1–2)
BUN BLD-MCNC: 38 MG/DL (ref 7–18)
C PNEUM DNA SPEC QL NAA+PROBE: NOT DETECTED
CALCIUM BLD-MCNC: 9.2 MG/DL (ref 8.5–10.1)
CHLORIDE SERPL-SCNC: 96 MMOL/L (ref 98–112)
CO2 SERPL-SCNC: 27 MMOL/L (ref 21–32)
CORONAVIRUS 229E PCR:: NOT DETECTED
CORONAVIRUS HKU1 PCR:: NOT DETECTED
CORONAVIRUS NL63 PCR:: NOT DETECTED
CORONAVIRUS OC43 PCR:: NOT DETECTED
CREAT BLD-MCNC: 1.41 MG/DL
EOSINOPHIL # BLD AUTO: 0.01 X10(3) UL (ref 0–0.7)
EOSINOPHIL NFR BLD AUTO: 0.1 %
ERYTHROCYTE [DISTWIDTH] IN BLOOD BY AUTOMATED COUNT: 15.6 %
FLUAV RNA SPEC QL NAA+PROBE: NOT DETECTED
FLUBV RNA SPEC QL NAA+PROBE: NOT DETECTED
GFR SERPLBLD BASED ON 1.73 SQ M-ARVRAT: 35 ML/MIN/1.73M2 (ref 60–?)
GLOBULIN PLAS-MCNC: 3.5 G/DL (ref 2.8–4.4)
GLUCOSE BLD-MCNC: 152 MG/DL (ref 70–99)
HCT VFR BLD AUTO: 35 %
HGB BLD-MCNC: 12.2 G/DL
IMM GRANULOCYTES # BLD AUTO: 0.05 X10(3) UL (ref 0–1)
IMM GRANULOCYTES NFR BLD: 0.7 %
LACTATE SERPL-SCNC: 1.4 MMOL/L (ref 0.4–2)
LYMPHOCYTES # BLD AUTO: 1.18 X10(3) UL (ref 1–4)
LYMPHOCYTES NFR BLD AUTO: 17.5 %
MAGNESIUM SERPL-MCNC: 2.4 MG/DL (ref 1.6–2.6)
MCH RBC QN AUTO: 34.5 PG (ref 26–34)
MCHC RBC AUTO-ENTMCNC: 34.9 G/DL (ref 31–37)
MCV RBC AUTO: 98.9 FL
METAPNEUMOVIRUS PCR:: NOT DETECTED
MONOCYTES # BLD AUTO: 0.2 X10(3) UL (ref 0.1–1)
MONOCYTES NFR BLD AUTO: 3 %
MYCOPLASMA PNEUMONIA PCR:: NOT DETECTED
NEUTROPHILS # BLD AUTO: 5.3 X10 (3) UL (ref 1.5–7.7)
NEUTROPHILS # BLD AUTO: 5.3 X10(3) UL (ref 1.5–7.7)
NEUTROPHILS NFR BLD AUTO: 78.4 %
NT-PROBNP SERPL-MCNC: 2567 PG/ML (ref ?–450)
OSMOLALITY SERPL CALC.SUM OF ELEC: 282 MOSM/KG (ref 275–295)
PARAINFLUENZA 1 PCR:: NOT DETECTED
PARAINFLUENZA 2 PCR:: NOT DETECTED
PARAINFLUENZA 3 PCR:: NOT DETECTED
PARAINFLUENZA 4 PCR:: NOT DETECTED
PLATELET # BLD AUTO: 347 10(3)UL (ref 150–450)
POTASSIUM SERPL-SCNC: 4.4 MMOL/L (ref 3.5–5.1)
PROCALCITONIN SERPL-MCNC: <0.05 NG/ML (ref ?–0.16)
PROT SERPL-MCNC: 7.1 G/DL (ref 6.4–8.2)
Q-T INTERVAL: 528 MS
QRS DURATION: 132 MS
QTC CALCULATION (BEZET): 435 MS
R AXIS: -36 DEGREES
RBC # BLD AUTO: 3.54 X10(6)UL
RHINOVIRUS/ENTERO PCR:: NOT DETECTED
RSV RNA SPEC QL NAA+PROBE: NOT DETECTED
SARS-COV-2 RNA NPH QL NAA+NON-PROBE: NOT DETECTED
SARS-COV-2 RNA RESP QL NAA+PROBE: NOT DETECTED
SODIUM SERPL-SCNC: 130 MMOL/L (ref 136–145)
T AXIS: 71 DEGREES
T4 FREE SERPL-MCNC: 1.2 NG/DL (ref 0.8–1.7)
TROPONIN I HIGH SENSITIVITY: 25 NG/L
TSI SER-ACNC: 4.4 MIU/ML (ref 0.36–3.74)
VENTRICULAR RATE: 41 BPM
WBC # BLD AUTO: 6.8 X10(3) UL (ref 4–11)

## 2023-03-06 PROCEDURE — 71045 X-RAY EXAM CHEST 1 VIEW: CPT | Performed by: EMERGENCY MEDICINE

## 2023-03-06 PROCEDURE — 99223 1ST HOSP IP/OBS HIGH 75: CPT | Performed by: STUDENT IN AN ORGANIZED HEALTH CARE EDUCATION/TRAINING PROGRAM

## 2023-03-06 PROCEDURE — 5A09357 ASSISTANCE WITH RESPIRATORY VENTILATION, LESS THAN 24 CONSECUTIVE HOURS, CONTINUOUS POSITIVE AIRWAY PRESSURE: ICD-10-PCS | Performed by: INTERNAL MEDICINE

## 2023-03-06 PROCEDURE — 80053 COMPREHEN METABOLIC PANEL: CPT | Performed by: FAMILY MEDICINE

## 2023-03-06 RX ORDER — PYRIDOXINE HCL (VITAMIN B6) 100 MG
500 TABLET ORAL DAILY
COMMUNITY

## 2023-03-06 RX ORDER — MELATONIN
3 NIGHTLY PRN
Status: DISCONTINUED | OUTPATIENT
Start: 2023-03-06 | End: 2023-03-11

## 2023-03-06 RX ORDER — IPRATROPIUM BROMIDE AND ALBUTEROL SULFATE 2.5; .5 MG/3ML; MG/3ML
3 SOLUTION RESPIRATORY (INHALATION)
Status: DISCONTINUED | OUTPATIENT
Start: 2023-03-06 | End: 2023-03-07

## 2023-03-06 RX ORDER — FUROSEMIDE 10 MG/ML
20 INJECTION INTRAMUSCULAR; INTRAVENOUS ONCE
Status: COMPLETED | OUTPATIENT
Start: 2023-03-06 | End: 2023-03-06

## 2023-03-06 RX ORDER — BENZONATATE 100 MG/1
200 CAPSULE ORAL 3 TIMES DAILY PRN
Status: DISCONTINUED | OUTPATIENT
Start: 2023-03-06 | End: 2023-03-11

## 2023-03-06 RX ORDER — IPRATROPIUM BROMIDE AND ALBUTEROL SULFATE 2.5; .5 MG/3ML; MG/3ML
3 SOLUTION RESPIRATORY (INHALATION) EVERY 6 HOURS PRN
Status: DISCONTINUED | OUTPATIENT
Start: 2023-03-06 | End: 2023-03-11

## 2023-03-06 RX ORDER — ECHINACEA PURPUREA EXTRACT 125 MG
1 TABLET ORAL
Status: DISCONTINUED | OUTPATIENT
Start: 2023-03-06 | End: 2023-03-11

## 2023-03-06 RX ORDER — BISACODYL 10 MG
10 SUPPOSITORY, RECTAL RECTAL
Status: DISCONTINUED | OUTPATIENT
Start: 2023-03-06 | End: 2023-03-11

## 2023-03-06 RX ORDER — METOPROLOL TARTRATE 100 MG/1
100 TABLET ORAL 2 TIMES DAILY
Status: DISCONTINUED | OUTPATIENT
Start: 2023-03-06 | End: 2023-03-08

## 2023-03-06 RX ORDER — LOSARTAN POTASSIUM 50 MG/1
50 TABLET ORAL 2 TIMES DAILY
Status: DISCONTINUED | OUTPATIENT
Start: 2023-03-06 | End: 2023-03-11

## 2023-03-06 RX ORDER — ENOXAPARIN SODIUM 100 MG/ML
40 INJECTION SUBCUTANEOUS DAILY
Status: DISCONTINUED | OUTPATIENT
Start: 2023-03-07 | End: 2023-03-08

## 2023-03-06 RX ORDER — PANTOPRAZOLE SODIUM 40 MG/1
40 TABLET, DELAYED RELEASE ORAL
Status: DISCONTINUED | OUTPATIENT
Start: 2023-03-06 | End: 2023-03-11

## 2023-03-06 RX ORDER — FLUTICASONE FUROATE AND VILANTEROL 200; 25 UG/1; UG/1
1 POWDER RESPIRATORY (INHALATION) DAILY
Status: DISCONTINUED | OUTPATIENT
Start: 2023-03-07 | End: 2023-03-11

## 2023-03-06 RX ORDER — ACETAMINOPHEN 500 MG
1000 TABLET ORAL EVERY 8 HOURS PRN
Status: DISCONTINUED | OUTPATIENT
Start: 2023-03-06 | End: 2023-03-07

## 2023-03-06 RX ORDER — METHYLPREDNISOLONE SODIUM SUCCINATE 125 MG/2ML
80 INJECTION, POWDER, LYOPHILIZED, FOR SOLUTION INTRAMUSCULAR; INTRAVENOUS ONCE
Status: COMPLETED | OUTPATIENT
Start: 2023-03-06 | End: 2023-03-06

## 2023-03-06 RX ORDER — POLYETHYLENE GLYCOL 3350 17 G/17G
17 POWDER, FOR SOLUTION ORAL DAILY PRN
Status: DISCONTINUED | OUTPATIENT
Start: 2023-03-06 | End: 2023-03-11

## 2023-03-06 RX ORDER — ONDANSETRON 2 MG/ML
4 INJECTION INTRAMUSCULAR; INTRAVENOUS EVERY 6 HOURS PRN
Status: DISCONTINUED | OUTPATIENT
Start: 2023-03-06 | End: 2023-03-11

## 2023-03-06 RX ORDER — HYDRALAZINE HYDROCHLORIDE 25 MG/1
25 TABLET, FILM COATED ORAL EVERY 8 HOURS SCHEDULED
Status: DISCONTINUED | OUTPATIENT
Start: 2023-03-06 | End: 2023-03-07

## 2023-03-06 RX ORDER — METHYLPREDNISOLONE SODIUM SUCCINATE 40 MG/ML
40 INJECTION, POWDER, LYOPHILIZED, FOR SOLUTION INTRAMUSCULAR; INTRAVENOUS EVERY 6 HOURS
Status: DISCONTINUED | OUTPATIENT
Start: 2023-03-06 | End: 2023-03-10

## 2023-03-06 RX ORDER — BUDESONIDE 0.5 MG/2ML
0.5 INHALANT ORAL
Status: DISCONTINUED | OUTPATIENT
Start: 2023-03-06 | End: 2023-03-11

## 2023-03-06 RX ORDER — SENNOSIDES 8.6 MG
17.2 TABLET ORAL NIGHTLY PRN
Status: DISCONTINUED | OUTPATIENT
Start: 2023-03-06 | End: 2023-03-11

## 2023-03-06 RX ORDER — METOCLOPRAMIDE HYDROCHLORIDE 5 MG/ML
5 INJECTION INTRAMUSCULAR; INTRAVENOUS EVERY 8 HOURS PRN
Status: DISCONTINUED | OUTPATIENT
Start: 2023-03-06 | End: 2023-03-11

## 2023-03-06 RX ORDER — ACETAMINOPHEN 500 MG
1000 TABLET ORAL ONCE
Status: COMPLETED | OUTPATIENT
Start: 2023-03-06 | End: 2023-03-06

## 2023-03-06 RX ORDER — LEVOTHYROXINE SODIUM 0.1 MG/1
100 TABLET ORAL DAILY
Status: DISCONTINUED | OUTPATIENT
Start: 2023-03-07 | End: 2023-03-11

## 2023-03-06 NOTE — ED INITIAL ASSESSMENT (HPI)
Per EMS family called d/t pt c/o of inability to breathe. On arrival pt's hr was 32.  Pt was given 1 mg of Atropine prior to arrival. Pt HR 44 on arrival.

## 2023-03-07 ENCOUNTER — TELEPHONE (OUTPATIENT)
Facility: CLINIC | Age: 88
End: 2023-03-07

## 2023-03-07 ENCOUNTER — APPOINTMENT (OUTPATIENT)
Dept: CV DIAGNOSTICS | Facility: HOSPITAL | Age: 88
End: 2023-03-07
Attending: NURSE PRACTITIONER
Payer: MEDICARE

## 2023-03-07 LAB
ALBUMIN SERPL-MCNC: 3 G/DL (ref 3.4–5)
ALBUMIN/GLOB SERPL: 0.9 {RATIO} (ref 1–2)
ALP LIVER SERPL-CCNC: 79 U/L
ALT SERPL-CCNC: 49 U/L
ANION GAP SERPL CALC-SCNC: 7 MMOL/L (ref 0–18)
AST SERPL-CCNC: 42 U/L (ref 15–37)
ATRIAL RATE: 80 BPM
ATRIAL RATE: 95 BPM
BASOPHILS # BLD AUTO: 0.01 X10(3) UL (ref 0–0.2)
BASOPHILS NFR BLD AUTO: 0.2 %
BILIRUB SERPL-MCNC: 0.4 MG/DL (ref 0.1–2)
BUN BLD-MCNC: 41 MG/DL (ref 7–18)
CALCIUM BLD-MCNC: 8.8 MG/DL (ref 8.5–10.1)
CHLORIDE SERPL-SCNC: 98 MMOL/L (ref 98–112)
CHOLEST SERPL-MCNC: 256 MG/DL (ref ?–200)
CO2 SERPL-SCNC: 24 MMOL/L (ref 21–32)
CREAT BLD-MCNC: 1.37 MG/DL
EOSINOPHIL # BLD AUTO: 0 X10(3) UL (ref 0–0.7)
EOSINOPHIL NFR BLD AUTO: 0 %
ERYTHROCYTE [DISTWIDTH] IN BLOOD BY AUTOMATED COUNT: 14.3 %
GFR SERPLBLD BASED ON 1.73 SQ M-ARVRAT: 37 ML/MIN/1.73M2 (ref 60–?)
GLOBULIN PLAS-MCNC: 3.3 G/DL (ref 2.8–4.4)
GLUCOSE BLD-MCNC: 170 MG/DL (ref 70–99)
GLUCOSE BLD-MCNC: 184 MG/DL (ref 70–99)
GLUCOSE BLD-MCNC: 188 MG/DL (ref 70–99)
GLUCOSE BLD-MCNC: 195 MG/DL (ref 70–99)
HCT VFR BLD AUTO: 34.1 %
HDLC SERPL-MCNC: 86 MG/DL (ref 40–59)
HGB BLD-MCNC: 11.6 G/DL
IMM GRANULOCYTES # BLD AUTO: 0.04 X10(3) UL (ref 0–1)
IMM GRANULOCYTES NFR BLD: 0.9 %
INR BLD: 1.03 (ref 0.85–1.16)
LDLC SERPL CALC-MCNC: 151 MG/DL (ref ?–100)
LYMPHOCYTES # BLD AUTO: 0.68 X10(3) UL (ref 1–4)
LYMPHOCYTES NFR BLD AUTO: 14.5 %
MAGNESIUM SERPL-MCNC: 2.3 MG/DL (ref 1.6–2.6)
MCH RBC QN AUTO: 33.6 PG (ref 26–34)
MCHC RBC AUTO-ENTMCNC: 34 G/DL (ref 31–37)
MCV RBC AUTO: 98.8 FL
MONOCYTES # BLD AUTO: 0.08 X10(3) UL (ref 0.1–1)
MONOCYTES NFR BLD AUTO: 1.7 %
NEUTROPHILS # BLD AUTO: 3.89 X10 (3) UL (ref 1.5–7.7)
NEUTROPHILS # BLD AUTO: 3.89 X10(3) UL (ref 1.5–7.7)
NEUTROPHILS NFR BLD AUTO: 82.7 %
NONHDLC SERPL-MCNC: 170 MG/DL (ref ?–130)
OSMOLALITY SERPL CALC.SUM OF ELEC: 283 MOSM/KG (ref 275–295)
P AXIS: 48 DEGREES
P AXIS: 7 DEGREES
P-R INTERVAL: 148 MS
P-R INTERVAL: 148 MS
PHOSPHATE SERPL-MCNC: 4.2 MG/DL (ref 2.5–4.9)
PLATELET # BLD AUTO: 255 10(3)UL (ref 150–450)
POTASSIUM SERPL-SCNC: 4.5 MMOL/L (ref 3.5–5.1)
PROT SERPL-MCNC: 6.3 G/DL (ref 6.4–8.2)
PROTHROMBIN TIME: 13.6 SECONDS (ref 11.6–14.8)
Q-T INTERVAL: 408 MS
Q-T INTERVAL: 450 MS
QRS DURATION: 148 MS
QRS DURATION: 148 MS
QTC CALCULATION (BEZET): 512 MS
QTC CALCULATION (BEZET): 519 MS
R AXIS: -37 DEGREES
R AXIS: -40 DEGREES
RBC # BLD AUTO: 3.45 X10(6)UL
SODIUM SERPL-SCNC: 129 MMOL/L (ref 136–145)
T AXIS: 115 DEGREES
T AXIS: 130 DEGREES
TRIGL SERPL-MCNC: 112 MG/DL (ref 30–149)
TROPONIN I HIGH SENSITIVITY: 208 NG/L
VENTRICULAR RATE: 80 BPM
VENTRICULAR RATE: 95 BPM
VLDLC SERPL CALC-MCNC: 21 MG/DL (ref 0–30)
WBC # BLD AUTO: 4.7 X10(3) UL (ref 4–11)

## 2023-03-07 PROCEDURE — 93306 TTE W/DOPPLER COMPLETE: CPT | Performed by: NURSE PRACTITIONER

## 2023-03-07 PROCEDURE — 99232 SBSQ HOSP IP/OBS MODERATE 35: CPT | Performed by: HOSPITALIST

## 2023-03-07 RX ORDER — HYDRALAZINE HYDROCHLORIDE 50 MG/1
50 TABLET, FILM COATED ORAL EVERY 8 HOURS SCHEDULED
Status: DISCONTINUED | OUTPATIENT
Start: 2023-03-07 | End: 2023-03-11

## 2023-03-07 RX ORDER — NICOTINE POLACRILEX 4 MG
30 LOZENGE BUCCAL
Status: DISCONTINUED | OUTPATIENT
Start: 2023-03-07 | End: 2023-03-11

## 2023-03-07 RX ORDER — NICOTINE POLACRILEX 4 MG
15 LOZENGE BUCCAL
Status: DISCONTINUED | OUTPATIENT
Start: 2023-03-07 | End: 2023-03-11

## 2023-03-07 RX ORDER — IPRATROPIUM BROMIDE AND ALBUTEROL SULFATE 2.5; .5 MG/3ML; MG/3ML
3 SOLUTION RESPIRATORY (INHALATION)
Status: DISCONTINUED | OUTPATIENT
Start: 2023-03-08 | End: 2023-03-08

## 2023-03-07 RX ORDER — ACETAMINOPHEN 325 MG/1
650 TABLET ORAL EVERY 6 HOURS PRN
Status: DISCONTINUED | OUTPATIENT
Start: 2023-03-07 | End: 2023-03-11

## 2023-03-07 RX ORDER — DEXTROSE MONOHYDRATE 25 G/50ML
50 INJECTION, SOLUTION INTRAVENOUS
Status: DISCONTINUED | OUTPATIENT
Start: 2023-03-07 | End: 2023-03-11

## 2023-03-07 RX ORDER — HYDRALAZINE HYDROCHLORIDE 50 MG/1
50 TABLET, FILM COATED ORAL EVERY 8 HOURS SCHEDULED
Status: DISCONTINUED | OUTPATIENT
Start: 2023-03-07 | End: 2023-03-07

## 2023-03-07 NOTE — PLAN OF CARE
Pt received as an admission from the ED. Alert, oriented x4. NSR w/ BBB on tele. 2L O2. Dyspnea with exertion. Has a non-productive cough. Pt receiving scheduled nebs and IV Solu-medrol. Edema to extremities. Tylenol given for neck pain. Pt and daughter updated on plan of care and verbalized understanding. Problem: CARDIOVASCULAR - ADULT  Goal: Maintains optimal cardiac output and hemodynamic stability  Description: INTERVENTIONS:  - Monitor vital signs, rhythm, and trends  - Monitor for bleeding, hypotension and signs of decreased cardiac output  - Evaluate effectiveness of vasoactive medications to optimize hemodynamic stability  - Monitor arterial and/or venous puncture sites for bleeding and/or hematoma  - Assess quality of pulses, skin color and temperature  - Assess for signs of decreased coronary artery perfusion - ex.  Angina  - Evaluate fluid balance, assess for edema, trend weights  Outcome: Progressing     Problem: RESPIRATORY - ADULT  Goal: Achieves optimal ventilation and oxygenation  Description: INTERVENTIONS:  - Assess for changes in respiratory status  - Assess for changes in mentation and behavior  - Position to facilitate oxygenation and minimize respiratory effort  - Oxygen supplementation based on oxygen saturation or ABGs  - Provide Smoking Cessation handout, if applicable  - Encourage broncho-pulmonary hygiene including cough, deep breathe, Incentive Spirometry  - Assess the need for suctioning and perform as needed  - Assess and instruct to report SOB or any respiratory difficulty  - Respiratory Therapy support as indicated  - Manage/alleviate anxiety  - Monitor for signs/symptoms of CO2 retention  Outcome: Progressing

## 2023-03-07 NOTE — ED QUICK NOTES
Orders for admission, patient is aware of plan and ready to go upstairs. Any questions, please call ED RN Sarah White at extension [phone].      Patient Covid vaccination status: Fully vaccinated     COVID Test Ordered in ED: Rapid SARS-CoV-2 by PCR    COVID Suspicion at Admission: Low clinical suspicion for COVID    Running Infusions:      Mental Status/LOC at time of transport: A&O x4    Other pertinent information:   CIWA score: N/A   NIH score:  N/A
Phlebotomy paged in order to draw blood cultures.
Inguinal hernia of left side without obstruction or gangrene  02/01/2019  incarcerated symptomatic  Active  Abrahan Prado

## 2023-03-07 NOTE — TELEPHONE ENCOUNTER
Return call placed to daughter. Pt was admitted yesterday to [facility] for asthma exacerbation. Also notified daughter that order for nebulizer was sent to [address] [place] [address] on 3-.

## 2023-03-07 NOTE — PROGRESS NOTES
[facility] Pharmacy Note:  Renal Dose Adjustment for Metoclopramide (REGLAN)    Jani Angelo has been prescribed Metoclopramide (REGLAN) 10 mg every 8 hours as needed for nausea/vomiting,. Estimated Creatinine Clearance: 35.1 mL/min (A) (based on SCr of 1.41 mg/dL (H)). Calculated creatinine clearance is < 40 ml/min, therefore, the dose of Metoclopramide (REGLAN) has been changed to 5 mg every 8 hours as needed for nausea/vomiting per P&T approved protocol. Pharmacy will continue to follow, and if renal function improves, will resume the original order.        Thank you,  Campos Castellanos, PharmD  3/6/2023 8:48 PM

## 2023-03-07 NOTE — PLAN OF CARE
Pt is A/OX4. Received pt on 2L, weaned pt to RA. Nebs scheduled Q4H. Pt is NSR on tele. Pt c/o of left arm pain and right sided chest pain/tightness. EKG completed. Jayna FALCON notified and at bedside. Pt currently reporting no chest pain or tightness. Arthritis pain unrelieved with tylenol,  notified. Dosing adjusted. PT/OT eval this am, recommending home with home health.

## 2023-03-07 NOTE — TELEPHONE ENCOUNTER
Daughter is calling for pt (she is [facility] adm [facility]) She has questions about pts nebulizer. She would like to know how long it is good for and also when did dr order it?

## 2023-03-08 ENCOUNTER — APPOINTMENT (OUTPATIENT)
Dept: GENERAL RADIOLOGY | Facility: HOSPITAL | Age: 88
End: 2023-03-08
Attending: INTERNAL MEDICINE
Payer: MEDICARE

## 2023-03-08 LAB
ANION GAP SERPL CALC-SCNC: 7 MMOL/L (ref 0–18)
ARTERIAL PATENCY WRIST A: POSITIVE
BASE EXCESS BLDA CALC-SCNC: -0.7 MMOL/L (ref ?–2)
BODY TEMPERATURE: 98.6 F
BUN BLD-MCNC: 35 MG/DL (ref 7–18)
CA-I BLD-SCNC: 1.12 MMOL/L (ref 0.95–1.32)
CALCIUM BLD-MCNC: 8.6 MG/DL (ref 8.5–10.1)
CHLORIDE SERPL-SCNC: 94 MMOL/L (ref 98–112)
CO2 SERPL-SCNC: 23 MMOL/L (ref 21–32)
COHGB MFR BLD: 1 % SAT (ref 0–3)
CREAT BLD-MCNC: 1.12 MG/DL
GFR SERPLBLD BASED ON 1.73 SQ M-ARVRAT: 47 ML/MIN/1.73M2 (ref 60–?)
GLUCOSE BLD-MCNC: 143 MG/DL (ref 70–99)
GLUCOSE BLD-MCNC: 156 MG/DL (ref 70–99)
GLUCOSE BLD-MCNC: 162 MG/DL (ref 70–99)
GLUCOSE BLD-MCNC: 166 MG/DL (ref 70–99)
GLUCOSE BLD-MCNC: 166 MG/DL (ref 70–99)
HCO3 BLDA-SCNC: 24.4 MEQ/L (ref 21–27)
HGB BLD-MCNC: 12.9 G/DL
L/M: 4 L/MIN
LACTATE BLD-SCNC: 2.8 MMOL/L (ref 0.5–2)
METHGB MFR BLD: 0.4 % SAT (ref 0.4–1.5)
OSMOLALITY SERPL CALC.SUM OF ELEC: 269 MOSM/KG (ref 275–295)
OXYHGB MFR BLDA: 96.5 % (ref 92–100)
PCO2 BLDA: 40 MM HG (ref 35–45)
PH BLDA: 7.39 [PH] (ref 7.35–7.45)
PO2 BLDA: 95 MM HG (ref 80–100)
POTASSIUM BLD-SCNC: 4.9 MMOL/L (ref 3.6–5.1)
POTASSIUM SERPL-SCNC: 5 MMOL/L (ref 3.5–5.1)
SODIUM BLD-SCNC: 123 MMOL/L (ref 135–145)
SODIUM SERPL-SCNC: 124 MMOL/L (ref 136–145)
TROPONIN I HIGH SENSITIVITY: 2785 NG/L

## 2023-03-08 PROCEDURE — 71045 X-RAY EXAM CHEST 1 VIEW: CPT | Performed by: INTERNAL MEDICINE

## 2023-03-08 PROCEDURE — 99233 SBSQ HOSP IP/OBS HIGH 50: CPT | Performed by: HOSPITALIST

## 2023-03-08 RX ORDER — METOPROLOL TARTRATE 50 MG/1
TABLET, FILM COATED ORAL
Status: COMPLETED
Start: 2023-03-08 | End: 2023-03-08

## 2023-03-08 RX ORDER — FUROSEMIDE 40 MG/1
40 TABLET ORAL DAILY
Status: DISCONTINUED | OUTPATIENT
Start: 2023-03-09 | End: 2023-03-11

## 2023-03-08 RX ORDER — FUROSEMIDE 10 MG/ML
20 INJECTION INTRAMUSCULAR; INTRAVENOUS ONCE
Status: COMPLETED | OUTPATIENT
Start: 2023-03-08 | End: 2023-03-08

## 2023-03-08 RX ORDER — ENOXAPARIN SODIUM 100 MG/ML
0.5 INJECTION SUBCUTANEOUS DAILY
Status: DISCONTINUED | OUTPATIENT
Start: 2023-03-08 | End: 2023-03-11

## 2023-03-08 RX ORDER — FUROSEMIDE 10 MG/ML
20 INJECTION INTRAMUSCULAR; INTRAVENOUS ONCE
Status: DISCONTINUED | OUTPATIENT
Start: 2023-03-08 | End: 2023-03-08

## 2023-03-08 RX ORDER — METOPROLOL TARTRATE 50 MG/1
50 TABLET, FILM COATED ORAL
Status: DISCONTINUED | OUTPATIENT
Start: 2023-03-08 | End: 2023-03-11

## 2023-03-08 RX ORDER — IPRATROPIUM BROMIDE AND ALBUTEROL SULFATE 2.5; .5 MG/3ML; MG/3ML
3 SOLUTION RESPIRATORY (INHALATION)
Status: DISCONTINUED | OUTPATIENT
Start: 2023-03-08 | End: 2023-03-11

## 2023-03-08 RX ORDER — FUROSEMIDE 10 MG/ML
40 INJECTION INTRAMUSCULAR; INTRAVENOUS DAILY
Status: DISCONTINUED | OUTPATIENT
Start: 2023-03-08 | End: 2023-03-08

## 2023-03-08 RX ORDER — METOPROLOL TARTRATE 50 MG/1
50 TABLET, FILM COATED ORAL
Status: DISCONTINUED | OUTPATIENT
Start: 2023-03-08 | End: 2023-03-08

## 2023-03-08 NOTE — PLAN OF CARE
Assumed care at 0700. Pt is A/OX4. Pt had increased WOB this am, accessory muscle use visible, expiratory wheezing present. Pt placed on bipap per Dr. Rubio Woody. Nebulizer frequency increased. IV steroids given as scheduled. Weaned pt off bipap this evening. NC in place on 3L O2/min. Pt has nonpitting edema in BUE, and +3 in BLE. Pt currently receiving lasix 40 mg daily. Urine output adequate. Troponin 2785 this am, cardiology aware. No complaints of chest pain. Code status updated to DNAR/Select, signed POLST in pts chart. PT/OT eval tomorrow. Family at bedside and updated on care plan. Bed locked and in low position, alarms set, call light in reach. Comfort needs met.

## 2023-03-08 NOTE — PLAN OF CARE
Received pt at 2000  Pt AOx4, NSR, 1-2L NC, VSS  Hot packs & PRN tylenol for arthritic pain  IV solumedrol   PRN Neb did not help ease breathing. Per RT & RN assessment pt still sounded labored. Hosp notified. x1 dose IV lasix  ~0400 increased wob. Pulm& hosp notified. See orders  D/c Planning: Home w/ HH once medically cleared  Call light within reach. Needs currently met      Problem: CARDIOVASCULAR - ADULT  Goal: Maintains optimal cardiac output and hemodynamic stability  Description: INTERVENTIONS:  - Monitor vital signs, rhythm, and trends  - Monitor for bleeding, hypotension and signs of decreased cardiac output  - Evaluate effectiveness of vasoactive medications to optimize hemodynamic stability  - Monitor arterial and/or venous puncture sites for bleeding and/or hematoma  - Assess quality of pulses, skin color and temperature  - Assess for signs of decreased coronary artery perfusion - ex.  Angina  - Evaluate fluid balance, assess for edema, trend weights  Outcome: Progressing     Problem: RESPIRATORY - ADULT  Goal: Achieves optimal ventilation and oxygenation  Description: INTERVENTIONS:  - Assess for changes in respiratory status  - Assess for changes in mentation and behavior  - Position to facilitate oxygenation and minimize respiratory effort  - Oxygen supplementation based on oxygen saturation or ABGs  - Provide Smoking Cessation handout, if applicable  - Encourage broncho-pulmonary hygiene including cough, deep breathe, Incentive Spirometry  - Assess the need for suctioning and perform as needed  - Assess and instruct to report SOB or any respiratory difficulty  - Respiratory Therapy support as indicated  - Manage/alleviate anxiety  - Monitor for signs/symptoms of CO2 retention  Outcome: Progressing

## 2023-03-08 NOTE — PHYSICAL THERAPY NOTE
Order received for PT eval and chart reviewed. Pt with decline in respiratory status requiring bipap this am and pm. PT will continue to follow.

## 2023-03-08 NOTE — CM/SW NOTE
03/08/23 1000   CM/SW Referral Data   Referral Source Physician   Reason for Referral Discharge planning   Informant Daughter   Patient [address]   Number of Levels in Home 2   Patient lives with Daughter   Patient Status Prior to Admission   Independent with ADLs and Mobility No   Pt. requires assistance with Housework;Driving;Meals; Bathing;Dressing; Toileting   Services in place prior to admission Home Health Care;DME/Supplies at home;[facility] [address]   DME Provider/Supplier [facility]   Type of DME/Supplies Delora Punter; Shower Chair;Grab Bars;Home Oxygen   [facility] Care hours per day 6   [facility] Care days per week 5   Discharge Needs   Anticipated D/C needs Home health care   Choice of Post-Acute Provider   Informed patient of right to choose their preferred provider Yes   List of appropriate post-acute services provided to patient/family with quality data No - Declined list   Patient/family choice [facility]     Order received for Discharge Planning. Pt is a 80year old female admitted with shortness of breath, acute hypoxemic respiratory failure due to asthma exacerbation and pulmonary edema. Currently on Bipap, IV steroids, nebs    Met with daughter, Pily Riojas, for above eval as pt working with nursing. Pt lives with daughter and has caregiver support part time and is current with [facility]. She ambulates with a walker at home. At baseline, she can manage toileting and dressing herself, but due to recent shoulder fracture she requires more assistance. [address] entered  Uses O2 2L ([facility]) prn at home  Daughter declines any other discharge needs    Plan: Home with [facility] when medically cleared    / to remain available for support and/or discharge planning.      Ivy HANSENA MSN, RN CTL/  [id number]

## 2023-03-08 NOTE — PROGRESS NOTES
Called to re-evaluate patient by RN and meet with family. Patient is now on BIPAP. Still with mild increased work of breathing, but looks much better than earlier this morning. Elevated troponin noted. Cardiology is following. Discussed code status with patient and daughter, Rafla Villagomez, at bedside. Patient has opted for DNAR/select. Code status updated. Discussed with Km Langston RN.  Additional critical care time: 35 minutes

## 2023-03-09 LAB
ANION GAP SERPL CALC-SCNC: 6 MMOL/L (ref 0–18)
BUN BLD-MCNC: 33 MG/DL (ref 7–18)
CALCIUM BLD-MCNC: 8.7 MG/DL (ref 8.5–10.1)
CHLORIDE SERPL-SCNC: 94 MMOL/L (ref 98–112)
CO2 SERPL-SCNC: 26 MMOL/L (ref 21–32)
CREAT BLD-MCNC: 1 MG/DL
GFR SERPLBLD BASED ON 1.73 SQ M-ARVRAT: 54 ML/MIN/1.73M2 (ref 60–?)
GLUCOSE BLD-MCNC: 171 MG/DL (ref 70–99)
GLUCOSE BLD-MCNC: 187 MG/DL (ref 70–99)
GLUCOSE BLD-MCNC: 193 MG/DL (ref 70–99)
GLUCOSE BLD-MCNC: 198 MG/DL (ref 70–99)
GLUCOSE BLD-MCNC: 206 MG/DL (ref 70–99)
OSMOLALITY SERPL CALC.SUM OF ELEC: 275 MOSM/KG (ref 275–295)
POTASSIUM SERPL-SCNC: 4.2 MMOL/L (ref 3.5–5.1)
SODIUM SERPL-SCNC: 126 MMOL/L (ref 136–145)

## 2023-03-09 PROCEDURE — 99233 SBSQ HOSP IP/OBS HIGH 50: CPT | Performed by: HOSPITALIST

## 2023-03-09 NOTE — PLAN OF CARE
Received pt at 2000  Pt AOx4, NSR, 3L NC/ BiPAP, VSS  Hot packs & PRN tylenol for arthritic pain  IV solumedrol   D/c Planning: PT/OT to re eval. Current w/ HH  Call light within reach. Needs currently met      Problem: CARDIOVASCULAR - ADULT  Goal: Maintains optimal cardiac output and hemodynamic stability  Description: INTERVENTIONS:  - Monitor vital signs, rhythm, and trends  - Monitor for bleeding, hypotension and signs of decreased cardiac output  - Evaluate effectiveness of vasoactive medications to optimize hemodynamic stability  - Monitor arterial and/or venous puncture sites for bleeding and/or hematoma  - Assess quality of pulses, skin color and temperature  - Assess for signs of decreased coronary artery perfusion - ex.  Angina  - Evaluate fluid balance, assess for edema, trend weights  Outcome: Progressing     Problem: RESPIRATORY - ADULT  Goal: Achieves optimal ventilation and oxygenation  Description: INTERVENTIONS:  - Assess for changes in respiratory status  - Assess for changes in mentation and behavior  - Position to facilitate oxygenation and minimize respiratory effort  - Oxygen supplementation based on oxygen saturation or ABGs  - Provide Smoking Cessation handout, if applicable  - Encourage broncho-pulmonary hygiene including cough, deep breathe, Incentive Spirometry  - Assess the need for suctioning and perform as needed  - Assess and instruct to report SOB or any respiratory difficulty  - Respiratory Therapy support as indicated  - Manage/alleviate anxiety  - Monitor for signs/symptoms of CO2 retention  Outcome: Progressing

## 2023-03-09 NOTE — PHYSICAL THERAPY NOTE
Order received for PT eval and chart reviewed. Attempted to see Pt this am, however, Pt reports fatigue after returning from the bathroom and requests to rest. PT will continue to follow.

## 2023-03-09 NOTE — PLAN OF CARE
Assumed care at 0700. Pt A/O x4. 2-3L O2 per NC during the day. Bipap at night. Pt complaining of chest pain this morning. EKG completed and cardiology notified, no new orders. Per patient chest pain resolved. PT/OT reeval completed, recommending home with [facility] PT.   Pt family at the bedside. Updated on POC. Call light within reach. Will continue to monitor.

## 2023-03-10 LAB
ATRIAL RATE: 90 BPM
GLUCOSE BLD-MCNC: 171 MG/DL (ref 70–99)
GLUCOSE BLD-MCNC: 172 MG/DL (ref 70–99)
GLUCOSE BLD-MCNC: 186 MG/DL (ref 70–99)
GLUCOSE BLD-MCNC: 192 MG/DL (ref 70–99)
P AXIS: 42 DEGREES
P-R INTERVAL: 148 MS
Q-T INTERVAL: 408 MS
QRS DURATION: 150 MS
QTC CALCULATION (BEZET): 499 MS
R AXIS: -24 DEGREES
T AXIS: 97 DEGREES
VENTRICULAR RATE: 90 BPM

## 2023-03-10 PROCEDURE — 99233 SBSQ HOSP IP/OBS HIGH 50: CPT | Performed by: HOSPITALIST

## 2023-03-10 RX ORDER — METHYLPREDNISOLONE SODIUM SUCCINATE 40 MG/ML
40 INJECTION, POWDER, LYOPHILIZED, FOR SOLUTION INTRAMUSCULAR; INTRAVENOUS EVERY 6 HOURS
Status: COMPLETED | OUTPATIENT
Start: 2023-03-10 | End: 2023-03-10

## 2023-03-10 RX ORDER — TORSEMIDE 20 MG/1
40 TABLET ORAL DAILY
Qty: 180 TABLET | Refills: 2 | Status: SHIPPED | COMMUNITY
Start: 2023-03-10

## 2023-03-10 RX ORDER — PREDNISONE 20 MG/1
40 TABLET ORAL
Status: DISCONTINUED | OUTPATIENT
Start: 2023-03-11 | End: 2023-03-11

## 2023-03-10 NOTE — PROGRESS NOTES
Assumed care at [address]. Patient A&Ox4. Tele SR, on 3L NC, BiPAP HS. Lungs diminished, exp wheezing. Scheduled nebs. IV steroids as ordered. Patient cont to refuse insulin. C/o right shoulder pain, PRN tylenol/heat pad. PT rec home at discharge. Plan to transition to PO steroids tomorrow.

## 2023-03-11 VITALS
OXYGEN SATURATION: 98 % | RESPIRATION RATE: 18 BRPM | DIASTOLIC BLOOD PRESSURE: 66 MMHG | TEMPERATURE: 98 F | WEIGHT: 193.13 LBS | SYSTOLIC BLOOD PRESSURE: 160 MMHG | HEART RATE: 95 BPM | BODY MASS INDEX: 43 KG/M2

## 2023-03-11 LAB
GLUCOSE BLD-MCNC: 125 MG/DL (ref 70–99)
PLATELET # BLD AUTO: 287 10(3)UL (ref 150–450)

## 2023-03-11 PROCEDURE — 99239 HOSP IP/OBS DSCHRG MGMT >30: CPT | Performed by: HOSPITALIST

## 2023-03-11 RX ORDER — PREDNISONE 20 MG/1
40 TABLET ORAL
Qty: 10 TABLET | Refills: 0 | Status: SHIPPED | OUTPATIENT
Start: 2023-03-12

## 2023-03-11 NOTE — PLAN OF CARE
Pt discharged to home with [facility] in stable condition. Discharge paperwork, prescriptions and f/u appt provided to the pt and daughter; both verbalized understanding.  Pt will  be transported to home via ambulance with [facility] cost.

## 2023-03-11 NOTE — CM/SW NOTE
03/11/23 1000   Discharge disposition   Expected discharge disposition Home-Health   Post Acute Care Provider Residential   Discharge transportation [address]     Notified by RN, Ashkan, that patient discharging today and requesting [address]    Notified [facility] liaison, Aldair Root, of discharge today    [address] ETA 20 min (ambulance available at [address] rates)  PCS filled out in [employer] to remain available for support and/or discharge planning.      Vane HANSENA MSN, RN CTL/  [id number]

## 2023-03-11 NOTE — PLAN OF CARE
Assumed care of pt around 1900. A/o x4. 1-2L NC. NSR on tele. R shoulder pain 5/10, PRN Tylenol given w/ some relief. Lymphedema to BLE. Scheduled neb treatments. Voiding per jamshid. Currently resting in bed w/ call light within reach.

## 2023-03-11 NOTE — PROGRESS NOTES
03/11/23 0956   Mobility   O2 walk?  Yes   SPO2% on Room Air at Rest 98   SPO2% on Oxygen at Rest 99   At rest oxygen flow (liters per minute) 1.5   SPO2% Ambulation on Room Air 90   SPO2% Ambulation on Oxygen 96   Ambulation oxygen flow (liters per minute) 2

## 2023-03-13 ENCOUNTER — TELEPHONE (OUTPATIENT)
Facility: CLINIC | Age: 88
End: 2023-03-13

## 2023-03-13 ENCOUNTER — PATIENT OUTREACH (OUTPATIENT)
Dept: CASE MANAGEMENT | Age: 88
End: 2023-03-13

## 2023-03-13 RX ORDER — PREDNISONE 10 MG/1
TABLET ORAL
Qty: 30 TABLET | Refills: 0 | Status: SHIPPED | OUTPATIENT
Start: 2023-03-13

## 2023-03-13 NOTE — TELEPHONE ENCOUNTER
Please call dtr Ivy Espinoza) on work number [phone]. When pt was dc from hospital, she was given rx for presnisone 40 mg/day for 5 day. Dtr is wondering if she needs to taper the prednisone after 5 days.  Pt is normally on presnisone for 5mg/day

## 2023-03-13 NOTE — PROGRESS NOTES
Attempted to contact pt for TCM however no answer. Call continued to ring and did not go to a . [facility] to try again at a later time.

## 2023-03-13 NOTE — TELEPHONE ENCOUNTER
Pt discharged from hospital on Saturday. Started 40mg Prednisone on Tamir 3-12-23. Has enough till Thursday 3-16-23. Has hospital follow up on 3-23-23. Daughter is wondering if she needs an extension of Prednisone for taper. Uses [facility] in [place]. Please advise.

## 2023-03-13 NOTE — PROGRESS NOTES
Called/reviewed with daughter. Patient was hospitalized with asthma exacerbation and CHF exacerbation. Now improved, discharged on Saturday and only given 5 days worth of steroids.  Will extend taper and she is due to see me next week    Vandana Browning MD

## 2023-03-23 ENCOUNTER — OFFICE VISIT (OUTPATIENT)
Facility: CLINIC | Age: 88
End: 2023-03-23
Payer: MEDICARE

## 2023-03-23 VITALS
SYSTOLIC BLOOD PRESSURE: 118 MMHG | WEIGHT: 185 LBS | OXYGEN SATURATION: 95 % | RESPIRATION RATE: 18 BRPM | HEIGHT: 56 IN | HEART RATE: 84 BPM | DIASTOLIC BLOOD PRESSURE: 60 MMHG | BODY MASS INDEX: 41.61 KG/M2

## 2023-03-23 DIAGNOSIS — J96.11 CHRONIC RESPIRATORY FAILURE WITH HYPOXIA (HCC): ICD-10-CM

## 2023-03-23 DIAGNOSIS — I50.9 CONGESTIVE HEART FAILURE, UNSPECIFIED HF CHRONICITY, UNSPECIFIED HEART FAILURE TYPE (HCC): ICD-10-CM

## 2023-03-23 DIAGNOSIS — J45.50 SEVERE PERSISTENT ASTHMA WITHOUT COMPLICATION: Primary | ICD-10-CM

## 2023-03-23 PROCEDURE — 99214 OFFICE O/P EST MOD 30 MIN: CPT | Performed by: INTERNAL MEDICINE

## 2023-03-23 RX ORDER — FLUCONAZOLE 100 MG/1
100 TABLET ORAL DAILY
Qty: 14 TABLET | Refills: 0 | Status: SHIPPED | OUTPATIENT
Start: 2023-03-23 | End: 2023-04-06

## 2023-03-23 RX ORDER — ARFORMOTEROL TARTRATE 15 UG/2ML
15 SOLUTION RESPIRATORY (INHALATION) 2 TIMES DAILY
Qty: 60 ML | Refills: 11 | Status: SHIPPED | OUTPATIENT
Start: 2023-03-23

## 2023-03-23 NOTE — PATIENT INSTRUCTIONS
Continue to wean the steroids as scheduled- then return to prednisone 5mg daily  I am changing your nebulizers - budesonide twice a day and the new medicine would be arformoterol twice a day. You can use the duoneb nebulizer every 6 hours as needed for your breathing  Watch your weight and fluid balance daily. Keep using the biotene mouth wash to help your mouth.   If it does not get better in the next few days, then start fluconazole 1 tablet daily for 14 days  Follow up in 2-4 weeks with me or [facility] (nurse practitioner)

## 2023-04-21 ENCOUNTER — LAB ENCOUNTER (OUTPATIENT)
Dept: LAB | Age: 88
End: 2023-04-21
Attending: FAMILY MEDICINE
Payer: MEDICARE

## 2023-04-21 ENCOUNTER — OFFICE VISIT (OUTPATIENT)
Dept: FAMILY MEDICINE CLINIC | Facility: CLINIC | Age: 88
End: 2023-04-21
Payer: MEDICARE

## 2023-04-21 VITALS
WEIGHT: 185 LBS | BODY MASS INDEX: 41.61 KG/M2 | DIASTOLIC BLOOD PRESSURE: 62 MMHG | RESPIRATION RATE: 16 BRPM | HEIGHT: 56 IN | HEART RATE: 63 BPM | SYSTOLIC BLOOD PRESSURE: 110 MMHG | OXYGEN SATURATION: 96 %

## 2023-04-21 DIAGNOSIS — D64.9 ANEMIA, UNSPECIFIED TYPE: ICD-10-CM

## 2023-04-21 DIAGNOSIS — I10 ESSENTIAL HYPERTENSION: ICD-10-CM

## 2023-04-21 DIAGNOSIS — E03.9 HYPOTHYROIDISM, UNSPECIFIED TYPE: ICD-10-CM

## 2023-04-21 DIAGNOSIS — N18.32 STAGE 3B CHRONIC KIDNEY DISEASE (HCC): ICD-10-CM

## 2023-04-21 DIAGNOSIS — E03.9 HYPOTHYROIDISM, UNSPECIFIED TYPE: Primary | ICD-10-CM

## 2023-04-21 DIAGNOSIS — E87.1 HYPONATREMIA: ICD-10-CM

## 2023-04-21 DIAGNOSIS — N17.9 AKI (ACUTE KIDNEY INJURY) (HCC): ICD-10-CM

## 2023-04-21 DIAGNOSIS — E11.8 CONTROLLED TYPE 2 DIABETES MELLITUS WITH COMPLICATION, WITHOUT LONG-TERM CURRENT USE OF INSULIN (HCC): ICD-10-CM

## 2023-04-21 PROBLEM — N18.30 CHRONIC RENAL DISEASE, STAGE III (HCC): Status: ACTIVE | Noted: 2023-04-21

## 2023-04-21 LAB
ALBUMIN SERPL-MCNC: 3.6 G/DL (ref 3.4–5)
ALBUMIN/GLOB SERPL: 1.1 {RATIO} (ref 1–2)
ALP LIVER SERPL-CCNC: 60 U/L
ALT SERPL-CCNC: 24 U/L
ANION GAP SERPL CALC-SCNC: 7 MMOL/L (ref 0–18)
AST SERPL-CCNC: 22 U/L (ref 15–37)
BASOPHILS # BLD AUTO: 0.05 X10(3) UL (ref 0–0.2)
BASOPHILS NFR BLD AUTO: 0.5 %
BILIRUB SERPL-MCNC: 0.5 MG/DL (ref 0.1–2)
BUN BLD-MCNC: 32 MG/DL (ref 7–18)
CALCIUM BLD-MCNC: 9.4 MG/DL (ref 8.5–10.1)
CHLORIDE SERPL-SCNC: 97 MMOL/L (ref 98–112)
CO2 SERPL-SCNC: 26 MMOL/L (ref 21–32)
CREAT BLD-MCNC: 1.46 MG/DL
CREAT UR-SCNC: 55.1 MG/DL
EOSINOPHIL # BLD AUTO: 0.02 X10(3) UL (ref 0–0.7)
EOSINOPHIL NFR BLD AUTO: 0.2 %
ERYTHROCYTE [DISTWIDTH] IN BLOOD BY AUTOMATED COUNT: 14.3 %
EST. AVERAGE GLUCOSE BLD GHB EST-MCNC: 123 MG/DL (ref 68–126)
FASTING STATUS PATIENT QL REPORTED: NO
GFR SERPLBLD BASED ON 1.73 SQ M-ARVRAT: 34 ML/MIN/1.73M2 (ref 60–?)
GLOBULIN PLAS-MCNC: 3.4 G/DL (ref 2.8–4.4)
GLUCOSE BLD-MCNC: 119 MG/DL (ref 70–99)
HBA1C MFR BLD: 5.9 % (ref ?–5.7)
HCT VFR BLD AUTO: 41.9 %
HGB BLD-MCNC: 13.5 G/DL
IMM GRANULOCYTES # BLD AUTO: 0.08 X10(3) UL (ref 0–1)
IMM GRANULOCYTES NFR BLD: 0.8 %
LYMPHOCYTES # BLD AUTO: 1.61 X10(3) UL (ref 1–4)
LYMPHOCYTES NFR BLD AUTO: 16.4 %
MCH RBC QN AUTO: 34.2 PG (ref 26–34)
MCHC RBC AUTO-ENTMCNC: 32.2 G/DL (ref 31–37)
MCV RBC AUTO: 106.1 FL
MONOCYTES # BLD AUTO: 0.5 X10(3) UL (ref 0.1–1)
MONOCYTES NFR BLD AUTO: 5.1 %
NEUTROPHILS # BLD AUTO: 7.53 X10 (3) UL (ref 1.5–7.7)
NEUTROPHILS # BLD AUTO: 7.53 X10(3) UL (ref 1.5–7.7)
NEUTROPHILS NFR BLD AUTO: 77 %
OSMOLALITY SERPL CALC.SUM OF ELEC: 278 MOSM/KG (ref 275–295)
OSMOLALITY UR: 292 MOSM/KG (ref 300–1300)
PLATELET # BLD AUTO: 307 10(3)UL (ref 150–450)
POTASSIUM SERPL-SCNC: 4.8 MMOL/L (ref 3.5–5.1)
PROT SERPL-MCNC: 7 G/DL (ref 6.4–8.2)
RBC # BLD AUTO: 3.95 X10(6)UL
SODIUM SERPL-SCNC: 130 MMOL/L (ref 136–145)
SODIUM SERPL-SCNC: 36 MMOL/L
T4 FREE SERPL-MCNC: 1.1 NG/DL (ref 0.8–1.7)
TSI SER-ACNC: 7.49 MIU/ML (ref 0.36–3.74)
WBC # BLD AUTO: 9.8 X10(3) UL (ref 4–11)

## 2023-04-21 PROCEDURE — 36415 COLL VENOUS BLD VENIPUNCTURE: CPT

## 2023-04-21 PROCEDURE — 85025 COMPLETE CBC W/AUTO DIFF WBC: CPT

## 2023-04-21 PROCEDURE — 83036 HEMOGLOBIN GLYCOSYLATED A1C: CPT

## 2023-04-21 PROCEDURE — 84439 ASSAY OF FREE THYROXINE: CPT

## 2023-04-21 PROCEDURE — 82570 ASSAY OF URINE CREATININE: CPT

## 2023-04-21 PROCEDURE — 1111F DSCHRG MED/CURRENT MED MERGE: CPT | Performed by: FAMILY MEDICINE

## 2023-04-21 PROCEDURE — 80053 COMPREHEN METABOLIC PANEL: CPT

## 2023-04-21 PROCEDURE — 99214 OFFICE O/P EST MOD 30 MIN: CPT | Performed by: FAMILY MEDICINE

## 2023-04-21 PROCEDURE — 84443 ASSAY THYROID STIM HORMONE: CPT

## 2023-04-21 PROCEDURE — 84300 ASSAY OF URINE SODIUM: CPT

## 2023-04-21 PROCEDURE — 83935 ASSAY OF URINE OSMOLALITY: CPT

## 2023-04-21 RX ORDER — CELECOXIB 200 MG/1
200 CAPSULE ORAL 2 TIMES DAILY
COMMUNITY
End: 2023-04-21

## 2023-04-21 RX ORDER — CELECOXIB 200 MG/1
200 CAPSULE ORAL DAILY PRN
Qty: 90 CAPSULE | Refills: 3 | Status: SHIPPED | OUTPATIENT
Start: 2023-04-21

## 2023-04-21 RX ORDER — LOSARTAN POTASSIUM 50 MG/1
50 TABLET ORAL 2 TIMES DAILY
Qty: 180 TABLET | Refills: 1 | Status: SHIPPED | OUTPATIENT
Start: 2023-04-21

## 2023-04-21 RX ORDER — LEVOTHYROXINE SODIUM 0.1 MG/1
100 TABLET ORAL DAILY
Qty: 90 TABLET | Refills: 3 | Status: SHIPPED | OUTPATIENT
Start: 2023-04-21

## 2023-04-26 ENCOUNTER — TELEPHONE (OUTPATIENT)
Dept: FAMILY MEDICINE CLINIC | Facility: CLINIC | Age: 88
End: 2023-04-26

## 2023-04-26 DIAGNOSIS — D75.89 MACROCYTOSIS: Primary | ICD-10-CM

## 2023-04-26 DIAGNOSIS — E03.9 HYPOTHYROIDISM, UNSPECIFIED TYPE: ICD-10-CM

## 2023-04-26 NOTE — TELEPHONE ENCOUNTER
Send Levothyroxine 125mcg daily. Recheck in TSH in 2 months. Kidney function is low stable, stay normal hydrated   Sodium is low but stable, avoid over hydration. [facility], [facility] off will have lab run P35 and folic acid.

## 2023-04-27 RX ORDER — LEVOTHYROXINE SODIUM 0.12 MG/1
125 TABLET ORAL
Qty: 90 TABLET | Refills: 0 | Status: SHIPPED | OUTPATIENT
Start: 2023-04-27

## 2023-05-01 NOTE — TELEPHONE ENCOUNTER
Trevor Yeh ([facility]) [phone]      Patient notified, verbalized understanding.

## 2023-05-05 ENCOUNTER — TELEPHONE (OUTPATIENT)
Facility: CLINIC | Age: 88
End: 2023-05-05

## 2023-05-05 ENCOUNTER — HOSPITAL ENCOUNTER (INPATIENT)
Facility: HOSPITAL | Age: 88
LOS: 6 days | Discharge: HOME HEALTH | End: 2023-05-11
Attending: EMERGENCY MEDICINE | Admitting: HOSPITALIST
Payer: MEDICARE

## 2023-05-05 ENCOUNTER — APPOINTMENT (OUTPATIENT)
Dept: GENERAL RADIOLOGY | Facility: HOSPITAL | Age: 88
End: 2023-05-05
Attending: EMERGENCY MEDICINE
Payer: MEDICARE

## 2023-05-05 DIAGNOSIS — I50.9 ACUTE CONGESTIVE HEART FAILURE, UNSPECIFIED HEART FAILURE TYPE (HCC): Primary | ICD-10-CM

## 2023-05-05 DIAGNOSIS — E87.1 HYPONATREMIA: ICD-10-CM

## 2023-05-05 LAB
ALBUMIN SERPL-MCNC: 3.6 G/DL (ref 3.4–5)
ALBUMIN/GLOB SERPL: 1 {RATIO} (ref 1–2)
ALP LIVER SERPL-CCNC: 67 U/L
ALT SERPL-CCNC: 28 U/L
ANION GAP SERPL CALC-SCNC: 9 MMOL/L (ref 0–18)
AST SERPL-CCNC: 39 U/L (ref 15–37)
ATRIAL RATE: 81 BPM
BASOPHILS # BLD AUTO: 0.02 X10(3) UL (ref 0–0.2)
BASOPHILS NFR BLD AUTO: 0.4 %
BILIRUB SERPL-MCNC: 0.6 MG/DL (ref 0.1–2)
BUN BLD-MCNC: 26 MG/DL (ref 7–18)
CALCIUM BLD-MCNC: 9.5 MG/DL (ref 8.5–10.1)
CHLORIDE SERPL-SCNC: 88 MMOL/L (ref 98–112)
CO2 SERPL-SCNC: 28 MMOL/L (ref 21–32)
CREAT BLD-MCNC: 1.21 MG/DL
EOSINOPHIL # BLD AUTO: 0 X10(3) UL (ref 0–0.7)
EOSINOPHIL NFR BLD AUTO: 0 %
ERYTHROCYTE [DISTWIDTH] IN BLOOD BY AUTOMATED COUNT: 13.2 %
GFR SERPLBLD BASED ON 1.73 SQ M-ARVRAT: 43 ML/MIN/1.73M2 (ref 60–?)
GLOBULIN PLAS-MCNC: 3.7 G/DL (ref 2.8–4.4)
GLUCOSE BLD-MCNC: 155 MG/DL (ref 70–99)
HCT VFR BLD AUTO: 40.7 %
HGB BLD-MCNC: 13.9 G/DL
IMM GRANULOCYTES # BLD AUTO: 0.03 X10(3) UL (ref 0–1)
IMM GRANULOCYTES NFR BLD: 0.6 %
LYMPHOCYTES # BLD AUTO: 0.86 X10(3) UL (ref 1–4)
LYMPHOCYTES NFR BLD AUTO: 18.3 %
MCH RBC QN AUTO: 34.1 PG (ref 26–34)
MCHC RBC AUTO-ENTMCNC: 34.2 G/DL (ref 31–37)
MCV RBC AUTO: 99.8 FL
MONOCYTES # BLD AUTO: 0.09 X10(3) UL (ref 0.1–1)
MONOCYTES NFR BLD AUTO: 1.9 %
NEUTROPHILS # BLD AUTO: 3.7 X10 (3) UL (ref 1.5–7.7)
NEUTROPHILS # BLD AUTO: 3.7 X10(3) UL (ref 1.5–7.7)
NEUTROPHILS NFR BLD AUTO: 78.8 %
NT-PROBNP SERPL-MCNC: 5153 PG/ML (ref ?–450)
OSMOLALITY SERPL CALC.SUM OF ELEC: 268 MOSM/KG (ref 275–295)
P AXIS: 38 DEGREES
P-R INTERVAL: 176 MS
PLATELET # BLD AUTO: 331 10(3)UL (ref 150–450)
POTASSIUM SERPL-SCNC: 4.8 MMOL/L (ref 3.5–5.1)
PROT SERPL-MCNC: 7.3 G/DL (ref 6.4–8.2)
Q-T INTERVAL: 450 MS
QRS DURATION: 150 MS
QTC CALCULATION (BEZET): 522 MS
R AXIS: -25 DEGREES
RBC # BLD AUTO: 4.08 X10(6)UL
SODIUM SERPL-SCNC: 125 MMOL/L (ref 136–145)
T AXIS: 111 DEGREES
TROPONIN I HIGH SENSITIVITY: 24 NG/L
TROPONIN I HIGH SENSITIVITY: 25 NG/L
VENTRICULAR RATE: 81 BPM
WBC # BLD AUTO: 4.7 X10(3) UL (ref 4–11)

## 2023-05-05 PROCEDURE — 71045 X-RAY EXAM CHEST 1 VIEW: CPT | Performed by: EMERGENCY MEDICINE

## 2023-05-05 PROCEDURE — 99223 1ST HOSP IP/OBS HIGH 75: CPT | Performed by: HOSPITALIST

## 2023-05-05 RX ORDER — LEVOTHYROXINE SODIUM 0.12 MG/1
125 TABLET ORAL
Status: DISCONTINUED | OUTPATIENT
Start: 2023-05-06 | End: 2023-05-11

## 2023-05-05 RX ORDER — ALBUTEROL SULFATE 90 UG/1
2 AEROSOL, METERED RESPIRATORY (INHALATION) EVERY 6 HOURS PRN
Status: DISCONTINUED | OUTPATIENT
Start: 2023-05-05 | End: 2023-05-11

## 2023-05-05 RX ORDER — SENNOSIDES 8.6 MG
17.2 TABLET ORAL NIGHTLY PRN
Status: DISCONTINUED | OUTPATIENT
Start: 2023-05-05 | End: 2023-05-11

## 2023-05-05 RX ORDER — ECHINACEA PURPUREA EXTRACT 125 MG
1 TABLET ORAL
Status: DISCONTINUED | OUTPATIENT
Start: 2023-05-05 | End: 2023-05-11

## 2023-05-05 RX ORDER — METOCLOPRAMIDE HYDROCHLORIDE 5 MG/ML
10 INJECTION INTRAMUSCULAR; INTRAVENOUS EVERY 8 HOURS PRN
Status: DISCONTINUED | OUTPATIENT
Start: 2023-05-05 | End: 2023-05-11

## 2023-05-05 RX ORDER — HYDRALAZINE HYDROCHLORIDE 25 MG/1
25 TABLET, FILM COATED ORAL 2 TIMES DAILY
Status: DISCONTINUED | OUTPATIENT
Start: 2023-05-05 | End: 2023-05-09

## 2023-05-05 RX ORDER — ACETAMINOPHEN 500 MG
1000 TABLET ORAL EVERY 4 HOURS PRN
Status: DISCONTINUED | OUTPATIENT
Start: 2023-05-05 | End: 2023-05-11

## 2023-05-05 RX ORDER — IPRATROPIUM BROMIDE AND ALBUTEROL SULFATE 2.5; .5 MG/3ML; MG/3ML
3 SOLUTION RESPIRATORY (INHALATION) EVERY 6 HOURS PRN
Status: DISCONTINUED | OUTPATIENT
Start: 2023-05-05 | End: 2023-05-08

## 2023-05-05 RX ORDER — PREDNISONE 5 MG/1
5 TABLET ORAL DAILY
COMMUNITY

## 2023-05-05 RX ORDER — SODIUM PHOSPHATE, DIBASIC AND SODIUM PHOSPHATE, MONOBASIC 7; 19 G/133ML; G/133ML
1 ENEMA RECTAL ONCE AS NEEDED
Status: DISCONTINUED | OUTPATIENT
Start: 2023-05-05 | End: 2023-05-11

## 2023-05-05 RX ORDER — POLYETHYLENE GLYCOL 3350 17 G/17G
17 POWDER, FOR SOLUTION ORAL DAILY PRN
Status: DISCONTINUED | OUTPATIENT
Start: 2023-05-05 | End: 2023-05-11

## 2023-05-05 RX ORDER — FUROSEMIDE 10 MG/ML
40 INJECTION INTRAMUSCULAR; INTRAVENOUS ONCE
Status: COMPLETED | OUTPATIENT
Start: 2023-05-05 | End: 2023-05-05

## 2023-05-05 RX ORDER — METOPROLOL TARTRATE 100 MG/1
100 TABLET ORAL
Status: DISCONTINUED | OUTPATIENT
Start: 2023-05-05 | End: 2023-05-11

## 2023-05-05 RX ORDER — POTASSIUM CHLORIDE 1.5 G/1.77G
20 POWDER, FOR SOLUTION ORAL DAILY
COMMUNITY

## 2023-05-05 RX ORDER — METHYLPREDNISOLONE SODIUM SUCCINATE 125 MG/2ML
125 INJECTION, POWDER, LYOPHILIZED, FOR SOLUTION INTRAMUSCULAR; INTRAVENOUS ONCE
Status: COMPLETED | OUTPATIENT
Start: 2023-05-05 | End: 2023-05-05

## 2023-05-05 RX ORDER — IPRATROPIUM BROMIDE AND ALBUTEROL SULFATE 2.5; .5 MG/3ML; MG/3ML
3 SOLUTION RESPIRATORY (INHALATION) ONCE
Status: COMPLETED | OUTPATIENT
Start: 2023-05-05 | End: 2023-05-05

## 2023-05-05 RX ORDER — BISACODYL 10 MG
10 SUPPOSITORY, RECTAL RECTAL
Status: DISCONTINUED | OUTPATIENT
Start: 2023-05-05 | End: 2023-05-11

## 2023-05-05 RX ORDER — ARFORMOTEROL TARTRATE 15 UG/2ML
15 SOLUTION RESPIRATORY (INHALATION) 2 TIMES DAILY
Status: DISCONTINUED | OUTPATIENT
Start: 2023-05-05 | End: 2023-05-08

## 2023-05-05 RX ORDER — BUDESONIDE 0.5 MG/2ML
0.5 INHALANT ORAL 2 TIMES DAILY
Status: DISCONTINUED | OUTPATIENT
Start: 2023-05-05 | End: 2023-05-11

## 2023-05-05 RX ORDER — LOSARTAN POTASSIUM 25 MG/1
50 TABLET ORAL 2 TIMES DAILY
Status: DISCONTINUED | OUTPATIENT
Start: 2023-05-05 | End: 2023-05-11

## 2023-05-05 RX ORDER — ONDANSETRON 2 MG/ML
4 INJECTION INTRAMUSCULAR; INTRAVENOUS EVERY 6 HOURS PRN
Status: DISCONTINUED | OUTPATIENT
Start: 2023-05-05 | End: 2023-05-11

## 2023-05-05 RX ORDER — MELATONIN
3 NIGHTLY PRN
Status: DISCONTINUED | OUTPATIENT
Start: 2023-05-05 | End: 2023-05-11

## 2023-05-05 RX ORDER — ENOXAPARIN SODIUM 100 MG/ML
0.5 INJECTION SUBCUTANEOUS DAILY
Status: DISCONTINUED | OUTPATIENT
Start: 2023-05-06 | End: 2023-05-11

## 2023-05-05 NOTE — ED INITIAL ASSESSMENT (HPI)
Pt arrives from home via EMS with c/o MARCELLO. Pt has hx of COPD. Currently on 2 L NC, pt uses oxygen PRN. Duo neb given on route to hospital by EMS.

## 2023-05-05 NOTE — ED QUICK NOTES
Orders for admission, patient is aware of plan and ready to go upstairs. Any questions, please call ED RN Mana Roldan at extension [phone].      Patient Covid vaccination status: Fully vaccinated     COVID Test Ordered in ED: None    COVID Suspicion at Admission: N/A    Running Infusions:  None    Mental Status/LOC at time of transport: AO x 4    Other pertinent information: O2 via NC at 2L    CIWA score: N/A   NIH score:  N/A

## 2023-05-05 NOTE — TELEPHONE ENCOUNTER
Spoke with home health nurse. Pt had not been using O2 for several weeks and has since restarted it. Pt is experiencing dyspnea and wheezing and swelling of LE has worsened. On O2, her oxygen sats are 96%. Did a neb treatment this morning. Had dry cough which is now productive of phlegm of unknown color. Pt is requesting Rx. Nurse has advised pt to go to ER but she does not want to. Please advise. Per Dr. Dmitriy Mina, notified [address] that pt concurs with her that pt should go to ER. Pt is refusing. Julius Davey placed a call to daughter to notify her.

## 2023-05-06 LAB
ANION GAP SERPL CALC-SCNC: 7 MMOL/L (ref 0–18)
BASOPHILS # BLD AUTO: 0.01 X10(3) UL (ref 0–0.2)
BASOPHILS NFR BLD AUTO: 0.2 %
BUN BLD-MCNC: 23 MG/DL (ref 7–18)
CALCIUM BLD-MCNC: 8.7 MG/DL (ref 8.5–10.1)
CHLORIDE SERPL-SCNC: 93 MMOL/L (ref 98–112)
CO2 SERPL-SCNC: 27 MMOL/L (ref 21–32)
CREAT BLD-MCNC: 0.9 MG/DL
EOSINOPHIL # BLD AUTO: 0 X10(3) UL (ref 0–0.7)
EOSINOPHIL NFR BLD AUTO: 0 %
ERYTHROCYTE [DISTWIDTH] IN BLOOD BY AUTOMATED COUNT: 13.2 %
GFR SERPLBLD BASED ON 1.73 SQ M-ARVRAT: 61 ML/MIN/1.73M2 (ref 60–?)
GLUCOSE BLD-MCNC: 155 MG/DL (ref 70–99)
HCT VFR BLD AUTO: 40.2 %
HGB BLD-MCNC: 13.5 G/DL
IMM GRANULOCYTES # BLD AUTO: 0.03 X10(3) UL (ref 0–1)
IMM GRANULOCYTES NFR BLD: 0.7 %
LYMPHOCYTES # BLD AUTO: 0.81 X10(3) UL (ref 1–4)
LYMPHOCYTES NFR BLD AUTO: 17.7 %
MAGNESIUM SERPL-MCNC: 2.2 MG/DL (ref 1.6–2.6)
MCH RBC QN AUTO: 33.4 PG (ref 26–34)
MCHC RBC AUTO-ENTMCNC: 33.6 G/DL (ref 31–37)
MCV RBC AUTO: 99.5 FL
MONOCYTES # BLD AUTO: 0.13 X10(3) UL (ref 0.1–1)
MONOCYTES NFR BLD AUTO: 2.8 %
NEUTROPHILS # BLD AUTO: 3.59 X10 (3) UL (ref 1.5–7.7)
NEUTROPHILS # BLD AUTO: 3.59 X10(3) UL (ref 1.5–7.7)
NEUTROPHILS NFR BLD AUTO: 78.6 %
OSMOLALITY SERPL CALC.SUM OF ELEC: 271 MOSM/KG (ref 275–295)
PLATELET # BLD AUTO: 322 10(3)UL (ref 150–450)
POTASSIUM SERPL-SCNC: 3.9 MMOL/L (ref 3.5–5.1)
RBC # BLD AUTO: 4.04 X10(6)UL
SODIUM SERPL-SCNC: 127 MMOL/L (ref 136–145)
WBC # BLD AUTO: 4.6 X10(3) UL (ref 4–11)

## 2023-05-06 PROCEDURE — 99232 SBSQ HOSP IP/OBS MODERATE 35: CPT | Performed by: HOSPITALIST

## 2023-05-06 RX ORDER — PREDNISONE 5 MG/1
5 TABLET ORAL DAILY
Status: DISCONTINUED | OUTPATIENT
Start: 2023-05-06 | End: 2023-05-11

## 2023-05-06 RX ORDER — PROMETHAZINE HYDROCHLORIDE 25 MG/1
3 TABLET ORAL EVERY 4 HOURS PRN
Status: DISCONTINUED | OUTPATIENT
Start: 2023-05-06 | End: 2023-05-11

## 2023-05-06 RX ORDER — LEVOTHYROXINE SODIUM 0.12 MG/1
125 TABLET ORAL
Status: DISCONTINUED | OUTPATIENT
Start: 2023-05-06 | End: 2023-05-06

## 2023-05-06 NOTE — PLAN OF CARE
Assumed care of pt from ED around 2100  AxO x4, up x1 and walker  2 L NC, sats >90, lung sounds diminished, congested cough  Pt denies any pain  NSR w/ LBBB, no cardiac symptoms  1+ swelling to upper extremities, 2+ R wrist (pt says fracture last dec), 3+ BLE and feet, R leg has one small spot weeping  Skin otherwise clean, dry, and intact, scattered bruising and redness to BLE/BUE, sacrum pink/blanchable  Lasix gtt 5 mg, 2.5 mls/hr cont  Continent of bowel, times of incontinence of bladder/ increased frequency -purewick and brief in place  Fall precautions in place, bed alarm on  Pt and daughter updated on plan of care, all needs met at this time      Problem: PAIN - ADULT  Goal: Verbalizes/displays adequate comfort level or patient's stated pain goal  Description: INTERVENTIONS:  - Encourage pt to monitor pain and request assistance  - Assess pain using appropriate pain scale  - Administer analgesics based on type and severity of pain and evaluate response  - Implement non-pharmacological measures as appropriate and evaluate response  - Consider cultural and social influences on pain and pain management  - Manage/alleviate anxiety  - Utilize distraction and/or relaxation techniques  - Monitor for opioid side effects  - Notify MD/LIP if interventions unsuccessful or patient reports new pain  - Anticipate increased pain with activity and pre-medicate as appropriate  Outcome: Progressing     Problem: RISK FOR INFECTION - ADULT  Goal: Absence of fever/infection during anticipated neutropenic period  Description: INTERVENTIONS  - Monitor WBC  - Administer growth factors as ordered  - Implement neutropenic guidelines  Outcome: Progressing     Problem: SAFETY ADULT - FALL  Goal: Free from fall injury  Description: INTERVENTIONS:  - Assess pt frequently for physical needs  - Identify cognitive and physical deficits and behaviors that affect risk of falls. - [place] fall precautions as indicated by assessment.   - Educate pt/family on patient safety including physical limitations  - Instruct pt to call for assistance with activity based on assessment  - Modify environment to reduce risk of injury  - Provide assistive devices as appropriate  - Consider OT/PT consult to assist with strengthening/mobility  - Encourage toileting schedule  Outcome: Progressing     Problem: CARDIOVASCULAR - ADULT  Goal: Maintains optimal cardiac output and hemodynamic stability  Description: INTERVENTIONS:  - Monitor vital signs, rhythm, and trends  - Monitor for bleeding, hypotension and signs of decreased cardiac output  - Evaluate effectiveness of vasoactive medications to optimize hemodynamic stability  - Monitor arterial and/or venous puncture sites for bleeding and/or hematoma  - Assess quality of pulses, skin color and temperature  - Assess for signs of decreased coronary artery perfusion - ex.  Angina  - Evaluate fluid balance, assess for edema, trend weights  Outcome: Progressing  Goal: Absence of cardiac arrhythmias or at baseline  Description: INTERVENTIONS:  - Continuous cardiac monitoring, monitor vital signs, obtain 12 lead EKG if indicated  - Evaluate effectiveness of antiarrhythmic and heart rate control medications as ordered  - Initiate emergency measures for life threatening arrhythmias  - Monitor electrolytes and administer replacement therapy as ordered  Outcome: Progressing     Problem: RESPIRATORY - ADULT  Goal: Achieves optimal ventilation and oxygenation  Description: INTERVENTIONS:  - Assess for changes in respiratory status  - Assess for changes in mentation and behavior  - Position to facilitate oxygenation and minimize respiratory effort  - Oxygen supplementation based on oxygen saturation or ABGs  - Provide Smoking Cessation handout, if applicable  - Encourage broncho-pulmonary hygiene including cough, deep breathe, Incentive Spirometry  - Assess the need for suctioning and perform as needed  - Assess and instruct to report SOB or any respiratory difficulty  - Respiratory Therapy support as indicated  - Manage/alleviate anxiety  - Monitor for signs/symptoms of CO2 retention  Outcome: Progressing

## 2023-05-06 NOTE — PLAN OF CARE
Received patient at 0730. Alert and oriented x4. Tele rhythm NSR. O2 saturation 97% on 2L o2 per NC. Breath sounds diminished. Bed is locked and in low position. Call light and personal belongings in reach. No c/o chest pain or shortness of breath at this time. Pt voiding with no issue, on lasix gtt. PT/OT to see pt. Care plan reviewed, pt verbalizes understanding. Problem: PAIN - ADULT  Goal: Verbalizes/displays adequate comfort level or patient's stated pain goal  Description: INTERVENTIONS:  - Encourage pt to monitor pain and request assistance  - Assess pain using appropriate pain scale  - Administer analgesics based on type and severity of pain and evaluate response  - Implement non-pharmacological measures as appropriate and evaluate response  - Consider cultural and social influences on pain and pain management  - Manage/alleviate anxiety  - Utilize distraction and/or relaxation techniques  - Monitor for opioid side effects  - Notify MD/LIP if interventions unsuccessful or patient reports new pain  - Anticipate increased pain with activity and pre-medicate as appropriate  Outcome: Progressing     Problem: RISK FOR INFECTION - ADULT  Goal: Absence of fever/infection during anticipated neutropenic period  Description: INTERVENTIONS  - Monitor WBC  - Administer growth factors as ordered  - Implement neutropenic guidelines  Outcome: Progressing     Problem: SAFETY ADULT - FALL  Goal: Free from fall injury  Description: INTERVENTIONS:  - Assess pt frequently for physical needs  - Identify cognitive and physical deficits and behaviors that affect risk of falls.   - [place] fall precautions as indicated by assessment.  - Educate pt/family on patient safety including physical limitations  - Instruct pt to call for assistance with activity based on assessment  - Modify environment to reduce risk of injury  - Provide assistive devices as appropriate  - Consider OT/PT consult to assist with strengthening/mobility  - Encourage toileting schedule  Outcome: Progressing     Problem: CARDIOVASCULAR - ADULT  Goal: Maintains optimal cardiac output and hemodynamic stability  Description: INTERVENTIONS:  - Monitor vital signs, rhythm, and trends  - Monitor for bleeding, hypotension and signs of decreased cardiac output  - Evaluate effectiveness of vasoactive medications to optimize hemodynamic stability  - Monitor arterial and/or venous puncture sites for bleeding and/or hematoma  - Assess quality of pulses, skin color and temperature  - Assess for signs of decreased coronary artery perfusion - ex.  Angina  - Evaluate fluid balance, assess for edema, trend weights  Outcome: Progressing  Goal: Absence of cardiac arrhythmias or at baseline  Description: INTERVENTIONS:  - Continuous cardiac monitoring, monitor vital signs, obtain 12 lead EKG if indicated  - Evaluate effectiveness of antiarrhythmic and heart rate control medications as ordered  - Initiate emergency measures for life threatening arrhythmias  - Monitor electrolytes and administer replacement therapy as ordered  Outcome: Progressing     Problem: RESPIRATORY - ADULT  Goal: Achieves optimal ventilation and oxygenation  Description: INTERVENTIONS:  - Assess for changes in respiratory status  - Assess for changes in mentation and behavior  - Position to facilitate oxygenation and minimize respiratory effort  - Oxygen supplementation based on oxygen saturation or ABGs  - Provide Smoking Cessation handout, if applicable  - Encourage broncho-pulmonary hygiene including cough, deep breathe, Incentive Spirometry  - Assess the need for suctioning and perform as needed  - Assess and instruct to report SOB or any respiratory difficulty  - Respiratory Therapy support as indicated  - Manage/alleviate anxiety  - Monitor for signs/symptoms of CO2 retention  Outcome: Progressing

## 2023-05-07 LAB — NT-PROBNP SERPL-MCNC: 3432 PG/ML (ref ?–450)

## 2023-05-07 PROCEDURE — 99232 SBSQ HOSP IP/OBS MODERATE 35: CPT | Performed by: HOSPITALIST

## 2023-05-07 RX ORDER — CELECOXIB 200 MG/1
200 CAPSULE ORAL DAILY PRN
Status: DISCONTINUED | OUTPATIENT
Start: 2023-05-07 | End: 2023-05-11

## 2023-05-07 NOTE — PLAN OF CARE
Received patient at 1. Alert and oriented x 4. Tele Rhythm NSR bundle branch block. Patient is on 1L oxygen (has oxygen at home PRN). Expiratory wheezes, diminished breath sounds upon ascultation, shortness of breath on exertion. Lower extremities edema 4+. Patient received pain meds for right upper arm fracture (from December 2022). Bed is locked and in low position. Call light and personal items within reach. Pt voiding with no issues. Reviewed plan of care with patient and family member at bedside. POC-IV lasix drip, nebulizer treatments, PT/OT to see          Problem: PAIN - ADULT  Goal: Verbalizes/displays adequate comfort level or patient's stated pain goal  Description: INTERVENTIONS:  - Encourage pt to monitor pain and request assistance  - Assess pain using appropriate pain scale  - Administer analgesics based on type and severity of pain and evaluate response  - Implement non-pharmacological measures as appropriate and evaluate response  - Consider cultural and social influences on pain and pain management  - Manage/alleviate anxiety  - Utilize distraction and/or relaxation techniques  - Monitor for opioid side effects  - Notify MD/LIP if interventions unsuccessful or patient reports new pain  - Anticipate increased pain with activity and pre-medicate as appropriate  Outcome: Progressing     Problem: RISK FOR INFECTION - ADULT  Goal: Absence of fever/infection during anticipated neutropenic period  Description: INTERVENTIONS  - Monitor WBC  - Administer growth factors as ordered  - Implement neutropenic guidelines  Outcome: Progressing     Problem: SAFETY ADULT - FALL  Goal: Free from fall injury  Description: INTERVENTIONS:  - Assess pt frequently for physical needs  - Identify cognitive and physical deficits and behaviors that affect risk of falls.   - [place] fall precautions as indicated by assessment.  - Educate pt/family on patient safety including physical limitations  - Instruct pt to call for assistance with activity based on assessment  - Modify environment to reduce risk of injury  - Provide assistive devices as appropriate  - Consider OT/PT consult to assist with strengthening/mobility  - Encourage toileting schedule  Outcome: Progressing     Problem: CARDIOVASCULAR - ADULT  Goal: Maintains optimal cardiac output and hemodynamic stability  Description: INTERVENTIONS:  - Monitor vital signs, rhythm, and trends  - Monitor for bleeding, hypotension and signs of decreased cardiac output  - Evaluate effectiveness of vasoactive medications to optimize hemodynamic stability  - Monitor arterial and/or venous puncture sites for bleeding and/or hematoma  - Assess quality of pulses, skin color and temperature  - Assess for signs of decreased coronary artery perfusion - ex.  Angina  - Evaluate fluid balance, assess for edema, trend weights  Outcome: Progressing  Goal: Absence of cardiac arrhythmias or at baseline  Description: INTERVENTIONS:  - Continuous cardiac monitoring, monitor vital signs, obtain 12 lead EKG if indicated  - Evaluate effectiveness of antiarrhythmic and heart rate control medications as ordered  - Initiate emergency measures for life threatening arrhythmias  - Monitor electrolytes and administer replacement therapy as ordered  Outcome: Progressing

## 2023-05-07 NOTE — PLAN OF CARE
Assumed care of patient @ 0730. Patient is A&O x4, up standby assist with walker. Some complaint of pain to the right arm/shoulder relieved by tylenol/celebrex. Heat packs applied to right shoulder. Patient is sinus rhythm, sinus sugar w/ LBBB. No chest pain some dyspnea on exertion. Weaned to room air. +3 edema to bilateral lower extremities +4 to feet bilaterally. Lasix gtt infusing per orders. Patient is incontinent of bladder, continent of bowel. Last BM today. Fall precautions in place, bed and chair alarm in use patient and family updated on plan of care. Problem: PAIN - ADULT  Goal: Verbalizes/displays adequate comfort level or patient's stated pain goal  Description: INTERVENTIONS:  - Encourage pt to monitor pain and request assistance  - Assess pain using appropriate pain scale  - Administer analgesics based on type and severity of pain and evaluate response  - Implement non-pharmacological measures as appropriate and evaluate response  - Consider cultural and social influences on pain and pain management  - Manage/alleviate anxiety  - Utilize distraction and/or relaxation techniques  - Monitor for opioid side effects  - Notify MD/LIP if interventions unsuccessful or patient reports new pain  - Anticipate increased pain with activity and pre-medicate as appropriate  Outcome: Progressing     Problem: RISK FOR INFECTION - ADULT  Goal: Absence of fever/infection during anticipated neutropenic period  Description: INTERVENTIONS  - Monitor WBC  - Administer growth factors as ordered  - Implement neutropenic guidelines  Outcome: Progressing     Problem: SAFETY ADULT - FALL  Goal: Free from fall injury  Description: INTERVENTIONS:  - Assess pt frequently for physical needs  - Identify cognitive and physical deficits and behaviors that affect risk of falls.   - [place] fall precautions as indicated by assessment.  - Educate pt/family on patient safety including physical limitations  - Instruct pt to call for assistance with activity based on assessment  - Modify environment to reduce risk of injury  - Provide assistive devices as appropriate  - Consider OT/PT consult to assist with strengthening/mobility  - Encourage toileting schedule  Outcome: Progressing     Problem: CARDIOVASCULAR - ADULT  Goal: Maintains optimal cardiac output and hemodynamic stability  Description: INTERVENTIONS:  - Monitor vital signs, rhythm, and trends  - Monitor for bleeding, hypotension and signs of decreased cardiac output  - Evaluate effectiveness of vasoactive medications to optimize hemodynamic stability  - Monitor arterial and/or venous puncture sites for bleeding and/or hematoma  - Assess quality of pulses, skin color and temperature  - Assess for signs of decreased coronary artery perfusion - ex.  Angina  - Evaluate fluid balance, assess for edema, trend weights  Outcome: Progressing  Goal: Absence of cardiac arrhythmias or at baseline  Description: INTERVENTIONS:  - Continuous cardiac monitoring, monitor vital signs, obtain 12 lead EKG if indicated  - Evaluate effectiveness of antiarrhythmic and heart rate control medications as ordered  - Initiate emergency measures for life threatening arrhythmias  - Monitor electrolytes and administer replacement therapy as ordered  Outcome: Progressing     Problem: RESPIRATORY - ADULT  Goal: Achieves optimal ventilation and oxygenation  Description: INTERVENTIONS:  - Assess for changes in respiratory status  - Assess for changes in mentation and behavior  - Position to facilitate oxygenation and minimize respiratory effort  - Oxygen supplementation based on oxygen saturation or ABGs  - Provide Smoking Cessation handout, if applicable  - Encourage broncho-pulmonary hygiene including cough, deep breathe, Incentive Spirometry  - Assess the need for suctioning and perform as needed  - Assess and instruct to report SOB or any respiratory difficulty  - Respiratory Therapy support as indicated  - Manage/alleviate anxiety  - Monitor for signs/symptoms of CO2 retention  Outcome: Progressing

## 2023-05-08 LAB
ANION GAP SERPL CALC-SCNC: 2 MMOL/L (ref 0–18)
BUN BLD-MCNC: 36 MG/DL (ref 7–18)
CALCIUM BLD-MCNC: 8.4 MG/DL (ref 8.5–10.1)
CHLORIDE SERPL-SCNC: 94 MMOL/L (ref 98–112)
CO2 SERPL-SCNC: 32 MMOL/L (ref 21–32)
CREAT BLD-MCNC: 0.98 MG/DL
GFR SERPLBLD BASED ON 1.73 SQ M-ARVRAT: 55 ML/MIN/1.73M2 (ref 60–?)
GLUCOSE BLD-MCNC: 87 MG/DL (ref 70–99)
MAGNESIUM SERPL-MCNC: 2.3 MG/DL (ref 1.6–2.6)
OSMOLALITY SERPL CALC.SUM OF ELEC: 274 MOSM/KG (ref 275–295)
POTASSIUM SERPL-SCNC: 3.5 MMOL/L (ref 3.5–5.1)
SODIUM SERPL-SCNC: 128 MMOL/L (ref 136–145)

## 2023-05-08 PROCEDURE — 99232 SBSQ HOSP IP/OBS MODERATE 35: CPT | Performed by: HOSPITALIST

## 2023-05-08 RX ORDER — FUROSEMIDE 10 MG/ML
40 INJECTION INTRAMUSCULAR; INTRAVENOUS
Status: DISCONTINUED | OUTPATIENT
Start: 2023-05-08 | End: 2023-05-09

## 2023-05-08 RX ORDER — IPRATROPIUM BROMIDE AND ALBUTEROL SULFATE 2.5; .5 MG/3ML; MG/3ML
3 SOLUTION RESPIRATORY (INHALATION)
Status: DISCONTINUED | OUTPATIENT
Start: 2023-05-08 | End: 2023-05-11

## 2023-05-08 NOTE — PLAN OF CARE
Assumed care of pt at [address]. A&Ox4. 1L NC, NSR w/LBBB on tele. Pt denies chest pain. Expiratory wheezing noted upon auscultation, O2 sats WNL, PRN duonebs ordered. Voiding per Sita, last BM 05/07. Ambulates x1 w/walker. Fall precautions in place, bed alarm on, call light within reach. Pt and family updated on plan of care. Problem: PAIN - ADULT  Goal: Verbalizes/displays adequate comfort level or patient's stated pain goal  Description: INTERVENTIONS:  - Encourage pt to monitor pain and request assistance  - Assess pain using appropriate pain scale  - Administer analgesics based on type and severity of pain and evaluate response  - Implement non-pharmacological measures as appropriate and evaluate response  - Consider cultural and social influences on pain and pain management  - Manage/alleviate anxiety  - Utilize distraction and/or relaxation techniques  - Monitor for opioid side effects  - Notify MD/LIP if interventions unsuccessful or patient reports new pain  - Anticipate increased pain with activity and pre-medicate as appropriate  Outcome: Progressing     Problem: RISK FOR INFECTION - ADULT  Goal: Absence of fever/infection during anticipated neutropenic period  Description: INTERVENTIONS  - Monitor WBC  - Administer growth factors as ordered  - Implement neutropenic guidelines  Outcome: Progressing     Problem: SAFETY ADULT - FALL  Goal: Free from fall injury  Description: INTERVENTIONS:  - Assess pt frequently for physical needs  - Identify cognitive and physical deficits and behaviors that affect risk of falls.   - [place] fall precautions as indicated by assessment.  - Educate pt/family on patient safety including physical limitations  - Instruct pt to call for assistance with activity based on assessment  - Modify environment to reduce risk of injury  - Provide assistive devices as appropriate  - Consider OT/PT consult to assist with strengthening/mobility  - Encourage toileting schedule  Outcome: Progressing     Problem: CARDIOVASCULAR - ADULT  Goal: Maintains optimal cardiac output and hemodynamic stability  Description: INTERVENTIONS:  - Monitor vital signs, rhythm, and trends  - Monitor for bleeding, hypotension and signs of decreased cardiac output  - Evaluate effectiveness of vasoactive medications to optimize hemodynamic stability  - Monitor arterial and/or venous puncture sites for bleeding and/or hematoma  - Assess quality of pulses, skin color and temperature  - Assess for signs of decreased coronary artery perfusion - ex.  Angina  - Evaluate fluid balance, assess for edema, trend weights  Outcome: Progressing  Goal: Absence of cardiac arrhythmias or at baseline  Description: INTERVENTIONS:  - Continuous cardiac monitoring, monitor vital signs, obtain 12 lead EKG if indicated  - Evaluate effectiveness of antiarrhythmic and heart rate control medications as ordered  - Initiate emergency measures for life threatening arrhythmias  - Monitor electrolytes and administer replacement therapy as ordered  Outcome: Progressing     Problem: RESPIRATORY - ADULT  Goal: Achieves optimal ventilation and oxygenation  Description: INTERVENTIONS:  - Assess for changes in respiratory status  - Assess for changes in mentation and behavior  - Position to facilitate oxygenation and minimize respiratory effort  - Oxygen supplementation based on oxygen saturation or ABGs  - Provide Smoking Cessation handout, if applicable  - Encourage broncho-pulmonary hygiene including cough, deep breathe, Incentive Spirometry  - Assess the need for suctioning and perform as needed  - Assess and instruct to report SOB or any respiratory difficulty  - Respiratory Therapy support as indicated  - Manage/alleviate anxiety  - Monitor for signs/symptoms of CO2 retention  Outcome: Progressing

## 2023-05-08 NOTE — PLAN OF CARE
Received pt at 0700. Pt is A&Ox4, no complaints of pain. Pt is on room air, PRN 1-2L 02 via NC(home set up), lungs are diminished bilaterally with expiratory wheezing, productive cough. Pt is in NSR/SB, no complaints of chest pain. She has a purewick in place, continent of bowel, active bowel sounds, abdomen non-tender, last BM 5-7. Pt has BLE redness @ BLE/LUE, generalized bruising. BLE pitting edema present. Patient updated with plan of care. POC  - lasix gtt  - I&O/DW   - scheduled & PRN nebs       Problem: PAIN - ADULT  Goal: Verbalizes/displays adequate comfort level or patient's stated pain goal  Description: INTERVENTIONS:  - Encourage pt to monitor pain and request assistance  - Assess pain using appropriate pain scale  - Administer analgesics based on type and severity of pain and evaluate response  - Implement non-pharmacological measures as appropriate and evaluate response  - Consider cultural and social influences on pain and pain management  - Manage/alleviate anxiety  - Utilize distraction and/or relaxation techniques  - Monitor for opioid side effects  - Notify MD/LIP if interventions unsuccessful or patient reports new pain  - Anticipate increased pain with activity and pre-medicate as appropriate  Outcome: Progressing     Problem: RISK FOR INFECTION - ADULT  Goal: Absence of fever/infection during anticipated neutropenic period  Description: INTERVENTIONS  - Monitor WBC  - Administer growth factors as ordered  - Implement neutropenic guidelines  Outcome: Progressing     Problem: SAFETY ADULT - FALL  Goal: Free from fall injury  Description: INTERVENTIONS:  - Assess pt frequently for physical needs  - Identify cognitive and physical deficits and behaviors that affect risk of falls.   - [place] fall precautions as indicated by assessment.  - Educate pt/family on patient safety including physical limitations  - Instruct pt to call for assistance with activity based on assessment  - Modify environment to reduce risk of injury  - Provide assistive devices as appropriate  - Consider OT/PT consult to assist with strengthening/mobility  - Encourage toileting schedule  Outcome: Progressing     Problem: CARDIOVASCULAR - ADULT  Goal: Maintains optimal cardiac output and hemodynamic stability  Description: INTERVENTIONS:  - Monitor vital signs, rhythm, and trends  - Monitor for bleeding, hypotension and signs of decreased cardiac output  - Evaluate effectiveness of vasoactive medications to optimize hemodynamic stability  - Monitor arterial and/or venous puncture sites for bleeding and/or hematoma  - Assess quality of pulses, skin color and temperature  - Assess for signs of decreased coronary artery perfusion - ex.  Angina  - Evaluate fluid balance, assess for edema, trend weights  Outcome: Progressing  Goal: Absence of cardiac arrhythmias or at baseline  Description: INTERVENTIONS:  - Continuous cardiac monitoring, monitor vital signs, obtain 12 lead EKG if indicated  - Evaluate effectiveness of antiarrhythmic and heart rate control medications as ordered  - Initiate emergency measures for life threatening arrhythmias  - Monitor electrolytes and administer replacement therapy as ordered  Outcome: Progressing     Problem: RESPIRATORY - ADULT  Goal: Achieves optimal ventilation and oxygenation  Description: INTERVENTIONS:  - Assess for changes in respiratory status  - Assess for changes in mentation and behavior  - Position to facilitate oxygenation and minimize respiratory effort  - Oxygen supplementation based on oxygen saturation or ABGs  - Provide Smoking Cessation handout, if applicable  - Encourage broncho-pulmonary hygiene including cough, deep breathe, Incentive Spirometry  - Assess the need for suctioning and perform as needed  - Assess and instruct to report SOB or any respiratory difficulty  - Respiratory Therapy support as indicated  - Manage/alleviate anxiety  - Monitor for signs/symptoms of CO2 retention  Outcome: Progressing     Problem: Patient/Family Goals  Goal: Patient/Family Long Term Goal  Description: Patient's Long Term Goal: stay out of hospital 5-8    Interventions:  - med compliance  - follow ups    - See additional Care Plan goals for specific interventions  Outcome: Progressing  Goal: Patient/Family Short Term Goal  Description: Patient's Short Term Goal: no pain 5-8    Interventions:   - PRN meds  - hot/cold packs  - tell nurse if in pain    - See additional Care Plan goals for specific interventions  Outcome: Progressing

## 2023-05-09 LAB
ANION GAP SERPL CALC-SCNC: 2 MMOL/L (ref 0–18)
BUN BLD-MCNC: 30 MG/DL (ref 7–18)
CALCIUM BLD-MCNC: 8.3 MG/DL (ref 8.5–10.1)
CHLORIDE SERPL-SCNC: 95 MMOL/L (ref 98–112)
CO2 SERPL-SCNC: 24 MMOL/L (ref 21–32)
CREAT BLD-MCNC: 0.99 MG/DL
GFR SERPLBLD BASED ON 1.73 SQ M-ARVRAT: 54 ML/MIN/1.73M2 (ref 60–?)
GLUCOSE BLD-MCNC: 110 MG/DL (ref 70–99)
OSMOLALITY SERPL CALC.SUM OF ELEC: 259 MOSM/KG (ref 275–295)
POTASSIUM SERPL-SCNC: 5.1 MMOL/L (ref 3.5–5.1)
SODIUM SERPL-SCNC: 121 MMOL/L (ref 136–145)

## 2023-05-09 PROCEDURE — 99232 SBSQ HOSP IP/OBS MODERATE 35: CPT | Performed by: HOSPITALIST

## 2023-05-09 RX ORDER — FUROSEMIDE 10 MG/ML
40 INJECTION INTRAMUSCULAR; INTRAVENOUS DAILY
Status: DISCONTINUED | OUTPATIENT
Start: 2023-05-10 | End: 2023-05-10

## 2023-05-09 RX ORDER — IPRATROPIUM BROMIDE AND ALBUTEROL SULFATE 2.5; .5 MG/3ML; MG/3ML
3 SOLUTION RESPIRATORY (INHALATION) ONCE
Status: COMPLETED | OUTPATIENT
Start: 2023-05-09 | End: 2023-05-09

## 2023-05-09 RX ORDER — HYDRALAZINE HYDROCHLORIDE 50 MG/1
50 TABLET, FILM COATED ORAL 2 TIMES DAILY
Status: DISCONTINUED | OUTPATIENT
Start: 2023-05-09 | End: 2023-05-10

## 2023-05-09 NOTE — PLAN OF CARE
A&OX4. Tylenol given for pain. 1L NC. Purewick. Mepliex on bottom for protection. L extended IV. X1 walker. IV lasix BID. Pt rec [facility].      Problem: PAIN - ADULT  Goal: Verbalizes/displays adequate comfort level or patient's stated pain goal  Description: INTERVENTIONS:  - Encourage pt to monitor pain and request assistance  - Assess pain using appropriate pain scale  - Administer analgesics based on type and severity of pain and evaluate response  - Implement non-pharmacological measures as appropriate and evaluate response  - Consider cultural and social influences on pain and pain management  - Manage/alleviate anxiety  - Utilize distraction and/or relaxation techniques  - Monitor for opioid side effects  - Notify MD/LIP if interventions unsuccessful or patient reports new pain  - Anticipate increased pain with activity and pre-medicate as appropriate  Outcome: Progressing

## 2023-05-09 NOTE — PLAN OF CARE
Assumed care of patient @ 0730 patient resting in bed, AOX4, reports no pain. Lung sounds diminished bilaterally, O2 saturation maintained on room air. No complaints of shortness of breath or chest pain. Sinus rhythm with bundle branch block on tele, S1-S2 present, no adventitious sounds noted. Bowel sounds present and active in all quadrants, last BM today 5/9. Patient voiding with increased frequency d/t diuretics. Pt ambulating in [place] with standby assist and walker. Sacrum clean, dry, intact with new mepilex placed today for protection. Non-pitting edema to BL upper extremities. +3 Pitting edema to BL lower extremities. +2 Edema to BL feet. Plan of Care: IV Diuretics. I/O. Ambulate as tolerated. Scheduled nebs. Discussed plan of care with patient, verbalized understanding. Call light within reach.       Problem: PAIN - ADULT  Goal: Verbalizes/displays adequate comfort level or patient's stated pain goal  Description: INTERVENTIONS:  - Encourage pt to monitor pain and request assistance  - Assess pain using appropriate pain scale  - Administer analgesics based on type and severity of pain and evaluate response  - Implement non-pharmacological measures as appropriate and evaluate response  - Consider cultural and social influences on pain and pain management  - Manage/alleviate anxiety  - Utilize distraction and/or relaxation techniques  - Monitor for opioid side effects  - Notify MD/LIP if interventions unsuccessful or patient reports new pain  - Anticipate increased pain with activity and pre-medicate as appropriate  Outcome: Progressing     Problem: RISK FOR INFECTION - ADULT  Goal: Absence of fever/infection during anticipated neutropenic period  Description: INTERVENTIONS  - Monitor WBC  - Administer growth factors as ordered  - Implement neutropenic guidelines  Outcome: Progressing     Problem: SAFETY ADULT - FALL  Goal: Free from fall injury  Description: INTERVENTIONS:  - Assess pt frequently for physical needs  - Identify cognitive and physical deficits and behaviors that affect risk of falls. - [place] fall precautions as indicated by assessment.  - Educate pt/family on patient safety including physical limitations  - Instruct pt to call for assistance with activity based on assessment  - Modify environment to reduce risk of injury  - Provide assistive devices as appropriate  - Consider OT/PT consult to assist with strengthening/mobility  - Encourage toileting schedule  Outcome: Progressing     Problem: CARDIOVASCULAR - ADULT  Goal: Maintains optimal cardiac output and hemodynamic stability  Description: INTERVENTIONS:  - Monitor vital signs, rhythm, and trends  - Monitor for bleeding, hypotension and signs of decreased cardiac output  - Evaluate effectiveness of vasoactive medications to optimize hemodynamic stability  - Monitor arterial and/or venous puncture sites for bleeding and/or hematoma  - Assess quality of pulses, skin color and temperature  - Assess for signs of decreased coronary artery perfusion - ex.  Angina  - Evaluate fluid balance, assess for edema, trend weights  Outcome: Progressing  Goal: Absence of cardiac arrhythmias or at baseline  Description: INTERVENTIONS:  - Continuous cardiac monitoring, monitor vital signs, obtain 12 lead EKG if indicated  - Evaluate effectiveness of antiarrhythmic and heart rate control medications as ordered  - Initiate emergency measures for life threatening arrhythmias  - Monitor electrolytes and administer replacement therapy as ordered  Outcome: Progressing     Problem: RESPIRATORY - ADULT  Goal: Achieves optimal ventilation and oxygenation  Description: INTERVENTIONS:  - Assess for changes in respiratory status  - Assess for changes in mentation and behavior  - Position to facilitate oxygenation and minimize respiratory effort  - Oxygen supplementation based on oxygen saturation or ABGs  - Provide Smoking Cessation handout, if applicable  - Encourage broncho-pulmonary hygiene including cough, deep breathe, Incentive Spirometry  - Assess the need for suctioning and perform as needed  - Assess and instruct to report SOB or any respiratory difficulty  - Respiratory Therapy support as indicated  - Manage/alleviate anxiety  - Monitor for signs/symptoms of CO2 retention  Outcome: Progressing     Problem: Patient/Family Goals  Goal: Patient/Family Long Term Goal  Description: Patient's Long Term Goal: stay out of hospital 5-9    Interventions:  - med compliance  - follow ups    - See additional Care Plan goals for specific interventions  Outcome: Progressing  Goal: Patient/Family Short Term Goal  Description: Patient's Short Term Goal: Increase activity 5-9    Interventions:   - Diuretics to decrease swelling  - Manage any pain  - Walk in halls with PCT/RN    - See additional Care Plan goals for specific interventions  Outcome: Progressing

## 2023-05-10 LAB
ANION GAP SERPL CALC-SCNC: 4 MMOL/L (ref 0–18)
BUN BLD-MCNC: 30 MG/DL (ref 7–18)
CALCIUM BLD-MCNC: 9 MG/DL (ref 8.5–10.1)
CHLORIDE SERPL-SCNC: 93 MMOL/L (ref 98–112)
CO2 SERPL-SCNC: 30 MMOL/L (ref 21–32)
CREAT BLD-MCNC: 0.94 MG/DL
GFR SERPLBLD BASED ON 1.73 SQ M-ARVRAT: 58 ML/MIN/1.73M2 (ref 60–?)
GLUCOSE BLD-MCNC: 123 MG/DL (ref 70–99)
OSMOLALITY SERPL CALC.SUM OF ELEC: 272 MOSM/KG (ref 275–295)
POTASSIUM SERPL-SCNC: 3.5 MMOL/L (ref 3.5–5.1)
SODIUM SERPL-SCNC: 127 MMOL/L (ref 136–145)

## 2023-05-10 PROCEDURE — 99232 SBSQ HOSP IP/OBS MODERATE 35: CPT | Performed by: HOSPITALIST

## 2023-05-10 RX ORDER — TORSEMIDE 20 MG/1
40 TABLET ORAL DAILY
Status: DISCONTINUED | OUTPATIENT
Start: 2023-05-11 | End: 2023-05-11

## 2023-05-10 RX ORDER — HYDRALAZINE HYDROCHLORIDE 25 MG/1
25 TABLET, FILM COATED ORAL 2 TIMES DAILY
Status: DISCONTINUED | OUTPATIENT
Start: 2023-05-10 | End: 2023-05-11

## 2023-05-10 RX ORDER — IPRATROPIUM BROMIDE AND ALBUTEROL SULFATE 2.5; .5 MG/3ML; MG/3ML
3 SOLUTION RESPIRATORY (INHALATION) EVERY 6 HOURS PRN
Status: DISCONTINUED | OUTPATIENT
Start: 2023-05-10 | End: 2023-05-11

## 2023-05-10 NOTE — PROGRESS NOTES
Patient seen and examined. 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 Would recommend 2L to 2.5L free water restriction. Await clearance from cards. Hospitalist portion of med rec completed.     Lisbet Cordova MD  [facility]  Internal Medicine Hospitalist

## 2023-05-10 NOTE — CARDIAC REHAB
Cardiac rehab staff saw patient for HF education. Education completed with Lisa Summers and caregiver.

## 2023-05-10 NOTE — PLAN OF CARE
Patient is alert and oriented x 4. Up with assistance. Family at the bedside. Patient placed on 1L NC during the night due to desats while sleeping. Purewick in placed. Scheduled nebs treatment administered. Patient refused hydralazine due to bp 119/50. Will continue to monitor bp and hr and notify service of any acute changes. Problem: PAIN - ADULT  Goal: Verbalizes/displays adequate comfort level or patient's stated pain goal  Description: INTERVENTIONS:  - Encourage pt to monitor pain and request assistance  - Assess pain using appropriate pain scale  - Administer analgesics based on type and severity of pain and evaluate response  - Implement non-pharmacological measures as appropriate and evaluate response  - Consider cultural and social influences on pain and pain management  - Manage/alleviate anxiety  - Utilize distraction and/or relaxation techniques  - Monitor for opioid side effects  - Notify MD/LIP if interventions unsuccessful or patient reports new pain  - Anticipate increased pain with activity and pre-medicate as appropriate  Outcome: Progressing     Problem: RISK FOR INFECTION - ADULT  Goal: Absence of fever/infection during anticipated neutropenic period  Description: INTERVENTIONS  - Monitor WBC  - Administer growth factors as ordered  - Implement neutropenic guidelines  Outcome: Progressing     Problem: CARDIOVASCULAR - ADULT  Goal: Maintains optimal cardiac output and hemodynamic stability  Description: INTERVENTIONS:  - Monitor vital signs, rhythm, and trends  - Monitor for bleeding, hypotension and signs of decreased cardiac output  - Evaluate effectiveness of vasoactive medications to optimize hemodynamic stability  - Monitor arterial and/or venous puncture sites for bleeding and/or hematoma  - Assess quality of pulses, skin color and temperature  - Assess for signs of decreased coronary artery perfusion - ex.  Angina  - Evaluate fluid balance, assess for edema, trend weights  Outcome: Progressing  Goal: Absence of cardiac arrhythmias or at baseline  Description: INTERVENTIONS:  - Continuous cardiac monitoring, monitor vital signs, obtain 12 lead EKG if indicated  - Evaluate effectiveness of antiarrhythmic and heart rate control medications as ordered  - Initiate emergency measures for life threatening arrhythmias  - Monitor electrolytes and administer replacement therapy as ordered  Outcome: Progressing     Problem: RESPIRATORY - ADULT  Goal: Achieves optimal ventilation and oxygenation  Description: INTERVENTIONS:  - Assess for changes in respiratory status  - Assess for changes in mentation and behavior  - Position to facilitate oxygenation and minimize respiratory effort  - Oxygen supplementation based on oxygen saturation or ABGs  - Provide Smoking Cessation handout, if applicable  - Encourage broncho-pulmonary hygiene including cough, deep breathe, Incentive Spirometry  - Assess the need for suctioning and perform as needed  - Assess and instruct to report SOB or any respiratory difficulty  - Respiratory Therapy support as indicated  - Manage/alleviate anxiety  - Monitor for signs/symptoms of CO2 retention  Outcome: Progressing

## 2023-05-11 VITALS
WEIGHT: 190.5 LBS | DIASTOLIC BLOOD PRESSURE: 49 MMHG | TEMPERATURE: 98 F | OXYGEN SATURATION: 96 % | HEIGHT: 56 IN | HEART RATE: 73 BPM | BODY MASS INDEX: 42.85 KG/M2 | SYSTOLIC BLOOD PRESSURE: 144 MMHG | RESPIRATION RATE: 19 BRPM

## 2023-05-11 PROCEDURE — 99239 HOSP IP/OBS DSCHRG MGMT >30: CPT | Performed by: HOSPITALIST

## 2023-05-11 RX ORDER — POTASSIUM CHLORIDE 20 MEQ/1
40 TABLET, EXTENDED RELEASE ORAL ONCE
Status: COMPLETED | OUTPATIENT
Start: 2023-05-11 | End: 2023-05-11

## 2023-05-11 NOTE — PLAN OF CARE
A&OX4. Denied pain. 2L NC. Elevated bps all bp meds given. Purewick. X 1 walker. Neb treatments given. IV lasix will transition to PO. 2000ML FR. Daily weight.      Problem: PAIN - ADULT  Goal: Verbalizes/displays adequate comfort level or patient's stated pain goal  Description: INTERVENTIONS:  - Encourage pt to monitor pain and request assistance  - Assess pain using appropriate pain scale  - Administer analgesics based on type and severity of pain and evaluate response  - Implement non-pharmacological measures as appropriate and evaluate response  - Consider cultural and social influences on pain and pain management  - Manage/alleviate anxiety  - Utilize distraction and/or relaxation techniques  - Monitor for opioid side effects  - Notify MD/LIP if interventions unsuccessful or patient reports new pain  - Anticipate increased pain with activity and pre-medicate as appropriate  Outcome: Progressing

## 2023-05-11 NOTE — PLAN OF CARE
NURSING DISCHARGE NOTE    Discharged Home via Ambulance. Accompanied by Family member and Support staff  Belongings Taken by patient/family       Reviewed medications and follow up with patient and her daughter. Patient discharged to home with daughter per medicar. Douglas Riding

## 2023-05-11 NOTE — RESPIRATORY THERAPY NOTE
RT received pt on 1L NC pt states that she wear O2 prn at home when she feels she needs it. Bilaterally pt is diminished with Rhonchi and coarse cough productive GAMALIEL with a moderate amount. Pt called for PRN at [phone] stated that she felt she was having a hard time breathing. At present, she feels \"ok\" will continue to monitor.

## 2023-05-11 NOTE — PLAN OF CARE
Subjective: Patient is up in chair and eating her breakfast. She has her caregiver at the bedside. Objective: Patient is sinus rhythm on monitor. Lungs with scattered fine crackles noted on auscultation. Patient has no c/o pain    Assessment: Patient noted to have lower extremity edema, however, she states that \"I have lymphedema\". She is on room air this AM with O2 saturation of 95%    Plan: Monitor blood pressure. Increase activity level.

## 2023-05-11 NOTE — PROGRESS NOTES
Patient seen and examined. Discharge if ok with consultants. Hospitalist portion of med rec completed.     Harriet Lewis MD  [facility]  Internal Medicine Hospitalist

## 2023-05-11 NOTE — PHYSICAL THERAPY NOTE
Attempted to see Pt this PM - RN Samantha aware of attempt. Pt reported ambulating earlier with caregiver - plans to rest, in hopes of discharge later today. Will f/u later today if time permits, after all other patients are attempted per tentative schedule.

## 2023-05-11 NOTE — CM/SW NOTE
Spoke with Sera Whitt and scheduled  for 5pm today. PCS form completed. Patient is aware of cost of medicar. SW will remain available.       Sera Parent  [phone] or [facility]  Discharge Planner  [phone]

## 2023-05-12 ENCOUNTER — PATIENT OUTREACH (OUTPATIENT)
Dept: CASE MANAGEMENT | Age: 88
End: 2023-05-12

## 2023-05-12 ENCOUNTER — TELEPHONE (OUTPATIENT)
Dept: FAMILY MEDICINE CLINIC | Facility: CLINIC | Age: 88
End: 2023-05-12

## 2023-05-12 DIAGNOSIS — Z02.9 ENCOUNTERS FOR UNSPECIFIED ADMINISTRATIVE PURPOSE: ICD-10-CM

## 2023-05-12 DIAGNOSIS — I50.9 ACUTE CONGESTIVE HEART FAILURE, UNSPECIFIED HEART FAILURE TYPE (HCC): ICD-10-CM

## 2023-05-12 PROCEDURE — 1125F AMNT PAIN NOTED PAIN PRSNT: CPT

## 2023-05-12 PROCEDURE — 1111F DSCHRG MED/CURRENT MED MERGE: CPT

## 2023-05-12 NOTE — TELEPHONE ENCOUNTER
Davida Hein from [facility] is requesting verbal orders to re-certify for home health. Please advise.

## 2023-05-12 NOTE — PROGRESS NOTES
NCM spoke with patient briefly states this is not a good time to talk. Pt is requesting callback on Monday. NCM will try calling back Monday.

## 2023-05-12 NOTE — TELEPHONE ENCOUNTER
Faiza West at [facility] will fax order for skilled nursing to continue for patient. CHF is primary diagnosis. Agreed to inform Dr Adeel Adair.

## 2023-05-15 ENCOUNTER — TELEPHONE (OUTPATIENT)
Dept: FAMILY MEDICINE CLINIC | Facility: CLINIC | Age: 88
End: 2023-05-15

## 2023-05-16 ENCOUNTER — TELEPHONE (OUTPATIENT)
Dept: CARDIOLOGY CLINIC | Facility: HOSPITAL | Age: 88
End: 2023-05-16

## 2023-05-16 RX ORDER — TRAMADOL HYDROCHLORIDE 50 MG/1
50 TABLET ORAL 2 TIMES DAILY PRN
Qty: 30 TABLET | Refills: 0 | Status: ON HOLD | OUTPATIENT
Start: 2023-05-16 | End: 2023-05-19

## 2023-05-16 NOTE — TELEPHONE ENCOUNTER
Received call from [facility] cardiology/ Dr. Shashi John office to call patient and arrange for start of care in [facility]. Recent admission for heart failure. Patent is currently in hospital and [facility] will call this afternoon to arrange first visit.

## 2023-05-16 NOTE — TELEPHONE ENCOUNTER
Please schedule pt for appt with YP, preferably by 5/25/23 for [facility]- hospital f/u

## 2023-05-17 DIAGNOSIS — I10 PRIMARY HYPERTENSION: ICD-10-CM

## 2023-05-17 RX ORDER — TORSEMIDE 20 MG/1
TABLET ORAL
Qty: 180 TABLET | Refills: 2 | Status: ON HOLD | OUTPATIENT
Start: 2023-05-17

## 2023-05-18 ENCOUNTER — APPOINTMENT (OUTPATIENT)
Dept: LAB | Age: 88
End: 2023-05-18
Attending: INTERNAL MEDICINE
Payer: MEDICARE

## 2023-05-18 ENCOUNTER — LAB ENCOUNTER (OUTPATIENT)
Dept: LAB | Age: 88
End: 2023-05-18
Attending: INTERNAL MEDICINE
Payer: MEDICARE

## 2023-05-18 ENCOUNTER — HOSPITAL ENCOUNTER (OUTPATIENT)
Dept: GENERAL RADIOLOGY | Age: 88
Discharge: HOME | End: 2023-05-18
Attending: INTERNAL MEDICINE
Payer: MEDICARE

## 2023-05-18 ENCOUNTER — OFFICE VISIT (OUTPATIENT)
Facility: CLINIC | Age: 88
End: 2023-05-18
Payer: MEDICARE

## 2023-05-18 ENCOUNTER — LAB ENCOUNTER (OUTPATIENT)
Dept: LAB | Age: 88
DRG: 291 | End: 2023-05-18
Attending: INTERNAL MEDICINE
Payer: MEDICARE

## 2023-05-18 ENCOUNTER — HOSPITAL ENCOUNTER (OUTPATIENT)
Dept: GENERAL RADIOLOGY | Age: 88
Discharge: HOME | DRG: 291 | End: 2023-05-18
Attending: INTERNAL MEDICINE
Payer: MEDICARE

## 2023-05-18 ENCOUNTER — APPOINTMENT (OUTPATIENT)
Dept: LAB | Age: 88
DRG: 291 | End: 2023-05-18
Attending: INTERNAL MEDICINE
Payer: MEDICARE

## 2023-05-18 VITALS
BODY MASS INDEX: 43 KG/M2 | HEART RATE: 53 BPM | DIASTOLIC BLOOD PRESSURE: 50 MMHG | HEIGHT: 56 IN | SYSTOLIC BLOOD PRESSURE: 118 MMHG | RESPIRATION RATE: 16 BRPM | OXYGEN SATURATION: 97 %

## 2023-05-18 DIAGNOSIS — R06.02 SHORTNESS OF BREATH: ICD-10-CM

## 2023-05-18 DIAGNOSIS — R60.9 EDEMA: ICD-10-CM

## 2023-05-18 DIAGNOSIS — R60.9 EDEMA: Primary | ICD-10-CM

## 2023-05-18 DIAGNOSIS — J44.1 COPD EXACERBATION (HCC): ICD-10-CM

## 2023-05-18 DIAGNOSIS — I10 ESSENTIAL HYPERTENSION, MALIGNANT: ICD-10-CM

## 2023-05-18 DIAGNOSIS — J45.50 SEVERE PERSISTENT ASTHMA WITHOUT COMPLICATION: Primary | ICD-10-CM

## 2023-05-18 LAB
ANION GAP SERPL CALC-SCNC: 7 MMOL/L (ref 0–18)
BUN BLD-MCNC: 38 MG/DL (ref 7–18)
CALCIUM BLD-MCNC: 9.2 MG/DL (ref 8.5–10.1)
CHLORIDE SERPL-SCNC: 87 MMOL/L (ref 98–112)
CO2 SERPL-SCNC: 26 MMOL/L (ref 21–32)
CREAT BLD-MCNC: 1.23 MG/DL
FASTING STATUS PATIENT QL REPORTED: NO
GFR SERPLBLD BASED ON 1.73 SQ M-ARVRAT: 42 ML/MIN/1.73M2 (ref 60–?)
GLUCOSE BLD-MCNC: 99 MG/DL (ref 70–99)
OSMOLALITY SERPL CALC.SUM OF ELEC: 259 MOSM/KG (ref 275–295)
POTASSIUM SERPL-SCNC: 4.7 MMOL/L (ref 3.5–5.1)
SODIUM SERPL-SCNC: 120 MMOL/L (ref 136–145)

## 2023-05-18 PROCEDURE — 80048 BASIC METABOLIC PNL TOTAL CA: CPT

## 2023-05-18 PROCEDURE — 71046 X-RAY EXAM CHEST 2 VIEWS: CPT | Performed by: INTERNAL MEDICINE

## 2023-05-18 PROCEDURE — 36415 COLL VENOUS BLD VENIPUNCTURE: CPT

## 2023-05-18 PROCEDURE — 99214 OFFICE O/P EST MOD 30 MIN: CPT | Performed by: NURSE PRACTITIONER

## 2023-05-18 RX ORDER — FLUTICASONE PROPIONATE AND SALMETEROL XINAFOATE 230; 21 UG/1; UG/1
AEROSOL, METERED RESPIRATORY (INHALATION)
Status: ON HOLD | COMMUNITY
Start: 2022-11-01 | End: 2023-05-19

## 2023-05-18 RX ORDER — BUDESONIDE 0.5 MG/2ML
0.5 INHALANT ORAL 2 TIMES DAILY
Qty: 360 ML | Refills: 3 | Status: ON HOLD | OUTPATIENT
Start: 2023-05-18 | End: 2023-05-19

## 2023-05-18 RX ORDER — ARFORMOTEROL TARTRATE 15 UG/2ML
15 SOLUTION RESPIRATORY (INHALATION) 2 TIMES DAILY
Qty: 2 ML | Refills: 0 | Status: ON HOLD | OUTPATIENT
Start: 2023-05-18

## 2023-05-18 RX ORDER — DESOXIMETASONE 2.5 MG/G
CREAM TOPICAL
Status: ON HOLD | COMMUNITY
Start: 2022-11-01

## 2023-05-18 NOTE — PATIENT INSTRUCTIONS
Followup with Dr Theo Bey in 4 weeks   Continue current treatment for   I will look into the nebulizer  Please contact office at [phone] with any decline in respiratory status, questions or concerns

## 2023-05-19 ENCOUNTER — APPOINTMENT (OUTPATIENT)
Dept: GENERAL RADIOLOGY | Facility: HOSPITAL | Age: 88
DRG: 291 | End: 2023-05-19
Attending: EMERGENCY MEDICINE
Payer: MEDICARE

## 2023-05-19 ENCOUNTER — HOSPITAL ENCOUNTER (INPATIENT)
Facility: HOSPITAL | Age: 88
LOS: 6 days | Discharge: HOME | End: 2023-05-25
Attending: EMERGENCY MEDICINE | Admitting: HOSPITALIST
Payer: MEDICARE

## 2023-05-19 ENCOUNTER — APPOINTMENT (OUTPATIENT)
Dept: GENERAL RADIOLOGY | Facility: HOSPITAL | Age: 88
End: 2023-05-19
Attending: EMERGENCY MEDICINE
Payer: MEDICARE

## 2023-05-19 ENCOUNTER — HOSPITAL ENCOUNTER (INPATIENT)
Facility: HOSPITAL | Age: 88
LOS: 6 days | Discharge: HOME | DRG: 291 | End: 2023-05-25
Attending: EMERGENCY MEDICINE | Admitting: HOSPITALIST
Payer: MEDICARE

## 2023-05-19 DIAGNOSIS — E87.1 HYPONATREMIA: Primary | ICD-10-CM

## 2023-05-19 DIAGNOSIS — I10 PRIMARY HYPERTENSION: ICD-10-CM

## 2023-05-19 PROBLEM — J44.9 COPD (CHRONIC OBSTRUCTIVE PULMONARY DISEASE) (HCC): Status: ACTIVE | Noted: 2023-03-06

## 2023-05-19 PROBLEM — I44.7 LBBB (LEFT BUNDLE BRANCH BLOCK): Chronic | Status: ACTIVE | Noted: 2023-05-19

## 2023-05-19 LAB
ALBUMIN SERPL-MCNC: 3.3 G/DL (ref 3.4–5)
ALBUMIN/GLOB SERPL: 1.1 {RATIO} (ref 1–2)
ALP LIVER SERPL-CCNC: 65 U/L
ALT SERPL-CCNC: 25 U/L
ANION GAP SERPL CALC-SCNC: 6 MMOL/L (ref 0–18)
ANION GAP SERPL CALC-SCNC: 7 MMOL/L (ref 0–18)
AST SERPL-CCNC: 25 U/L (ref 15–37)
BASOPHILS # BLD AUTO: 0.05 X10(3) UL (ref 0–0.2)
BASOPHILS NFR BLD AUTO: 0.6 %
BILIRUB SERPL-MCNC: 0.5 MG/DL (ref 0.1–2)
BILIRUB UR QL STRIP.AUTO: NEGATIVE
BUN BLD-MCNC: 31 MG/DL (ref 7–18)
BUN BLD-MCNC: 34 MG/DL (ref 7–18)
CALCIUM BLD-MCNC: 8.9 MG/DL (ref 8.5–10.1)
CALCIUM BLD-MCNC: 9 MG/DL (ref 8.5–10.1)
CHLORIDE SERPL-SCNC: 84 MMOL/L (ref 98–112)
CHLORIDE SERPL-SCNC: 88 MMOL/L (ref 98–112)
CLARITY UR REFRACT.AUTO: CLEAR
CO2 SERPL-SCNC: 28 MMOL/L (ref 21–32)
CO2 SERPL-SCNC: 29 MMOL/L (ref 21–32)
COLOR UR AUTO: YELLOW
CREAT BLD-MCNC: 0.94 MG/DL
CREAT BLD-MCNC: 1.05 MG/DL
EOSINOPHIL # BLD AUTO: 0.09 X10(3) UL (ref 0–0.7)
EOSINOPHIL NFR BLD AUTO: 1.2 %
ERYTHROCYTE [DISTWIDTH] IN BLOOD BY AUTOMATED COUNT: 12.4 %
GFR SERPLBLD BASED ON 1.73 SQ M-ARVRAT: 50 ML/MIN/1.73M2 (ref 60–?)
GFR SERPLBLD BASED ON 1.73 SQ M-ARVRAT: 58 ML/MIN/1.73M2 (ref 60–?)
GLOBULIN PLAS-MCNC: 2.9 G/DL (ref 2.8–4.4)
GLUCOSE BLD-MCNC: 104 MG/DL (ref 70–99)
GLUCOSE BLD-MCNC: 168 MG/DL (ref 70–99)
GLUCOSE UR STRIP.AUTO-MCNC: NEGATIVE MG/DL
HCT VFR BLD AUTO: 36.8 %
HGB BLD-MCNC: 12.7 G/DL
IMM GRANULOCYTES # BLD AUTO: 0.03 X10(3) UL (ref 0–1)
IMM GRANULOCYTES NFR BLD: 0.4 %
KETONES UR STRIP.AUTO-MCNC: NEGATIVE MG/DL
LYMPHOCYTES # BLD AUTO: 1.37 X10(3) UL (ref 1–4)
LYMPHOCYTES NFR BLD AUTO: 17.6 %
MAGNESIUM SERPL-MCNC: 2.1 MG/DL (ref 1.6–2.6)
MCH RBC QN AUTO: 33.4 PG (ref 26–34)
MCHC RBC AUTO-ENTMCNC: 34.5 G/DL (ref 31–37)
MCV RBC AUTO: 96.8 FL
MONOCYTES # BLD AUTO: 0.71 X10(3) UL (ref 0.1–1)
MONOCYTES NFR BLD AUTO: 9.1 %
NEUTROPHILS # BLD AUTO: 5.54 X10 (3) UL (ref 1.5–7.7)
NEUTROPHILS # BLD AUTO: 5.54 X10(3) UL (ref 1.5–7.7)
NEUTROPHILS NFR BLD AUTO: 71.1 %
NITRITE UR QL STRIP.AUTO: NEGATIVE
OSMOLALITY SERPL CALC.SUM OF ELEC: 256 MOSM/KG (ref 275–295)
OSMOLALITY SERPL CALC.SUM OF ELEC: 266 MOSM/KG (ref 275–295)
OSMOLALITY UR: 200 MOSM/KG (ref 300–1300)
PH UR STRIP.AUTO: 8 [PH] (ref 5–8)
PLATELET # BLD AUTO: 274 10(3)UL (ref 150–450)
POTASSIUM SERPL-SCNC: 4.4 MMOL/L (ref 3.5–5.1)
POTASSIUM SERPL-SCNC: 4.4 MMOL/L (ref 3.5–5.1)
PROT SERPL-MCNC: 6.2 G/DL (ref 6.4–8.2)
PROT UR STRIP.AUTO-MCNC: NEGATIVE MG/DL
RBC # BLD AUTO: 3.8 X10(6)UL
RBC UR QL AUTO: NEGATIVE
SODIUM SERPL-SCNC: 119 MMOL/L (ref 136–145)
SODIUM SERPL-SCNC: 123 MMOL/L (ref 136–145)
SODIUM SERPL-SCNC: 53 MMOL/L
SP GR UR STRIP.AUTO: 1 (ref 1–1.03)
T4 FREE SERPL-MCNC: 1.4 NG/DL (ref 0.8–1.7)
TSI SER-ACNC: 3.91 MIU/ML (ref 0.36–3.74)
UROBILINOGEN UR STRIP.AUTO-MCNC: <2 MG/DL
WBC # BLD AUTO: 7.8 X10(3) UL (ref 4–11)

## 2023-05-19 PROCEDURE — 99223 1ST HOSP IP/OBS HIGH 75: CPT | Performed by: HOSPITALIST

## 2023-05-19 PROCEDURE — 99223 1ST HOSP IP/OBS HIGH 75: CPT | Performed by: INTERNAL MEDICINE

## 2023-05-19 PROCEDURE — 71045 X-RAY EXAM CHEST 1 VIEW: CPT | Performed by: EMERGENCY MEDICINE

## 2023-05-19 RX ORDER — ONDANSETRON 2 MG/ML
4 INJECTION INTRAMUSCULAR; INTRAVENOUS EVERY 6 HOURS PRN
Status: DISCONTINUED | OUTPATIENT
Start: 2023-05-19 | End: 2023-05-25

## 2023-05-19 RX ORDER — FUROSEMIDE 10 MG/ML
40 INJECTION INTRAMUSCULAR; INTRAVENOUS ONCE
Status: COMPLETED | OUTPATIENT
Start: 2023-05-19 | End: 2023-05-19

## 2023-05-19 RX ORDER — ENOXAPARIN SODIUM 100 MG/ML
40 INJECTION SUBCUTANEOUS DAILY
Status: DISCONTINUED | OUTPATIENT
Start: 2023-05-19 | End: 2023-05-25

## 2023-05-19 RX ORDER — BUDESONIDE 0.5 MG/2ML
0.5 INHALANT ORAL 2 TIMES DAILY
COMMUNITY
Start: 2023-05-19

## 2023-05-19 RX ORDER — HYDRALAZINE HYDROCHLORIDE 25 MG/1
25 TABLET, FILM COATED ORAL 2 TIMES DAILY
Status: DISCONTINUED | OUTPATIENT
Start: 2023-05-19 | End: 2023-05-25

## 2023-05-19 RX ORDER — LEVOTHYROXINE SODIUM 0.12 MG/1
125 TABLET ORAL
Status: DISCONTINUED | OUTPATIENT
Start: 2023-05-19 | End: 2023-05-25

## 2023-05-19 RX ORDER — ACETAMINOPHEN 500 MG
500 TABLET ORAL EVERY 4 HOURS PRN
Status: DISCONTINUED | OUTPATIENT
Start: 2023-05-19 | End: 2023-05-25

## 2023-05-19 RX ORDER — METOCLOPRAMIDE HYDROCHLORIDE 5 MG/ML
10 INJECTION INTRAMUSCULAR; INTRAVENOUS EVERY 8 HOURS PRN
Status: DISCONTINUED | OUTPATIENT
Start: 2023-05-19 | End: 2023-05-25

## 2023-05-19 RX ORDER — BUDESONIDE 0.5 MG/2ML
0.5 INHALANT ORAL 2 TIMES DAILY
Status: DISCONTINUED | OUTPATIENT
Start: 2023-05-19 | End: 2023-05-25

## 2023-05-19 RX ORDER — MELATONIN
3 NIGHTLY PRN
Status: DISCONTINUED | OUTPATIENT
Start: 2023-05-19 | End: 2023-05-25

## 2023-05-19 RX ORDER — FUROSEMIDE 10 MG/ML
40 INJECTION INTRAMUSCULAR; INTRAVENOUS
Status: DISCONTINUED | OUTPATIENT
Start: 2023-05-19 | End: 2023-05-22

## 2023-05-19 RX ORDER — FLUTICASONE FUROATE AND VILANTEROL 200; 25 UG/1; UG/1
1 POWDER RESPIRATORY (INHALATION) DAILY
Status: DISCONTINUED | OUTPATIENT
Start: 2023-05-19 | End: 2023-05-25

## 2023-05-19 RX ORDER — LOSARTAN POTASSIUM 50 MG/1
50 TABLET ORAL 2 TIMES DAILY
Status: DISCONTINUED | OUTPATIENT
Start: 2023-05-19 | End: 2023-05-25

## 2023-05-19 RX ORDER — PREDNISONE 5 MG/1
5 TABLET ORAL DAILY
Status: DISCONTINUED | OUTPATIENT
Start: 2023-05-19 | End: 2023-05-25

## 2023-05-19 RX ORDER — ARFORMOTEROL TARTRATE 15 UG/2ML
15 SOLUTION RESPIRATORY (INHALATION) 2 TIMES DAILY
Status: DISCONTINUED | OUTPATIENT
Start: 2023-05-19 | End: 2023-05-25

## 2023-05-19 RX ORDER — SODIUM CHLORIDE 9 MG/ML
75 INJECTION, SOLUTION INTRAVENOUS CONTINUOUS
Status: DISCONTINUED | OUTPATIENT
Start: 2023-05-19 | End: 2023-05-19

## 2023-05-19 RX ORDER — IPRATROPIUM BROMIDE AND ALBUTEROL SULFATE 2.5; .5 MG/3ML; MG/3ML
3 SOLUTION RESPIRATORY (INHALATION)
Status: DISCONTINUED | OUTPATIENT
Start: 2023-05-19 | End: 2023-05-20

## 2023-05-19 NOTE — ED QUICK NOTES
Orders for admission, patient is aware of plan and ready to go upstairs.  Any questions, please call ED JOELLEN rubio at extension [phone]    Patient Covid vaccination status: Fully vaccinated     COVID Test Ordered in ED: None    COVID Suspicion at Admission: N/A    Running Infusions:  None    Mental Status/LOC at time of transport: aox3    Other pertinent information:   CIWA score: N/A   NIH score:  N/A

## 2023-05-20 PROBLEM — I50.9 ACUTE ON CHRONIC CONGESTIVE HEART FAILURE (HCC): Status: ACTIVE | Noted: 2023-05-05

## 2023-05-20 LAB
ANION GAP SERPL CALC-SCNC: 5 MMOL/L (ref 0–18)
ANION GAP SERPL CALC-SCNC: 7 MMOL/L (ref 0–18)
ATRIAL RATE: 61 BPM
BUN BLD-MCNC: 29 MG/DL (ref 7–18)
BUN BLD-MCNC: 31 MG/DL (ref 7–18)
CALCIUM BLD-MCNC: 8.9 MG/DL (ref 8.5–10.1)
CALCIUM BLD-MCNC: 9 MG/DL (ref 8.5–10.1)
CHLORIDE SERPL-SCNC: 89 MMOL/L (ref 98–112)
CHLORIDE SERPL-SCNC: 91 MMOL/L (ref 98–112)
CO2 SERPL-SCNC: 27 MMOL/L (ref 21–32)
CO2 SERPL-SCNC: 30 MMOL/L (ref 21–32)
CREAT BLD-MCNC: 0.69 MG/DL
CREAT BLD-MCNC: 0.87 MG/DL
GFR SERPLBLD BASED ON 1.73 SQ M-ARVRAT: 63 ML/MIN/1.73M2 (ref 60–?)
GFR SERPLBLD BASED ON 1.73 SQ M-ARVRAT: 82 ML/MIN/1.73M2 (ref 60–?)
GLUCOSE BLD-MCNC: 79 MG/DL (ref 70–99)
GLUCOSE BLD-MCNC: 99 MG/DL (ref 70–99)
MAGNESIUM SERPL-MCNC: 2 MG/DL (ref 1.6–2.6)
OSMOLALITY SERPL CALC.SUM OF ELEC: 265 MOSM/KG (ref 275–295)
OSMOLALITY SERPL CALC.SUM OF ELEC: 265 MOSM/KG (ref 275–295)
P AXIS: 44 DEGREES
P-R INTERVAL: 170 MS
POTASSIUM SERPL-SCNC: 4 MMOL/L (ref 3.5–5.1)
POTASSIUM SERPL-SCNC: 4.1 MMOL/L (ref 3.5–5.1)
Q-T INTERVAL: 496 MS
QRS DURATION: 154 MS
QTC CALCULATION (BEZET): 499 MS
R AXIS: -36 DEGREES
SODIUM SERPL-SCNC: 124 MMOL/L (ref 136–145)
SODIUM SERPL-SCNC: 125 MMOL/L (ref 136–145)
T AXIS: 108 DEGREES
VENTRICULAR RATE: 61 BPM

## 2023-05-20 PROCEDURE — 99233 SBSQ HOSP IP/OBS HIGH 50: CPT | Performed by: STUDENT IN AN ORGANIZED HEALTH CARE EDUCATION/TRAINING PROGRAM

## 2023-05-20 PROCEDURE — 99233 SBSQ HOSP IP/OBS HIGH 50: CPT | Performed by: INTERNAL MEDICINE

## 2023-05-20 RX ORDER — IPRATROPIUM BROMIDE AND ALBUTEROL SULFATE 2.5; .5 MG/3ML; MG/3ML
3 SOLUTION RESPIRATORY (INHALATION) 3 TIMES DAILY
Status: DISCONTINUED | OUTPATIENT
Start: 2023-05-20 | End: 2023-05-25

## 2023-05-20 RX ORDER — TOLVAPTAN 15 MG/1
15 TABLET ORAL ONCE
Status: COMPLETED | OUTPATIENT
Start: 2023-05-20 | End: 2023-05-20

## 2023-05-20 RX ORDER — IPRATROPIUM BROMIDE AND ALBUTEROL SULFATE 2.5; .5 MG/3ML; MG/3ML
3 SOLUTION RESPIRATORY (INHALATION) EVERY 4 HOURS PRN
Status: DISCONTINUED | OUTPATIENT
Start: 2023-05-20 | End: 2023-05-25

## 2023-05-20 NOTE — PLAN OF CARE
The patient is A/Ox4  Currently on RA  Tele - SB/NSR  Vitals Stable  Afebrile  C/O of BLE pain, improved with PRN Tylenol per MAR. BLE edema  On IV Lasix  Na is trending up. Low Na diet, 1.2 L FR, DW  Renal and cards are following  Family is at the bedside. On ABX per [facility]  Safety precautions in place. Staff will continue to monitor. Problem: PAIN - ADULT  Goal: Verbalizes/displays adequate comfort level or patient's stated pain goal  Description: INTERVENTIONS:  - Encourage pt to monitor pain and request assistance  - Assess pain using appropriate pain scale  - Administer analgesics based on type and severity of pain and evaluate response  - Implement non-pharmacological measures as appropriate and evaluate response  - Consider cultural and social influences on pain and pain management  - Manage/alleviate anxiety  - Utilize distraction and/or relaxation techniques  - Monitor for opioid side effects  - Notify MD/LIP if interventions unsuccessful or patient reports new pain  - Anticipate increased pain with activity and pre-medicate as appropriate  Outcome: Progressing     Problem: SAFETY ADULT - FALL  Goal: Free from fall injury  Description: INTERVENTIONS:  - Assess pt frequently for physical needs  - Identify cognitive and physical deficits and behaviors that affect risk of falls.   - [place] fall precautions as indicated by assessment.  - Educate pt/family on patient safety including physical limitations  - Instruct pt to call for assistance with activity based on assessment  - Modify environment to reduce risk of injury  - Provide assistive devices as appropriate  - Consider OT/PT consult to assist with strengthening/mobility  - Encourage toileting schedule  Outcome: Progressing     Problem: RESPIRATORY - ADULT  Goal: Achieves optimal ventilation and oxygenation  Description: INTERVENTIONS:  - Assess for changes in respiratory status  - Assess for changes in mentation and behavior  - Position to facilitate oxygenation and minimize respiratory effort  - Oxygen supplementation based on oxygen saturation or ABGs  - Provide Smoking Cessation handout, if applicable  - Encourage broncho-pulmonary hygiene including cough, deep breathe, Incentive Spirometry  - Assess the need for suctioning and perform as needed  - Assess and instruct to report SOB or any respiratory difficulty  - Respiratory Therapy support as indicated  - Manage/alleviate anxiety  - Monitor for signs/symptoms of CO2 retention  Outcome: Progressing

## 2023-05-20 NOTE — PLAN OF CARE
Assumed care at [address] [facility]. No acute distress noted. Daughter at bedside. A&Ox4. Room air, . NSR. Cardiac diet, 2000g Na+ restriction, 1200mL fluid restriction. Intake and output, daily weight. Incontinent, purewick and brief in place. Medicated per MAR. C/o neck pain. No n/v/d. Patient and daughter updated on POC. Safety precautions in place. All needs met.

## 2023-05-20 NOTE — RESPIRATORY THERAPY NOTE
Pt 100% on 1L N/C Duoneb/Pulmicort given BID pt refusing Breo inhaler and Brovana nebs b/s dim renee

## 2023-05-21 LAB
ANION GAP SERPL CALC-SCNC: 7 MMOL/L (ref 0–18)
BUN BLD-MCNC: 32 MG/DL (ref 7–18)
CALCIUM BLD-MCNC: 9 MG/DL (ref 8.5–10.1)
CHLORIDE SERPL-SCNC: 95 MMOL/L (ref 98–112)
CO2 SERPL-SCNC: 28 MMOL/L (ref 21–32)
CREAT BLD-MCNC: 0.96 MG/DL
GFR SERPLBLD BASED ON 1.73 SQ M-ARVRAT: 56 ML/MIN/1.73M2 (ref 60–?)
GLUCOSE BLD-MCNC: 91 MG/DL (ref 70–99)
MAGNESIUM SERPL-MCNC: 2.1 MG/DL (ref 1.6–2.6)
OSMOLALITY SERPL CALC.SUM OF ELEC: 276 MOSM/KG (ref 275–295)
POTASSIUM SERPL-SCNC: 4 MMOL/L (ref 3.5–5.1)
SODIUM SERPL-SCNC: 130 MMOL/L (ref 136–145)

## 2023-05-21 PROCEDURE — 99233 SBSQ HOSP IP/OBS HIGH 50: CPT | Performed by: INTERNAL MEDICINE

## 2023-05-21 PROCEDURE — 99233 SBSQ HOSP IP/OBS HIGH 50: CPT | Performed by: STUDENT IN AN ORGANIZED HEALTH CARE EDUCATION/TRAINING PROGRAM

## 2023-05-21 RX ORDER — TOLVAPTAN 15 MG/1
15 TABLET ORAL ONCE
Status: COMPLETED | OUTPATIENT
Start: 2023-05-21 | End: 2023-05-21

## 2023-05-21 NOTE — PLAN OF CARE
The patient is A/Ox 4  On RA, denies SOB  Tele - NSR/SB  Vitals Stable  Afebrile  No c/o of pain today. Na is 130. On IV Lasix. PT eval is pending. DC planning. Daughter is at the bedside. Safety precautions in place. Staff will continue to monitor. Problem: PAIN - ADULT  Goal: Verbalizes/displays adequate comfort level or patient's stated pain goal  Description: INTERVENTIONS:  - Encourage pt to monitor pain and request assistance  - Assess pain using appropriate pain scale  - Administer analgesics based on type and severity of pain and evaluate response  - Implement non-pharmacological measures as appropriate and evaluate response  - Consider cultural and social influences on pain and pain management  - Manage/alleviate anxiety  - Utilize distraction and/or relaxation techniques  - Monitor for opioid side effects  - Notify MD/LIP if interventions unsuccessful or patient reports new pain  - Anticipate increased pain with activity and pre-medicate as appropriate  Outcome: Progressing     Problem: SAFETY ADULT - FALL  Goal: Free from fall injury  Description: INTERVENTIONS:  - Assess pt frequently for physical needs  - Identify cognitive and physical deficits and behaviors that affect risk of falls.   - [place] fall precautions as indicated by assessment.  - Educate pt/family on patient safety including physical limitations  - Instruct pt to call for assistance with activity based on assessment  - Modify environment to reduce risk of injury  - Provide assistive devices as appropriate  - Consider OT/PT consult to assist with strengthening/mobility  - Encourage toileting schedule  Outcome: Progressing     Problem: RESPIRATORY - ADULT  Goal: Achieves optimal ventilation and oxygenation  Description: INTERVENTIONS:  - Assess for changes in respiratory status  - Assess for changes in mentation and behavior  - Position to facilitate oxygenation and minimize respiratory effort  - Oxygen supplementation based on oxygen saturation or ABGs  - Provide Smoking Cessation handout, if applicable  - Encourage broncho-pulmonary hygiene including cough, deep breathe, Incentive Spirometry  - Assess the need for suctioning and perform as needed  - Assess and instruct to report SOB or any respiratory difficulty  - Respiratory Therapy support as indicated  - Manage/alleviate anxiety  - Monitor for signs/symptoms of CO2 retention  Outcome: Progressing

## 2023-05-21 NOTE — PLAN OF CARE
Pt is A&O x4. Pleasant. VSS- NSR/SB on tele. RA- mild exp wheezing. Pt denies SOB. PRN tylenol given for pain. PIV is SL. +2 BLE pitting edema. Daily weight. Cardiac diet with Na restriction and 1200mL fluid restriction. Pt up with 1 assist and walker. Purewick and brief for mixed incontinence. Neph and cards following case. Problem: PAIN - ADULT  Goal: Verbalizes/displays adequate comfort level or patient's stated pain goal  Description: INTERVENTIONS:  - Encourage pt to monitor pain and request assistance  - Assess pain using appropriate pain scale  - Administer analgesics based on type and severity of pain and evaluate response  - Implement non-pharmacological measures as appropriate and evaluate response  - Consider cultural and social influences on pain and pain management  - Manage/alleviate anxiety  - Utilize distraction and/or relaxation techniques  - Monitor for opioid side effects  - Notify MD/LIP if interventions unsuccessful or patient reports new pain  - Anticipate increased pain with activity and pre-medicate as appropriate  Outcome: Progressing     Problem: SAFETY ADULT - FALL  Goal: Free from fall injury  Description: INTERVENTIONS:  - Assess pt frequently for physical needs  - Identify cognitive and physical deficits and behaviors that affect risk of falls.   - [place] fall precautions as indicated by assessment.  - Educate pt/family on patient safety including physical limitations  - Instruct pt to call for assistance with activity based on assessment  - Modify environment to reduce risk of injury  - Provide assistive devices as appropriate  - Consider OT/PT consult to assist with strengthening/mobility  - Encourage toileting schedule  Outcome: Progressing     Problem: RESPIRATORY - ADULT  Goal: Achieves optimal ventilation and oxygenation  Description: INTERVENTIONS:  - Assess for changes in respiratory status  - Assess for changes in mentation and behavior  - Position to facilitate oxygenation and minimize respiratory effort  - Oxygen supplementation based on oxygen saturation or ABGs  - Provide Smoking Cessation handout, if applicable  - Encourage broncho-pulmonary hygiene including cough, deep breathe, Incentive Spirometry  - Assess the need for suctioning and perform as needed  - Assess and instruct to report SOB or any respiratory difficulty  - Respiratory Therapy support as indicated  - Manage/alleviate anxiety  - Monitor for signs/symptoms of CO2 retention  Outcome: Progressing

## 2023-05-22 LAB
ANION GAP SERPL CALC-SCNC: 6 MMOL/L (ref 0–18)
BUN BLD-MCNC: 34 MG/DL (ref 7–18)
CALCIUM BLD-MCNC: 9.1 MG/DL (ref 8.5–10.1)
CHLORIDE SERPL-SCNC: 99 MMOL/L (ref 98–112)
CO2 SERPL-SCNC: 28 MMOL/L (ref 21–32)
CREAT BLD-MCNC: 0.98 MG/DL
GFR SERPLBLD BASED ON 1.73 SQ M-ARVRAT: 55 ML/MIN/1.73M2 (ref 60–?)
GLUCOSE BLD-MCNC: 89 MG/DL (ref 70–99)
MAGNESIUM SERPL-MCNC: 2.3 MG/DL (ref 1.6–2.6)
OSMOLALITY SERPL CALC.SUM OF ELEC: 283 MOSM/KG (ref 275–295)
POTASSIUM SERPL-SCNC: 4.1 MMOL/L (ref 3.5–5.1)
SODIUM SERPL-SCNC: 133 MMOL/L (ref 136–145)

## 2023-05-22 PROCEDURE — 99233 SBSQ HOSP IP/OBS HIGH 50: CPT | Performed by: INTERNAL MEDICINE

## 2023-05-22 PROCEDURE — 99232 SBSQ HOSP IP/OBS MODERATE 35: CPT | Performed by: STUDENT IN AN ORGANIZED HEALTH CARE EDUCATION/TRAINING PROGRAM

## 2023-05-22 RX ORDER — FUROSEMIDE 10 MG/ML
80 INJECTION INTRAMUSCULAR; INTRAVENOUS
Status: DISCONTINUED | OUTPATIENT
Start: 2023-05-22 | End: 2023-05-23

## 2023-05-22 RX ORDER — FUROSEMIDE 10 MG/ML
80 INJECTION INTRAMUSCULAR; INTRAVENOUS ONCE
Status: COMPLETED | OUTPATIENT
Start: 2023-05-22 | End: 2023-05-22

## 2023-05-22 RX ORDER — CEPHALEXIN 500 MG/1
500 CAPSULE ORAL 3 TIMES DAILY
Qty: 12 CAPSULE | Refills: 0 | Status: SHIPPED | OUTPATIENT
Start: 2023-05-23 | End: 2023-05-22

## 2023-05-22 NOTE — PLAN OF CARE
Assumed care at 0730  A/O x4  RA  NSR / SB on tele  VSS  Daily standing weight  IV diuresis  BLE swelling - see flowsheets. Denies SOB. Low sodium diet / 1500 ml fluid restriction  Denies pain  Purewick  Up SBA with walker  R PIV saline locked  All needs met. Pt and family updated on POC. Will continue to monitor. Problem: PAIN - ADULT  Goal: Verbalizes/displays adequate comfort level or patient's stated pain goal  Description: INTERVENTIONS:  - Encourage pt to monitor pain and request assistance  - Assess pain using appropriate pain scale  - Administer analgesics based on type and severity of pain and evaluate response  - Implement non-pharmacological measures as appropriate and evaluate response  - Consider cultural and social influences on pain and pain management  - Manage/alleviate anxiety  - Utilize distraction and/or relaxation techniques  - Monitor for opioid side effects  - Notify MD/LIP if interventions unsuccessful or patient reports new pain  - Anticipate increased pain with activity and pre-medicate as appropriate  Outcome: Progressing     Problem: SAFETY ADULT - FALL  Goal: Free from fall injury  Description: INTERVENTIONS:  - Assess pt frequently for physical needs  - Identify cognitive and physical deficits and behaviors that affect risk of falls.   - [place] fall precautions as indicated by assessment.  - Educate pt/family on patient safety including physical limitations  - Instruct pt to call for assistance with activity based on assessment  - Modify environment to reduce risk of injury  - Provide assistive devices as appropriate  - Consider OT/PT consult to assist with strengthening/mobility  - Encourage toileting schedule  Outcome: Progressing     Problem: RESPIRATORY - ADULT  Goal: Achieves optimal ventilation and oxygenation  Description: INTERVENTIONS:  - Assess for changes in respiratory status  - Assess for changes in mentation and behavior  - Position to facilitate oxygenation and minimize respiratory effort  - Oxygen supplementation based on oxygen saturation or ABGs  - Provide Smoking Cessation handout, if applicable  - Encourage broncho-pulmonary hygiene including cough, deep breathe, Incentive Spirometry  - Assess the need for suctioning and perform as needed  - Assess and instruct to report SOB or any respiratory difficulty  - Respiratory Therapy support as indicated  - Manage/alleviate anxiety  - Monitor for signs/symptoms of CO2 retention  Outcome: Progressing

## 2023-05-22 NOTE — PLAN OF CARE
Pt is aox4. Sitting in chair at shift change. Medicated per orders. Tylenol given for neck pain. IV saline locked. Room air. Daily weight. Purewick. Problem: PAIN - ADULT  Goal: Verbalizes/displays adequate comfort level or patient's stated pain goal  Description: INTERVENTIONS:  - Encourage pt to monitor pain and request assistance  - Assess pain using appropriate pain scale  - Administer analgesics based on type and severity of pain and evaluate response  - Implement non-pharmacological measures as appropriate and evaluate response  - Consider cultural and social influences on pain and pain management  - Manage/alleviate anxiety  - Utilize distraction and/or relaxation techniques  - Monitor for opioid side effects  - Notify MD/LIP if interventions unsuccessful or patient reports new pain  - Anticipate increased pain with activity and pre-medicate as appropriate  Outcome: Progressing     Problem: SAFETY ADULT - FALL  Goal: Free from fall injury  Description: INTERVENTIONS:  - Assess pt frequently for physical needs  - Identify cognitive and physical deficits and behaviors that affect risk of falls.   - [place] fall precautions as indicated by assessment.  - Educate pt/family on patient safety including physical limitations  - Instruct pt to call for assistance with activity based on assessment  - Modify environment to reduce risk of injury  - Provide assistive devices as appropriate  - Consider OT/PT consult to assist with strengthening/mobility  - Encourage toileting schedule  Outcome: Progressing     Problem: RESPIRATORY - ADULT  Goal: Achieves optimal ventilation and oxygenation  Description: INTERVENTIONS:  - Assess for changes in respiratory status  - Assess for changes in mentation and behavior  - Position to facilitate oxygenation and minimize respiratory effort  - Oxygen supplementation based on oxygen saturation or ABGs  - Provide Smoking Cessation handout, if applicable  - Encourage broncho-pulmonary hygiene including cough, deep breathe, Incentive Spirometry  - Assess the need for suctioning and perform as needed  - Assess and instruct to report SOB or any respiratory difficulty  - Respiratory Therapy support as indicated  - Manage/alleviate anxiety  - Monitor for signs/symptoms of CO2 retention  Outcome: Progressing

## 2023-05-23 LAB
ANION GAP SERPL CALC-SCNC: 7 MMOL/L (ref 0–18)
BUN BLD-MCNC: 44 MG/DL (ref 7–18)
CALCIUM BLD-MCNC: 9.3 MG/DL (ref 8.5–10.1)
CHLORIDE SERPL-SCNC: 97 MMOL/L (ref 98–112)
CO2 SERPL-SCNC: 28 MMOL/L (ref 21–32)
CREAT BLD-MCNC: 1.01 MG/DL
GFR SERPLBLD BASED ON 1.73 SQ M-ARVRAT: 53 ML/MIN/1.73M2 (ref 60–?)
GLUCOSE BLD-MCNC: 84 MG/DL (ref 70–99)
OSMOLALITY SERPL CALC.SUM OF ELEC: 284 MOSM/KG (ref 275–295)
POTASSIUM SERPL-SCNC: 3.7 MMOL/L (ref 3.5–5.1)
SODIUM SERPL-SCNC: 132 MMOL/L (ref 136–145)

## 2023-05-23 PROCEDURE — 99232 SBSQ HOSP IP/OBS MODERATE 35: CPT | Performed by: STUDENT IN AN ORGANIZED HEALTH CARE EDUCATION/TRAINING PROGRAM

## 2023-05-23 PROCEDURE — 99233 SBSQ HOSP IP/OBS HIGH 50: CPT | Performed by: INTERNAL MEDICINE

## 2023-05-23 RX ORDER — TORSEMIDE 20 MG/1
20 TABLET ORAL
Status: DISCONTINUED | OUTPATIENT
Start: 2023-05-23 | End: 2023-05-23

## 2023-05-23 RX ORDER — HYDRALAZINE HYDROCHLORIDE 25 MG/1
25 TABLET, FILM COATED ORAL 2 TIMES DAILY
Qty: 180 TABLET | Refills: 1 | Status: SHIPPED | OUTPATIENT
Start: 2023-05-23

## 2023-05-23 RX ORDER — TORSEMIDE 20 MG/1
40 TABLET ORAL
Status: DISCONTINUED | OUTPATIENT
Start: 2023-05-23 | End: 2023-05-25

## 2023-05-23 RX ORDER — TORSEMIDE 20 MG/1
40 TABLET ORAL 2 TIMES DAILY
Qty: 360 TABLET | Refills: 1 | Status: SHIPPED | OUTPATIENT
Start: 2023-05-23 | End: 2023-05-25

## 2023-05-23 RX ORDER — POTASSIUM CHLORIDE 20 MEQ/1
40 TABLET, EXTENDED RELEASE ORAL ONCE
Status: COMPLETED | OUTPATIENT
Start: 2023-05-23 | End: 2023-05-23

## 2023-05-23 RX ORDER — TOLVAPTAN 15 MG/1
15 TABLET ORAL ONCE
Status: COMPLETED | OUTPATIENT
Start: 2023-05-23 | End: 2023-05-23

## 2023-05-23 RX ORDER — HYDRALAZINE HYDROCHLORIDE 25 MG/1
25 TABLET, FILM COATED ORAL 2 TIMES DAILY
Qty: 60 TABLET | Refills: 0 | Status: SHIPPED | OUTPATIENT
Start: 2023-05-23 | End: 2023-05-23

## 2023-05-23 NOTE — PLAN OF CARE
A&Ox4. Up in chair at beginning of shift, daughter at bedside. On room air, lungs mostly clear with intermittent expiratory wheezing on the right. Denies MARCELLO or shortness of breath. Receiving scheduled nebs. BLE +2 pitting edema- receiving IV Lasix BID. Abdomen soft and nontender, BS active. Denies N/V/D. C/o neck pain- PRN Tylenol given with positive result. NSR on tele. Purewick in place for I&O monitoring. Daily weight, 1.2L fluid restriction. Trending Sodium levels. Receiving IV Ancef q8h for +UTI. Right AC PIC saline locked. Nephro and cardiology following case. Plan is to discharge home with daughter when medically cleared. Needs met. Will monitor.     Problem: PAIN - ADULT  Goal: Verbalizes/displays adequate comfort level or patient's stated pain goal  Description: INTERVENTIONS:  - Encourage pt to monitor pain and request assistance  - Assess pain using appropriate pain scale  - Administer analgesics based on type and severity of pain and evaluate response  - Implement non-pharmacological measures as appropriate and evaluate response  - Consider cultural and social influences on pain and pain management  - Manage/alleviate anxiety  - Utilize distraction and/or relaxation techniques  - Monitor for opioid side effects  - Notify MD/LIP if interventions unsuccessful or patient reports new pain  - Anticipate increased pain with activity and pre-medicate as appropriate  Outcome: Progressing

## 2023-05-23 NOTE — PLAN OF CARE
Patient alert and oriented x4. On RA.  NSR on tele. Denies pain. K replaced per protocol. PO torsemide. IV ancef. Generalized edema. Caregiver at bedside. Daily weights. Strict I&Os. Resting comfortably in bed. WCTM.

## 2023-05-24 ENCOUNTER — TELEPHONE (OUTPATIENT)
Dept: FAMILY MEDICINE CLINIC | Facility: CLINIC | Age: 88
End: 2023-05-24

## 2023-05-24 LAB
ANION GAP SERPL CALC-SCNC: 10 MMOL/L (ref 0–18)
BUN BLD-MCNC: 51 MG/DL (ref 7–18)
CALCIUM BLD-MCNC: 9.1 MG/DL (ref 8.5–10.1)
CHLORIDE SERPL-SCNC: 95 MMOL/L (ref 98–112)
CO2 SERPL-SCNC: 29 MMOL/L (ref 21–32)
CREAT BLD-MCNC: 1.15 MG/DL
GFR SERPLBLD BASED ON 1.73 SQ M-ARVRAT: 45 ML/MIN/1.73M2 (ref 60–?)
GLUCOSE BLD-MCNC: 99 MG/DL (ref 70–99)
OSMOLALITY SERPL CALC.SUM OF ELEC: 292 MOSM/KG (ref 275–295)
POTASSIUM SERPL-SCNC: 3.9 MMOL/L (ref 3.5–5.1)
POTASSIUM SERPL-SCNC: 3.9 MMOL/L (ref 3.5–5.1)
SODIUM SERPL-SCNC: 134 MMOL/L (ref 136–145)

## 2023-05-24 PROCEDURE — 99232 SBSQ HOSP IP/OBS MODERATE 35: CPT | Performed by: INTERNAL MEDICINE

## 2023-05-24 PROCEDURE — 99232 SBSQ HOSP IP/OBS MODERATE 35: CPT | Performed by: HOSPITALIST

## 2023-05-24 NOTE — TELEPHONE ENCOUNTER
Requesting virtual appt for:  5/31 @ 9740 with YP for hospital follow up. Is any way this can be virtual.    Please call.

## 2023-05-24 NOTE — PLAN OF CARE
Pt alert and oriented x 4   Assumed pt care at 0730  RA  Tele-NSR  PIV right arm   IV Ancef every 8 hours  Cardiac Diet  1200ml fluid restriction  Purewick in place  Denies pain    Problem: RESPIRATORY - ADULT  Goal: Achieves optimal ventilation and oxygenation  Description: INTERVENTIONS:  - Assess for changes in respiratory status  - Assess for changes in mentation and behavior  - Position to facilitate oxygenation and minimize respiratory effort  - Oxygen supplementation based on oxygen saturation or ABGs  - Provide Smoking Cessation handout, if applicable  - Encourage broncho-pulmonary hygiene including cough, deep breathe, Incentive Spirometry  - Assess the need for suctioning and perform as needed  - Assess and instruct to report SOB or any respiratory difficulty  - Respiratory Therapy support as indicated  - Manage/alleviate anxiety  - Monitor for signs/symptoms of CO2 retention  Outcome: Progressing     Problem: SAFETY ADULT - FALL  Goal: Free from fall injury  Description: INTERVENTIONS:  - Assess pt frequently for physical needs  - Identify cognitive and physical deficits and behaviors that affect risk of falls.   - [place] fall precautions as indicated by assessment.  - Educate pt/family on patient safety including physical limitations  - Instruct pt to call for assistance with activity based on assessment  - Modify environment to reduce risk of injury  - Provide assistive devices as appropriate  - Consider OT/PT consult to assist with strengthening/mobility  - Encourage toileting schedule  Outcome: Progressing     Problem: PAIN - ADULT  Goal: Verbalizes/displays adequate comfort level or patient's stated pain goal  Description: INTERVENTIONS:  - Encourage pt to monitor pain and request assistance  - Assess pain using appropriate pain scale  - Administer analgesics based on type and severity of pain and evaluate response  - Implement non-pharmacological measures as appropriate and evaluate response  - Consider cultural and social influences on pain and pain management  - Manage/alleviate anxiety  - Utilize distraction and/or relaxation techniques  - Monitor for opioid side effects  - Notify MD/LIP if interventions unsuccessful or patient reports new pain  - Anticipate increased pain with activity and pre-medicate as appropriate  Outcome: Progressing

## 2023-05-24 NOTE — PLAN OF CARE
A&Ox4. Up in chair at beginning of shift, daughter at bedside. On room air, lungs clear and diminished. Non-productive cough noted. Denies MARCELLO or shortness of breath. Receiving scheduled nebs. BLE +2 pitting edema- receiving PO Torsemide BID. Abdomen soft and nontender, BS active. Denies N/V/D. C/o neck pain- PRN Tylenol given with positive result. NSR on tele. Purewick in place for I&O monitoring. Daily weight, 1.2L fluid restriction. Trending Sodium levels. Tolvaptan x 1 given this evening. Receiving IV Ancef q8h for +UTI. Right AC PIV saline locked. Nephro and cardiology following case. Plan is to discharge home with daughter when medically cleared. Needs met. Will monitor.     Problem: PAIN - ADULT  Goal: Verbalizes/displays adequate comfort level or patient's stated pain goal  Description: INTERVENTIONS:  - Encourage pt to monitor pain and request assistance  - Assess pain using appropriate pain scale  - Administer analgesics based on type and severity of pain and evaluate response  - Implement non-pharmacological measures as appropriate and evaluate response  - Consider cultural and social influences on pain and pain management  - Manage/alleviate anxiety  - Utilize distraction and/or relaxation techniques  - Monitor for opioid side effects  - Notify MD/LIP if interventions unsuccessful or patient reports new pain  - Anticipate increased pain with activity and pre-medicate as appropriate  Outcome: Progressing

## 2023-05-24 NOTE — TELEPHONE ENCOUNTER
Spoke to daughter they scheduled appt with Dr Jared Castro for [facility] on 5/31/23. Daughter wondering if this can be a video visit. Daughter states they will need medical transport in order to get her there. Please advise.

## 2023-05-25 VITALS
DIASTOLIC BLOOD PRESSURE: 53 MMHG | BODY MASS INDEX: 42 KG/M2 | TEMPERATURE: 99 F | WEIGHT: 188.5 LBS | RESPIRATION RATE: 16 BRPM | SYSTOLIC BLOOD PRESSURE: 148 MMHG | OXYGEN SATURATION: 97 % | HEART RATE: 70 BPM

## 2023-05-25 PROCEDURE — 99239 HOSP IP/OBS DSCHRG MGMT >30: CPT | Performed by: HOSPITALIST

## 2023-05-25 RX ORDER — CEPHALEXIN 500 MG/1
500 CAPSULE ORAL 4 TIMES DAILY
Qty: 4 CAPSULE | Refills: 0 | Status: SHIPPED | OUTPATIENT
Start: 2023-05-25 | End: 2023-06-01 | Stop reason: ALTCHOICE

## 2023-05-25 NOTE — PLAN OF CARE
Assumed care of 81 y/o female @3765  A/Ox4, RA-scheduled nebs  NS-Tele, Cardiac diet-1200 ml fluid restriction  Non-cardiac electrolyte protocol  Incontinent, purewick in place-draining well  Up w/1 assist  RFA PIV-infusing Ancef @200 ml/hr  Safety prec maintained and all needs met      Problem: SAFETY ADULT - FALL  Goal: Free from fall injury  Description: INTERVENTIONS:  - Assess pt frequently for physical needs  - Identify cognitive and physical deficits and behaviors that affect risk of falls.   - [place] fall precautions as indicated by assessment.  - Educate pt/family on patient safety including physical limitations  - Instruct pt to call for assistance with activity based on assessment  - Modify environment to reduce risk of injury  - Provide assistive devices as appropriate  - Consider OT/PT consult to assist with strengthening/mobility  - Encourage toileting schedule  Outcome: Progressing     Problem: RESPIRATORY - ADULT  Goal: Achieves optimal ventilation and oxygenation  Description: INTERVENTIONS:  - Assess for changes in respiratory status  - Assess for changes in mentation and behavior  - Position to facilitate oxygenation and minimize respiratory effort  - Oxygen supplementation based on oxygen saturation or ABGs  - Provide Smoking Cessation handout, if applicable  - Encourage broncho-pulmonary hygiene including cough, deep breathe, Incentive Spirometry  - Assess the need for suctioning and perform as needed  - Assess and instruct to report SOB or any respiratory difficulty  - Respiratory Therapy support as indicated  - Manage/alleviate anxiety  - Monitor for signs/symptoms of CO2 retention  Outcome: Progressing

## 2023-05-25 NOTE — CM/SW NOTE
Noted DC order placed. Notified [facility] liaison of anticipated discharge today, [facility] liaison requested F2F. F2F entered and [facility] liaison notified.      ALEX Sanchez  Discharge Planner

## 2023-05-25 NOTE — CM/SW NOTE
05/25/23 1100   Discharge disposition   Expected discharge disposition Home or Self   Discharge transportation [facility]     Notified by RN pt is medically cleared to discharge and will need transport. [facility] arranged for 5pm. Updated RN. RN will update pt. PCS form completed and available for RN to print.       [facility]  [phone]    ALEX Jorgensen  Discharge Planner

## 2023-05-25 NOTE — PLAN OF CARE
Assumed care at 0730  A/O x4  RA  NSR on tele  VSS  +2 edema to BLE - see flowsheets  Denies SOB  C/o 6/10 arthritic pain- heat packs and tylenol given per [facility]  Cardiac EP  Ancef q8  Purewick  Daily weight  Strict I/O  Cardiac diet  1200 ml fluid restriction  Up 1 with walker  Plan to discharge home today @ [address]  Pt and family updated on POC. Will continue to monitor. Problem: PAIN - ADULT  Goal: Verbalizes/displays adequate comfort level or patient's stated pain goal  Description: INTERVENTIONS:  - Encourage pt to monitor pain and request assistance  - Assess pain using appropriate pain scale  - Administer analgesics based on type and severity of pain and evaluate response  - Implement non-pharmacological measures as appropriate and evaluate response  - Consider cultural and social influences on pain and pain management  - Manage/alleviate anxiety  - Utilize distraction and/or relaxation techniques  - Monitor for opioid side effects  - Notify MD/LIP if interventions unsuccessful or patient reports new pain  - Anticipate increased pain with activity and pre-medicate as appropriate  Outcome: Progressing     Problem: SAFETY ADULT - FALL  Goal: Free from fall injury  Description: INTERVENTIONS:  - Assess pt frequently for physical needs  - Identify cognitive and physical deficits and behaviors that affect risk of falls.   - [place] fall precautions as indicated by assessment.  - Educate pt/family on patient safety including physical limitations  - Instruct pt to call for assistance with activity based on assessment  - Modify environment to reduce risk of injury  - Provide assistive devices as appropriate  - Consider OT/PT consult to assist with strengthening/mobility  - Encourage toileting schedule  Outcome: Progressing     Problem: RESPIRATORY - ADULT  Goal: Achieves optimal ventilation and oxygenation  Description: INTERVENTIONS:  - Assess for changes in respiratory status  - Assess for changes in mentation and behavior  - Position to facilitate oxygenation and minimize respiratory effort  - Oxygen supplementation based on oxygen saturation or ABGs  - Provide Smoking Cessation handout, if applicable  - Encourage broncho-pulmonary hygiene including cough, deep breathe, Incentive Spirometry  - Assess the need for suctioning and perform as needed  - Assess and instruct to report SOB or any respiratory difficulty  - Respiratory Therapy support as indicated  - Manage/alleviate anxiety  - Monitor for signs/symptoms of CO2 retention  Outcome: Progressing

## 2023-05-25 NOTE — PROGRESS NOTES
NURSING DISCHARGE NOTE    Discharged Home via Ambulatory. Accompanied by Support staff  Belongings Taken by patient/family. PIV removed. AVS printed and reviewed with patient and daughter. Transported home via [employer]. All questions answered.

## 2023-05-26 ENCOUNTER — PATIENT OUTREACH (OUTPATIENT)
Dept: CASE MANAGEMENT | Age: 88
End: 2023-05-26

## 2023-05-26 DIAGNOSIS — E87.1 HYPONATREMIA: ICD-10-CM

## 2023-05-26 DIAGNOSIS — Z02.9 ENCOUNTERS FOR UNSPECIFIED ADMINISTRATIVE PURPOSE: ICD-10-CM

## 2023-05-26 PROCEDURE — 1111F DSCHRG MED/CURRENT MED MERGE: CPT

## 2023-05-30 ENCOUNTER — TELEPHONE (OUTPATIENT)
Dept: FAMILY MEDICINE CLINIC | Facility: CLINIC | Age: 88
End: 2023-05-30

## 2023-05-30 NOTE — TELEPHONE ENCOUNTER
DEMARCO FROM [facility] SAW PT YESTERDAY.  SHE WILL SEE HER ONCE A WEEK FOR THE NEXT 9 WEEKS     DANIEL

## 2023-05-31 ENCOUNTER — TELEMEDICINE (OUTPATIENT)
Dept: FAMILY MEDICINE CLINIC | Facility: CLINIC | Age: 88
End: 2023-05-31
Payer: MEDICARE

## 2023-05-31 DIAGNOSIS — I50.30 (HFPEF) HEART FAILURE WITH PRESERVED EJECTION FRACTION (HCC): Primary | ICD-10-CM

## 2023-05-31 DIAGNOSIS — I50.9 ACUTE ON CHRONIC CONGESTIVE HEART FAILURE, UNSPECIFIED HEART FAILURE TYPE (HCC): Primary | ICD-10-CM

## 2023-05-31 DIAGNOSIS — E87.1 HYPONATREMIA: ICD-10-CM

## 2023-05-31 DIAGNOSIS — N30.00 ACUTE CYSTITIS WITHOUT HEMATURIA: ICD-10-CM

## 2023-05-31 PROCEDURE — 99214 OFFICE O/P EST MOD 30 MIN: CPT | Performed by: FAMILY MEDICINE

## 2023-05-31 PROCEDURE — 1111F DSCHRG MED/CURRENT MED MERGE: CPT | Performed by: FAMILY MEDICINE

## 2023-06-01 ENCOUNTER — HOSPITAL ENCOUNTER (OUTPATIENT)
Dept: CARDIOLOGY CLINIC | Facility: HOSPITAL | Age: 88
Discharge: HOME | End: 2023-06-01
Attending: NURSE PRACTITIONER
Payer: MEDICARE

## 2023-06-01 ENCOUNTER — HOSPITAL ENCOUNTER (OUTPATIENT)
Dept: LAB | Facility: HOSPITAL | Age: 88
Discharge: HOME | End: 2023-06-01
Attending: NURSE PRACTITIONER
Payer: MEDICARE

## 2023-06-01 ENCOUNTER — TELEPHONE (OUTPATIENT)
Dept: FAMILY MEDICINE CLINIC | Facility: CLINIC | Age: 88
End: 2023-06-01

## 2023-06-01 VITALS
BODY MASS INDEX: 41 KG/M2 | DIASTOLIC BLOOD PRESSURE: 53 MMHG | OXYGEN SATURATION: 96 % | WEIGHT: 184.81 LBS | RESPIRATION RATE: 21 BRPM | HEART RATE: 76 BPM | SYSTOLIC BLOOD PRESSURE: 139 MMHG

## 2023-06-01 DIAGNOSIS — I89.0 LYMPHEDEMA: ICD-10-CM

## 2023-06-01 DIAGNOSIS — I50.30 (HFPEF) HEART FAILURE WITH PRESERVED EJECTION FRACTION (HCC): ICD-10-CM

## 2023-06-01 DIAGNOSIS — I50.32 CHRONIC HEART FAILURE WITH PRESERVED EJECTION FRACTION (HFPEF) (HCC): Primary | ICD-10-CM

## 2023-06-01 DIAGNOSIS — E87.1 HYPONATREMIA: ICD-10-CM

## 2023-06-01 LAB
ALBUMIN SERPL-MCNC: 3.6 G/DL (ref 3.4–5)
ANION GAP SERPL CALC-SCNC: 5 MMOL/L (ref 0–18)
ANION GAP SERPL CALC-SCNC: 6 MMOL/L (ref 0–18)
BUN BLD-MCNC: 61 MG/DL (ref 7–18)
BUN BLD-MCNC: 63 MG/DL (ref 7–18)
CALCIUM BLD-MCNC: 8.8 MG/DL (ref 8.5–10.1)
CALCIUM BLD-MCNC: 9 MG/DL (ref 8.5–10.1)
CHLORIDE SERPL-SCNC: 95 MMOL/L (ref 98–112)
CHLORIDE SERPL-SCNC: 96 MMOL/L (ref 98–112)
CO2 SERPL-SCNC: 26 MMOL/L (ref 21–32)
CO2 SERPL-SCNC: 28 MMOL/L (ref 21–32)
CREAT BLD-MCNC: 1.36 MG/DL
CREAT BLD-MCNC: 1.4 MG/DL
DEPRECATED HBV CORE AB SER IA-ACNC: 155.8 NG/ML
ERYTHROCYTE [DISTWIDTH] IN BLOOD BY AUTOMATED COUNT: 12.6 %
FASTING STATUS PATIENT QL REPORTED: NO
FASTING STATUS PATIENT QL REPORTED: NO
GFR SERPLBLD BASED ON 1.73 SQ M-ARVRAT: 36 ML/MIN/1.73M2 (ref 60–?)
GFR SERPLBLD BASED ON 1.73 SQ M-ARVRAT: 37 ML/MIN/1.73M2 (ref 60–?)
GLUCOSE BLD-MCNC: 162 MG/DL (ref 70–99)
GLUCOSE BLD-MCNC: 203 MG/DL (ref 70–99)
HCT VFR BLD AUTO: 40 %
HGB BLD-MCNC: 13 G/DL
IRON SATN MFR SERPL: 20 %
IRON SERPL-MCNC: 77 UG/DL
MCH RBC QN AUTO: 32.7 PG (ref 26–34)
MCHC RBC AUTO-ENTMCNC: 32.5 G/DL (ref 31–37)
MCV RBC AUTO: 100.5 FL
NT-PROBNP SERPL-MCNC: 1654 PG/ML (ref ?–450)
OSMOLALITY SERPL CALC.SUM OF ELEC: 286 MOSM/KG (ref 275–295)
OSMOLALITY SERPL CALC.SUM OF ELEC: 291 MOSM/KG (ref 275–295)
PLATELET # BLD AUTO: 298 10(3)UL (ref 150–450)
POTASSIUM SERPL-SCNC: 4.3 MMOL/L (ref 3.5–5.1)
RBC # BLD AUTO: 3.98 X10(6)UL
SODIUM SERPL-SCNC: 127 MMOL/L (ref 136–145)
SODIUM SERPL-SCNC: 129 MMOL/L (ref 136–145)
TIBC SERPL-MCNC: 381 UG/DL (ref 240–450)
TRANSFERRIN SERPL-MCNC: 256 MG/DL (ref 200–360)
WBC # BLD AUTO: 10.7 X10(3) UL (ref 4–11)

## 2023-06-01 PROCEDURE — 85027 COMPLETE CBC AUTOMATED: CPT | Performed by: NURSE PRACTITIONER

## 2023-06-01 PROCEDURE — 99215 OFFICE O/P EST HI 40 MIN: CPT | Performed by: NURSE PRACTITIONER

## 2023-06-01 PROCEDURE — 83880 ASSAY OF NATRIURETIC PEPTIDE: CPT | Performed by: NURSE PRACTITIONER

## 2023-06-01 PROCEDURE — 80048 BASIC METABOLIC PNL TOTAL CA: CPT | Performed by: NURSE PRACTITIONER

## 2023-06-01 PROCEDURE — 36415 COLL VENOUS BLD VENIPUNCTURE: CPT | Performed by: NURSE PRACTITIONER

## 2023-06-01 RX ORDER — TOLVAPTAN 15 MG/1
15 TABLET ORAL ONCE
Status: COMPLETED | OUTPATIENT
Start: 2023-06-01 | End: 2023-06-01

## 2023-06-01 RX ADMIN — TOLVAPTAN 15 MG: 15 TABLET ORAL at 15:32:00

## 2023-06-01 NOTE — PROGRESS NOTES
Patient is new to the [facility] for [address] and arrived with 2 daughters. Patient and family were provided with an orientation to the clinic with all questions answered. Patient assessed. Patient denies shortness of breath or bloating at this time. She states that she occasionally gets short of breath with her abdomen feels too full. She states that her lower extremity edema has improved some after being hospitalized recently. Patient reports developing lymphedema around early 2020. Weight today is 184.8 lbs, which is down 4 lbs from her hospital discharge weight. APN notified of all the above information. Labs ordered and drawn by [facility], but potassium hemolyzed. APN ordered for repeat BMP. BMP was drawn in the [facility] and sent to lab. Reviewed allergies and list of current medications with patient and updated it in the Electronic Medical Record. Serum sodium today was 127. Patient was given PO 15 mg tolvaptan. Patient tolerated it well. Educated patient on low sodium diet and food choices, fluid restriction of 2 liters, and daily weights. Reviewed follow-up appointments and discharge Heart Failure instructions with patient. Patient verbalized an understanding. Patient to return to the clinic in 1 week.

## 2023-06-01 NOTE — PATIENT INSTRUCTIONS
Heart Failure Discharge Instructions      Activity: Regular exercise and activity is important for your overall health and to help keep your heart strong and functioning as well as possible. Walk at a slow to moderate pace for 15-20 minutes 3-5 days per week. Follow these instructions every day to keep yourself in the [address] you are feeling well and your symptoms are under control                                    Medications  Take your medication every day as instructed  Do not stop taking your medicine or change the amount you are taking without instructions from your doctor or nurse  Do Not take non-steroidal antiinflammatory drugs such as ibuprofen, aleve, advil, or motrin                                    Diet/Fluids  People with heart failure should eat less sodium (salt) and limit fluid. Sodium attracts water and makes the body hold fluid. This extra fluid makes the heart work harder and can worsen the symptoms of heart failure. Diet    2000 mg sodium daily  Fluid restriction    64 ounces daily  (8 oz. = 1 cup)                                     Body Weight  Weigh yourself every day before breakfast and write your weight down  Use the same scale and wear about the same amount of clothes each time  A sudden weight increase is due to fluid retention rather than fat                                         Activity  Pace your activities to avoid getting overtired  Take rest periods as needed  Elevate your feet to reduce ankle swelling when resting                             Signs of Worsening Heart Failure    You are entering the Yellow Zone - this is a warning zone    Call your doctor or nurse if you have any of these signs or symptoms:   You gain 2 or more pounds overnight or 3-5 pounds in 3-7 days  You have more trouble breathing  You get more tired with regular activity, or are limiting activity because of shortness of breath or fatigue  You are short of breath lying down, you need more pillows to breathe comfortably,  or wake up during the night short of breath  You urinate less often during the day and more often at night  You have a bloated feeling, upset stomach, loss of appetite, or your clothes are fitting tighter    GO TO THE EMERGENCY DEPARTMENT or CALL 911 IF: These are signs you are in the RED ZONE - THIS IS AN EMERGENCY  You have tightness or pain in your chest  You are extremely short of breath or can't catch your breath  You cough up frothy pink mucous  You feel confused or can't think clearly  You are traveling and develop symptoms of worsening heart failure     We respect everyone's time and availability. Please be aware that this is not a walk-in clinic and we require appointments in order to facilitate timely care for all patients. We ask you to arrive 30 minutes before your appointment to allow time for you to check-in and have your bloodwork drawn. Please understand if you are late for your appointment, you may be asked to reschedule. If possible, all attempts will be made to accommodate but realize this is no guarantee that this will always be available. We understand there are extenuating circumstances. If you need to cancel or reschedule your appointment, please call the [facility] within 24 hours at [phone]. Thank you for your cooperation, [facility] Staff. IF YOU HAVE QUESTIONS REGARDING YOUR BILL, FEEL FREE TO CONTACT [facility] PATIENT ACCOUNTS -[phone]. IF YOU NEED FINANCIAL ASSISTANCE, PLEASE CALL AN [facility] FINANCIAL COUNSELOR -[phone].   [facility]     [phone]         TY

## 2023-06-05 ENCOUNTER — TELEPHONE (OUTPATIENT)
Dept: CARDIOLOGY CLINIC | Facility: HOSPITAL | Age: 88
End: 2023-06-05

## 2023-06-05 ENCOUNTER — TELEPHONE (OUTPATIENT)
Dept: FAMILY MEDICINE CLINIC | Facility: CLINIC | Age: 88
End: 2023-06-05

## 2023-06-05 DIAGNOSIS — I50.9 CHRONIC CONGESTIVE HEART FAILURE, UNSPECIFIED HEART FAILURE TYPE (HCC): Primary | ICD-10-CM

## 2023-06-05 NOTE — TELEPHONE ENCOUNTER
Can someone please reach out to [facility] to see if she would be able to get a blood volume analysis before her appointment on Friday? I will place order. Severiano Bolognese talked to her about it at recent visit but machine was broke at that time and since has been fixed.      Thank you

## 2023-06-06 RX ORDER — METOPROLOL TARTRATE 100 MG/1
TABLET ORAL
Qty: 180 TABLET | Refills: 0 | Status: SHIPPED | OUTPATIENT
Start: 2023-06-06

## 2023-06-07 ENCOUNTER — MED REC SCAN ONLY (OUTPATIENT)
Facility: CLINIC | Age: 88
End: 2023-06-07

## 2023-06-07 DIAGNOSIS — I50.32 CHRONIC HEART FAILURE WITH PRESERVED EJECTION FRACTION (HFPEF) (HCC): Primary | ICD-10-CM

## 2023-06-09 ENCOUNTER — HOSPITAL ENCOUNTER (OUTPATIENT)
Dept: LAB | Facility: HOSPITAL | Age: 88
Discharge: HOME | End: 2023-06-09
Attending: NURSE PRACTITIONER
Payer: MEDICARE

## 2023-06-09 ENCOUNTER — HOSPITAL ENCOUNTER (OUTPATIENT)
Dept: CARDIOLOGY CLINIC | Facility: HOSPITAL | Age: 88
Discharge: HOME | End: 2023-06-09
Attending: NURSE PRACTITIONER
Payer: MEDICARE

## 2023-06-09 VITALS
OXYGEN SATURATION: 98 % | RESPIRATION RATE: 28 BRPM | SYSTOLIC BLOOD PRESSURE: 177 MMHG | HEART RATE: 72 BPM | BODY MASS INDEX: 43 KG/M2 | WEIGHT: 190 LBS | DIASTOLIC BLOOD PRESSURE: 59 MMHG

## 2023-06-09 DIAGNOSIS — I89.0 LYMPHEDEMA: ICD-10-CM

## 2023-06-09 DIAGNOSIS — I50.32 CHRONIC HEART FAILURE WITH PRESERVED EJECTION FRACTION (HFPEF) (HCC): ICD-10-CM

## 2023-06-09 DIAGNOSIS — N18.30 STAGE 3 CHRONIC KIDNEY DISEASE, UNSPECIFIED WHETHER STAGE 3A OR 3B CKD (HCC): ICD-10-CM

## 2023-06-09 DIAGNOSIS — I50.32 CHRONIC DIASTOLIC HEART FAILURE (HCC): Primary | ICD-10-CM

## 2023-06-09 DIAGNOSIS — E87.1 HYPONATREMIA: ICD-10-CM

## 2023-06-09 DIAGNOSIS — E87.5 HYPERKALEMIA: ICD-10-CM

## 2023-06-09 LAB
ANION GAP SERPL CALC-SCNC: 6 MMOL/L (ref 0–18)
BUN BLD-MCNC: 48 MG/DL (ref 7–18)
CALCIUM BLD-MCNC: 9.6 MG/DL (ref 8.5–10.1)
CHLORIDE SERPL-SCNC: 94 MMOL/L (ref 98–112)
CO2 SERPL-SCNC: 27 MMOL/L (ref 21–32)
CREAT BLD-MCNC: 1.4 MG/DL
FASTING STATUS PATIENT QL REPORTED: NO
GFR SERPLBLD BASED ON 1.73 SQ M-ARVRAT: 36 ML/MIN/1.73M2 (ref 60–?)
GLUCOSE BLD-MCNC: 154 MG/DL (ref 70–99)
OSMOLALITY SERPL CALC.SUM OF ELEC: 280 MOSM/KG (ref 275–295)
POTASSIUM SERPL-SCNC: 5.3 MMOL/L (ref 3.5–5.1)
SODIUM SERPL-SCNC: 127 MMOL/L (ref 136–145)

## 2023-06-09 PROCEDURE — 80048 BASIC METABOLIC PNL TOTAL CA: CPT | Performed by: NURSE PRACTITIONER

## 2023-06-09 PROCEDURE — 36415 COLL VENOUS BLD VENIPUNCTURE: CPT | Performed by: NURSE PRACTITIONER

## 2023-06-09 PROCEDURE — 99215 OFFICE O/P EST HI 40 MIN: CPT | Performed by: NURSE PRACTITIONER

## 2023-06-09 RX ORDER — TOLVAPTAN 15 MG/1
30 TABLET ORAL ONCE
Status: COMPLETED | OUTPATIENT
Start: 2023-06-09 | End: 2023-06-09

## 2023-06-09 RX ORDER — POTASSIUM CHLORIDE 20 MEQ/1
20 TABLET, EXTENDED RELEASE ORAL DAILY
Refills: 0 | COMMUNITY
Start: 2023-06-09

## 2023-06-09 RX ADMIN — TOLVAPTAN 30 MG: 15 TABLET ORAL at 17:15:00

## 2023-06-09 NOTE — PATIENT INSTRUCTIONS
Heart Failure Discharge Instructions    Decrease Potassium to 20 meq ( 1 tablet) daily. Schedule blood volume analysis next week when they call you. Activity: Regular exercise and activity is important for your overall health and to help keep your heart strong and functioning as well as possible. Walk at a slow to moderate pace for 15-20 minutes 3-5 days per week. Follow these instructions every day to keep yourself in the [address] you are feeling well and your symptoms are under control                                    Medications  Take your medication every day as instructed  Do not stop taking your medicine or change the amount you are taking without instructions from your doctor or nurse  Do Not take non-steroidal antiinflammatory drugs such as ibuprofen, aleve, advil, or motrin                                    Diet/Fluids  People with heart failure should eat less sodium (salt) and limit fluid. Sodium attracts water and makes the body hold fluid. This extra fluid makes the heart work harder and can worsen the symptoms of heart failure. Diet    2000 mg sodium daily  Fluid restriction    64 ounces daily  (8 oz. = 1 cup)                                     Body Weight  Weigh yourself every day before breakfast and write your weight down  Use the same scale and wear about the same amount of clothes each time  A sudden weight increase is due to fluid retention rather than fat                                         Activity  Pace your activities to avoid getting overtired  Take rest periods as needed  Elevate your feet to reduce ankle swelling when resting                             Signs of Worsening Heart Failure    You are entering the Yellow Zone - this is a warning zone    Call your doctor or nurse if you have any of these signs or symptoms:   You gain 2 or more pounds overnight or 3-5 pounds in 3-7 days  You have more trouble breathing  You get more tired with regular activity, or are limiting activity because of shortness of breath or fatigue  You are short of breath lying down, you need more pillows to breathe comfortably,  or wake up during the night short of breath  You urinate less often during the day and more often at night  You have a bloated feeling, upset stomach, loss of appetite, or your clothes are fitting tighter    GO TO THE EMERGENCY DEPARTMENT or CALL 911 IF: These are signs you are in the RED ZONE - THIS IS AN EMERGENCY  You have tightness or pain in your chest  You are extremely short of breath or can't catch your breath  You cough up frothy pink mucous  You feel confused or can't think clearly  You are traveling and develop symptoms of worsening heart failure     We respect everyone's time and availability. Please be aware that this is not a walk-in clinic and we require appointments in order to facilitate timely care for all patients. We ask you to arrive 30 minutes before your appointment to allow time for you to check-in and have your bloodwork drawn. Please understand if you are late for your appointment, you may be asked to reschedule. If possible, all attempts will be made to accommodate but realize this is no guarantee that this will always be available. We understand there are extenuating circumstances. If you need to cancel or reschedule your appointment, please call the [facility] [employer] within 24 hours at [phone]. Thank you for your cooperation, [facility] Staff. IF YOU HAVE QUESTIONS REGARDING YOUR BILL, FEEL FREE TO CONTACT [facility] PATIENT ACCOUNTS -[phone]. IF YOU NEED FINANCIAL ASSISTANCE, PLEASE CALL AN [facility] FINANCIAL COUNSELOR -[phone].              [facility] [employer] [facility]     [phone]

## 2023-06-09 NOTE — PROGRESS NOTES
Patient assessed. Patient complaining of decreased urinary amount and frequency. She reports that she thinks she is not drinking enough. Patient and daughter report that she has been drinking 2 cups of coffee in the morning, 1 Liter of water during the day, and 1 1/2 to 2 cups of water in the evening. Patient denies abdominal bloating. Patient with hard, non-pitting edema to her lower extremities. Weight up 5 lbs at 190.0 lbs. APN notified of all the above information. Labs ordered and drawn by [facility]. Reviewed allergies and list of current medications with patient and updated it in the Electronic Medical Record. Serum sodium today was 127. Patient given PO 30 mg Tolvaptan. Patient tolerated it well. Educated patient on low sodium diet and food choices, fluid restriction of 2 liters, and daily weights. Reviewed follow-up appointments and discharge Heart Failure instructions with patient. Patient verbalized an understanding. IV discontinued; catheter intact. Pressure held and gauze applied. Patient to return to the clinic in 2 weeks and have a BVA with labs done next week.

## 2023-06-13 ENCOUNTER — TELEPHONE (OUTPATIENT)
Dept: CARDIOLOGY CLINIC | Facility: HOSPITAL | Age: 88
End: 2023-06-13

## 2023-06-13 NOTE — TELEPHONE ENCOUNTER
Received VM from [facility] RN stating she saw Abigail Houston today and noted she more more congested today and with diminished right lower ling sounds. RN states her o2 saturation was 96% on room air. She was not in distress. Reviewed with EZEKIEL Garcia who states patient will have BVA tomorrow with labs and will wait for those results before making any further recommendations.

## 2023-06-14 ENCOUNTER — HOSPITAL ENCOUNTER (OUTPATIENT)
Dept: LAB | Facility: HOSPITAL | Age: 88
Discharge: HOME | End: 2023-06-14
Attending: NURSE PRACTITIONER
Payer: MEDICARE

## 2023-06-14 ENCOUNTER — HOSPITAL ENCOUNTER (OUTPATIENT)
Dept: CV DIAGNOSTICS | Facility: HOSPITAL | Age: 88
Discharge: HOME | End: 2023-06-14
Attending: NURSE PRACTITIONER
Payer: MEDICARE

## 2023-06-14 DIAGNOSIS — I50.9 CHRONIC CONGESTIVE HEART FAILURE, UNSPECIFIED HEART FAILURE TYPE (HCC): ICD-10-CM

## 2023-06-14 DIAGNOSIS — I50.32 CHRONIC DIASTOLIC HEART FAILURE (HCC): ICD-10-CM

## 2023-06-14 LAB
ANION GAP SERPL CALC-SCNC: 6 MMOL/L (ref 0–18)
BUN BLD-MCNC: 39 MG/DL (ref 7–18)
CALCIUM BLD-MCNC: 8.3 MG/DL (ref 8.5–10.1)
CHLORIDE SERPL-SCNC: 98 MMOL/L (ref 98–112)
CO2 SERPL-SCNC: 28 MMOL/L (ref 21–32)
CREAT BLD-MCNC: 1.2 MG/DL
FASTING STATUS PATIENT QL REPORTED: NO
GFR SERPLBLD BASED ON 1.73 SQ M-ARVRAT: 43 ML/MIN/1.73M2 (ref 60–?)
GLUCOSE BLD-MCNC: 130 MG/DL (ref 70–99)
HCT VFR BLD AUTO: 35.8 %
NT-PROBNP SERPL-MCNC: 1336 PG/ML (ref ?–450)
OSMOLALITY SERPL CALC.SUM OF ELEC: 285 MOSM/KG (ref 275–295)
POTASSIUM SERPL-SCNC: 3.6 MMOL/L (ref 3.5–5.1)
SODIUM SERPL-SCNC: 132 MMOL/L (ref 136–145)

## 2023-06-14 PROCEDURE — 80048 BASIC METABOLIC PNL TOTAL CA: CPT | Performed by: NURSE PRACTITIONER

## 2023-06-14 PROCEDURE — 83880 ASSAY OF NATRIURETIC PEPTIDE: CPT | Performed by: NURSE PRACTITIONER

## 2023-06-14 PROCEDURE — 85014 HEMATOCRIT: CPT | Performed by: NURSE PRACTITIONER

## 2023-06-14 PROCEDURE — 36415 COLL VENOUS BLD VENIPUNCTURE: CPT | Performed by: NURSE PRACTITIONER

## 2023-06-15 ENCOUNTER — TELEPHONE (OUTPATIENT)
Dept: CARDIOLOGY CLINIC | Facility: HOSPITAL | Age: 88
End: 2023-06-15

## 2023-06-15 ENCOUNTER — TELEPHONE (OUTPATIENT)
Facility: CLINIC | Age: 88
End: 2023-06-15

## 2023-06-15 ENCOUNTER — OFFICE VISIT (OUTPATIENT)
Facility: CLINIC | Age: 88
End: 2023-06-15
Payer: MEDICARE

## 2023-06-15 VITALS
DIASTOLIC BLOOD PRESSURE: 52 MMHG | OXYGEN SATURATION: 95 % | BODY MASS INDEX: 43 KG/M2 | RESPIRATION RATE: 16 BRPM | SYSTOLIC BLOOD PRESSURE: 120 MMHG | HEART RATE: 78 BPM | HEIGHT: 56 IN

## 2023-06-15 DIAGNOSIS — J45.50 SEVERE PERSISTENT ASTHMA WITHOUT COMPLICATION: ICD-10-CM

## 2023-06-15 DIAGNOSIS — R06.2 WHEEZING: ICD-10-CM

## 2023-06-15 DIAGNOSIS — J96.11 CHRONIC RESPIRATORY FAILURE WITH HYPOXIA (HCC): ICD-10-CM

## 2023-06-15 DIAGNOSIS — I50.9 CONGESTIVE HEART FAILURE, UNSPECIFIED HF CHRONICITY, UNSPECIFIED HEART FAILURE TYPE (HCC): ICD-10-CM

## 2023-06-15 DIAGNOSIS — J45.50 SEVERE PERSISTENT ASTHMA WITHOUT COMPLICATION: Primary | ICD-10-CM

## 2023-06-15 DIAGNOSIS — J44.1 COPD EXACERBATION (HCC): ICD-10-CM

## 2023-06-15 RX ORDER — PREDNISONE 10 MG/1
TABLET ORAL
Qty: 30 TABLET | Refills: 1 | Status: SHIPPED | OUTPATIENT
Start: 2023-06-15

## 2023-06-15 RX ORDER — PREDNISONE 10 MG/1
10 TABLET ORAL DAILY
Qty: 30 TABLET | Refills: 0 | Status: SHIPPED | OUTPATIENT
Start: 2023-06-15 | End: 2023-06-29

## 2023-06-15 RX ORDER — ARFORMOTEROL TARTRATE 15 UG/2ML
15 SOLUTION RESPIRATORY (INHALATION) 2 TIMES DAILY
Qty: 120 ML | Refills: 5 | Status: SHIPPED | OUTPATIENT
Start: 2023-06-15

## 2023-06-15 NOTE — TELEPHONE ENCOUNTER
----- Message from DAMIR Santo sent at 6/15/2023 11:02 AM CDT -----  Please let Bert Roper know her kidney function and  potassium are improved. Unfortunately d.t the multiple sticks and the fact that she couldn't lay flat the accuracy of the BVA is felt to be limited. They are recommending repeat test. We can hold off for now, but please see how she is feeling.

## 2023-06-15 NOTE — PATIENT INSTRUCTIONS
Increase the prednisone from 5mg daily to 10mg daily for at least one week. If you are better next week, then we can go back down to the regular 5mg daily. I also sent in a prednisone taper regimen to have on hand at home in case you need it in the future  I will ask my nurse Ge Veloz, to contact you next week to ensure you are better  Continue nebulized budesonide one vial in the morning and one vial in the evening. Rinse your mouth out after using the nebulizers  I will ask my nurse Ge Veloz to contact the pharmacy about the arformoterol   You can use the duoneb nebulizer every 6 hours as needed for your breathing  You can continue to use the saline nebulizer one vial every 6 hours as needed for the cough or thick phlegm. Watch your weight and fluid balance daily.   Follow up in 2-4 weeks with me or Karol (nurse practitioner)

## 2023-06-15 NOTE — TELEPHONE ENCOUNTER
Per Dr. Poonam Pedroza, pt has not receivied Arformoterol ordered by [facility] on 5-18-23. [facility] [place] and they do not have order. Rx for our area needs to go to [facility] location. Rx resent.

## 2023-06-16 ENCOUNTER — TELEPHONE (OUTPATIENT)
Dept: CARDIOLOGY CLINIC | Facility: HOSPITAL | Age: 88
End: 2023-06-16

## 2023-06-16 DIAGNOSIS — I10 PRIMARY HYPERTENSION: ICD-10-CM

## 2023-06-16 NOTE — TELEPHONE ENCOUNTER
Approve/deny Torsemide  Last filled 5/23/23 by Dr Jhonny Raymond when pt in hospital  Last visit VV 5/31/23

## 2023-06-16 NOTE — TELEPHONE ENCOUNTER
Patients daughter calling stating that patient needs refill of furosemide. [facility] [facility], [address] [place] [address],[address] AT [address], [phone], [phone]      Please advise.

## 2023-06-16 NOTE — TELEPHONE ENCOUNTER
----- Message from DAMIR Jimenez sent at 6/15/2023 11:02 AM CDT -----  Please let Suma Zamora know her kidney function and  potassium are improved. Unfortunately d.t the multiple sticks and the fact that she couldn't lay flat the accuracy of the BVA is felt to be limited. They are recommending repeat test. We can hold off for now, but please see how she is feeling.

## 2023-06-16 NOTE — TELEPHONE ENCOUNTER
Bert Roper returned a call to the [facility] [address]. She was informed that EZEKIEL Santo in the clinic, reviewed Nancy's labs that were completed with her BVA and wanted to inform them that Nancy's kidney function and her potassium level have improved. She was also informed that due to Bert Roper needing multiple attempts with drawing labs during the BVA, the staff that completed the BVA were recommending a repeat test, but Jason Kaur wants to hold off for now and to see how Bert Roper is feeling. Her daughter verbalized back understanding of all the above information. Nancy's daughter reports that Bert Roper had difficulty and frustration due to multiple attempts to obtain labs, and Bert Roper had difficulty breathing when laying down and did not tolerate it, due to her COPD. She states that Bert Roper would not agree to repeating the BVA.

## 2023-06-16 NOTE — TELEPHONE ENCOUNTER
Daughter calling checking on status of script request for patient. She would like a call when sent to pharmacy.

## 2023-06-19 ENCOUNTER — TELEPHONE (OUTPATIENT)
Facility: CLINIC | Age: 88
End: 2023-06-19

## 2023-06-19 DIAGNOSIS — I50.32 CHRONIC DIASTOLIC HEART FAILURE (HCC): Primary | ICD-10-CM

## 2023-06-20 ENCOUNTER — HOSPITAL ENCOUNTER (OUTPATIENT)
Dept: CARDIOLOGY CLINIC | Facility: HOSPITAL | Age: 88
Discharge: HOME | End: 2023-06-20
Attending: NURSE PRACTITIONER
Payer: MEDICARE

## 2023-06-20 ENCOUNTER — HOSPITAL ENCOUNTER (OUTPATIENT)
Dept: PERIOP | Facility: HOSPITAL | Age: 88
Discharge: HOME | End: 2023-06-20
Attending: NURSE PRACTITIONER
Payer: MEDICARE

## 2023-06-20 VITALS
HEART RATE: 68 BPM | SYSTOLIC BLOOD PRESSURE: 153 MMHG | OXYGEN SATURATION: 96 % | BODY MASS INDEX: 43 KG/M2 | RESPIRATION RATE: 22 BRPM | DIASTOLIC BLOOD PRESSURE: 53 MMHG | WEIGHT: 191 LBS

## 2023-06-20 DIAGNOSIS — I50.32 CHRONIC DIASTOLIC HEART FAILURE (HCC): Primary | ICD-10-CM

## 2023-06-20 DIAGNOSIS — E87.1 HYPONATREMIA: ICD-10-CM

## 2023-06-20 DIAGNOSIS — N18.30 STAGE 3 CHRONIC KIDNEY DISEASE, UNSPECIFIED WHETHER STAGE 3A OR 3B CKD (HCC): ICD-10-CM

## 2023-06-20 DIAGNOSIS — I89.0 LYMPHEDEMA: ICD-10-CM

## 2023-06-20 LAB
ANION GAP SERPL CALC-SCNC: 6 MMOL/L (ref 0–18)
BUN BLD-MCNC: 56 MG/DL (ref 7–18)
CALCIUM BLD-MCNC: 9.7 MG/DL (ref 8.5–10.1)
CHLORIDE SERPL-SCNC: 95 MMOL/L (ref 98–112)
CO2 SERPL-SCNC: 30 MMOL/L (ref 21–32)
CREAT BLD-MCNC: 1.29 MG/DL
GFR SERPLBLD BASED ON 1.73 SQ M-ARVRAT: 39 ML/MIN/1.73M2 (ref 60–?)
GLUCOSE BLD-MCNC: 83 MG/DL (ref 70–99)
OSMOLALITY SERPL CALC.SUM OF ELEC: 287 MOSM/KG (ref 275–295)
POTASSIUM SERPL-SCNC: 3.8 MMOL/L (ref 3.5–5.1)
SODIUM SERPL-SCNC: 131 MMOL/L (ref 136–145)

## 2023-06-20 PROCEDURE — 99215 OFFICE O/P EST HI 40 MIN: CPT | Performed by: NURSE PRACTITIONER

## 2023-06-20 RX ORDER — POTASSIUM CHLORIDE 20 MEQ/1
20 TABLET, EXTENDED RELEASE ORAL ONCE
Status: COMPLETED | OUTPATIENT
Start: 2023-06-20 | End: 2023-06-20

## 2023-06-20 RX ORDER — BUMETANIDE 0.25 MG/ML
4 INJECTION INTRAMUSCULAR; INTRAVENOUS ONCE
Status: COMPLETED | OUTPATIENT
Start: 2023-06-20 | End: 2023-06-20

## 2023-06-20 RX ADMIN — BUMETANIDE 4 MG: 0.25 INJECTION INTRAMUSCULAR; INTRAVENOUS at 10:10:00

## 2023-06-20 RX ADMIN — POTASSIUM CHLORIDE 20 MEQ: 20 TABLET, EXTENDED RELEASE ORAL at 10:00:00

## 2023-06-20 NOTE — PATIENT INSTRUCTIONS
Heart Failure Discharge Instructions      Activity: Regular exercise and activity is important for your overall health and to help keep your heart strong and functioning as well as possible. Walk at a slow to moderate pace for 15-20 minutes 3-5 days per week. Follow these instructions every day to keep yourself in the [address] you are feeling well and your symptoms are under control                                    Medications  Take your medication every day as instructed  Do not stop taking your medicine or change the amount you are taking without instructions from your doctor or nurse  Do Not take non-steroidal antiinflammatory drugs such as ibuprofen, aleve, advil, or motrin                                    Diet/Fluids  People with heart failure should eat less sodium (salt) and limit fluid. Sodium attracts water and makes the body hold fluid. This extra fluid makes the heart work harder and can worsen the symptoms of heart failure. Diet    2000 mg sodium daily  Fluid restriction    64 ounces daily  (8 oz. = 1 cup)                                     Body Weight  Weigh yourself every day before breakfast and write your weight down  Use the same scale and wear about the same amount of clothes each time  A sudden weight increase is due to fluid retention rather than fat                                         Activity  Pace your activities to avoid getting overtired  Take rest periods as needed  Elevate your feet to reduce ankle swelling when resting                             Signs of Worsening Heart Failure    You are entering the Yellow Zone - this is a warning zone    Call your doctor or nurse if you have any of these signs or symptoms:   You gain 2 or more pounds overnight or 3-5 pounds in 3-7 days  You have more trouble breathing  You get more tired with regular activity, or are limiting activity because of shortness of breath or fatigue  You are short of breath lying down, you need more pillows to breathe comfortably,  or wake up during the night short of breath  You urinate less often during the day and more often at night  You have a bloated feeling, upset stomach, loss of appetite, or your clothes are fitting tighter    GO TO THE EMERGENCY DEPARTMENT or CALL 911 IF: These are signs you are in the RED ZONE - THIS IS AN EMERGENCY  You have tightness or pain in your chest  You are extremely short of breath or can't catch your breath  You cough up frothy pink mucous  You feel confused or can't think clearly  You are traveling and develop symptoms of worsening heart failure     We respect everyone's time and availability. Please be aware that this is not a walk-in clinic and we require appointments in order to facilitate timely care for all patients. We ask you to arrive 30 minutes before your appointment to allow time for you to check-in and have your bloodwork drawn. Please understand if you are late for your appointment, you may be asked to reschedule. If possible, all attempts will be made to accommodate but realize this is no guarantee that this will always be available. We understand there are extenuating circumstances. If you need to cancel or reschedule your appointment, please call the [facility] [employer] within 24 hours at [phone]. Thank you for your cooperation, [facility] Staff. IF YOU HAVE QUESTIONS REGARDING YOUR BILL, FEEL FREE TO CONTACT Replaced by [facility] PATIENT ACCOUNTS -[phone]. IF YOU NEED FINANCIAL ASSISTANCE, PLEASE CALL AN Replaced by [facility] FINANCIAL COUNSELOR [phone].              [facility] [employer] [facility]     [phone]

## 2023-06-20 NOTE — PROGRESS NOTES
Patient assessed. Patient denies shortness of breath or abdominal bloating. Patient reports her cough has improved. Patient with bilateral lower extremity non-pitting edema. Weight up 1 lb at 191.0 lbs. APN notified of all the above information. Labs ordered and drawn by the Vascular Access Team at the same time as IV placement. Reviewed allergies and list of current medications with patient and updated it in the Electronic Medical Record. IV established per protocol.  mg diuril given, IVP 4 mg bumex given, and PO 2 meq potassium given. Patient tolerated it well. Educated patient on low sodium diet and food choices, fluid restriction of 2 liters, and daily weights. Reviewed follow-up appointments and discharge Heart Failure instructions with patient. Patient verbalized an understanding. IV discontinued; catheter intact. Pressure held and gauze applied. Patient to return to the clinic in 1 week.

## 2023-06-22 ENCOUNTER — TELEPHONE (OUTPATIENT)
Facility: CLINIC | Age: 88
End: 2023-06-22

## 2023-06-22 DIAGNOSIS — J44.1 COPD EXACERBATION (HCC): ICD-10-CM

## 2023-06-22 DIAGNOSIS — J45.50 SEVERE PERSISTENT ASTHMA WITHOUT COMPLICATION: ICD-10-CM

## 2023-06-22 RX ORDER — ARFORMOTEROL TARTRATE 15 UG/2ML
15 SOLUTION RESPIRATORY (INHALATION) 2 TIMES DAILY
Qty: 120 ML | Refills: 5 | Status: SHIPPED | OUTPATIENT
Start: 2023-06-22

## 2023-06-22 NOTE — TELEPHONE ENCOUNTER
1 Aiden Meyer did not receive arformoterol  orders. Per Shane Villagomez at [facility], please resend with face sheet and office visit notes. Fax to [phone]. All information faxed as requested. Rx resent.

## 2023-06-23 NOTE — TELEPHONE ENCOUNTER
Spoke with daughter and she will contact [address] about arformoterol shipment if [address] does not reach her today.

## 2023-06-23 NOTE — TELEPHONE ENCOUNTER
Called to check in with Cecille moore about arformoterol. Rep states that pt refused medication and she did not want them to contact her daughter . Call placed to pt and she states that this did not happen. She requested to have [address] call her daughter. Provided number to [address] and if arrangements are made with daughter today, pt could receive the arformoterol tomorrow. Pt states that she is doing well and has decreased her Prednisone to 5 mg.

## 2023-06-26 DIAGNOSIS — I50.32 CHRONIC DIASTOLIC HEART FAILURE (HCC): Primary | ICD-10-CM

## 2023-06-27 ENCOUNTER — HOSPITAL ENCOUNTER (OUTPATIENT)
Dept: CARDIOLOGY CLINIC | Facility: HOSPITAL | Age: 88
Discharge: HOME | End: 2023-06-27
Attending: NURSE PRACTITIONER
Payer: MEDICARE

## 2023-06-27 ENCOUNTER — HOSPITAL ENCOUNTER (OUTPATIENT)
Dept: PERIOP | Facility: HOSPITAL | Age: 88
Discharge: HOME | End: 2023-06-27
Attending: NURSE PRACTITIONER
Payer: MEDICARE

## 2023-06-27 VITALS
RESPIRATION RATE: 23 BRPM | DIASTOLIC BLOOD PRESSURE: 42 MMHG | OXYGEN SATURATION: 96 % | WEIGHT: 191.38 LBS | SYSTOLIC BLOOD PRESSURE: 129 MMHG | HEART RATE: 64 BPM | BODY MASS INDEX: 43 KG/M2

## 2023-06-27 DIAGNOSIS — I50.32 CHRONIC DIASTOLIC HEART FAILURE (HCC): Primary | ICD-10-CM

## 2023-06-27 DIAGNOSIS — I10 ESSENTIAL HYPERTENSION: ICD-10-CM

## 2023-06-27 DIAGNOSIS — I89.0 LYMPHEDEMA: ICD-10-CM

## 2023-06-27 DIAGNOSIS — E87.1 HYPONATREMIA: ICD-10-CM

## 2023-06-27 LAB
ANION GAP SERPL CALC-SCNC: 8 MMOL/L (ref 0–18)
BUN BLD-MCNC: 40 MG/DL (ref 7–18)
CALCIUM BLD-MCNC: 9.1 MG/DL (ref 8.5–10.1)
CHLORIDE SERPL-SCNC: 95 MMOL/L (ref 98–112)
CO2 SERPL-SCNC: 29 MMOL/L (ref 21–32)
CREAT BLD-MCNC: 1.15 MG/DL
FASTING STATUS PATIENT QL REPORTED: NO
GFR SERPLBLD BASED ON 1.73 SQ M-ARVRAT: 45 ML/MIN/1.73M2 (ref 60–?)
GLUCOSE BLD-MCNC: 124 MG/DL (ref 70–99)
OSMOLALITY SERPL CALC.SUM OF ELEC: 285 MOSM/KG (ref 275–295)
POTASSIUM SERPL-SCNC: 3.7 MMOL/L (ref 3.5–5.1)
SODIUM SERPL-SCNC: 132 MMOL/L (ref 136–145)

## 2023-06-27 PROCEDURE — 99215 OFFICE O/P EST HI 40 MIN: CPT | Performed by: NURSE PRACTITIONER

## 2023-06-28 DIAGNOSIS — J44.1 COPD EXACERBATION (HCC): Primary | ICD-10-CM

## 2023-06-28 RX ORDER — BUDESONIDE 0.5 MG/2ML
0.5 INHALANT ORAL 2 TIMES DAILY
Qty: 360 ML | Refills: 3 | Status: SHIPPED | OUTPATIENT
Start: 2023-06-28

## 2023-06-28 RX ORDER — LOSARTAN POTASSIUM 50 MG/1
50 TABLET ORAL 2 TIMES DAILY
Qty: 180 TABLET | Refills: 1 | Status: SHIPPED | OUTPATIENT
Start: 2023-06-28

## 2023-07-06 ENCOUNTER — MED REC SCAN ONLY (OUTPATIENT)
Facility: CLINIC | Age: 88
End: 2023-07-06

## 2023-07-11 DIAGNOSIS — I50.32 CHRONIC HEART FAILURE WITH PRESERVED EJECTION FRACTION (HFPEF) (HCC): Primary | ICD-10-CM

## 2023-07-12 ENCOUNTER — HOSPITAL ENCOUNTER (OUTPATIENT)
Dept: PERIOP | Facility: HOSPITAL | Age: 88
Discharge: HOME | End: 2023-07-12
Attending: NURSE PRACTITIONER
Payer: MEDICARE

## 2023-07-12 ENCOUNTER — HOSPITAL ENCOUNTER (OUTPATIENT)
Dept: CARDIOLOGY CLINIC | Facility: HOSPITAL | Age: 88
Discharge: HOME | End: 2023-07-12
Attending: NURSE PRACTITIONER
Payer: MEDICARE

## 2023-07-12 VITALS
BODY MASS INDEX: 42 KG/M2 | RESPIRATION RATE: 22 BRPM | HEART RATE: 74 BPM | DIASTOLIC BLOOD PRESSURE: 51 MMHG | OXYGEN SATURATION: 96 % | WEIGHT: 188.81 LBS | SYSTOLIC BLOOD PRESSURE: 129 MMHG

## 2023-07-12 DIAGNOSIS — I89.0 LYMPHEDEMA: ICD-10-CM

## 2023-07-12 DIAGNOSIS — J96.11 CHRONIC RESPIRATORY FAILURE WITH HYPOXIA (HCC): ICD-10-CM

## 2023-07-12 DIAGNOSIS — I50.32 CHRONIC DIASTOLIC (CONGESTIVE) HEART FAILURE (HCC): Primary | ICD-10-CM

## 2023-07-12 DIAGNOSIS — N18.30 STAGE 3 CHRONIC KIDNEY DISEASE, UNSPECIFIED WHETHER STAGE 3A OR 3B CKD (HCC): ICD-10-CM

## 2023-07-12 DIAGNOSIS — E87.1 HYPONATREMIA: ICD-10-CM

## 2023-07-12 DIAGNOSIS — J44.1 COPD EXACERBATION (HCC): Primary | ICD-10-CM

## 2023-07-12 LAB
ANION GAP SERPL CALC-SCNC: 5 MMOL/L (ref 0–18)
BUN BLD-MCNC: 41 MG/DL (ref 7–18)
CALCIUM BLD-MCNC: 9.4 MG/DL (ref 8.5–10.1)
CHLORIDE SERPL-SCNC: 94 MMOL/L (ref 98–112)
CO2 SERPL-SCNC: 32 MMOL/L (ref 21–32)
CREAT BLD-MCNC: 1.23 MG/DL
GFR SERPLBLD BASED ON 1.73 SQ M-ARVRAT: 41 ML/MIN/1.73M2 (ref 60–?)
GLUCOSE BLD-MCNC: 126 MG/DL (ref 70–99)
OSMOLALITY SERPL CALC.SUM OF ELEC: 284 MOSM/KG (ref 275–295)
POTASSIUM SERPL-SCNC: 3.4 MMOL/L (ref 3.5–5.1)
SODIUM SERPL-SCNC: 131 MMOL/L (ref 136–145)

## 2023-07-12 PROCEDURE — 99215 OFFICE O/P EST HI 40 MIN: CPT | Performed by: NURSE PRACTITIONER

## 2023-07-12 RX ORDER — POTASSIUM CHLORIDE 20 MEQ/1
20 TABLET, EXTENDED RELEASE ORAL ONCE
Status: COMPLETED | OUTPATIENT
Start: 2023-07-12 | End: 2023-07-12

## 2023-07-12 RX ADMIN — POTASSIUM CHLORIDE 20 MEQ: 20 TABLET, EXTENDED RELEASE ORAL at 14:23:00

## 2023-07-12 NOTE — PROGRESS NOTES
Patient assessed with daughter near by. No signs or symptoms of fatigue or chest pain. Bilateral lower extremity edema noted; which thinks has improved. Patient complains of congestion and shortness of breath. After ambulating from the bathroom, her oxygen level took awhile to come up - was still saturating at 88-89%. 1L of NC oxygen was administered. Weight stable at 188.8 lbs; down 2.5 lbs. Reviewed current list of patient's allergies and medication; updated the Electronic Medical Record. Labs ordered to assess kidney function and drawn by [facility]. Reviewed follow-up appointment and Heart Failure discharge instructions with patient. Patient verbalized an understanding. RTC in one month.

## 2023-07-12 NOTE — PATIENT INSTRUCTIONS
Heart Failure Discharge Instructions      Activity: Regular exercise and activity is important for your overall health and to help keep your heart strong and functioning as well as possible. Walk at a slow to moderate pace for 15-20 minutes 3-5 days per week. Follow these instructions every day to keep yourself in the [address] you are feeling well and your symptoms are under control                                    Medications  Take your medication every day as instructed  Do not stop taking your medicine or change the amount you are taking without instructions from your doctor or nurse  Do Not take non-steroidal antiinflammatory drugs such as ibuprofen, aleve, advil, or motrin                                    Diet/Fluids  People with heart failure should eat less sodium (salt) and limit fluid. Sodium attracts water and makes the body hold fluid. This extra fluid makes the heart work harder and can worsen the symptoms of heart failure. Diet    2000 mg sodium daily  Fluid restriction    64 ounces daily  (8 oz. = 1 cup)                                     Body Weight  Weigh yourself every day before breakfast and write your weight down  Use the same scale and wear about the same amount of clothes each time  A sudden weight increase is due to fluid retention rather than fat                                         Activity  Pace your activities to avoid getting overtired  Take rest periods as needed  Elevate your feet to reduce ankle swelling when resting                             Signs of Worsening Heart Failure    You are entering the Yellow Zone - this is a warning zone    Call your doctor or nurse if you have any of these signs or symptoms:   You gain 2 or more pounds overnight or 3-5 pounds in 3-7 days  You have more trouble breathing  You get more tired with regular activity, or are limiting activity because of shortness of breath or fatigue  You are short of breath lying down, you need more pillows to breathe comfortably,  or wake up during the night short of breath  You urinate less often during the day and more often at night  You have a bloated feeling, upset stomach, loss of appetite, or your clothes are fitting tighter    GO TO THE EMERGENCY DEPARTMENT or CALL 911 IF: These are signs you are in the RED ZONE - THIS IS AN EMERGENCY  You have tightness or pain in your chest  You are extremely short of breath or can't catch your breath  You cough up frothy pink mucous  You feel confused or can't think clearly  You are traveling and develop symptoms of worsening heart failure     We respect everyone's time and availability. Please be aware that this is not a walk-in clinic and we require appointments in order to facilitate timely care for all patients. We ask you to arrive 30 minutes before your appointment to allow time for you to check-in and have your bloodwork drawn. Please understand if you are late for your appointment, you may be asked to reschedule. If possible, all attempts will be made to accommodate but realize this is no guarantee that this will always be available. We understand there are extenuating circumstances. If you need to cancel or reschedule your appointment, please call the [facility] within 24 hours at [phone]. Thank you for your cooperation, [facility] Staff. IF YOU HAVE QUESTIONS REGARDING YOUR BILL, FEEL FREE TO CONTACT [facility] PATIENT ACCOUNTS -[phone]. IF YOU NEED FINANCIAL ASSISTANCE, PLEASE CALL AN [facility] FINANCIAL COUNSELOR -[phone].              [facility] [employer] [facility]     [phone]

## 2023-07-13 ENCOUNTER — TELEPHONE (OUTPATIENT)
Dept: FAMILY MEDICINE CLINIC | Facility: CLINIC | Age: 88
End: 2023-07-13

## 2023-07-13 DIAGNOSIS — I10 PRIMARY HYPERTENSION: ICD-10-CM

## 2023-07-13 RX ORDER — IPRATROPIUM BROMIDE AND ALBUTEROL SULFATE 2.5; .5 MG/3ML; MG/3ML
3 SOLUTION RESPIRATORY (INHALATION) EVERY 6 HOURS PRN
Qty: 360 ML | Refills: 3 | Status: SHIPPED | OUTPATIENT
Start: 2023-07-13

## 2023-07-13 NOTE — TELEPHONE ENCOUNTER
Torsemide 40 MG Oral Tab 60 tablet 1 6/16/2023    Sig:   Take 40 mg by mouth 2 (two) times daily. Last OV: 4/21/23    Spoke with pt daughter she would like a 90 day supply of medication. Approve/deny:      (Pt had BMP 7/12/23).

## 2023-07-13 NOTE — TELEPHONE ENCOUNTER
Received RX refill request for Duoneb. Last OV: 05/1823. Next OV:07/27/23. RX pended and routed to   For review.

## 2023-07-14 ENCOUNTER — TELEPHONE (OUTPATIENT)
Dept: FAMILY MEDICINE CLINIC | Facility: CLINIC | Age: 88
End: 2023-07-14

## 2023-07-14 RX ORDER — FUROSEMIDE 20 MG/1
40 TABLET ORAL 2 TIMES DAILY
Qty: 120 TABLET | Refills: 11 | Status: SHIPPED | OUTPATIENT
Start: 2023-07-14

## 2023-07-14 RX ORDER — TORSEMIDE 20 MG/1
40 TABLET ORAL 2 TIMES DAILY
Qty: 120 TABLET | Refills: 0 | Status: CANCELLED | OUTPATIENT
Start: 2023-07-14

## 2023-07-14 NOTE — TELEPHONE ENCOUNTER
Caregiver called back.  States prescription has to be written as 20mg instead of 40mg with patient taking a total of 40mg in the am and 40mg in the pm    Medication pended for review

## 2023-07-14 NOTE — TELEPHONE ENCOUNTER
Spoke with Dilma. Advised medication sent to pharmacy for patient Sondra Penn.  Dilma verbalized understanding

## 2023-07-14 NOTE — TELEPHONE ENCOUNTER
Daughter called and Torsemide 20mg is covered per pharmacist.  So can YP approve RX for 20mg tablets and change quantity in dosage. Daughter is concerned RX will get denied again. Patient is running out of medication. Please call Malissa Aase at [phone] when prescription is ordered.

## 2023-07-18 ENCOUNTER — TELEPHONE (OUTPATIENT)
Dept: FAMILY MEDICINE CLINIC | Facility: CLINIC | Age: 88
End: 2023-07-18

## 2023-07-18 RX ORDER — TORSEMIDE 20 MG/1
TABLET ORAL
Qty: 360 TABLET | Refills: 0 | Status: SHIPPED | OUTPATIENT
Start: 2023-07-18

## 2023-07-18 RX ORDER — PREDNISONE 5 MG/1
5 TABLET ORAL DAILY
Qty: 90 TABLET | Refills: 0 | Status: SHIPPED | OUTPATIENT
Start: 2023-07-18

## 2023-07-25 ENCOUNTER — OFFICE VISIT (OUTPATIENT)
Dept: NEPHROLOGY | Facility: CLINIC | Age: 88
End: 2023-07-25
Payer: MEDICARE

## 2023-07-25 VITALS — BODY MASS INDEX: 40 KG/M2 | WEIGHT: 179 LBS | SYSTOLIC BLOOD PRESSURE: 140 MMHG | DIASTOLIC BLOOD PRESSURE: 69 MMHG

## 2023-07-25 DIAGNOSIS — N18.30 STAGE 3 CHRONIC KIDNEY DISEASE, UNSPECIFIED WHETHER STAGE 3A OR 3B CKD (HCC): Primary | ICD-10-CM

## 2023-07-25 DIAGNOSIS — E55.9 VITAMIN D DEFICIENCY DUE TO CHRONIC KIDNEY DISEASE: ICD-10-CM

## 2023-07-25 DIAGNOSIS — N18.9 VITAMIN D DEFICIENCY DUE TO CHRONIC KIDNEY DISEASE: ICD-10-CM

## 2023-07-25 PROCEDURE — 1111F DSCHRG MED/CURRENT MED MERGE: CPT | Performed by: INTERNAL MEDICINE

## 2023-07-25 PROCEDURE — 99213 OFFICE O/P EST LOW 20 MIN: CPT | Performed by: INTERNAL MEDICINE

## 2023-07-27 ENCOUNTER — OFFICE VISIT (OUTPATIENT)
Facility: CLINIC | Age: 88
End: 2023-07-27
Payer: MEDICARE

## 2023-07-27 VITALS
RESPIRATION RATE: 16 BRPM | DIASTOLIC BLOOD PRESSURE: 50 MMHG | HEART RATE: 74 BPM | OXYGEN SATURATION: 95 % | SYSTOLIC BLOOD PRESSURE: 120 MMHG

## 2023-07-27 DIAGNOSIS — J45.50 SEVERE PERSISTENT ASTHMA WITHOUT COMPLICATION: ICD-10-CM

## 2023-07-27 DIAGNOSIS — J96.11 CHRONIC RESPIRATORY FAILURE WITH HYPOXIA (HCC): ICD-10-CM

## 2023-07-27 DIAGNOSIS — J41.8 MIXED SIMPLE AND MUCOPURULENT CHRONIC BRONCHITIS (HCC): ICD-10-CM

## 2023-07-27 DIAGNOSIS — J44.1 COPD EXACERBATION (HCC): Primary | ICD-10-CM

## 2023-07-27 DIAGNOSIS — R05.3 CHRONIC COUGH: ICD-10-CM

## 2023-07-27 PROCEDURE — 99214 OFFICE O/P EST MOD 30 MIN: CPT | Performed by: INTERNAL MEDICINE

## 2023-07-27 NOTE — PROGRESS NOTES
[facility] General Pulmonary Progress Note    History of Present Illness:  French Samuel is a 80year old female with hx of Asthma, CKD, and CHF who presents for follow up of asthma/COPD. She reports having been well until today when she felt her cough worsen with white phlegm. Again after clearing the phlegm she feels well. She did use arformoterol +budesonide in early June but did not feel better so stopped - of note she stopped using duoneb altogether during this time. No pain. No fevers. +BLE edema    June 2023 previously  She was hospitalized in May with CHF exacerbation and hyponatremia, her breathing was controlled during her hospitalization. After going home she developed chest congestion and frequent coughing. She has continued to use her nebulizers - budesonide BID, duoneb PRN and saline nebs PRN. She remains on baseline pred 5mg daily  No fevers. No pain/pleurisy  Weight is up and +BLE - planning to see cardiology soon     May 2023 previously  Discharged  last week after treated for fluid overload. Overall feels well; breathing is significantly better than prior to hospitalization. Pt hospitalized from 5/5-5/11/23 with Acute HFpEF with acute pulmonary edema, BNP > 5000, diuresed on lasix gtt with > 4L of fluid removed. Meds adjusted per cardiologist, plan for labs and CXR today and plan to go to the heart failure clinic next week. No f/c/ns. Currently with occasional cough and CORONA. BLE edema unchanged per pt. Previously 3/23/23 Dr Danny Angel  80year old female former smoker (quit 30 years ago, 20 pack years) with significant PMH asthma, HTN and obesity who presents today for follow up. She was hospitalized with acute asthma exacerbation and CHF exacerbation, managed on steroids, nebs and diuretics with improvement. She feels better today, breathing is back to normal. Has sore throat and some mild dysphagia over the last week or so. She is not watching her weight of fluid balance.       Previously  She has followed with me for several years and managed for asthma. She has had variable exacerbations of her asthma in the past leading to initiation of chronic steroids which was weaned to 5mg daily with good success and mitigation of repeated asthma exacerbations. She presents today without acute concern. Notes occasional cough/phlegm, relieved with nebs. Using budesonide neb once to twice a day. Using advair BID. Using duoneb prn and only needing to use a few times/week. No fevers. No pain or pleurisy. Limited mobility due to BLE swelling, denies CORONA. Past Medical History:   Past Medical History:   Diagnosis Date    Accelerated hypertension 1/24/2014    Asthma     Cough 8/5/2016    Disorder of thyroid     Dyspnea 8/4/2016    Dyspnea, unspecified type 8/4/2016    Endocrine disorder     HYPOTHYROID    Osteoarthrosis, unspecified whether generalized or localized, unspecified site     Other and unspecified hyperlipidemia     Pneumonia, organism unspecified(486)     Severe persistent asthma with status asthmaticus 12/30/2019    Thyroid disease     Unspecified essential hypertension     Ventricular ectopy 1/15/2019    Visual impairment         Past Surgical History:   Past Surgical History:   Procedure Laterality Date    KNEE REPLACEMENT SURGERY      OTHER SURGICAL HISTORY      TOTAL KNEE REPLACEMENT Bilateral     16 yrs ago       Family Medical History:   Family History   Problem Relation Age of Onset    Cancer Father         bladder    Heart Disorder Mother     Hypertension Mother     Diabetes Mother         Social History: Social History    Socioeconomic History      Marital status:        Spouse name: Not on file      Number of children: Not on file      Years of education: Not on file      Highest education level: Not on file    Occupational History      Not on file    Tobacco Use      Smoking status: Former        Packs/day: 2.50        Years: 30.00        Pack years: 76        Types: Cigarettes        Quit date: 1991        Years since quittin.9      Smokeless tobacco: Never    Vaping Use      Vaping Use: Never used    Substance and Sexual Activity      Alcohol use: Not Currently        Comment: 2 times per month      Drug use: No      Sexual activity: Not on file    Other Topics      Concerns:         Service: Not Asked        Blood Transfusions: Not Asked        Caffeine Concern: Yes        Occupational Exposure: Not Asked        Hobby Hazards: Not Asked        Sleep Concern: Not Asked        Stress Concern: Not Asked        Weight Concern: Not Asked        Special Diet: Not Asked        Back Care: Not Asked        Exercise: Yes        Bike Helmet: Not Asked        Seat Belt: Not Asked        Self-Exams: Not Asked    Social History Narrative      Not on file    Social Determinants of Health  Financial Resource Strain: Low Risk  (5/15/2023)      Financial Resource Strain          Difficulty of Paying Living Expenses: Not hard at all          Med Affordability: No  Food Insecurity: Not on file  Transportation Needs: No Transportation Needs (5/15/2023)      Transportation Needs          Lack of Transportation: No  Physical Activity: Not on file  Stress: Not on file  Social Connections: Not on file  Housing Stability: Not on file     Medications:   Current Outpatient Medications   Medication Sig Dispense Refill    predniSONE 5 MG Oral Tab Take 1 tablet (5 mg total) by mouth daily. 90 tablet 0    torsemide 20 MG Oral Tab TAKE 2 TABLETS BY MOUTH TWICE DAILY 360 tablet 0    ipratropium-albuterol 0.5-2.5 (3) MG/3ML Inhalation Solution Take 3 mL by nebulization every 6 (six) hours as needed. 360 mL 3    losartan 50 MG Oral Tab Take 1 tablet (50 mg total) by mouth 2 (two) times daily. 180 tablet 1    budesonide 0.5 MG/2ML Inhalation Suspension Take 2 mL (0.5 mg total) by nebulization 2 (two) times daily. 360 mL 3    potassium chloride 20 MEQ Oral Tab CR Take 1 tablet (20 mEq total) by mouth daily.   0 METOPROLOL TARTRATE 100 MG Oral Tab TAKE 1 TABLET(100 MG) BY MOUTH TWICE DAILY 180 tablet 0    hydrALAZINE 25 MG Oral Tab Take 1 tablet (25 mg total) by mouth in the morning and 1 tablet (25 mg total) before bedtime. 180 tablet 1    Desoximetasone 0.25 % External Cream desoximetasone 0.25 % topical cream, [RxNorm: 551523]      levothyroxine 125 MCG Oral Tab Take 1 tablet (125 mcg total) by mouth before breakfast. 90 tablet 0    cyanocobalamin 250 MCG Oral Tab Take 1 tablet (250 mcg total) by mouth daily. Cholecalciferol (VITAMIN D-3 OR) Take by mouth daily. Cranberry 500 MG Oral Cap Take 500 mg by mouth daily. sodium chloride 0.9 % Inhalation Nebu Soln Take 3 mL by nebulization as needed for Wheezing. 360 mL 3    acetaminophen 500 MG Oral Tab Take 1 tablet (500 mg total) by mouth every 6 (six) hours. 30 tablet 0    albuterol (PROAIR HFA) 108 (90 Base) MCG/ACT Inhalation Aero Soln Inhale 2 puffs into the lungs every 6 (six) hours as needed for Wheezing or Shortness of Breath. 1 each 11    Cod Liver Oil 1000 MG Oral Cap Take 1 capsule by mouth daily. Calcium Carbonate-Vitamin D (CALTRATE 600+D OR) Take 1 tablet by mouth daily. Multiple Vitamins-Minerals (CENTRUM SILVER OR) Take 1 tablet by mouth daily. arformoterol 15 MCG/2ML Inhalation Nebu Soln Take 2 mL (15 mcg total) by nebulization 2 (two) times daily. (Patient not taking: Reported on 7/27/2023) 120 mL 5       Review of Systems: Review of Systems   Constitutional: Negative. HENT: Negative. Respiratory:  Positive for cough and shortness of breath. Negative for wheezing and stridor. Cardiovascular:  Positive for chest pain and leg swelling. Negative for palpitations. All other systems reviewed and are negative. Physical Exam:  /50 (BP Location: Left arm, Patient Position: Sitting, Cuff Size: adult)   Pulse 74   Resp 16   SpO2 95%      Constitutional: alert, cooperative. No acute distress.   HEENT: Head NC/AT. Cardio: Regular rate and rhythm. Normal S1 and S2. No murmurs. Respiratory: Thorax symmetrical with no labored breathing. Clear to ausculation bilaterally with symmetrical breath sounds. No wheezing, rhonchi, rales, or crackles. GI: NABS. Abd soft, non-tender. Extremities: No clubbing or cyanosis. + BLE edema. Neurologic: A&Ox3. No gross motor deficits. Skin: Warm, dry  Psych: Calm, cooperative. Pleasant affect. Results:  Personally reviewed    WBC: 10.7, done on 6/1/2023. HGB: 13, done on 6/1/2023. PLT: 298, done on 6/1/2023. Glucose: 126, done on 7/12/2023. Cr: 1.23, done on 7/12/2023. GFR(AA): 40, done on 7/23/2022. GFR (non-AA): 35, done on 7/23/2022. CA: 9.4, done on 7/12/2023. Na: 131, done on 7/12/2023. K: 3.4, done on 7/12/2023. Cl: 94, done on 7/12/2023. CO2: 32, done on 7/12/2023. Last ALB was 3.6% done on 6/1/2023. XR CHEST AP PORTABLE  (CPT=71045)    Result Date: 5/19/2023  CONCLUSION: No acute cardiopulmonary abnormality. Subacute to chronic fracture of the right proximal humerus is noted. Dictated by (CST): Frank Bernstein MD on 5/19/2023 at 11:01 AM     Finalized by (CST): Frank Bernstein MD on 5/19/2023 at 11:02 AM       XR CHEST PA + LAT CHEST (CPT=71046)    Result Date: 5/18/2023  CONCLUSION:  No consolidation. Emphysematous changes are noted. Dictated by (CST): Buster Trimble MD on 5/18/2023 at 1:24 PM     Finalized by (CST): Buster Trimble MD on 5/18/2023 at 1:25 PM       XR CHEST AP PORTABLE  (CPT=71045)    Result Date: 5/5/2023  CONCLUSION:   Stable cardiac and mediastinal contours. Findings of mild/moderate interstitial pulmonary edema. No discrete airspace consolidation. Trace bilateral pleural effusions. No pneumothorax. Chronic remodeling of the right proximal humerus. Suspected remote left lateral rib fractures.     Dictated by (CST): Jose Trejo MD on 5/05/2023 at 4:16 PM     Finalized by (CST): Jose Trejo MD on 5/05/2023 at 4:18 PM         Assessment & Plan:   #1. Asthma   Severe, persistent  Has had multiple exacerbations in the past, improved on chronic prednisone 5mg daily  Changed from advair to budesonide neb BID; does not feel that arformoterol helped. Will continue on budesonide BID and duonebs PRN  Continue duonebs and saline nebs PRN  Given her chronic cough and multiple exacerbations over the past year with multiple hospitalizations, limited response to current airway clearance therapies, she would be a great candidate for oxcillating lung therapy/ lung expansion therapy, will plan to proceed with [facility] airway clearance to be used with her neb medications  Will increase pred to 10mg daily for this week. Will have her f/u in 2 weeks to reassess with hope to deescalate back to 5mg daily  We had previously reviewed options to consider biologics but she has declined but now she is agreeable to proceed - given information for Dr. Silvestre Hammans  Encouraged to exercise and lose weight  We have discussed concern for DANE/OHS in the past with recommendation for PSG. However she is opposed to testing for sleep apnea and denies that she would ever use a device to help her sleep     #2. Hypoxia, chronic  Hypoxia on exertion diagnosed on last hospitalization and has since improved  Continues to use oxygen with sleep  She continues to use and benefit from supplemental o2    #3. Chronic bronchitis with mucus plugging  As above    #4.  CHF  Following with heart failure clinic and cardiology, remains on diuretics    David Rose MD  7/27/2023

## 2023-07-27 NOTE — PATIENT INSTRUCTIONS
Increase the prednisone to 10mg once a day for the next week or so  See allergy doctor - Dr. Sadia Muñoz, call [phone]  I will work on seeing if we can obtain the [employer] airway clearance machine to help the cough/phlegm. I would like you to see Karol the week of August 7th to review the steroids and how you're doing. Continue nebulized budesonide one vial in the morning and one vial in the evening. Rinse your mouth out after using the nebulizers  You can hold off on the arformoterol nebulizer for right now. You can use the duoneb nebulizer every 6 hours as needed for your breathing  You can continue to use the saline nebulizer one vial every 6 hours as needed for the cough or thick phlegm. Watch your weight and fluid balance daily.

## 2023-08-04 ENCOUNTER — MED REC SCAN ONLY (OUTPATIENT)
Facility: CLINIC | Age: 88
End: 2023-08-04

## 2023-08-07 RX ORDER — LEVOTHYROXINE SODIUM 0.12 MG/1
125 TABLET ORAL
Qty: 90 TABLET | Refills: 0 | Status: SHIPPED | OUTPATIENT
Start: 2023-08-07

## 2023-08-08 ENCOUNTER — TELEMEDICINE (OUTPATIENT)
Facility: CLINIC | Age: 88
End: 2023-08-08
Payer: MEDICARE

## 2023-08-08 DIAGNOSIS — J45.50 SEVERE PERSISTENT ASTHMA WITHOUT COMPLICATION: Primary | ICD-10-CM

## 2023-08-08 DIAGNOSIS — J96.11 CHRONIC RESPIRATORY FAILURE WITH HYPOXIA (HCC): ICD-10-CM

## 2023-08-08 PROCEDURE — 99213 OFFICE O/P EST LOW 20 MIN: CPT | Performed by: NURSE PRACTITIONER

## 2023-08-08 NOTE — PROGRESS NOTES
[facility] Progress Note    History of Present Illness:  Patsy Birch is a 80year old female with significant PMH of Asthma who presents today for follow up. Reports breathing is perfect. Very happy with new medications. Continues on prednisone 10mg as she has increased cough on 5mg. Denies new complaints. Previously 7/27/23 Dr Audrey Allen  a 80year old female with hx of Asthma, CKD, and CHF who presents for follow up of asthma/COPD. She reports having been well until today when she felt her cough worsen with white phlegm. Again after clearing the phlegm she feels well. She did use arformoterol +budesonide in early June but did not feel better so stopped - of note she stopped using duoneb altogether during this time. No pain. No fevers. +BLE edema     June 2023 previously  She was hospitalized in May with CHF exacerbation and hyponatremia, her breathing was controlled during her hospitalization. After going home she developed chest congestion and frequent coughing. She has continued to use her nebulizers - budesonide BID, duoneb PRN and saline nebs PRN. She remains on baseline pred 5mg daily  No fevers. No pain/pleurisy  Weight is up and +BLE - planning to see cardiology soon      May 2023 previously  Discharged  last week after treated for fluid overload. Overall feels well; breathing is significantly better than prior to hospitalization. Pt hospitalized from 5/5-5/11/23 with Acute HFpEF with acute pulmonary edema, BNP > 5000, diuresed on lasix gtt with > 4L of fluid removed. Meds adjusted per cardiologist, plan for labs and CXR today and plan to go to the heart failure clinic next week. No f/c/ns. Currently with occasional cough and CORONA. BLE edema unchanged per pt. Previously 3/23/23 Dr Audrey Allen  80year old female former smoker (quit 30 years ago, 20 pack years) with significant PMH asthma, HTN and obesity who presents today for follow up.  She was hospitalized with acute asthma exacerbation and CHF exacerbation, managed on steroids, nebs and diuretics with improvement. She feels better today, breathing is back to normal. Has sore throat and some mild dysphagia over the last week or so. She is not watching her weight of fluid balance. Previously  She has followed with me for several years and managed for asthma. She has had variable exacerbations of her asthma in the past leading to initiation of chronic steroids which was weaned to 5mg daily with good success and mitigation of repeated asthma exacerbations. She presents today without acute concern. Notes occasional cough/phlegm, relieved with nebs. Using budesonide neb once to twice a day. Using advair BID. Using duoneb prn and only needing to use a few times/week. No fevers. No pain or pleurisy. Limited mobility due to BLE swelling, denies CORONA. Past Medical History:   Past Medical History:   Diagnosis Date    Accelerated hypertension 1/24/2014    Asthma     Cough 8/5/2016    Disorder of thyroid     Dyspnea 8/4/2016    Dyspnea, unspecified type 8/4/2016    Endocrine disorder     HYPOTHYROID    Osteoarthrosis, unspecified whether generalized or localized, unspecified site     Other and unspecified hyperlipidemia     Pneumonia, organism unspecified(486)     Severe persistent asthma with status asthmaticus 12/30/2019    Thyroid disease     Unspecified essential hypertension     Ventricular ectopy 1/15/2019    Visual impairment         Past Surgical History:   Past Surgical History:   Procedure Laterality Date    KNEE REPLACEMENT SURGERY      OTHER SURGICAL HISTORY      TOTAL KNEE REPLACEMENT Bilateral     16 yrs ago         Family Medical History:   Family History   Problem Relation Age of Onset    Cancer Father         bladder    Heart Disorder Mother     Hypertension Mother     Diabetes Mother         Social History: Social History    Socioeconomic History      Marital status:        Spouse name: Not on file      Number of children: Not on file      Years of education: Not on file      Highest education level: Not on file    Occupational History      Not on file    Tobacco Use      Smoking status: Former        Packs/day: 2.50        Years: 30.00        Pack years: 76        Types: Cigarettes        Quit date: 1991        Years since quittin.0      Smokeless tobacco: Never    Vaping Use      Vaping Use: Never used    Substance and Sexual Activity      Alcohol use: Not Currently        Comment: 2 times per month      Drug use: No      Sexual activity: Not on file    Other Topics      Concerns:         Service: Not Asked        Blood Transfusions: Not Asked        Caffeine Concern: Yes        Occupational Exposure: Not Asked        Hobby Hazards: Not Asked        Sleep Concern: Not Asked        Stress Concern: Not Asked        Weight Concern: Not Asked        Special Diet: Not Asked        Back Care: Not Asked        Exercise: Yes        Bike Helmet: Not Asked        Seat Belt: Not Asked        Self-Exams: Not Asked    Social History Narrative      Not on file    Social Determinants of Health  Financial Resource Strain: Low Risk  (5/15/2023)      Financial Resource Strain          Difficulty of Paying Living Expenses: Not hard at all          Med Affordability: No  Food Insecurity: Not on file  Transportation Needs: No Transportation Needs (5/15/2023)      Transportation Needs          Lack of Transportation: No  Physical Activity: Not on file  Stress: Not on file  Social Connections: Not on file  Housing Stability: Not on file     Medications:   Current Outpatient Medications   Medication Sig Dispense Refill    levothyroxine 125 MCG Oral Tab Take 1 tablet (125 mcg total) by mouth before breakfast. 90 tablet 0    predniSONE 5 MG Oral Tab Take 1 tablet (5 mg total) by mouth daily.  90 tablet 0    torsemide 20 MG Oral Tab TAKE 2 TABLETS BY MOUTH TWICE DAILY 360 tablet 0    ipratropium-albuterol 0.5-2.5 (3) MG/3ML Inhalation Solution Take 3 mL by nebulization every 6 (six) hours as needed. 360 mL 3    losartan 50 MG Oral Tab Take 1 tablet (50 mg total) by mouth 2 (two) times daily. 180 tablet 1    budesonide 0.5 MG/2ML Inhalation Suspension Take 2 mL (0.5 mg total) by nebulization 2 (two) times daily. 360 mL 3    arformoterol 15 MCG/2ML Inhalation Nebu Soln Take 2 mL (15 mcg total) by nebulization 2 (two) times daily. (Patient not taking: Reported on 7/27/2023) 120 mL 5    potassium chloride 20 MEQ Oral Tab CR Take 1 tablet (20 mEq total) by mouth daily. 0    METOPROLOL TARTRATE 100 MG Oral Tab TAKE 1 TABLET(100 MG) BY MOUTH TWICE DAILY 180 tablet 0    hydrALAZINE 25 MG Oral Tab Take 1 tablet (25 mg total) by mouth in the morning and 1 tablet (25 mg total) before bedtime. 180 tablet 1    Desoximetasone 0.25 % External Cream desoximetasone 0.25 % topical cream, [RxNorm: 278996]      cyanocobalamin 250 MCG Oral Tab Take 1 tablet (250 mcg total) by mouth daily. Cholecalciferol (VITAMIN D-3 OR) Take by mouth daily. Cranberry 500 MG Oral Cap Take 500 mg by mouth daily. sodium chloride 0.9 % Inhalation Nebu Soln Take 3 mL by nebulization as needed for Wheezing. 360 mL 3    acetaminophen 500 MG Oral Tab Take 1 tablet (500 mg total) by mouth every 6 (six) hours. 30 tablet 0    albuterol (PROAIR HFA) 108 (90 Base) MCG/ACT Inhalation Aero Soln Inhale 2 puffs into the lungs every 6 (six) hours as needed for Wheezing or Shortness of Breath. 1 each 11    Cod Liver Oil 1000 MG Oral Cap Take 1 capsule by mouth daily. Calcium Carbonate-Vitamin D (CALTRATE 600+D OR) Take 1 tablet by mouth daily. Multiple Vitamins-Minerals (CENTRUM SILVER OR) Take 1 tablet by mouth daily. Review of Systems: Review of Systems   Constitutional:  Negative for activity change, appetite change, chills, diaphoresis, fatigue, fever and unexpected weight change.    HENT:  Negative for congestion, postnasal drip, rhinorrhea, sinus pressure, sinus pain, sneezing, sore throat, trouble swallowing and voice change. Respiratory:  Negative for apnea, cough, choking, chest tightness, shortness of breath, wheezing and stridor. Cardiovascular:  Negative for chest pain, palpitations and leg swelling. Musculoskeletal:  Negative for arthralgias. Skin:  Negative for pallor and rash. Allergic/Immunologic: Negative for immunocompromised state. Neurological:  Negative for dizziness and headaches. Hematological:  Negative for adenopathy. Physical Exam:  There were no vitals taken for this visit. Constitutional: alert, cooperative. No acute distress. HEENT: Head NC/AT. Respiratory: Thorax symmetrical with no labored breathing. Neurologic: A&Ox3. No gross motor deficits. Skin: dry  Psych: Calm, cooperative. Pleasant affect. Results:  Personally reviewed    WBC: 10.7, done on 6/1/2023. HGB: 13, done on 6/1/2023. PLT: 298, done on 6/1/2023. Glucose: 126, done on 7/12/2023. Cr: 1.23, done on 7/12/2023. GFR(AA): 40, done on 7/23/2022. GFR (non-AA): 35, done on 7/23/2022. CA: 9.4, done on 7/12/2023. Na: 131, done on 7/12/2023. K: 3.4, done on 7/12/2023. Cl: 94, done on 7/12/2023. CO2: 32, done on 7/12/2023. Last ALB was 3.6% done on 6/1/2023. XR CHEST AP PORTABLE  (CPT=71045)    Result Date: 5/19/2023  CONCLUSION: No acute cardiopulmonary abnormality. Subacute to chronic fracture of the right proximal humerus is noted. Dictated by (CST): Aleksandra White MD on 5/19/2023 at 11:01 AM     Finalized by (CST): Aleksandra White MD on 5/19/2023 at 11:02 AM       XR CHEST PA + LAT CHEST (CPT=71046)    Result Date: 5/18/2023  CONCLUSION:  No consolidation. Emphysematous changes are noted. Dictated by (CST): Salazar Beavers MD on 5/18/2023 at 1:24 PM     Finalized by (CST): Salazar Beavers MD on 5/18/2023 at 1:25 PM         Assessment/Plan:  #1.  Asthma   Severe, persistent  Has had multiple exacerbations in the past, improved on chronic prednisone 5mg daily  Changed from advair to budesonide neb BID; does not feel that arformoterol helped. Will continue on budesonide BID and duonebs PRN  Continue duonebs and saline nebs PRN  Given her chronic cough and multiple exacerbations over the past year with multiple hospitalizations, limited response to current airway clearance therapies, she would be a great candidate for oxcillating lung therapy/ lung expansion therapy, will plan to proceed with Volara airway clearance to be used with her neb medications  Will increase pred to 10mg daily for this week. Will have her f/u in 2 weeks to reassess with hope to deescalate back to 5mg daily  We had previously reviewed options to consider biologics but she has declined but now she is agreeable to proceed - given information for Dr. Sadie Hernández  Encouraged to exercise and lose weight  We have discussed concern for DANE/OHS in the past with recommendation for PSG. However she is opposed to testing for sleep apnea and denies that she would ever use a device to help her sleep  8/2023 Improved; less SOB and minimal cough with light yellow phlegm. Recommend to continue current nebs; budesonide BID, Duonebs once daily and saline prn. Will try to decrease prednisone to 5mg daily and may alternate if cough returns. Discussed longterm effects of chronic steroids. Will followup in 4 weeks by YUKI CONTRERAS [facility] unless symptomatic     #2. Hypoxia, chronic  Hypoxia on exertion diagnosed on last hospitalization and has since improved  Continues to use oxygen with sleep  She continues to use and benefit from supplemental o2  8/2023 Using and benefiting from oxygen tx     #3. Chronic bronchitis with mucus plugging  As above     #4. CHF  Following with heart failure clinic and cardiology, remains on diuretics       Vince Rivera, APRN  [facility]   8/8/2023    This visit is conducted using Telemedicine with live, interactive video and audio.     Patient has been referred to the [facility] website at [email or web address] to review the yearly Consent to Treat document. Patient understands and accepts financial responsibility for any deductible, co-insurance and/or co-pays associated with this service.

## 2023-08-18 DIAGNOSIS — I89.0 LYMPHEDEMA: Primary | ICD-10-CM

## 2023-08-18 DIAGNOSIS — I50.32 CHRONIC DIASTOLIC (CONGESTIVE) HEART FAILURE (HCC): ICD-10-CM

## 2023-08-22 ENCOUNTER — HOSPITAL ENCOUNTER (OUTPATIENT)
Dept: CARDIOLOGY CLINIC | Facility: HOSPITAL | Age: 88
Discharge: HOME | End: 2023-08-22
Attending: NURSE PRACTITIONER
Payer: MEDICARE

## 2023-08-22 ENCOUNTER — HOSPITAL ENCOUNTER (OUTPATIENT)
Dept: PERIOP | Facility: HOSPITAL | Age: 88
Discharge: HOME | End: 2023-08-22
Attending: NURSE PRACTITIONER
Payer: MEDICARE

## 2023-08-22 VITALS
RESPIRATION RATE: 20 BRPM | DIASTOLIC BLOOD PRESSURE: 72 MMHG | SYSTOLIC BLOOD PRESSURE: 154 MMHG | BODY MASS INDEX: 43 KG/M2 | OXYGEN SATURATION: 95 % | HEART RATE: 76 BPM | WEIGHT: 189.81 LBS

## 2023-08-22 DIAGNOSIS — I10 ESSENTIAL HYPERTENSION: ICD-10-CM

## 2023-08-22 DIAGNOSIS — N18.30 STAGE 3 CHRONIC KIDNEY DISEASE, UNSPECIFIED WHETHER STAGE 3A OR 3B CKD (HCC): ICD-10-CM

## 2023-08-22 DIAGNOSIS — R00.1 BRADYCARDIA: Primary | ICD-10-CM

## 2023-08-22 DIAGNOSIS — I50.32 CHRONIC DIASTOLIC (CONGESTIVE) HEART FAILURE (HCC): ICD-10-CM

## 2023-08-22 DIAGNOSIS — J44.9 CHRONIC OBSTRUCTIVE PULMONARY DISEASE, UNSPECIFIED COPD TYPE (HCC): ICD-10-CM

## 2023-08-22 LAB
ANION GAP SERPL CALC-SCNC: 7 MMOL/L (ref 0–18)
BUN BLD-MCNC: 34 MG/DL (ref 7–18)
CALCIUM BLD-MCNC: 9.1 MG/DL (ref 8.5–10.1)
CHLORIDE SERPL-SCNC: 97 MMOL/L (ref 98–112)
CO2 SERPL-SCNC: 30 MMOL/L (ref 21–32)
CREAT BLD-MCNC: 1.09 MG/DL
EGFRCR SERPLBLD CKD-EPI 2021: 48 ML/MIN/1.73M2 (ref 60–?)
ERYTHROCYTE [DISTWIDTH] IN BLOOD BY AUTOMATED COUNT: 14.4 %
FASTING STATUS PATIENT QL REPORTED: NO
GLUCOSE BLD-MCNC: 105 MG/DL (ref 70–99)
HCT VFR BLD AUTO: 36.1 %
HGB BLD-MCNC: 12.4 G/DL
MCH RBC QN AUTO: 33.2 PG (ref 26–34)
MCHC RBC AUTO-ENTMCNC: 34.3 G/DL (ref 31–37)
MCV RBC AUTO: 96.8 FL
OSMOLALITY SERPL CALC.SUM OF ELEC: 286 MOSM/KG (ref 275–295)
PLATELET # BLD AUTO: 301 10(3)UL (ref 150–450)
POTASSIUM SERPL-SCNC: 3.6 MMOL/L (ref 3.5–5.1)
RBC # BLD AUTO: 3.73 X10(6)UL
SODIUM SERPL-SCNC: 134 MMOL/L (ref 136–145)
WBC # BLD AUTO: 9.2 X10(3) UL (ref 4–11)

## 2023-08-22 PROCEDURE — 99215 OFFICE O/P EST HI 40 MIN: CPT | Performed by: NURSE PRACTITIONER

## 2023-08-22 NOTE — PATIENT INSTRUCTIONS
Heart Failure Discharge Instructions    Take an extra 20 meq of potassium today. Start Jardiance 10 mg daily tomorrow. Activity: Regular exercise and activity is important for your overall health and to help keep your heart strong and functioning as well as possible. Walk at a slow to moderate pace for 15-20 minutes 3-5 days per week. Follow these instructions every day to keep yourself in the [address] you are feeling well and your symptoms are under control                                    Medications  Take your medication every day as instructed  Do not stop taking your medicine or change the amount you are taking without instructions from your doctor or nurse  Do Not take non-steroidal antiinflammatory drugs such as ibuprofen, aleve, advil, or motrin                                    Diet/Fluids  People with heart failure should eat less sodium (salt) and limit fluid. Sodium attracts water and makes the body hold fluid. This extra fluid makes the heart work harder and can worsen the symptoms of heart failure. Diet    2000 mg sodium daily  Fluid restriction    64 ounces daily  (8 oz. = 1 cup)                                     Body Weight  Weigh yourself every day before breakfast and write your weight down  Use the same scale and wear about the same amount of clothes each time  A sudden weight increase is due to fluid retention rather than fat                                         Activity  Pace your activities to avoid getting overtired  Take rest periods as needed  Elevate your feet to reduce ankle swelling when resting                             Signs of Worsening Heart Failure    You are entering the Yellow Zone - this is a warning zone    Call your doctor or nurse if you have any of these signs or symptoms:   You gain 2 or more pounds overnight or 3-5 pounds in 3-7 days  You have more trouble breathing  You get more tired with regular activity, or are limiting activity because of shortness of breath or fatigue  You are short of breath lying down, you need more pillows to breathe comfortably,  or wake up during the night short of breath  You urinate less often during the day and more often at night  You have a bloated feeling, upset stomach, loss of appetite, or your clothes are fitting tighter    GO TO THE EMERGENCY DEPARTMENT or CALL 911 IF: These are signs you are in the RED ZONE - THIS IS AN EMERGENCY  You have tightness or pain in your chest  You are extremely short of breath or can't catch your breath  You cough up frothy pink mucous  You feel confused or can't think clearly  You are traveling and develop symptoms of worsening heart failure     We respect everyone's time and availability. Please be aware that this is not a walk-in clinic and we require appointments in order to facilitate timely care for all patients. We ask you to arrive 30 minutes before your appointment to allow time for you to check-in and have your bloodwork drawn. Please understand if you are late for your appointment, you may be asked to reschedule. If possible, all attempts will be made to accommodate but realize this is no guarantee that this will always be available. We understand there are extenuating circumstances. If you need to cancel or reschedule your appointment, please call the [facility] within 24 hours at [phone]. Thank you for your cooperation, [facility] Staff. IF YOU HAVE QUESTIONS REGARDING YOUR BILL, FEEL FREE TO CONTACT [facility] PATIENT ACCOUNTS -[phone]. IF YOU NEED FINANCIAL ASSISTANCE, PLEASE CALL AN [facility] FINANCIAL COUNSELOR -[phone].              [facility]     [phone]

## 2023-08-22 NOTE — PROGRESS NOTES
Pt. Retrieved from the VAT Team and brought tot he [facility] via wheelchair. Pt. Assessed. Pt. complaining of weight gain and swelling to legs. Pt. Denies eating any differently;states with the heat, she may be consuming more fluids. Weight up 1 pound at 189.8lbs. AP[facility] notified of patient's complaint of leg edema. Pt. Refuses 6 minute walk d/t lower extremity edema and inability to walk fr distances without being SOB. Labs ordered and drawn by VAT Team. Reviewed allergies and list of current medications with patient and updated it in the Electronic Medical Record. IV established per protocol from VAT Team RN. Educated patient on low sodium diet and food choices, fluid restriction of 2 liters, and daily weights. Reviewed follow-up appointments and discharge Heart Failure instructions with patient. Patient verbalized an understanding. IV discontinued; catheter intact. Pressure held and gauze applied.

## 2023-08-28 ENCOUNTER — TELEPHONE (OUTPATIENT)
Facility: CLINIC | Age: 88
End: 2023-08-28

## 2023-08-28 NOTE — TELEPHONE ENCOUNTER
As she is feeling better can we set up a THV tomorrow? She should contact her PCP about her HR dropping to 54.  Thanks

## 2023-08-28 NOTE — TELEPHONE ENCOUNTER
Pt has restarted Arformoterol for the second time about 8 days ago. She stopped medication yesterday. States that she could not get up the mucus, developed a dry cough and couldn't breathe. States that her HR went down to 54. She used Duoneb and sx improved. She is not having difficulty breathing now. She would like to discuss this with [facility]. Message forwarded to [facility].

## 2023-08-29 ENCOUNTER — TELEMEDICINE (OUTPATIENT)
Facility: CLINIC | Age: 88
End: 2023-08-29
Payer: MEDICARE

## 2023-08-29 DIAGNOSIS — J44.1 COPD EXACERBATION (HCC): Primary | ICD-10-CM

## 2023-08-29 DIAGNOSIS — J45.50 SEVERE PERSISTENT ASTHMA WITHOUT COMPLICATION: ICD-10-CM

## 2023-08-29 DIAGNOSIS — J96.11 CHRONIC RESPIRATORY FAILURE WITH HYPOXIA (HCC): ICD-10-CM

## 2023-08-29 DIAGNOSIS — R05.9 COUGH, UNSPECIFIED TYPE: ICD-10-CM

## 2023-08-29 PROCEDURE — 99214 OFFICE O/P EST MOD 30 MIN: CPT | Performed by: NURSE PRACTITIONER

## 2023-08-30 ENCOUNTER — TELEPHONE (OUTPATIENT)
Facility: CLINIC | Age: 88
End: 2023-08-30

## 2023-08-30 RX ORDER — PREDNISONE 10 MG/1
TABLET ORAL
Qty: 18 TABLET | Refills: 0 | Status: SHIPPED | OUTPATIENT
Start: 2023-08-30

## 2023-08-30 RX ORDER — AZITHROMYCIN 250 MG/1
TABLET, FILM COATED ORAL
Qty: 6 TABLET | Refills: 0 | Status: SHIPPED | OUTPATIENT
Start: 2023-08-30

## 2023-09-05 ENCOUNTER — TELEMEDICINE (OUTPATIENT)
Facility: CLINIC | Age: 88
End: 2023-09-05
Payer: MEDICARE

## 2023-09-05 DIAGNOSIS — J45.50 SEVERE PERSISTENT ASTHMA WITHOUT COMPLICATION: Primary | ICD-10-CM

## 2023-09-05 DIAGNOSIS — J41.8 MIXED SIMPLE AND MUCOPURULENT CHRONIC BRONCHITIS (HCC): ICD-10-CM

## 2023-09-05 DIAGNOSIS — J96.11 CHRONIC RESPIRATORY FAILURE WITH HYPOXIA (HCC): ICD-10-CM

## 2023-09-05 PROCEDURE — 99214 OFFICE O/P EST MOD 30 MIN: CPT | Performed by: NURSE PRACTITIONER

## 2023-09-05 NOTE — PATIENT INSTRUCTIONS
Complete prednisone until at 10mg daily; may alternate 10mg and 5 mg depending on sx and weather  Continue Budesonide and Duonebs BID  Followup in 6-8 weeks or sooner if needed

## 2023-09-05 NOTE — PROGRESS NOTES
[facility] Progress Note    History of Present Illness:  Heather Saha is a 80year old female with significant PMH of asthma who presents today for follow up s/p Abx and steroids. Currently taking prednisone 20mg daily, completed ABx. Reports feeling \"much, much better\". Not using arformoterol and prefers to not take it. Previously 8/29/23   a 80year old female with significant PMH asthma/COPD who presents today for evaluation of increased cough, chest congestion, wheeze and SOB. Decreased prednisone from 10mg to 5mg after our last visit. Restarted Arformoterol on her own and felt great initially then after 5-7 days had a harder time coughing up phlegm. She has since stopped the Arformoterol and increased prednisone from 5 to 10mg for the past 3 days as her sx worsened. Denies f/ns; + chills. Unable to cough up phlegm. Currently using Budesonide nebs and Duonebs BID. Previously 8/8/23    presents today for follow up. Reports breathing is perfect. Very happy with new medications. Continues on prednisone 10mg as she has increased cough on 5mg. Denies new complaints. Previously 7/27/23 Dr Ana Mahmood  a 80year old female with hx of Asthma, CKD, and CHF who presents for follow up of asthma/COPD. She reports having been well until today when she felt her cough worsen with white phlegm. Again after clearing the phlegm she feels well. She did use arformoterol +budesonide in early June but did not feel better so stopped - of note she stopped using duoneb altogether during this time. No pain. No fevers. +BLE edema     June 2023 previously  She was hospitalized in May with CHF exacerbation and hyponatremia, her breathing was controlled during her hospitalization. After going home she developed chest congestion and frequent coughing. She has continued to use her nebulizers - budesonide BID, duoneb PRN and saline nebs PRN. She remains on baseline pred 5mg daily  No fevers.  No pain/pleurisy  Weight is up and +BLE - planning to see cardiology soon      May 2023 previously  Discharged  last week after treated for fluid overload. Overall feels well; breathing is significantly better than prior to hospitalization. Pt hospitalized from 5/5-5/11/23 with Acute HFpEF with acute pulmonary edema, BNP > 5000, diuresed on lasix gtt with > 4L of fluid removed. Meds adjusted per cardiologist, plan for labs and CXR today and plan to go to the heart failure clinic next week. No f/c/ns. Currently with occasional cough and CORONA. BLE edema unchanged per pt. Previously 3/23/23 Dr Elisa Mccarthy  80year old female former smoker (quit 30 years ago, 20 pack years) with significant PMH asthma, HTN and obesity who presents today for follow up. She was hospitalized with acute asthma exacerbation and CHF exacerbation, managed on steroids, nebs and diuretics with improvement. She feels better today, breathing is back to normal. Has sore throat and some mild dysphagia over the last week or so. She is not watching her weight of fluid balance. Previously  She has followed with me for several years and managed for asthma. She has had variable exacerbations of her asthma in the past leading to initiation of chronic steroids which was weaned to 5mg daily with good success and mitigation of repeated asthma exacerbations. She presents today without acute concern. Notes occasional cough/phlegm, relieved with nebs. Using budesonide neb once to twice a day. Using advair BID. Using duoneb prn and only needing to use a few times/week. No fevers. No pain or pleurisy. Limited mobility due to BLE swelling, denies CORONA.         Past Medical History:   Past Medical History:   Diagnosis Date    Accelerated hypertension 1/24/2014    Asthma     Cough 8/5/2016    Disorder of thyroid     Dyspnea 8/4/2016    Dyspnea, unspecified type 8/4/2016    Endocrine disorder     HYPOTHYROID    Osteoarthrosis, unspecified whether generalized or localized, unspecified site     Other and unspecified hyperlipidemia     Pneumonia, organism unspecified(486)     Severe persistent asthma with status asthmaticus 2019    Thyroid disease     Unspecified essential hypertension     Ventricular ectopy 1/15/2019    Visual impairment         Past Surgical History:   Past Surgical History:   Procedure Laterality Date    KNEE REPLACEMENT SURGERY      OTHER SURGICAL HISTORY      TOTAL KNEE REPLACEMENT Bilateral     16 yrs ago         Family Medical History:   Family History   Problem Relation Age of Onset    Cancer Father         bladder    Heart Disorder Mother     Hypertension Mother     Diabetes Mother         Social History: Social History    Socioeconomic History      Marital status:        Spouse name: Not on file      Number of children: Not on file      Years of education: Not on file      Highest education level: Not on file    Occupational History      Not on file    Tobacco Use      Smoking status: Former        Packs/day: 2.50        Years: 30.00        Pack years: 76        Types: Cigarettes        Quit date: 1991        Years since quittin.1      Smokeless tobacco: Never    Vaping Use      Vaping Use: Never used    Substance and Sexual Activity      Alcohol use: Not Currently        Comment: 2 times per month      Drug use: No      Sexual activity: Not on file    Other Topics      Concerns:         Service: Not Asked        Blood Transfusions: Not Asked        Caffeine Concern: Yes        Occupational Exposure: Not Asked        Hobby Hazards: Not Asked        Sleep Concern: Not Asked        Stress Concern: Not Asked        Weight Concern: Not Asked        Special Diet: Not Asked        Back Care: Not Asked        Exercise: Yes        Bike Helmet: Not Asked        Seat Belt: Not Asked        Self-Exams: Not Asked    Social History Narrative      Not on file    Social Determinants of Health  Financial Resource Strain: Low Risk  (5/15/2023)      Financial Resource Strain          Difficulty of Paying Living Expenses: Not hard at all          Med Affordability: No  Food Insecurity: Not on file  Transportation Needs: No Transportation Needs (5/15/2023)      Transportation Needs          Lack of Transportation: No  Physical Activity: Not on file  Stress: Not on file  Social Connections: Not on file  Housing Stability: Not on file     Medications:   Current Outpatient Medications   Medication Sig Dispense Refill    predniSONE 10 MG Oral Tab Take 3 tabs (30mg) daily for 3 days, then take 2 tabs (20mg) daily for 3 days, then take 1 tab (10mg) daily for 3 days. 18 tablet 0    azithromycin 250 MG Oral Tab take 2 tablet (500MG)  by ORAL route  every day for 1 day then 1 tablet (250 mg) by oral route once daily for 4 days 6 tablet 0    empagliflozin (JARDIANCE) 10 MG Oral Tab Take 1 tablet (10 mg total) by mouth daily. 30 tablet 1    empagliflozin (JARDIANCE) 10 MG Oral Tab Take 1 tablet (10 mg total) by mouth daily. 14 tablet 0    levothyroxine 125 MCG Oral Tab Take 1 tablet (125 mcg total) by mouth before breakfast. 90 tablet 0    predniSONE 5 MG Oral Tab Take 1 tablet (5 mg total) by mouth daily. 90 tablet 0    torsemide 20 MG Oral Tab TAKE 2 TABLETS BY MOUTH TWICE DAILY 360 tablet 0    ipratropium-albuterol 0.5-2.5 (3) MG/3ML Inhalation Solution Take 3 mL by nebulization every 6 (six) hours as needed. 360 mL 3    losartan 50 MG Oral Tab Take 1 tablet (50 mg total) by mouth 2 (two) times daily. 180 tablet 1    budesonide 0.5 MG/2ML Inhalation Suspension Take 2 mL (0.5 mg total) by nebulization 2 (two) times daily. 360 mL 3    arformoterol 15 MCG/2ML Inhalation Nebu Soln Take 2 mL (15 mcg total) by nebulization 2 (two) times daily. (Patient not taking: Reported on 7/27/2023) 120 mL 5    potassium chloride 20 MEQ Oral Tab CR Take 1 tablet (20 mEq total) by mouth 2 (two) times daily.   0    METOPROLOL TARTRATE 100 MG Oral Tab TAKE 1 TABLET(100 MG) BY MOUTH TWICE DAILY 180 tablet 0    hydrALAZINE 25 MG Oral Tab Take 1 tablet (25 mg total) by mouth in the morning and 1 tablet (25 mg total) before bedtime. 180 tablet 1    Desoximetasone 0.25 % External Cream desoximetasone 0.25 % topical cream, [RxNorm: 757710]      cyanocobalamin 250 MCG Oral Tab Take 1 tablet (250 mcg total) by mouth daily. Cholecalciferol (VITAMIN D-3 OR) Take by mouth daily. Cranberry 500 MG Oral Cap Take 500 mg by mouth daily. sodium chloride 0.9 % Inhalation Nebu Soln Take 3 mL by nebulization as needed for Wheezing. 360 mL 3    acetaminophen 500 MG Oral Tab Take 1 tablet (500 mg total) by mouth every 6 (six) hours. 30 tablet 0    albuterol (PROAIR HFA) 108 (90 Base) MCG/ACT Inhalation Aero Soln Inhale 2 puffs into the lungs every 6 (six) hours as needed for Wheezing or Shortness of Breath. 1 each 11    Cod Liver Oil 1000 MG Oral Cap Take 1 capsule by mouth daily. Calcium Carbonate-Vitamin D (CALTRATE 600+D OR) Take 1 tablet by mouth daily. Multiple Vitamins-Minerals (CENTRUM SILVER OR) Take 1 tablet by mouth daily. Review of Systems: Review of Systems   Constitutional:  Negative for activity change, appetite change, chills, diaphoresis, fatigue, fever and unexpected weight change. HENT:  Negative for congestion, postnasal drip, rhinorrhea, sinus pressure, sinus pain, sneezing, sore throat, trouble swallowing and voice change. Respiratory:  Negative for apnea, cough, choking, chest tightness, shortness of breath, wheezing and stridor. Cardiovascular:  Negative for chest pain, palpitations and leg swelling. Musculoskeletal:  Negative for arthralgias. Skin:  Negative for pallor and rash. Allergic/Immunologic: Negative for immunocompromised state. Neurological:  Negative for dizziness and headaches. Hematological:  Negative for adenopathy. Physical Exam:  There were no vitals taken for this visit. Constitutional: alert, cooperative.  No acute distress. HEENT: Head NC/AT. Cardio: Regular rate and rhythm. Normal S1 and S2. No murmurs. Respiratory: Thorax symmetrical with no labored breathing. Neurologic: A&Ox3. No gross motor deficits. Skin:  dry  Psych: Calm, cooperative. Pleasant affect. Results:  Personally reviewed    WBC: 9.2, done on 8/22/2023. HGB: 12.4, done on 8/22/2023. PLT: 301, done on 8/22/2023. Glucose: 105, done on 8/22/2023. Cr: 1.09, done on 8/22/2023. GFR(AA): 40, done on 7/23/2022. GFR (non-AA): 35, done on 7/23/2022. CA: 9.1, done on 8/22/2023. Na: 134, done on 8/22/2023. K: 3.6, done on 8/22/2023. Cl: 97, done on 8/22/2023. CO2: 30, done on 8/22/2023. Last ALB was 3.6% done on 6/1/2023. No results found. Assessment/Plan:  #1. Asthma   Severe, persistent  Has had multiple exacerbations in the past, improved on chronic prednisone 5mg daily  Changed from advair to budesonide neb BID; does not feel that arformoterol helped. Will continue on budesonide BID and duonebs PRN  Continue duonebs and saline nebs PRN  Given her chronic cough and multiple exacerbations over the past year with multiple hospitalizations, limited response to current airway clearance therapies, she would be a great candidate for oxcillating lung therapy/ lung expansion therapy, will plan to proceed with Volara airway clearance to be used with her neb medications  Will increase pred to 10mg daily for this week. Will have her f/u in 2 weeks to reassess with hope to deescalate back to 5mg daily  We had previously reviewed options to consider biologics but she has declined but now she is agreeable to proceed - given information for Dr. Amy Torres  Encouraged to exercise and lose weight  We have discussed concern for DANE/OHS in the past with recommendation for PSG. However she is opposed to testing for sleep apnea and denies that she would ever use a device to help her sleep  8/8/2023 Improved; less SOB and minimal cough with light yellow phlegm. Recommend to continue current nebs; budesonide BID, Duonebs once daily and saline prn. Will try to decrease prednisone to 5mg daily and may alternate if cough returns. Discussed longterm effects of chronic steroids. Will followup in 4 weeks by [facility] unless symptomatic  8/29/2023 Reports np cough, chest congestion, wheeze, slight chills and SOB. Recommend prednisone taper, zpak and increase duonebs to 3-4 times a day. Discussed going to the ER if sx worsen  9/2023 Improved; minimal congestion today due to the weather but otherwise breathing has been better. Will continue to hold arformoterol and take budesonide and duonebs BID. Will continue to decrease prednisone to 10mg and try to decrease to 5mg when weather improves; may alternate 10mg and 5mg if needed. #2. Hypoxia, chronic  Hypoxia on exertion diagnosed on last hospitalization and has since improved  Continues to use oxygen with sleep  She continues to use and benefit from supplemental o2  8/2023 Using and benefiting from oxygen tx  9/2023 Using 2L at night only     #3. Chronic bronchitis with mucus plugging  As above  8/2023 See above  9/2023 Improved; see above     #4. CHF  Following with heart failure clinic and cardiology, remains on diuretics     DAMIR Danielle  [facility]   9/5/2023    This visit is conducted using Telemedicine with live, interactive video and audio. Patient has been referred to the [facility] website at [email or web address] to review the yearly Consent to Treat document. Patient understands and accepts financial responsibility for any deductible, co-insurance and/or co-pays associated with this service.

## 2023-09-11 DIAGNOSIS — I50.32 CHRONIC HEART FAILURE WITH PRESERVED EJECTION FRACTION (HFPEF) (HCC): Primary | ICD-10-CM

## 2023-09-12 ENCOUNTER — HOSPITAL ENCOUNTER (OUTPATIENT)
Dept: CARDIOLOGY CLINIC | Facility: HOSPITAL | Age: 88
Discharge: HOME | End: 2023-09-12
Attending: NURSE PRACTITIONER
Payer: MEDICARE

## 2023-09-12 ENCOUNTER — HOSPITAL ENCOUNTER (OUTPATIENT)
Dept: PERIOP | Facility: HOSPITAL | Age: 88
Discharge: HOME | End: 2023-09-12
Attending: NURSE PRACTITIONER
Payer: MEDICARE

## 2023-09-12 VITALS
BODY MASS INDEX: 43 KG/M2 | WEIGHT: 189.81 LBS | OXYGEN SATURATION: 100 % | RESPIRATION RATE: 22 BRPM | DIASTOLIC BLOOD PRESSURE: 45 MMHG | SYSTOLIC BLOOD PRESSURE: 133 MMHG | HEART RATE: 62 BPM

## 2023-09-12 DIAGNOSIS — I50.32 CHRONIC HEART FAILURE WITH PRESERVED EJECTION FRACTION (HFPEF) (HCC): ICD-10-CM

## 2023-09-12 DIAGNOSIS — R60.0 EXTREMITY EDEMA: ICD-10-CM

## 2023-09-12 DIAGNOSIS — J44.9 CHRONIC OBSTRUCTIVE PULMONARY DISEASE, UNSPECIFIED COPD TYPE (HCC): ICD-10-CM

## 2023-09-12 DIAGNOSIS — N18.30 STAGE 3 CHRONIC KIDNEY DISEASE, UNSPECIFIED WHETHER STAGE 3A OR 3B CKD (HCC): ICD-10-CM

## 2023-09-12 DIAGNOSIS — E87.1 HYPONATREMIA: Primary | ICD-10-CM

## 2023-09-12 DIAGNOSIS — I10 ESSENTIAL HYPERTENSION: ICD-10-CM

## 2023-09-12 LAB
ANION GAP SERPL CALC-SCNC: 7 MMOL/L (ref 0–18)
BUN BLD-MCNC: 43 MG/DL (ref 7–18)
CALCIUM BLD-MCNC: 9.3 MG/DL (ref 8.5–10.1)
CHLORIDE SERPL-SCNC: 96 MMOL/L (ref 98–112)
CO2 SERPL-SCNC: 28 MMOL/L (ref 21–32)
CREAT BLD-MCNC: 1.5 MG/DL
EGFRCR SERPLBLD CKD-EPI 2021: 33 ML/MIN/1.73M2 (ref 60–?)
FASTING STATUS PATIENT QL REPORTED: NO
GLUCOSE BLD-MCNC: 173 MG/DL (ref 70–99)
NT-PROBNP SERPL-MCNC: 1017 PG/ML (ref ?–450)
OSMOLALITY SERPL CALC.SUM OF ELEC: 287 MOSM/KG (ref 275–295)
POTASSIUM SERPL-SCNC: 4.4 MMOL/L (ref 3.5–5.1)
SODIUM SERPL-SCNC: 131 MMOL/L (ref 136–145)

## 2023-09-12 PROCEDURE — 99215 OFFICE O/P EST HI 40 MIN: CPT | Performed by: NURSE PRACTITIONER

## 2023-09-12 RX ORDER — PREDNISONE 5 MG/1
TABLET ORAL
Qty: 90 TABLET | Refills: 0 | COMMUNITY
Start: 2023-09-12

## 2023-09-12 RX ORDER — TOLVAPTAN 15 MG/1
15 TABLET ORAL ONCE
Status: COMPLETED | OUTPATIENT
Start: 2023-09-12 | End: 2023-09-12

## 2023-09-12 RX ADMIN — TOLVAPTAN 15 MG: 15 TABLET ORAL at 14:30:00

## 2023-09-14 ENCOUNTER — TELEPHONE (OUTPATIENT)
Dept: FAMILY MEDICINE CLINIC | Facility: CLINIC | Age: 88
End: 2023-09-14

## 2023-09-14 RX ORDER — METOPROLOL TARTRATE 100 MG/1
100 TABLET ORAL 2 TIMES DAILY
Qty: 180 TABLET | Refills: 0 | Status: SHIPPED | OUTPATIENT
Start: 2023-09-14

## 2023-09-18 ENCOUNTER — TELEPHONE (OUTPATIENT)
Facility: CLINIC | Age: 88
End: 2023-09-18

## 2023-09-18 RX ORDER — PREDNISONE 10 MG/1
TABLET ORAL
Qty: 30 TABLET | Refills: 0 | Status: SHIPPED | OUTPATIENT
Start: 2023-09-18

## 2023-09-18 RX ORDER — AZITHROMYCIN 250 MG/1
TABLET, FILM COATED ORAL
Qty: 6 TABLET | Refills: 0 | Status: SHIPPED | OUTPATIENT
Start: 2023-09-18

## 2023-09-20 ENCOUNTER — TELEPHONE (OUTPATIENT)
Facility: CLINIC | Age: 88
End: 2023-09-20

## 2023-09-20 NOTE — TELEPHONE ENCOUNTER
Pt called for a follow up. Pt states she is feeling much much better. Pt encouraged to take prescribed medication as ordered, stated she will. Pt reminded has a schedule appointment on 10/24/2023. Pt instructed to call if symptoms worsen, still not feeing better before appointment, pt verbalized understanding.

## 2023-09-25 ENCOUNTER — TELEPHONE (OUTPATIENT)
Dept: FAMILY MEDICINE CLINIC | Facility: CLINIC | Age: 88
End: 2023-09-25

## 2023-09-25 NOTE — TELEPHONE ENCOUNTER
Shwetha Caballero, nurse from [facility], calling to let doctor know that patient is being discharged from home health services. Heart failure and COPD has been stable.     Any questions, can call [phone]

## 2023-09-26 ENCOUNTER — TELEPHONE (OUTPATIENT)
Dept: CARDIOLOGY CLINIC | Facility: HOSPITAL | Age: 88
End: 2023-09-26

## 2023-09-26 NOTE — TELEPHONE ENCOUNTER
Called patient to remind her of labs that are due this week. She said she is \"holding off\" for now as she is on prednisone, and someone told her the prednisone will alter her labs. Advised patient to have labs done as soon as she is able.  She said she would, and patient placed on callback list for update 9/29

## 2023-09-28 ENCOUNTER — TELEPHONE (OUTPATIENT)
Dept: CARDIOLOGY CLINIC | Facility: HOSPITAL | Age: 88
End: 2023-09-28

## 2023-09-28 ENCOUNTER — LAB ENCOUNTER (OUTPATIENT)
Dept: LAB | Age: 88
End: 2023-09-28
Attending: INTERNAL MEDICINE
Payer: MEDICARE

## 2023-09-28 DIAGNOSIS — E87.1 HYPONATREMIA: ICD-10-CM

## 2023-09-28 DIAGNOSIS — N18.9 VITAMIN D DEFICIENCY DUE TO CHRONIC KIDNEY DISEASE: ICD-10-CM

## 2023-09-28 DIAGNOSIS — E55.9 VITAMIN D DEFICIENCY DUE TO CHRONIC KIDNEY DISEASE: ICD-10-CM

## 2023-09-28 DIAGNOSIS — N18.30 STAGE 3 CHRONIC KIDNEY DISEASE, UNSPECIFIED WHETHER STAGE 3A OR 3B CKD (HCC): ICD-10-CM

## 2023-09-28 DIAGNOSIS — I50.32 CHRONIC HEART FAILURE WITH PRESERVED EJECTION FRACTION (HFPEF) (HCC): ICD-10-CM

## 2023-09-28 LAB
ANION GAP SERPL CALC-SCNC: 8 MMOL/L (ref 0–18)
BASOPHILS # BLD AUTO: 0.06 X10(3) UL (ref 0–0.2)
BASOPHILS NFR BLD AUTO: 0.6 %
BUN BLD-MCNC: 37 MG/DL (ref 7–18)
CALCIUM BLD-MCNC: 9.6 MG/DL (ref 8.5–10.1)
CHLORIDE SERPL-SCNC: 92 MMOL/L (ref 98–112)
CO2 SERPL-SCNC: 29 MMOL/L (ref 21–32)
CREAT BLD-MCNC: 1.47 MG/DL
EGFRCR SERPLBLD CKD-EPI 2021: 33 ML/MIN/1.73M2 (ref 60–?)
EOSINOPHIL # BLD AUTO: 0.14 X10(3) UL (ref 0–0.7)
EOSINOPHIL NFR BLD AUTO: 1.4 %
ERYTHROCYTE [DISTWIDTH] IN BLOOD BY AUTOMATED COUNT: 14.6 %
FASTING STATUS PATIENT QL REPORTED: YES
GLUCOSE BLD-MCNC: 193 MG/DL (ref 70–99)
HCT VFR BLD AUTO: 38.7 %
HGB BLD-MCNC: 13.1 G/DL
IMM GRANULOCYTES # BLD AUTO: 0.13 X10(3) UL (ref 0–1)
IMM GRANULOCYTES NFR BLD: 1.3 %
LYMPHOCYTES # BLD AUTO: 1.82 X10(3) UL (ref 1–4)
LYMPHOCYTES NFR BLD AUTO: 18.4 %
MAGNESIUM SERPL-MCNC: 2 MG/DL (ref 1.6–2.6)
MCH RBC QN AUTO: 32.8 PG (ref 26–34)
MCHC RBC AUTO-ENTMCNC: 33.9 G/DL (ref 31–37)
MCV RBC AUTO: 96.8 FL
MONOCYTES # BLD AUTO: 0.84 X10(3) UL (ref 0.1–1)
MONOCYTES NFR BLD AUTO: 8.5 %
NEUTROPHILS # BLD AUTO: 6.89 X10 (3) UL (ref 1.5–7.7)
NEUTROPHILS # BLD AUTO: 6.89 X10(3) UL (ref 1.5–7.7)
NEUTROPHILS NFR BLD AUTO: 69.8 %
OSMOLALITY SERPL CALC.SUM OF ELEC: 282 MOSM/KG (ref 275–295)
PHOSPHATE SERPL-MCNC: 4.1 MG/DL (ref 2.5–4.9)
PLATELET # BLD AUTO: 305 10(3)UL (ref 150–450)
POTASSIUM SERPL-SCNC: 4.4 MMOL/L (ref 3.5–5.1)
PTH-INTACT SERPL-MCNC: 22.7 PG/ML (ref 18.5–88)
RBC # BLD AUTO: 4 X10(6)UL
SODIUM SERPL-SCNC: 129 MMOL/L (ref 136–145)
VIT D+METAB SERPL-MCNC: 39.4 NG/ML (ref 30–100)
WBC # BLD AUTO: 9.9 X10(3) UL (ref 4–11)

## 2023-09-28 PROCEDURE — 36415 COLL VENOUS BLD VENIPUNCTURE: CPT

## 2023-09-28 PROCEDURE — 85025 COMPLETE CBC W/AUTO DIFF WBC: CPT

## 2023-09-28 PROCEDURE — 84100 ASSAY OF PHOSPHORUS: CPT

## 2023-09-28 PROCEDURE — 80048 BASIC METABOLIC PNL TOTAL CA: CPT

## 2023-09-28 PROCEDURE — 83735 ASSAY OF MAGNESIUM: CPT

## 2023-09-28 PROCEDURE — 82306 VITAMIN D 25 HYDROXY: CPT

## 2023-09-28 PROCEDURE — 83970 ASSAY OF PARATHORMONE: CPT

## 2023-09-28 NOTE — TELEPHONE ENCOUNTER
----- Message from DAMIR Stephenson sent at 9/28/2023  4:15 PM CDT -----  Her sodium level is worse and renal indices are stable. Can you please see how she is feeling? I may adjust diuretics. Thank you     Called and patient was short of breath but applied her O2 and felt better. She reports that she is not dizzy or having chest pain, or confused. She is steady and went to get labs with her dtr today. She was able to communicate that she will call if she should have any worsening or changes in symptoms.

## 2023-09-29 ENCOUNTER — TELEPHONE (OUTPATIENT)
Dept: CARDIOLOGY CLINIC | Facility: HOSPITAL | Age: 88
End: 2023-09-29

## 2023-10-11 ENCOUNTER — MED REC SCAN ONLY (OUTPATIENT)
Facility: CLINIC | Age: 88
End: 2023-10-11

## 2023-10-12 DIAGNOSIS — I50.32 CHRONIC HEART FAILURE WITH PRESERVED EJECTION FRACTION (HFPEF) (HCC): Primary | ICD-10-CM

## 2023-10-13 ENCOUNTER — HOSPITAL ENCOUNTER (INPATIENT)
Facility: HOSPITAL | Age: 88
LOS: 2 days | Discharge: HOME | End: 2023-10-15
Attending: EMERGENCY MEDICINE | Admitting: INTERNAL MEDICINE
Payer: MEDICARE

## 2023-10-13 ENCOUNTER — TELEPHONE (OUTPATIENT)
Dept: CARDIOLOGY CLINIC | Facility: HOSPITAL | Age: 88
End: 2023-10-13

## 2023-10-13 ENCOUNTER — APPOINTMENT (OUTPATIENT)
Dept: GENERAL RADIOLOGY | Facility: HOSPITAL | Age: 88
End: 2023-10-13
Attending: EMERGENCY MEDICINE
Payer: MEDICARE

## 2023-10-13 ENCOUNTER — HOSPITAL ENCOUNTER (INPATIENT)
Facility: HOSPITAL | Age: 88
LOS: 2 days | Discharge: HOME HEALTH | DRG: 190 | End: 2023-10-15
Attending: EMERGENCY MEDICINE | Admitting: INTERNAL MEDICINE
Payer: MEDICARE

## 2023-10-13 ENCOUNTER — APPOINTMENT (OUTPATIENT)
Dept: GENERAL RADIOLOGY | Facility: HOSPITAL | Age: 88
DRG: 190 | End: 2023-10-13
Attending: EMERGENCY MEDICINE
Payer: MEDICARE

## 2023-10-13 PROBLEM — E87.5 HYPERKALEMIA: Status: ACTIVE | Noted: 2023-10-13

## 2023-10-13 PROBLEM — N17.9 AKI (ACUTE KIDNEY INJURY) (HCC): Status: ACTIVE | Noted: 2023-10-13

## 2023-10-13 PROBLEM — I50.32 CHRONIC HEART FAILURE WITH PRESERVED EJECTION FRACTION (HCC): Status: ACTIVE | Noted: 2023-01-01

## 2023-10-13 PROBLEM — I50.32 CHRONIC HEART FAILURE WITH PRESERVED EJECTION FRACTION (HCC): Status: ACTIVE | Noted: 2023-10-13

## 2023-10-13 PROBLEM — N17.9 AKI (ACUTE KIDNEY INJURY) (HCC): Status: ACTIVE | Noted: 2023-01-01

## 2023-10-13 PROBLEM — I89.0 LYMPHEDEMA: Status: ACTIVE | Noted: 2023-10-13

## 2023-10-13 PROBLEM — J44.1 COPD EXACERBATION (HCC): Status: ACTIVE | Noted: 2023-01-01

## 2023-10-13 PROBLEM — E87.5 HYPERKALEMIA: Status: ACTIVE | Noted: 2023-01-01

## 2023-10-13 PROBLEM — J44.1 COPD EXACERBATION (HCC): Status: ACTIVE | Noted: 2023-10-13

## 2023-10-13 PROBLEM — I89.0 LYMPHEDEMA: Status: ACTIVE | Noted: 2023-01-01

## 2023-10-13 NOTE — ED INITIAL ASSESSMENT (HPI)
Pt arrives to ed w c/o zack for one week and it became worse tonight. Pt denies chest pain. Pt reports having pain to her right side of neck and shoulder.  Pt pt normally wears 2 L NC.

## 2023-10-13 NOTE — CM/SW NOTE
10/13/23 1500   CM/SW Referral Data   Referral Source    Reason for Referral Discharge planning   Informant Patient;Daughter   Patient Info   Patient's Current Mental Status at Time of Assessment Alert;Oriented   Patient's [address]   Number of Levels in Home 2   Patient lives with Daughter   Patient Status Prior to Admission   Independent with ADLs and Mobility No   Pt. requires assistance with Housework;Driving;Meals; Bathing   Services in place prior to admission DME/Supplies at home;skilled nursing [address]   DME Provider/Supplier [facility]   Type of DME/Supplies [place]; Wheelchair; [address] Provider Private Duty   skilled nursing Care hours per day 5   skilled nursing Care days per week 5   Discharge Needs   Anticipated D/C needs Home health care   Choice of Post-Acute Provider   Informed patient of right to choose their preferred provider Yes   List of appropriate post-acute services provided to patient/family with quality data No - Declined list   Patient/family choice [facility]     CM self referred to case for discharge planning. Met w/patient at the bedside for above eval.    She reports strong family support/24 hour supervision from family and CG. She recently used [facility] for RN only and would like resume. Uses Home O2 2L mostly night time, but uses during the day when needed      Referral sent to [facility] via [facility]. F2F/orders entered. Addendum [id number]  Reviewed [facility] referral and [facility] accepted. Reserved in [address] and notified [facility] Say justin    / to remain available for support and/or discharge planning.      Hubre HANSENA MSN, RN CTL/  [id number]

## 2023-10-13 NOTE — ED QUICK NOTES
Orders for admission, patient is aware of plan and ready to go upstairs. Any questions, please call ED RN Lavelle Hodgkins at extension [phone].      Patient Covid vaccination status: Fully vaccinated     COVID Test Ordered in ED: SARS-CoV-2/Flu A and B/RSV by PCR (GeneXpert)    COVID Suspicion at Admission: N/A    Running Infusions:  None    Mental Status/LOC at time of transport: A&O x3     Other pertinent information:   CIWA score: N/A   NIH score:  N/A

## 2023-10-13 NOTE — DISCHARGE INSTRUCTIONS
Home Health Provider  Sometimes managing your health at home requires assistance. The [facility] team has recognized your preference to use [facility]. They can be reached by phone at [phone]. The fax number for your reference is [phone]. . A representative from the home health agency will contact you or your family to schedule your first visit.

## 2023-10-13 NOTE — PROGRESS NOTES
NURSING ADMISSION NOTE      Patient admitted via Cart  Oriented to room. Safety precautions initiated. Bed in low position. Call light in reach. Pt admitted with COPD exac. Received on 3L, baseline 2L NOCly, with congested cough and rhonchi bilaterally. CORONA. 3+ BLE edema, baseline per pt. Purewick in place. R arm precautions for skin sensitivity after R arm fx. AxO x4. Up with walker at baseline. Caregiver at bedside. Nebs, IV abx, IV steroids. Wean o2. POC reviewed, call light within reach.

## 2023-10-14 NOTE — RESPIRATORY THERAPY NOTE
Patient called for PRN DuoNeb twice today. Patient tolerated treatments well. Patient breath sounds were bilateral expiratory wheeze and fine crackles at bases before and after treatments. Patient to start Pulmicort nebulizer treatments to match home regimen.      [place] [facility]  RT

## 2023-10-14 NOTE — PLAN OF CARE
Pt transferred from [facility]-  Alert x4- daughter at bedside- /53- per pt no bp meds given today- MD notified of BP and sepsis BPA fired. Hydralazine ordered- see MAR  2130- C/O chest pain 7/10- EKG done.  MD notified  Tylenol given-   Family at bedside- updated on plan of care  2230- handoff report to [facility]

## 2023-10-14 NOTE — HOME CARE LIAISON
Received referral via [facility] for Home Health services.  Spoke w/ patient via the phone and is agreeable to use [facility] again for home health care  Contact information updated on the AVS

## 2023-10-14 NOTE — PROGRESS NOTES
[facility]acy Note:  Renal Dose Adjustment for enoxaparin (LOVENOX)    Alexandr Shaw has been prescribed enoxaparin 30 mg subcutaneously every 24 hours. Estimated Creatinine Clearance: 38.3 mL/min (A) (based on SCr of 1.33 mg/dL (H)). Calculated CrCl greater than 30 mL/min so the dose of Enoxaparin (LOVENOX) has been changed to enoxaparin 40 mg every 24 hours per P&T approved protocol. Pharmacy will continue to follow, and make additional adjustments if needed.       Thank you,  Rikki Go, PharmD  10/14/2023 8:37 AM

## 2023-10-14 NOTE — PLAN OF CARE
RN SHIFT NOTE    Assumed care of pt at 0700. No c/o pain at this time. Patient is A&O x 4. NSR on tele, S1 and S2 present. Generalized/BUE non-pitting edema and BLE +3 pitting edema present. Lung sounds diminished with Rhonchi. Generalized bruising and redness noted. Tolerating medications and care needs have been met at this time. POC:   DW  Monitor BP  Monitor Creatinine  PT/OT to see    Problem: RESPIRATORY - ADULT  Goal: Achieves optimal ventilation and oxygenation  Description: INTERVENTIONS:  - Assess for changes in respiratory status  - Assess for changes in mentation and behavior  - Position to facilitate oxygenation and minimize respiratory effort  - Oxygen supplementation based on oxygen saturation or ABGs  - Provide Smoking Cessation handout, if applicable  - Encourage broncho-pulmonary hygiene including cough, deep breathe, Incentive Spirometry  - Assess the need for suctioning and perform as needed  - Assess and instruct to report SOB or any respiratory difficulty  - Respiratory Therapy support as indicated  - Manage/alleviate anxiety  - Monitor for signs/symptoms of CO2 retention  Outcome: Progressing     Problem: METABOLIC/FLUID AND ELECTROLYTES - ADULT  Goal: Electrolytes maintained within normal limits  Description: INTERVENTIONS:  - Monitor labs and rhythm and assess patient for signs and symptoms of electrolyte imbalances  - Administer electrolyte replacement as ordered  - Monitor response to electrolyte replacements, including rhythm and repeat lab results as appropriate  - Fluid restriction as ordered  - Instruct patient on fluid and nutrition restrictions as appropriate  Outcome: Progressing     Problem: SAFETY ADULT - FALL  Goal: Free from fall injury  Description: INTERVENTIONS:  - Assess pt frequently for physical needs  - Identify cognitive and physical deficits and behaviors that affect risk of falls.   - [place] fall precautions as indicated by assessment.  - Educate pt/family on patient safety including physical limitations  - Instruct pt to call for assistance with activity based on assessment  - Modify environment to reduce risk of injury  - Provide assistive devices as appropriate  - Consider OT/PT consult to assist with strengthening/mobility  - Encourage toileting schedule  Outcome: Progressing     Problem: PAIN - ADULT  Goal: Verbalizes/displays adequate comfort level or patient's stated pain goal  Description: INTERVENTIONS:  - Encourage pt to monitor pain and request assistance  - Assess pain using appropriate pain scale  - Administer analgesics based on type and severity of pain and evaluate response  - Implement non-pharmacological measures as appropriate and evaluate response  - Consider cultural and social influences on pain and pain management  - Manage/alleviate anxiety  - Utilize distraction and/or relaxation techniques  - Monitor for opioid side effects  - Notify MD/LIP if interventions unsuccessful or patient reports new pain  - Anticipate increased pain with activity and pre-medicate as appropriate  Outcome: Progressing

## 2023-10-14 NOTE — PLAN OF CARE
0112        pt. Alert and o x 4 , still c/o mid sternal CP rated 8/10. Pt. Said \" I don't want to suffer \" Paged MD re: pain with new orders noted and carried out. Hydromet given. Pt. On mod high back rest , On O2 at 2 li/min via nc ; lung sounf sounds with rhonchi / crackles and expiratory wheezing. Nebs tx given as ordered ; still on IV Solumedrol Q 8 hrs. Cont. Monitor per tele. labs/v/s. instructed to call staff when needed help , placed call light w/in reach. 0200 Pt. Sleeping.     Problem: RESPIRATORY - ADULT  Goal: Achieves optimal ventilation and oxygenation  Description: INTERVENTIONS:  - Assess for changes in respiratory status  - Assess for changes in mentation and behavior  - Position to facilitate oxygenation and minimize respiratory effort  - Oxygen supplementation based on oxygen saturation or ABGs  - Provide Smoking Cessation handout, if applicable  - Encourage broncho-pulmonary hygiene including cough, deep breathe, Incentive Spirometry  - Assess the need for suctioning and perform as needed  - Assess and instruct to report SOB or any respiratory difficulty  - Respiratory Therapy support as indicated  - Manage/alleviate anxiety  - Monitor for signs/symptoms of CO2 retention  Outcome: Progressing     Problem: METABOLIC/FLUID AND ELECTROLYTES - ADULT  Goal: Electrolytes maintained within normal limits  Description: INTERVENTIONS:  - Monitor labs and rhythm and assess patient for signs and symptoms of electrolyte imbalances  - Administer electrolyte replacement as ordered  - Monitor response to electrolyte replacements, including rhythm and repeat lab results as appropriate  - Fluid restriction as ordered  - Instruct patient on fluid and nutrition restrictions as appropriate  Outcome: Progressing     Problem: SAFETY ADULT - FALL  Goal: Free from fall injury  Description: INTERVENTIONS:  - Assess pt frequently for physical needs  - Identify cognitive and physical deficits and behaviors that affect risk of falls.   - [place] fall precautions as indicated by assessment.  - Educate pt/family on patient safety including physical limitations  - Instruct pt to call for assistance with activity based on assessment  - Modify environment to reduce risk of injury  - Provide assistive devices as appropriate  - Consider OT/PT consult to assist with strengthening/mobility  - Encourage toileting schedule  Outcome: Progressing     Problem: PAIN - ADULT  Goal: Verbalizes/displays adequate comfort level or patient's stated pain goal  Description: INTERVENTIONS:  - Encourage pt to monitor pain and request assistance  - Assess pain using appropriate pain scale  - Administer analgesics based on type and severity of pain and evaluate response  - Implement non-pharmacological measures as appropriate and evaluate response  - Consider cultural and social influences on pain and pain management  - Manage/alleviate anxiety  - Utilize distraction and/or relaxation techniques  - Monitor for opioid side effects  - Notify MD/LIP if interventions unsuccessful or patient reports new pain  - Anticipate increased pain with activity and pre-medicate as appropriate  Outcome: Progressing     Problem: RESPIRATORY - ADULT  Goal: Achieves optimal ventilation and oxygenation  Description: INTERVENTIONS:  - Assess for changes in respiratory status  - Assess for changes in mentation and behavior  - Position to facilitate oxygenation and minimize respiratory effort  - Oxygen supplementation based on oxygen saturation or ABGs  - Provide Smoking Cessation handout, if applicable  - Encourage broncho-pulmonary hygiene including cough, deep breathe, Incentive Spirometry  - Assess the need for suctioning and perform as needed  - Assess and instruct to report SOB or any respiratory difficulty  - Respiratory Therapy support as indicated  - Manage/alleviate anxiety  - Monitor for signs/symptoms of CO2 retention  Outcome: Progressing     Problem: METABOLIC/FLUID AND ELECTROLYTES - ADULT  Goal: Electrolytes maintained within normal limits  Description: INTERVENTIONS:  - Monitor labs and rhythm and assess patient for signs and symptoms of electrolyte imbalances  - Administer electrolyte replacement as ordered  - Monitor response to electrolyte replacements, including rhythm and repeat lab results as appropriate  - Fluid restriction as ordered  - Instruct patient on fluid and nutrition restrictions as appropriate  Outcome: Progressing     Problem: SAFETY ADULT - FALL  Goal: Free from fall injury  Description: INTERVENTIONS:  - Assess pt frequently for physical needs  - Identify cognitive and physical deficits and behaviors that affect risk of falls.   - [place] fall precautions as indicated by assessment.  - Educate pt/family on patient safety including physical limitations  - Instruct pt to call for assistance with activity based on assessment  - Modify environment to reduce risk of injury  - Provide assistive devices as appropriate  - Consider OT/PT consult to assist with strengthening/mobility  - Encourage toileting schedule  Outcome: Progressing     Problem: PAIN - ADULT  Goal: Verbalizes/displays adequate comfort level or patient's stated pain goal  Description: INTERVENTIONS:  - Encourage pt to monitor pain and request assistance  - Assess pain using appropriate pain scale  - Administer analgesics based on type and severity of pain and evaluate response  - Implement non-pharmacological measures as appropriate and evaluate response  - Consider cultural and social influences on pain and pain management  - Manage/alleviate anxiety  - Utilize distraction and/or relaxation techniques  - Monitor for opioid side effects  - Notify MD/LIP if interventions unsuccessful or patient reports new pain  - Anticipate increased pain with activity and pre-medicate as appropriate  Outcome: Progressing

## 2023-10-14 NOTE — PROGRESS NOTES
[facility] Pharmacy Note: Route Optimization for Azithromycin [facility])    Patient is currently on Azithromycin (ZITHROMAX) 500 mg IV every 24 hours. The patient meets the criteria to convert to the oral equivalent as established by the IV to Oral conversion protocol approved by the P&T committee. Medication was changed from IV formulation to Azithromycin (ZITHROMAX)  500 mg PO every 24 hours per protocol.       Spencer Rader, PharmD  10/14/2023,  8:56 AM

## 2023-10-15 NOTE — PLAN OF CARE
RN SHIFT NOTE    Assumed care of pt at 0700. No c/o of pain at this time. A&O x 4. NSR/ST on tele and denies cardiac symptoms. +3 BLE edema with generalized non-pitting body edema. Lung sounds diminished. Abdomen soft and round. Last BM 10/14. Generalized bruising present. Tolerating medications and care needs have been met at this time. POC: O2 walk, dc home today. Problem: RESPIRATORY - ADULT  Goal: Achieves optimal ventilation and oxygenation  Description: INTERVENTIONS:  - Assess for changes in respiratory status  - Assess for changes in mentation and behavior  - Position to facilitate oxygenation and minimize respiratory effort  - Oxygen supplementation based on oxygen saturation or ABGs  - Provide Smoking Cessation handout, if applicable  - Encourage broncho-pulmonary hygiene including cough, deep breathe, Incentive Spirometry  - Assess the need for suctioning and perform as needed  - Assess and instruct to report SOB or any respiratory difficulty  - Respiratory Therapy support as indicated  - Manage/alleviate anxiety  - Monitor for signs/symptoms of CO2 retention  Outcome: Progressing     Problem: METABOLIC/FLUID AND ELECTROLYTES - ADULT  Goal: Electrolytes maintained within normal limits  Description: INTERVENTIONS:  - Monitor labs and rhythm and assess patient for signs and symptoms of electrolyte imbalances  - Administer electrolyte replacement as ordered  - Monitor response to electrolyte replacements, including rhythm and repeat lab results as appropriate  - Fluid restriction as ordered  - Instruct patient on fluid and nutrition restrictions as appropriate  Outcome: Progressing     Problem: SAFETY ADULT - FALL  Goal: Free from fall injury  Description: INTERVENTIONS:  - Assess pt frequently for physical needs  - Identify cognitive and physical deficits and behaviors that affect risk of falls.   - [place] fall precautions as indicated by assessment.  - Educate pt/family on patient safety including physical limitations  - Instruct pt to call for assistance with activity based on assessment  - Modify environment to reduce risk of injury  - Provide assistive devices as appropriate  - Consider OT/PT consult to assist with strengthening/mobility  - Encourage toileting schedule  Outcome: Progressing     Problem: PAIN - ADULT  Goal: Verbalizes/displays adequate comfort level or patient's stated pain goal  Description: INTERVENTIONS:  - Encourage pt to monitor pain and request assistance  - Assess pain using appropriate pain scale  - Administer analgesics based on type and severity of pain and evaluate response  - Implement non-pharmacological measures as appropriate and evaluate response  - Consider cultural and social influences on pain and pain management  - Manage/alleviate anxiety  - Utilize distraction and/or relaxation techniques  - Monitor for opioid side effects  - Notify MD/LIP if interventions unsuccessful or patient reports new pain  - Anticipate increased pain with activity and pre-medicate as appropriate  Outcome: Progressing

## 2023-10-15 NOTE — PROGRESS NOTES
10/15/23 1300   Mobility   O2 walk?  Yes   SPO2% on Room Air at Rest 98   SPO2% on Oxygen at Rest 99   At rest oxygen flow (liters per minute) 2   SPO2% Ambulation on Room Air 93   SPO2% Ambulation on Oxygen   (N/A no o2 needed)   Ambulation oxygen flow (liters per minute)   (N/A no O2 needed)     O2 walk

## 2023-10-15 NOTE — PLAN OF CARE
Patient is alert and oriented x 4. No complains of pian or shortness of breath. Maintaining O2 saturation WNL on 2 L/min via NC. NSR on tle monitor. Generalized edema noted/ BUE non-pitting edema and BLE +3 pitting edema. Generalized bruising noted. Patient continent of bladder and bowel. Safety precautions in place, call light within reach, bed alarm on. All needs met at this time. Problem: RESPIRATORY - ADULT  Goal: Achieves optimal ventilation and oxygenation  Description: INTERVENTIONS:  - Assess for changes in respiratory status  - Assess for changes in mentation and behavior  - Position to facilitate oxygenation and minimize respiratory effort  - Oxygen supplementation based on oxygen saturation or ABGs  - Provide Smoking Cessation handout, if applicable  - Encourage broncho-pulmonary hygiene including cough, deep breathe, Incentive Spirometry  - Assess the need for suctioning and perform as needed  - Assess and instruct to report SOB or any respiratory difficulty  - Respiratory Therapy support as indicated  - Manage/alleviate anxiety  - Monitor for signs/symptoms of CO2 retention  Outcome: Progressing     Problem: METABOLIC/FLUID AND ELECTROLYTES - ADULT  Goal: Electrolytes maintained within normal limits  Description: INTERVENTIONS:  - Monitor labs and rhythm and assess patient for signs and symptoms of electrolyte imbalances  - Administer electrolyte replacement as ordered  - Monitor response to electrolyte replacements, including rhythm and repeat lab results as appropriate  - Fluid restriction as ordered  - Instruct patient on fluid and nutrition restrictions as appropriate  Outcome: Progressing     Problem: SAFETY ADULT - FALL  Goal: Free from fall injury  Description: INTERVENTIONS:  - Assess pt frequently for physical needs  - Identify cognitive and physical deficits and behaviors that affect risk of falls.   - [place] fall precautions as indicated by assessment.  - Educate pt/family on patient safety including physical limitations  - Instruct pt to call for assistance with activity based on assessment  - Modify environment to reduce risk of injury  - Provide assistive devices as appropriate  - Consider OT/PT consult to assist with strengthening/mobility  - Encourage toileting schedule  Outcome: Progressing     Problem: PAIN - ADULT  Goal: Verbalizes/displays adequate comfort level or patient's stated pain goal  Description: INTERVENTIONS:  - Encourage pt to monitor pain and request assistance  - Assess pain using appropriate pain scale  - Administer analgesics based on type and severity of pain and evaluate response  - Implement non-pharmacological measures as appropriate and evaluate response  - Consider cultural and social influences on pain and pain management  - Manage/alleviate anxiety  - Utilize distraction and/or relaxation techniques  - Monitor for opioid side effects  - Notify MD/LIP if interventions unsuccessful or patient reports new pain  - Anticipate increased pain with activity and pre-medicate as appropriate  Outcome: Progressing

## 2023-10-15 NOTE — PHYSICAL THERAPY NOTE
PT attempt: chart review done, per RN Ashkan, she will cancel the order for PT , patient does not need PT at this time.

## 2023-10-17 ENCOUNTER — PATIENT MESSAGE (OUTPATIENT)
Dept: FAMILY MEDICINE CLINIC | Facility: CLINIC | Age: 88
End: 2023-10-17

## 2023-10-17 ENCOUNTER — TELEPHONE (OUTPATIENT)
Dept: FAMILY MEDICINE CLINIC | Facility: CLINIC | Age: 88
End: 2023-10-17

## 2023-10-17 ENCOUNTER — PATIENT OUTREACH (OUTPATIENT)
Dept: CASE MANAGEMENT | Age: 88
End: 2023-10-17

## 2023-10-17 DIAGNOSIS — J44.1 COPD EXACERBATION (HCC): Primary | ICD-10-CM

## 2023-10-17 DIAGNOSIS — Z02.9 ENCOUNTERS FOR ADMINISTRATIVE PURPOSE: ICD-10-CM

## 2023-10-17 PROCEDURE — 1111F DSCHRG MED/CURRENT MED MERGE: CPT

## 2023-10-17 PROCEDURE — 1126F AMNT PAIN NOTED NONE PRSNT: CPT

## 2023-10-17 NOTE — TELEPHONE ENCOUNTER
From: Anne Pizarro  To: Carmina Madera  Sent: 10/17/2023 3:39 PM CDT  Subject: Thyroid    Do I need a blood test to recheck thyroid function? If so, I would like to have that done before scheduling my appointment with you.  Does that require an order to be sent in?

## 2023-10-17 NOTE — PROGRESS NOTES
Initial Post Discharge Follow Up   Discharge Date: 10/15/23  Contact Date: 10/17/2023    Consent Verification:  Assessment Completed With: Patient  HIPAA Verified? Yes    Discharge Dx:   COPD Exacerbation    Was TCC ordered: no          General:   How have you been since your discharge from the hospital? I'm doing beautifully. Do you have any pain since discharge?  yes, just my normal arthritis  Is the pain manageable at home? yes  When you were leaving the hospital were your discharge instructions reviewed with you? yes  How well were your discharge instructions explained to you? On a scale of 1-5   1- Very Poor and 5- Very well   Very well  Do you have any questions about your discharge instructions? No  Before leaving the hospital was your diagnoses explained to you? Yes  Do you have any questions about your diagnoses? No  Are you able to perform normal daily activities of living as you have prior to your hospital stay (dressing, bathing, ambulating to the bathroom, etc)? yes  (NCM) Was patient given a different diet per AVS? no      Medications:   Current Outpatient Medications   Medication Sig Dispense Refill    predniSONE 5 MG Oral Tab Take 8 tablets (40 mg total) by mouth daily with breakfast for 2 days, THEN 4 tablets (20 mg total) daily with breakfast for 2 days, THEN 2 tablets (10 mg total) daily with breakfast for 2 days, THEN 1 tablet (5 mg total) daily with breakfast for 2 days. 30 tablet 0    torsemide 20 MG Oral Tab Take 1 tablet (20 mg total) by mouth BID (Diuretic). TAKE 1 TABLET BY MOUTH TWICE DAILY 360 tablet 0    metoprolol tartrate 100 MG Oral Tab Take 1 tablet (100 mg total) by mouth 2 (two) times daily. 180 tablet 0    levothyroxine 125 MCG Oral Tab Take 1 tablet (125 mcg total) by mouth before breakfast. 90 tablet 0    ipratropium-albuterol 0.5-2.5 (3) MG/3ML Inhalation Solution Take 3 mL by nebulization every 6 (six) hours as needed.  360 mL 3    budesonide 0.5 MG/2ML Inhalation Suspension Take 2 mL (0.5 mg total) by nebulization 2 (two) times daily. 360 mL 3    potassium chloride 20 MEQ Oral Tab CR Take 1 tablet (20 mEq total) by mouth 2 (two) times daily. Once in morning and once at lunch time  0    hydrALAZINE 25 MG Oral Tab Take 1 tablet (25 mg total) by mouth in the morning and 1 tablet (25 mg total) before bedtime. (Patient taking differently: Take 1 tablet (25 mg total) by mouth in the morning and 1 tablet (25 mg total) before bedtime. Lunch and night time. ) 180 tablet 1    Desoximetasone 0.25 % External Cream desoximetasone 0.25 % topical cream, [RxNorm: 606210]      cyanocobalamin 250 MCG Oral Tab Take 1 tablet (250 mcg total) by mouth daily. Cholecalciferol (VITAMIN D-3 OR) Take by mouth daily. Cranberry 500 MG Oral Cap Take 500 mg by mouth daily. sodium chloride 0.9 % Inhalation Nebu Soln Take 3 mL by nebulization as needed for Wheezing. 360 mL 3    acetaminophen 500 MG Oral Tab Take 1 tablet (500 mg total) by mouth every 6 (six) hours. 30 tablet 0    albuterol (PROAIR HFA) 108 (90 Base) MCG/ACT Inhalation Aero Soln Inhale 2 puffs into the lungs every 6 (six) hours as needed for Wheezing or Shortness of Breath. 1 each 11    Cod Liver Oil 1000 MG Oral Cap Take 1 capsule by mouth daily. Calcium Carbonate-Vitamin D (CALTRATE 600+D OR) Take 1 tablet by mouth daily. Multiple Vitamins-Minerals (CENTRUM SILVER OR) Take 1 tablet by mouth daily.         (NCM)  Were there any medication changes noted on AVS?  yes  If so, were these medication changes discussed with you prior to leaving the hospital? yes  (NCM) If a new medication was prescribed:  n/a  May I go over your medications with you to make sure we are not missing anything?yes  Are there any reasons that keep you from taking your medication as prescribed? No  Are you having any concerns with constipation? No    Referrals/orders at D/C:  Home Health/Services ordered at D/C?   Yes   What services:   Rehab, CHF, Stroke, PT, OT, Speech, Other: [facility]  (NCM) (If HH was ordered) Has HH been set up? No, pt states that they called to come on Sunday but she was not feeling up to hit and asked them to call back today to schedule. DME ordered at D/C? No        SDOH:    Transportation Needs: No Transportation Needs (10/13/2023)      Transportation Needs          Lack of Transportation: No  Financial Resource Strain: Low Risk  (5/15/2023)      Financial Resource Strain          Difficulty of Paying Living Expenses: Not hard at all          Med Affordability: No      DX: specifics:  COPD:    Are you currently on oxygen?yes,mostly at night. How many Liters? 2  Do you have any questions about Oxygen use?  no    Are you familiar with the signs and symptoms of worsening COPD? Yes       Who do you call with worsening COPD symptoms? Dr. Jessica Colon   Do you know when to call with COPD symptoms? Yes   Do you have any of the following potential risk factors for COPD in your home environment? Primary or secondary tobacco smoke   no  Occupational dusts and chemicals organic and inorganic    no  Indoor air pollution from heating and cooking with poor ventilation   no    Mold      no    Extreme heat no            Needs post D/C:   Now that you are home, are there any needs or concerns you need addressed before your next visit with your PCP?  (DME, meds, disease concerns, Etc): No     Follow up appointments:      Your appointments       Date & Time Appointment Department [facility])    Oct 24, 2023  2:30 PM CDT Video Visit with Barbie De La Rosa, APRN [address], [address] [place] [address] ([address])    Please verify your telehealth insurance benefits prior to your appointment. You must be in the state of [place] during the virtual visit.      Please use the [employer] Mobile Karen and launch the video visit 10 minutes prior to your scheduled appointment time to ensure your camera and microphone are working properly. Once the video visit has started you will be placed in a waiting room until the provider begins the visit. You will receive an email confirmation with instructions. If you have questions, call your doctor's office directly. If you are having issues or need to use a desktop/laptop, please follow the below steps:        1. Close out all other open apps (could be competing for audio resources)  2. Disable Bluetooth  3.       Reboot mobile device before joining the video  4. Come off Wi-Fi and switch over to Data    Please see our Video Visit Tip Sheet if you need additional assistance. If you believe this is an emergency, please dial 911 immediately. Jan 30, 2024  1:20 PM CST Exam - Established with Camille Samuel MD [facility] Nephrology ([facility] [address] [place] [address])              [facility]  [facility]  [address] [facility] [address] [place] [address]  [phone], [facility] [[email or web address]] [address] [facility] [address] [phone]            PCP TCM or HFU appointment: scheduled at D/C within 7-14 days  no     NCM Reviewed/scheduled/rescheduled PCP TCM or HFU appointment with pt:  Yes, pt declined, NCM sent TE to PCP office. Have you made all of your follow up appointments? yes    Is there any reason as to why you cannot make your appointments? No     NCM Reviewed upcoming Specialist Appt with patient     Yes, Pulm on 10/24         Interventions by NCM: NCM reviewed discharge instructions and when to seek medical attention with the patient. She states that she is doing wonderful. Her breathing is good. She is having a productive cough now. Her bp last night was 157/63. She has not checked it yet this morning. She does not check her bs.  She denies having any fever, n/v/c/d, lightheadedness, HA or any new or worsening symptoms. Med review completed. She denied having any questions or concerns at this time. CCM referral placed:  No        [x]  Discharge Summary, Discharge medications reviewed/discussed/and reconciled against outpatient medications with patient,  and orders reviewed and discussed. Any changes or updates to medications and or orders sent to PCP.

## 2023-10-17 NOTE — TELEPHONE ENCOUNTER
DANIEL, Spoke to pt for TCM today. Pt states that she does not need an appt at this time. She is seeing pulmonologist on 10/24/23. TCM/HFU appt recommended by 10/22/23 as pt is a high risk for readmission.     BOOK BY DATE (last date for TCM): 10/29/23

## 2023-10-17 NOTE — TELEPHONE ENCOUNTER
Spoke to pt, she states she will check with her daughter in law when she is able to bring her and call back for appt

## 2023-10-19 ENCOUNTER — TELEPHONE (OUTPATIENT)
Dept: CARDIOLOGY CLINIC | Facility: HOSPITAL | Age: 88
End: 2023-10-19

## 2023-10-24 ENCOUNTER — TELEMEDICINE (OUTPATIENT)
Facility: CLINIC | Age: 88
End: 2023-10-24

## 2023-10-24 DIAGNOSIS — J96.11 CHRONIC RESPIRATORY FAILURE WITH HYPOXIA (HCC): Primary | ICD-10-CM

## 2023-10-24 DIAGNOSIS — J41.8 MIXED SIMPLE AND MUCOPURULENT CHRONIC BRONCHITIS (HCC): ICD-10-CM

## 2023-10-24 DIAGNOSIS — J45.50 SEVERE PERSISTENT ASTHMA WITHOUT COMPLICATION: ICD-10-CM

## 2023-10-24 PROCEDURE — 99214 OFFICE O/P EST MOD 30 MIN: CPT | Performed by: NURSE PRACTITIONER

## 2023-10-24 PROCEDURE — 1111F DSCHRG MED/CURRENT MED MERGE: CPT | Performed by: NURSE PRACTITIONER

## 2023-10-24 RX ORDER — PREDNISONE 10 MG/1
10 TABLET ORAL DAILY
Qty: 30 TABLET | Refills: 0 | Status: SHIPPED | OUTPATIENT
Start: 2023-10-24 | End: 2023-10-27

## 2023-10-24 NOTE — PROGRESS NOTES
[facility] General Pulmonary Progress Note    History of Present Illness:  Michael Landaverde is a 80year old female with significant PMHx of asthma who presents today for follow up. Pt recently hospitalized for AECOPD 10/13-10/15/23 after noticing increased SOB and right sided chest discomfort, treated with steroids and nebs. No Abx. Completed prednisone post discharge yesterday. Feels well today; mild cough remains. No f/c/ns. Using budesonide BID, Duonebs q 5-6 hrs and saline nebs prn. Using oxygen 2L at night only. Previously [id number]  a 80year old female with significant PMH of asthma who presents today for follow up s/p Abx and steroids. Currently taking prednisone 20mg daily, completed ABx. Reports feeling \"much, much better\". Not using arformoterol and prefers to not take it. Previously 8/29/23   a 80year old female with significant PMH asthma/COPD who presents today for evaluation of increased cough, chest congestion, wheeze and SOB. Decreased prednisone from 10mg to 5mg after our last visit. Restarted Arformoterol on her own and felt great initially then after 5-7 days had a harder time coughing up phlegm. She has since stopped the Arformoterol and increased prednisone from 5 to 10mg for the past 3 days as her sx worsened. Denies f/ns; + chills. Unable to cough up phlegm. Currently using Budesonide nebs and Duonebs BID. Previously 8/8/23    presents today for follow up. Reports breathing is perfect. Very happy with new medications. Continues on prednisone 10mg as she has increased cough on 5mg. Denies new complaints. Previously 7/27/23 Dr Sylvia Sheikh  a 80year old female with hx of Asthma, CKD, and CHF who presents for follow up of asthma/COPD. She reports having been well until today when she felt her cough worsen with white phlegm. Again after clearing the phlegm she feels well.   She did use arformoterol +budesonide in early June but did not feel better so stopped - of note she stopped using duoneb altogether during this time. No pain. No fevers. +BLE edema     June 2023 previously  She was hospitalized in May with CHF exacerbation and hyponatremia, her breathing was controlled during her hospitalization. After going home she developed chest congestion and frequent coughing. She has continued to use her nebulizers - budesonide BID, duoneb PRN and saline nebs PRN. She remains on baseline pred 5mg daily  No fevers. No pain/pleurisy  Weight is up and +BLE - planning to see cardiology soon      May 2023 previously  Discharged  last week after treated for fluid overload. Overall feels well; breathing is significantly better than prior to hospitalization. Pt hospitalized from 5/5-5/11/23 with Acute HFpEF with acute pulmonary edema, BNP > 5000, diuresed on lasix gtt with > 4L of fluid removed. Meds adjusted per cardiologist, plan for labs and CXR today and plan to go to the heart failure clinic next week. No f/c/ns. Currently with occasional cough and CORONA. BLE edema unchanged per pt. Previously 3/23/23 Dr Claudean Au  80year old female former smoker (quit 30 years ago, 20 pack years) with significant PMH asthma, HTN and obesity who presents today for follow up. She was hospitalized with acute asthma exacerbation and CHF exacerbation, managed on steroids, nebs and diuretics with improvement. She feels better today, breathing is back to normal. Has sore throat and some mild dysphagia over the last week or so. She is not watching her weight of fluid balance. Previously  She has followed with me for several years and managed for asthma. She has had variable exacerbations of her asthma in the past leading to initiation of chronic steroids which was weaned to 5mg daily with good success and mitigation of repeated asthma exacerbations. She presents today without acute concern. Notes occasional cough/phlegm, relieved with nebs. Using budesonide neb once to twice a day. Using advair BID.  Using duoneb prn and only needing to use a few times/week. No fevers. No pain or pleurisy. Limited mobility due to BLE swelling, denies CORONA. Past Medical History:   Past Medical History:   Diagnosis Date    Accelerated hypertension 2014    Asthma     Cough 2016    Disorder of thyroid     Dyspnea 2016    Dyspnea, unspecified type 2016    Endocrine disorder     HYPOTHYROID    Osteoarthrosis, unspecified whether generalized or localized, unspecified site     Other and unspecified hyperlipidemia     Pneumonia, organism unspecified(486)     Severe persistent asthma with status asthmaticus 2019    Thyroid disease     Unspecified essential hypertension     Ventricular ectopy 1/15/2019    Visual impairment         Past Surgical History:   Past Surgical History:   Procedure Laterality Date    KNEE REPLACEMENT SURGERY      OTHER SURGICAL HISTORY      TOTAL KNEE REPLACEMENT Bilateral     16 yrs ago         Family Medical History:   Family History   Problem Relation Age of Onset    Cancer Father         bladder    Heart Disorder Mother     Hypertension Mother     Diabetes Mother         Social History: Social History    Socioeconomic History      Marital status:        Spouse name: Not on file      Number of children: Not on file      Years of education: Not on file      Highest education level: Not on file    Occupational History      Not on file    Tobacco Use      Smoking status: Former        Packs/day: 2.50        Years: 30.00        Additional pack years: 0.00        Total pack years: 75.00        Types: Cigarettes        Quit date: 1991        Years since quittin.2      Smokeless tobacco: Never    Vaping Use      Vaping Use: Never used    Substance and Sexual Activity      Alcohol use: Not Currently        Comment: 2 times per month      Drug use: No      Sexual activity: Not on file    Other Topics      Concerns:         Service: Not Asked        Blood Transfusions: Not Asked Caffeine Concern: Yes        Occupational Exposure: Not Asked        Hobby Hazards: Not Asked        Sleep Concern: Not Asked        Stress Concern: Not Asked        Weight Concern: Not Asked        Special Diet: Not Asked        Back Care: Not Asked        Exercise: Yes        Bike Helmet: Not Asked        Seat Belt: Not Asked        Self-Exams: Not Asked    Social History Narrative      Not on file    Social Determinants of Health  Financial Resource Strain: Low Risk  (5/15/2023)      Financial Resource Strain          Difficulty of Paying Living Expenses: Not hard at all          Med Affordability: No  Food Insecurity: No Food Insecurity (10/13/2023)      Food Insecurity          Food Insecurity: Never true  Transportation Needs: No Transportation Needs (10/13/2023)      Transportation Needs          Lack of Transportation: No  Physical Activity: Not on file  Stress: Not on file  Social Connections: Not on file  Housing Stability: [address]  (10/13/2023)      Housing Stability          Housing Instability: No          Housing Instability Emergency: Not on file     Medications:   Current Outpatient Medications   Medication Sig Dispense Refill    predniSONE 5 MG Oral Tab Take 8 tablets (40 mg total) by mouth daily with breakfast for 2 days, THEN 4 tablets (20 mg total) daily with breakfast for 2 days, THEN 2 tablets (10 mg total) daily with breakfast for 2 days, THEN 1 tablet (5 mg total) daily with breakfast for 2 days. 30 tablet 0    torsemide 20 MG Oral Tab Take 1 tablet (20 mg total) by mouth BID (Diuretic). TAKE 1 TABLET BY MOUTH TWICE DAILY 360 tablet 0    metoprolol tartrate 100 MG Oral Tab Take 1 tablet (100 mg total) by mouth 2 (two) times daily. 180 tablet 0    levothyroxine 125 MCG Oral Tab Take 1 tablet (125 mcg total) by mouth before breakfast. 90 tablet 0    ipratropium-albuterol 0.5-2.5 (3) MG/3ML Inhalation Solution Take 3 mL by nebulization every 6 (six) hours as needed.  360 mL 3    budesonide 0.5 MG/2ML Inhalation Suspension Take 2 mL (0.5 mg total) by nebulization 2 (two) times daily. 360 mL 3    potassium chloride 20 MEQ Oral Tab CR Take 1 tablet (20 mEq total) by mouth 2 (two) times daily. Once in morning and once at lunch time  0    hydrALAZINE 25 MG Oral Tab Take 1 tablet (25 mg total) by mouth in the morning and 1 tablet (25 mg total) before bedtime. (Patient taking differently: Take 1 tablet (25 mg total) by mouth in the morning and 1 tablet (25 mg total) before bedtime. Lunch and night time. ) 180 tablet 1    Desoximetasone 0.25 % External Cream desoximetasone 0.25 % topical cream, [RxNorm: 613124]      cyanocobalamin 250 MCG Oral Tab Take 1 tablet (250 mcg total) by mouth daily. Cholecalciferol (VITAMIN D-3 OR) Take by mouth daily. Cranberry 500 MG Oral Cap Take 500 mg by mouth daily. sodium chloride 0.9 % Inhalation Nebu Soln Take 3 mL by nebulization as needed for Wheezing. 360 mL 3    acetaminophen 500 MG Oral Tab Take 1 tablet (500 mg total) by mouth every 6 (six) hours. 30 tablet 0    albuterol (PROAIR HFA) 108 (90 Base) MCG/ACT Inhalation Aero Soln Inhale 2 puffs into the lungs every 6 (six) hours as needed for Wheezing or Shortness of Breath. 1 each 11    Cod Liver Oil 1000 MG Oral Cap Take 1 capsule by mouth daily. Calcium Carbonate-Vitamin D (CALTRATE 600+D OR) Take 1 tablet by mouth daily. Multiple Vitamins-Minerals (CENTRUM SILVER OR) Take 1 tablet by mouth daily. Review of Systems: Review of Systems   Constitutional:  Negative for activity change, appetite change, chills, diaphoresis, fatigue, fever and unexpected weight change. HENT:  Negative for congestion, postnasal drip, rhinorrhea, sinus pressure, sinus pain, sneezing, sore throat, trouble swallowing and voice change. Respiratory:  Positive for cough and shortness of breath. Negative for apnea, choking, chest tightness, wheezing and stridor.     Cardiovascular:  Negative for chest pain, palpitations and leg swelling. Musculoskeletal:  Negative for arthralgias. Skin:  Negative for pallor and rash. Allergic/Immunologic: Negative for immunocompromised state. Neurological:  Negative for dizziness and headaches. Hematological:  Negative for adenopathy. Physical Exam:  There were no vitals taken for this visit. Constitutional: alert, cooperative. No acute distress. HEENT: Head NC/AT. Respiratory: Thorax symmetrical with no labored breathing. Neurologic: A&Ox3. No gross motor deficits. Skin:  dry  Psych: Calm, cooperative. Pleasant affect. Results:  Personally reviewed    WBC: 6.9, done on 10/13/2023. HGB: 12.3, done on 10/13/2023. PLT: 256, done on 10/13/2023. Glucose: 157, done on 10/15/2023. Cr: 1.28, done on 10/15/2023. GFR(AA): 40, done on 7/23/2022. GFR (non-AA): 35, done on 7/23/2022. CA: 9.8, done on 10/15/2023. Na: 134, done on 10/15/2023. K: 4.2, done on 10/15/2023. Cl: 95, done on 10/15/2023. CO2: 31, done on 10/15/2023. Last ALB was 3.2% done on 10/13/2023. XR CHEST AP PORTABLE  (CPT=71045)    Result Date: 10/13/2023  CONCLUSION:  Increased density lung bases bilaterally is most likely dependent atelectasis or chronic scar. This is not significantly changed. Preliminary report was reviewed and there is no significant discrepancy. LOCATION:  [facility]      Dictated by (CST): Devi Rosales MD on 10/13/2023 at 8:13 AM     Finalized by (CST): Devi Rosales MD on 10/13/2023 at 8:14 AM         Assessment/Plan:  #1. Asthma   Severe, persistent  Has had multiple exacerbations in the past, improved on chronic prednisone 5mg daily  Changed from advair to budesonide neb BID; does not feel that arformoterol helped.   Will continue on budesonide BID and duonebs PRN  Continue duonebs and saline nebs PRN  Given her chronic cough and multiple exacerbations over the past year with multiple hospitalizations, limited response to current airway clearance therapies, she would be a great candidate for oxcillating lung therapy/ lung expansion therapy, will plan to proceed with Volara airway clearance to be used with her neb medications  Will increase pred to 10mg daily for this week. Will have her f/u in 2 weeks to reassess with hope to deescalate back to 5mg daily  We had previously reviewed options to consider biologics but she has declined but now she is agreeable to proceed - given information for Dr. Kristina Arzola  Encouraged to exercise and lose weight  We have discussed concern for DANE/OHS in the past with recommendation for PSG. However she is opposed to testing for sleep apnea and denies that she would ever use a device to help her sleep  8/8/2023 Improved; less SOB and minimal cough with light yellow phlegm. Recommend to continue current nebs; budesonide BID, Duonebs once daily and saline prn. Will try to decrease prednisone to 5mg daily and may alternate if cough returns. Discussed longterm effects of chronic steroids. Will followup in 4 weeks by [facility] unless symptomatic  8/29/2023 Reports np cough, chest congestion, wheeze, slight chills and SOB. Recommend prednisone taper, zpak and increase duonebs to 3-4 times a day. Discussed going to the ER if sx worsen  9/2023 Improved; minimal congestion today due to the weather but otherwise breathing has been better. Will continue to hold arformoterol and take budesonide and duonebs BID. Will continue to decrease prednisone to 10mg and try to decrease to 5mg when weather improves; may alternate 10mg and 5mg if needed. 10/2023 s/p AE asthma, hospitalized 10/1310/15/23 treated with steroids and nebs. Will remain on prednisone 10mg daily and re-evaluate in 2 weeks and if stable will return to alternate 10mg and 5mg every other day. CCT including duonebs QID, budesonide BID and saline nebs prn. #2.  Hypoxia, chronic  Hypoxia on exertion diagnosed on last hospitalization and has since improved  Continues to use oxygen with sleep  She continues to use and benefit from supplemental o2  8/2023 Using and benefiting from oxygen tx  9/2023 Using 2L at night only  10/2023 Using and benefitting from oxygen tx; using 2L at night ilia     #3. Chronic bronchitis with mucus plugging  As above  8/2023 See above  9/2023 Improved; see above  10/2023 Improved; will contineu to monitor     #4. CHF  Following with heart failure clinic and cardiology, remains on diuretics      Nayla DAMIR Aldrich  [facility]   10/24/2023    This visit is conducted using Telemedicine with live, interactive video and audio. Patient has been referred to the [facility] website at [email or web address] to review the yearly Consent to Treat document. Patient understands and accepts financial responsibility for any deductible, co-insurance and/or co-pays associated with this service.

## 2023-10-24 NOTE — PATIENT INSTRUCTIONS
Continue prednisone 10mg daily for now  Continue duonebs 4 times a day, budesonide 2 times a day and saline nebs as needed  Followup in 2 weeks or sooner if needed  Please contact office at [phone] with any decline in respiratory status, questions or concerns

## 2023-10-27 ENCOUNTER — TELEPHONE (OUTPATIENT)
Facility: CLINIC | Age: 88
End: 2023-10-27

## 2023-10-27 RX ORDER — PREDNISONE 10 MG/1
10 TABLET ORAL DAILY
Qty: 30 TABLET | Refills: 0 | Status: SHIPPED | OUTPATIENT
Start: 2023-10-27 | End: 2023-10-27

## 2023-10-27 RX ORDER — PREDNISONE 10 MG/1
10 TABLET ORAL DAILY
Qty: 30 TABLET | Refills: 0 | OUTPATIENT
Start: 2023-10-27

## 2023-10-27 NOTE — TELEPHONE ENCOUNTER
Per Daughter, [employer] has not been received Rx. Rx still in transmission status. Rx phone in and left on VM.

## 2023-10-27 NOTE — TELEPHONE ENCOUNTER
She should be taking prednisone 10mg daily until we followup in 2 weeks for now; may continue depending on respiratory status.  Thanks

## 2023-11-02 ENCOUNTER — LAB ENCOUNTER (OUTPATIENT)
Dept: LAB | Age: 88
End: 2023-11-02
Attending: FAMILY MEDICINE
Payer: MEDICARE

## 2023-11-02 DIAGNOSIS — E03.9 HYPOTHYROIDISM, UNSPECIFIED TYPE: ICD-10-CM

## 2023-11-02 DIAGNOSIS — E11.8 CONTROLLED TYPE 2 DIABETES MELLITUS WITH COMPLICATION, WITHOUT LONG-TERM CURRENT USE OF INSULIN (HCC): ICD-10-CM

## 2023-11-02 DIAGNOSIS — I10 ESSENTIAL HYPERTENSION: ICD-10-CM

## 2023-11-02 DIAGNOSIS — Z13.1 SCREENING FOR DIABETES MELLITUS (DM): ICD-10-CM

## 2023-11-02 DIAGNOSIS — Z13.6 SCREENING FOR CARDIOVASCULAR CONDITION: ICD-10-CM

## 2023-11-02 DIAGNOSIS — E03.9 ACQUIRED HYPOTHYROIDISM: ICD-10-CM

## 2023-11-02 DIAGNOSIS — R73.09 ELEVATED GLUCOSE: ICD-10-CM

## 2023-11-02 DIAGNOSIS — Z13.0 SCREENING FOR DEFICIENCY ANEMIA: ICD-10-CM

## 2023-11-02 DIAGNOSIS — Z86.39 HISTORY OF LOW POTASSIUM: ICD-10-CM

## 2023-11-02 DIAGNOSIS — D75.89 MACROCYTOSIS: ICD-10-CM

## 2023-11-02 LAB
ALBUMIN SERPL-MCNC: 3.6 G/DL (ref 3.4–5)
ALBUMIN/GLOB SERPL: 1.1 {RATIO} (ref 1–2)
ALP LIVER SERPL-CCNC: 45 U/L
ALT SERPL-CCNC: 30 U/L
ANION GAP SERPL CALC-SCNC: 12 MMOL/L (ref 0–18)
AST SERPL-CCNC: 25 U/L (ref 15–37)
BASOPHILS # BLD AUTO: 0.04 X10(3) UL (ref 0–0.2)
BASOPHILS NFR BLD AUTO: 0.5 %
BILIRUB SERPL-MCNC: 0.4 MG/DL (ref 0.1–2)
BUN BLD-MCNC: 30 MG/DL (ref 9–23)
CALCIUM BLD-MCNC: 9.6 MG/DL (ref 8.5–10.1)
CHLORIDE SERPL-SCNC: 92 MMOL/L (ref 98–112)
CHOLEST SERPL-MCNC: 280 MG/DL (ref ?–200)
CO2 SERPL-SCNC: 28 MMOL/L (ref 21–32)
CREAT BLD-MCNC: 1.53 MG/DL
CREAT UR-SCNC: 18.7 MG/DL
EGFRCR SERPLBLD CKD-EPI 2021: 32 ML/MIN/1.73M2 (ref 60–?)
EOSINOPHIL # BLD AUTO: 0.01 X10(3) UL (ref 0–0.7)
EOSINOPHIL NFR BLD AUTO: 0.1 %
ERYTHROCYTE [DISTWIDTH] IN BLOOD BY AUTOMATED COUNT: 14.6 %
EST. AVERAGE GLUCOSE BLD GHB EST-MCNC: 140 MG/DL (ref 68–126)
FASTING PATIENT LIPID ANSWER: NO
FASTING STATUS PATIENT QL REPORTED: NO
FOLATE SERPL-MCNC: 80.4 NG/ML (ref 8.7–?)
GLOBULIN PLAS-MCNC: 3.2 G/DL (ref 2.8–4.4)
GLUCOSE BLD-MCNC: 220 MG/DL (ref 70–99)
HBA1C MFR BLD: 6.5 % (ref ?–5.7)
HCT VFR BLD AUTO: 39.8 %
HDLC SERPL-MCNC: 66 MG/DL (ref 40–59)
HGB BLD-MCNC: 13.2 G/DL
IMM GRANULOCYTES # BLD AUTO: 0.09 X10(3) UL (ref 0–1)
IMM GRANULOCYTES NFR BLD: 1.1 %
LDLC SERPL CALC-MCNC: 119 MG/DL (ref ?–100)
LYMPHOCYTES # BLD AUTO: 1.18 X10(3) UL (ref 1–4)
LYMPHOCYTES NFR BLD AUTO: 14 %
MAGNESIUM SERPL-MCNC: 2.3 MG/DL (ref 1.6–2.6)
MCH RBC QN AUTO: 33.2 PG (ref 26–34)
MCHC RBC AUTO-ENTMCNC: 33.2 G/DL (ref 31–37)
MCV RBC AUTO: 100 FL
MICROALBUMIN UR-MCNC: <0.5 MG/DL
MONOCYTES # BLD AUTO: 0.22 X10(3) UL (ref 0.1–1)
MONOCYTES NFR BLD AUTO: 2.6 %
NEUTROPHILS # BLD AUTO: 6.91 X10 (3) UL (ref 1.5–7.7)
NEUTROPHILS # BLD AUTO: 6.91 X10(3) UL (ref 1.5–7.7)
NEUTROPHILS NFR BLD AUTO: 81.7 %
NONHDLC SERPL-MCNC: 214 MG/DL (ref ?–130)
OSMOLALITY SERPL CALC.SUM OF ELEC: 287 MOSM/KG (ref 275–295)
PLATELET # BLD AUTO: 305 10(3)UL (ref 150–450)
PLATELET MORPHOLOGY: NORMAL
POTASSIUM SERPL-SCNC: 4.3 MMOL/L (ref 3.5–5.1)
PROT SERPL-MCNC: 6.8 G/DL (ref 6.4–8.2)
RBC # BLD AUTO: 3.98 X10(6)UL
SODIUM SERPL-SCNC: 132 MMOL/L (ref 136–145)
T4 FREE SERPL-MCNC: 1.1 NG/DL (ref 0.8–1.7)
TRIGL SERPL-MCNC: 539 MG/DL (ref 30–149)
TSI SER-ACNC: 5.01 MIU/ML (ref 0.36–3.74)
VIT B12 SERPL-MCNC: 1538 PG/ML (ref 193–986)
VLDLC SERPL CALC-MCNC: 99 MG/DL (ref 0–30)
WBC # BLD AUTO: 8.5 X10(3) UL (ref 4–11)

## 2023-11-02 PROCEDURE — 82607 VITAMIN B-12: CPT

## 2023-11-02 PROCEDURE — 82746 ASSAY OF FOLIC ACID SERUM: CPT

## 2023-11-02 PROCEDURE — 83036 HEMOGLOBIN GLYCOSYLATED A1C: CPT

## 2023-11-02 PROCEDURE — 82570 ASSAY OF URINE CREATININE: CPT

## 2023-11-02 PROCEDURE — 84443 ASSAY THYROID STIM HORMONE: CPT

## 2023-11-02 PROCEDURE — 80053 COMPREHEN METABOLIC PANEL: CPT

## 2023-11-02 PROCEDURE — 36415 COLL VENOUS BLD VENIPUNCTURE: CPT

## 2023-11-02 PROCEDURE — 85025 COMPLETE CBC W/AUTO DIFF WBC: CPT

## 2023-11-02 PROCEDURE — 83735 ASSAY OF MAGNESIUM: CPT

## 2023-11-02 PROCEDURE — 82043 UR ALBUMIN QUANTITATIVE: CPT

## 2023-11-02 PROCEDURE — 84439 ASSAY OF FREE THYROXINE: CPT

## 2023-11-02 PROCEDURE — 80061 LIPID PANEL: CPT

## 2023-11-03 DIAGNOSIS — I50.32 CHRONIC HEART FAILURE WITH PRESERVED EJECTION FRACTION (HFPEF) (HCC): Primary | ICD-10-CM

## 2023-11-06 ENCOUNTER — HOSPITAL ENCOUNTER (OUTPATIENT)
Dept: PERIOP | Facility: HOSPITAL | Age: 88
Discharge: HOME | End: 2023-11-06
Attending: FAMILY MEDICINE
Payer: MEDICARE

## 2023-11-06 ENCOUNTER — HOSPITAL ENCOUNTER (OUTPATIENT)
Dept: CARDIOLOGY CLINIC | Facility: HOSPITAL | Age: 88
End: 2023-11-06
Attending: NURSE PRACTITIONER
Payer: MEDICARE

## 2023-11-06 VITALS
WEIGHT: 184.63 LBS | OXYGEN SATURATION: 96 % | SYSTOLIC BLOOD PRESSURE: 126 MMHG | HEART RATE: 62 BPM | BODY MASS INDEX: 41 KG/M2 | DIASTOLIC BLOOD PRESSURE: 67 MMHG | RESPIRATION RATE: 18 BRPM

## 2023-11-06 DIAGNOSIS — I50.32 CHRONIC DIASTOLIC HEART FAILURE (HCC): Primary | ICD-10-CM

## 2023-11-06 DIAGNOSIS — N18.30 STAGE 3 CHRONIC KIDNEY DISEASE, UNSPECIFIED WHETHER STAGE 3A OR 3B CKD (HCC): ICD-10-CM

## 2023-11-06 DIAGNOSIS — E87.1 HYPONATREMIA: ICD-10-CM

## 2023-11-06 DIAGNOSIS — I89.0 LYMPHEDEMA: ICD-10-CM

## 2023-11-06 LAB
ANION GAP SERPL CALC-SCNC: 11 MMOL/L (ref 0–18)
BUN BLD-MCNC: 32 MG/DL (ref 9–23)
CALCIUM BLD-MCNC: 9.1 MG/DL (ref 8.5–10.1)
CHLORIDE SERPL-SCNC: 96 MMOL/L (ref 98–112)
CO2 SERPL-SCNC: 27 MMOL/L (ref 21–32)
CREAT BLD-MCNC: 1.4 MG/DL
EGFRCR SERPLBLD CKD-EPI 2021: 36 ML/MIN/1.73M2 (ref 60–?)
ERYTHROCYTE [DISTWIDTH] IN BLOOD BY AUTOMATED COUNT: 14.9 %
GLUCOSE BLD-MCNC: 209 MG/DL (ref 70–99)
HCT VFR BLD AUTO: 36.5 %
HGB BLD-MCNC: 12.4 G/DL
MCH RBC QN AUTO: 33.2 PG (ref 26–34)
MCHC RBC AUTO-ENTMCNC: 34 G/DL (ref 31–37)
MCV RBC AUTO: 97.9 FL
NT-PROBNP SERPL-MCNC: 2065 PG/ML (ref ?–450)
OSMOLALITY SERPL CALC.SUM OF ELEC: 291 MOSM/KG (ref 275–295)
PLATELET # BLD AUTO: 293 10(3)UL (ref 150–450)
POTASSIUM SERPL-SCNC: 4.1 MMOL/L (ref 3.5–5.1)
RBC # BLD AUTO: 3.73 X10(6)UL
SODIUM SERPL-SCNC: 134 MMOL/L (ref 136–145)
WBC # BLD AUTO: 6.7 X10(3) UL (ref 4–11)

## 2023-11-06 PROCEDURE — 99214 OFFICE O/P EST MOD 30 MIN: CPT | Performed by: NURSE PRACTITIONER

## 2023-11-06 RX ORDER — POTASSIUM CHLORIDE 20 MEQ/1
20 TABLET, EXTENDED RELEASE ORAL DAILY
COMMUNITY
Start: 2023-11-06

## 2023-11-06 NOTE — PATIENT INSTRUCTIONS
Heart Failure Discharge Instructions      Activity: Regular exercise and activity is important for your overall health and to help keep your heart strong and functioning as well as possible. Walk at a slow to moderate pace for 15-20 minutes 3-5 days per week. Follow these instructions every day to keep yourself in the [address] you are feeling well and your symptoms are under control                                    Medications  Take your medication every day as instructed  Do not stop taking your medicine or change the amount you are taking without instructions from your doctor or nurse  Do Not take non-steroidal antiinflammatory drugs such as ibuprofen, aleve, advil, or motrin                                    Diet/Fluids  People with heart failure should eat less sodium (salt) and limit fluid. Sodium attracts water and makes the body hold fluid. This extra fluid makes the heart work harder and can worsen the symptoms of heart failure. Diet    2000 mg sodium daily  Fluid restriction    64 ounces daily  (8 oz. = 1 cup)                                     Body Weight  Weigh yourself every day before breakfast and write your weight down  Use the same scale and wear about the same amount of clothes each time  A sudden weight increase is due to fluid retention rather than fat                                         Activity  Pace your activities to avoid getting overtired  Take rest periods as needed  Elevate your feet to reduce ankle swelling when resting                             Signs of Worsening Heart Failure    You are entering the Yellow Zone - this is a warning zone    Call your doctor or nurse if you have any of these signs or symptoms:   You gain 2 or more pounds overnight or 3-5 pounds in 3-7 days  You have more trouble breathing  You get more tired with regular activity, or are limiting activity because of shortness of breath or fatigue  You are short of breath lying down, you need more pillows to breathe comfortably,  or wake up during the night short of breath  You urinate less often during the day and more often at night  You have a bloated feeling, upset stomach, loss of appetite, or your clothes are fitting tighter    GO TO THE EMERGENCY DEPARTMENT or CALL 911 IF: These are signs you are in the RED ZONE - THIS IS AN EMERGENCY  You have tightness or pain in your chest  You are extremely short of breath or can't catch your breath  You cough up frothy pink mucous  You feel confused or can't think clearly  You are traveling and develop symptoms of worsening heart failure     We respect everyone's time and availability. Please be aware that this is not a walk-in clinic and we require appointments in order to facilitate timely care for all patients. We ask you to arrive 30 minutes before your appointment to allow time for you to check-in and have your bloodwork drawn. Please understand if you are late for your appointment, you may be asked to reschedule. If possible, all attempts will be made to accommodate but realize this is no guarantee that this will always be available. We understand there are extenuating circumstances. If you need to cancel or reschedule your appointment, please call the [facility] within 24 hours at [phone]. Thank you for your cooperation, [facility] Staff. IF YOU HAVE QUESTIONS REGARDING YOUR BILL, FEEL FREE TO CONTACT [facility] PATIENT ACCOUNTS -[phone]. IF YOU NEED FINANCIAL ASSISTANCE, PLEASE CALL AN [facility] FINANCIAL COUNSELOR -[phone].              [facility]     [phone]

## 2023-11-06 NOTE — PROGRESS NOTES
Patient assessed, with daughter. No signs or symptoms of shortness of breath, fatigue or chest pain. Patient has lower leg bilateral edema. Weight stable at 184 lbs; down 5 lbs. Reviewed current list of patient's allergies and medication; updated the Electronic Medical Record. Labs ordered to assess kidney function and drawn by [facility]. Reviewed follow-up appointment and Heart Failure discharge instructions with patient. Patient verbalized an understanding. RTC in 6 weeks.

## 2023-11-07 ENCOUNTER — TELEMEDICINE (OUTPATIENT)
Facility: CLINIC | Age: 88
End: 2023-11-07
Payer: MEDICARE

## 2023-11-07 DIAGNOSIS — J45.50 SEVERE PERSISTENT ASTHMA WITHOUT COMPLICATION: ICD-10-CM

## 2023-11-07 DIAGNOSIS — R05.9 COUGH, UNSPECIFIED TYPE: ICD-10-CM

## 2023-11-07 DIAGNOSIS — J44.1 COPD EXACERBATION (HCC): Primary | ICD-10-CM

## 2023-11-07 PROCEDURE — 99214 OFFICE O/P EST MOD 30 MIN: CPT | Performed by: NURSE PRACTITIONER

## 2023-11-07 PROCEDURE — 1111F DSCHRG MED/CURRENT MED MERGE: CPT | Performed by: NURSE PRACTITIONER

## 2023-11-07 RX ORDER — AZITHROMYCIN 250 MG/1
TABLET, FILM COATED ORAL
Qty: 6 TABLET | Refills: 0 | Status: SHIPPED | OUTPATIENT
Start: 2023-11-07

## 2023-11-07 NOTE — PATIENT INSTRUCTIONS
Continue current nebs, mucinex and add saline nebs twice a day  Continue prednisone 10mg daily and will give zpak and call for update next week  Please contact office at [phone] with any decline in respiratory status, questions or concerns

## 2023-11-07 NOTE — PROGRESS NOTES
[facility] General Pulmonary Progress Note    History of Present Illness:  Mayra Cortez is a 80year old female with significant PMH asthma who presents today for sick 2 week follow up. Reports she feels more congested, not able to cough up phlegm, heard wheezing last night. Denies increased SOB. Using budesonide and duonebs and prednisone 10mg daily. No f/c/ns. Not using saline nebs    Previously 10/24/23  a 80year old female with significant PMHx of asthma who presents today for follow up. Pt recently hospitalized for AECOPD 10/13-10/15/23 after noticing increased SOB and right sided chest discomfort, treated with steroids and nebs. No Abx. Completed prednisone post discharge yesterday. Feels well today; mild cough remains. No f/c/ns. Using budesonide BID, Duonebs q 5-6 hrs and saline nebs prn. Using oxygen 2L at night only. Previously [id number]  a 80year old female with significant PMH of asthma who presents today for follow up s/p Abx and steroids. Currently taking prednisone 20mg daily, completed ABx. Reports feeling \"much, much better\". Not using arformoterol and prefers to not take it. Previously 8/29/23   a 80year old female with significant PMH asthma/COPD who presents today for evaluation of increased cough, chest congestion, wheeze and SOB. Decreased prednisone from 10mg to 5mg after our last visit. Restarted Arformoterol on her own and felt great initially then after 5-7 days had a harder time coughing up phlegm. She has since stopped the Arformoterol and increased prednisone from 5 to 10mg for the past 3 days as her sx worsened. Denies f/ns; + chills. Unable to cough up phlegm. Currently using Budesonide nebs and Duonebs BID. Previously 8/8/23    presents today for follow up. Reports breathing is perfect. Very happy with new medications. Continues on prednisone 10mg as she has increased cough on 5mg. Denies new complaints.       Previously 7/27/23 Dr Buell Crigler  a 80year old female with hx of Asthma, CKD, and CHF who presents for follow up of asthma/COPD. She reports having been well until today when she felt her cough worsen with white phlegm. Again after clearing the phlegm she feels well. She did use arformoterol +budesonide in early June but did not feel better so stopped - of note she stopped using duoneb altogether during this time. No pain. No fevers. +BLE edema     June 2023 previously  She was hospitalized in May with CHF exacerbation and hyponatremia, her breathing was controlled during her hospitalization. After going home she developed chest congestion and frequent coughing. She has continued to use her nebulizers - budesonide BID, duoneb PRN and saline nebs PRN. She remains on baseline pred 5mg daily  No fevers. No pain/pleurisy  Weight is up and +BLE - planning to see cardiology soon      May 2023 previously  Discharged  last week after treated for fluid overload. Overall feels well; breathing is significantly better than prior to hospitalization. Pt hospitalized from 5/5-5/11/23 with Acute HFpEF with acute pulmonary edema, BNP > 5000, diuresed on lasix gtt with > 4L of fluid removed. Meds adjusted per cardiologist, plan for labs and CXR today and plan to go to the heart failure clinic next week. No f/c/ns. Currently with occasional cough and CORONA. BLE edema unchanged per pt. Previously 3/23/23 Dr Pj Olivo  80year old female former smoker (quit 30 years ago, 20 pack years) with significant PMH asthma, HTN and obesity who presents today for follow up. She was hospitalized with acute asthma exacerbation and CHF exacerbation, managed on steroids, nebs and diuretics with improvement. She feels better today, breathing is back to normal. Has sore throat and some mild dysphagia over the last week or so. She is not watching her weight of fluid balance. Previously  She has followed with me for several years and managed for asthma.  She has had variable exacerbations of her asthma in the past leading to initiation of chronic steroids which was weaned to 5mg daily with good success and mitigation of repeated asthma exacerbations. She presents today without acute concern. Notes occasional cough/phlegm, relieved with nebs. Using budesonide neb once to twice a day. Using advair BID. Using duoneb prn and only needing to use a few times/week. No fevers. No pain or pleurisy. Limited mobility due to BLE swelling, denies CORONA. Past Medical History:   Past Medical History:   Diagnosis Date    Accelerated hypertension 2014    Asthma     Cough 2016    Disorder of thyroid     Dyspnea 2016    Dyspnea, unspecified type 2016    Endocrine disorder     HYPOTHYROID    Osteoarthrosis, unspecified whether generalized or localized, unspecified site     Other and unspecified hyperlipidemia     Pneumonia, organism unspecified(486)     Severe persistent asthma with status asthmaticus 2019    Thyroid disease     Unspecified essential hypertension     Ventricular ectopy 1/15/2019    Visual impairment         Past Surgical History:   Past Surgical History:   Procedure Laterality Date    KNEE REPLACEMENT SURGERY      OTHER SURGICAL HISTORY      TOTAL KNEE REPLACEMENT Bilateral     16 yrs ago         Family Medical History:   Family History   Problem Relation Age of Onset    Cancer Father         bladder    Heart Disorder Mother     Hypertension Mother     Diabetes Mother         Social History:   Social History     Socioeconomic History    Marital status:       Spouse name: Not on file    Number of children: Not on file    Years of education: Not on file    Highest education level: Not on file   Occupational History    Not on file   Tobacco Use    Smoking status: Former     Packs/day: 2.50     Years: 30.00     Additional pack years: 0.00     Total pack years: 75.00     Types: Cigarettes     Quit date: 1991     Years since quittin.2    Smokeless tobacco: Never   Vaping Use    Vaping Use: Never used   Substance and Sexual Activity    Alcohol use: Not Currently     Comment: 2 times per month    Drug use: No    Sexual activity: Not on file   Other Topics Concern     Service Not Asked    Blood Transfusions Not Asked    Caffeine Concern Yes    Occupational Exposure Not Asked    [address] Hazards Not Asked    Sleep Concern Not Asked    Stress Concern Not Asked    Weight Concern Not Asked    Special Diet Not Asked    Back Care Not Asked    Exercise Yes    Bike Helmet Not Asked    Seat Belt Not Asked    Self-Exams Not Asked   Social History Narrative    Not on file     Social Determinants of Health     Financial Resource Strain: Low Risk  (5/15/2023)    Financial Resource Strain     Difficulty of Paying Living Expenses: Not hard at all     Med Affordability: No   Food Insecurity: No Food Insecurity (10/13/2023)    Food Insecurity     Food Insecurity: Never true   Transportation Needs: No Transportation Needs (10/13/2023)    Transportation Needs     Lack of Transportation: No   Physical Activity: Not on file   Stress: Not on file   Social Connections: Not on file   Housing Stability: [address]  (10/13/2023)    Housing Stability     Housing Instability: No     Housing Instability Emergency: Not on file        Medications:   Current Outpatient Medications   Medication Sig Dispense Refill    azithromycin 250 MG Oral Tab take 2 tablet (500MG)  by ORAL route  every day for 1 day then 1 tablet (250 mg) by oral route once daily for 4 days 6 tablet 0    potassium chloride 20 MEQ Oral Tab CR Take 1 tablet (20 mEq total) by mouth daily. levothyroxine 137 MCG Oral Tab Take 137 mcg by mouth before breakfast. 90 tablet 3    predniSONE 10 MG Oral Tab Take 1 tablet (10 mg total) by mouth daily. 30 tablet 0    torsemide 20 MG Oral Tab Take 1 tablet (20 mg total) by mouth BID (Diuretic).  TAKE 1 TABLET BY MOUTH TWICE DAILY 360 tablet 0    metoprolol tartrate 100 MG Oral Tab Take 1 tablet (100 mg total) by mouth 2 (two) times daily. 180 tablet 0    levothyroxine 125 MCG Oral Tab Take 1 tablet (125 mcg total) by mouth before breakfast. 90 tablet 0    ipratropium-albuterol 0.5-2.5 (3) MG/3ML Inhalation Solution Take 3 mL by nebulization every 6 (six) hours as needed. 360 mL 3    budesonide 0.5 MG/2ML Inhalation Suspension Take 2 mL (0.5 mg total) by nebulization 2 (two) times daily. 360 mL 3    hydrALAZINE 25 MG Oral Tab Take 1 tablet (25 mg total) by mouth in the morning and 1 tablet (25 mg total) before bedtime. (Patient taking differently: Take 1 tablet (25 mg total) by mouth in the morning and 1 tablet (25 mg total) before bedtime. Lunch and night time. ) 180 tablet 1    Desoximetasone 0.25 % External Cream desoximetasone 0.25 % topical cream, [RxNorm: 036606]      cyanocobalamin 250 MCG Oral Tab Take 1 tablet (250 mcg total) by mouth daily. Cholecalciferol (VITAMIN D-3 OR) Take by mouth daily. Cranberry 500 MG Oral Cap Take 500 mg by mouth daily. sodium chloride 0.9 % Inhalation Nebu Soln Take 3 mL by nebulization as needed for Wheezing. 360 mL 3    acetaminophen 500 MG Oral Tab Take 1 tablet (500 mg total) by mouth every 6 (six) hours. 30 tablet 0    albuterol (PROAIR HFA) 108 (90 Base) MCG/ACT Inhalation Aero Soln Inhale 2 puffs into the lungs every 6 (six) hours as needed for Wheezing or Shortness of Breath. 1 each 11    Cod Liver Oil 1000 MG Oral Cap Take 1 capsule by mouth daily. Calcium Carbonate-Vitamin D (CALTRATE 600+D OR) Take 1 tablet by mouth daily. Multiple Vitamins-Minerals (CENTRUM SILVER OR) Take 1 tablet by mouth daily. Review of Systems: Review of Systems   Constitutional:  Negative for activity change, appetite change, chills, diaphoresis, fatigue, fever and unexpected weight change. HENT:  Negative for congestion, postnasal drip, rhinorrhea, sinus pressure, sinus pain, sneezing, sore throat, trouble swallowing and voice change.     Respiratory:  Positive for cough and wheezing. Negative for apnea, choking, chest tightness, shortness of breath and stridor. Cardiovascular:  Negative for chest pain, palpitations and leg swelling. Musculoskeletal:  Negative for arthralgias. Skin:  Negative for pallor and rash. Allergic/Immunologic: Negative for immunocompromised state. Neurological:  Negative for dizziness and headaches. Hematological:  Negative for adenopathy. Physical Exam:  There were no vitals taken for this visit. Constitutional: alert, cooperative. No acute distress. HEENT: Head NC/AT. Respiratory: Thorax symmetrical with no labored breathing. Neurologic: A&Ox3. No gross motor deficits. Skin:  dry  Psych: Calm, cooperative. Pleasant affect. Results:  Personally reviewed    WBC: 6.7, done on 11/6/2023. HGB: 12.4, done on 11/6/2023. PLT: 293, done on 11/6/2023. Glucose: 209, done on 11/6/2023. Cr: 1.4, done on 11/6/2023. GFR(AA): 40, done on 7/23/2022. GFR (non-AA): 35, done on 7/23/2022. CA: 9.1, done on 11/6/2023. Na: 134, done on 11/6/2023. K: 4.1, done on 11/6/2023. Cl: 96, done on 11/6/2023. CO2: 27, done on 11/6/2023. Last ALB was 3.6% done on 11/2/2023. XR CHEST AP PORTABLE  (CPT=71045)    Result Date: 10/13/2023  CONCLUSION:  Increased density lung bases bilaterally is most likely dependent atelectasis or chronic scar. This is not significantly changed. Preliminary report was reviewed and there is no significant discrepancy. LOCATION:  [facility]      Dictated by (CST): Iesha Cheung MD on 10/13/2023 at 8:13 AM     Finalized by (CST): Iesha Cheung MD on 10/13/2023 at 8:14 AM         Assessment/Plan:  #1. Asthma   Severe, persistent  Has had multiple exacerbations in the past, improved on chronic prednisone 5mg daily  Changed from advair to budesonide neb BID; does not feel that arformoterol helped.   Will continue on budesonide BID and duonebs PRN  Continue duonebs and saline nebs PRN  Given her chronic cough and multiple exacerbations over the past year with multiple hospitalizations, limited response to current airway clearance therapies, she would be a great candidate for oxcillating lung therapy/ lung expansion therapy, will plan to proceed with Volara airway clearance to be used with her neb medications  Will increase pred to 10mg daily for this week. Will have her f/u in 2 weeks to reassess with hope to deescalate back to 5mg daily  We had previously reviewed options to consider biologics but she has declined but now she is agreeable to proceed - given information for Dr. Riley Eubanks  Encouraged to exercise and lose weight  We have discussed concern for DANE/OHS in the past with recommendation for PSG. However she is opposed to testing for sleep apnea and denies that she would ever use a device to help her sleep  8/8/2023 Improved; less SOB and minimal cough with light yellow phlegm. Recommend to continue current nebs; budesonide BID, Duonebs once daily and saline prn. Will try to decrease prednisone to 5mg daily and may alternate if cough returns. Discussed longterm effects of chronic steroids. Will followup in 4 weeks by [facility] unless symptomatic  8/29/2023 Reports np cough, chest congestion, wheeze, slight chills and SOB. Recommend prednisone taper, zpak and increase duonebs to 3-4 times a day. Discussed going to the ER if sx worsen  9/2023 Improved; minimal congestion today due to the weather but otherwise breathing has been better. Will continue to hold arformoterol and take budesonide and duonebs BID. Will continue to decrease prednisone to 10mg and try to decrease to 5mg when weather improves; may alternate 10mg and 5mg if needed. 10/2023 s/p AE asthma, hospitalized 10/1310/15/23 treated with steroids and nebs. Will remain on prednisone 10mg daily and re-evaluate in 2 weeks and if stable will return to alternate 10mg and 5mg every other day.  CCT including duonebs QID, budesonide BID and saline nebs prn.  11/2023 Stable; continue nebs and prednisone 10mg daily. Will give zpak to cover for AE asthma      #2. Hypoxia, chronic  Hypoxia on exertion diagnosed on last hospitalization and has since improved  Continues to use oxygen with sleep  She continues to use and benefit from supplemental o2  8/2023 Using and benefiting from oxygen tx  9/2023 Using 2L at night only  10/2023 Using and benefitting from oxygen tx; using 2L at night ilia  11/2023 Stable; CCT     #3. Chronic bronchitis with mucus plugging  As above  8/2023 See above  9/2023 Improved; see above  10/2023 Improved; will contineu to monitor  11/2023 See above; will restart saline nebs, continue mucinex     #4. CHF  Following with heart failure clinic and cardiology, remains on diuretics     DAMIR Danielle  [facility]   11/7/2023    This visit is conducted using Telemedicine with live, interactive video and audio. Patient has been referred to the [facility] website at [email or web address] to review the yearly Consent to Treat document. Patient understands and accepts financial responsibility for any deductible, co-insurance and/or co-pays associated with this service.

## 2023-11-10 ENCOUNTER — TELEPHONE (OUTPATIENT)
Facility: CLINIC | Age: 88
End: 2023-11-10

## 2023-11-10 RX ORDER — PREDNISONE 10 MG/1
TABLET ORAL
Qty: 30 TABLET | Refills: 0 | Status: SHIPPED | OUTPATIENT
Start: 2023-11-10

## 2023-11-13 NOTE — TELEPHONE ENCOUNTER
Spoke with pt; she feels back to normal after starting prednisone. Took 40mg daily over the past few days; will start 30mg tomorrow and follow the tapering instructions. Will followup Friday. Pt aware to call if questions, concerns or decline in respiratory status.

## 2023-11-14 ENCOUNTER — TELEMEDICINE (OUTPATIENT)
Dept: FAMILY MEDICINE CLINIC | Facility: CLINIC | Age: 88
End: 2023-11-14
Payer: MEDICARE

## 2023-11-14 DIAGNOSIS — E03.9 ACQUIRED HYPOTHYROIDISM: Primary | ICD-10-CM

## 2023-11-14 DIAGNOSIS — E78.2 MIXED HYPERLIPIDEMIA: ICD-10-CM

## 2023-11-14 PROCEDURE — 1111F DSCHRG MED/CURRENT MED MERGE: CPT | Performed by: FAMILY MEDICINE

## 2023-11-14 PROCEDURE — 99214 OFFICE O/P EST MOD 30 MIN: CPT | Performed by: FAMILY MEDICINE

## 2023-11-14 NOTE — PROGRESS NOTES
Subjective:   Patient ID: Alexandr Shaw is a 80year old female. + hypothyroidism. Previous TSH elevated. Changed dose. Pt feels no different.      + HLD. Chronic. Due for repeat check. Triglycerides too high on last test.    DMII. Recurrent. Last A1c value was 6.5% done 11/2/2023. No symptoms. History/Other:   Review of Systems   All other systems reviewed and are negative. Current Outpatient Medications   Medication Sig Dispense Refill    predniSONE 10 MG Oral Tab Take 4 tabs (40mg) for 3 days, then 3 tabs (30mg) daily for 3 days, then take 2 tabs (20mg) daily for 3 days, then take 1 tab (10mg) daily for 3 days. 30 tablet 0    azithromycin 250 MG Oral Tab take 2 tablet (500MG)  by ORAL route  every day for 1 day then 1 tablet (250 mg) by oral route once daily for 4 days 6 tablet 0    potassium chloride 20 MEQ Oral Tab CR Take 1 tablet (20 mEq total) by mouth daily. levothyroxine 137 MCG Oral Tab Take 137 mcg by mouth before breakfast. 90 tablet 3    predniSONE 10 MG Oral Tab Take 1 tablet (10 mg total) by mouth daily. 30 tablet 0    torsemide 20 MG Oral Tab Take 1 tablet (20 mg total) by mouth BID (Diuretic). TAKE 1 TABLET BY MOUTH TWICE DAILY 360 tablet 0    metoprolol tartrate 100 MG Oral Tab Take 1 tablet (100 mg total) by mouth 2 (two) times daily. 180 tablet 0    levothyroxine 125 MCG Oral Tab Take 1 tablet (125 mcg total) by mouth before breakfast. 90 tablet 0    ipratropium-albuterol 0.5-2.5 (3) MG/3ML Inhalation Solution Take 3 mL by nebulization every 6 (six) hours as needed. 360 mL 3    budesonide 0.5 MG/2ML Inhalation Suspension Take 2 mL (0.5 mg total) by nebulization 2 (two) times daily. 360 mL 3    hydrALAZINE 25 MG Oral Tab Take 1 tablet (25 mg total) by mouth in the morning and 1 tablet (25 mg total) before bedtime. (Patient taking differently: Take 1 tablet (25 mg total) by mouth in the morning and 1 tablet (25 mg total) before bedtime. Lunch and night time. ) 180 tablet 1    Desoximetasone 0.25 % External Cream desoximetasone 0.25 % topical cream, [RxNorm: 755519]      cyanocobalamin 250 MCG Oral Tab Take 1 tablet (250 mcg total) by mouth daily. Cholecalciferol (VITAMIN D-3 OR) Take by mouth daily. Cranberry 500 MG Oral Cap Take 500 mg by mouth daily. sodium chloride 0.9 % Inhalation Nebu Soln Take 3 mL by nebulization as needed for Wheezing. 360 mL 3    acetaminophen 500 MG Oral Tab Take 1 tablet (500 mg total) by mouth every 6 (six) hours. 30 tablet 0    albuterol (PROAIR HFA) 108 (90 Base) MCG/ACT Inhalation Aero Soln Inhale 2 puffs into the lungs every 6 (six) hours as needed for Wheezing or Shortness of Breath. 1 each 11    Cod Liver Oil 1000 MG Oral Cap Take 1 capsule by mouth daily. Calcium Carbonate-Vitamin D (CALTRATE 600+D OR) Take 1 tablet by mouth daily. Multiple Vitamins-Minerals (CENTRUM SILVER OR) Take 1 tablet by mouth daily. Allergies: Allergies   Allergen Reactions    Oxycodone HALLUCINATION    Bactrim [Sulfamethoxazole W/Trimethoprim] NAUSEA ONLY    Ace Inhibitors Coughing    Statins Coughing       Objective:   Physical Exam  Video visit    Assessment & Plan:   1. Acquired hypothyroidism    2. Mixed hyperlipidemia    Get test done in 2 months  1. Acquired hypothyroidism  - TSH W Reflex To Free T4 [E]; Future    2. Mixed hyperlipidemia  - Lipid Panel [E]; Future    3. Diabetes. Get A1C every 6 months.     Orders Placed This Encounter   Procedures    Lipid Panel [E]    TSH W Reflex To Free T4 [E]       Meds This Visit:  Requested Prescriptions      No prescriptions requested or ordered in this encounter       Imaging & Referrals:  None

## 2023-11-17 ENCOUNTER — TELEPHONE (OUTPATIENT)
Dept: FAMILY MEDICINE CLINIC | Facility: CLINIC | Age: 88
End: 2023-11-17

## 2023-11-20 ENCOUNTER — OFFICE VISIT (OUTPATIENT)
Dept: NEPHROLOGY | Facility: CLINIC | Age: 88
End: 2023-11-20
Payer: MEDICARE

## 2023-11-20 VITALS — DIASTOLIC BLOOD PRESSURE: 66 MMHG | BODY MASS INDEX: 43 KG/M2 | WEIGHT: 190 LBS | SYSTOLIC BLOOD PRESSURE: 128 MMHG

## 2023-11-20 DIAGNOSIS — I50.32 CHRONIC HEART FAILURE WITH PRESERVED EJECTION FRACTION (HCC): ICD-10-CM

## 2023-11-20 DIAGNOSIS — I15.9 SECONDARY HYPERTENSION: ICD-10-CM

## 2023-11-20 DIAGNOSIS — N18.30 STAGE 3 CHRONIC KIDNEY DISEASE, UNSPECIFIED WHETHER STAGE 3A OR 3B CKD (HCC): Primary | ICD-10-CM

## 2023-11-20 DIAGNOSIS — I89.0 LYMPHEDEMA: ICD-10-CM

## 2023-12-06 ENCOUNTER — MED REC SCAN ONLY (OUTPATIENT)
Facility: CLINIC | Age: 88
End: 2023-12-06

## 2023-12-06 RX ORDER — PREDNISONE 5 MG/1
5 TABLET ORAL DAILY
Qty: 30 TABLET | Refills: 0 | Status: SHIPPED | OUTPATIENT
Start: 2023-12-06 | End: 2023-12-08

## 2023-12-06 NOTE — TELEPHONE ENCOUNTER
Ok to decrease to 5mg if she is feeling better. Please have pt schedule a followup THV when she is able with her daughter.  Thanks

## 2023-12-06 NOTE — TELEPHONE ENCOUNTER
Per Elida REICH's TH[facility] note on 11/7/2023: Continue current nebs, mucinex and add saline nebs twice a day  Continue prednisone 10mg daily and will give zpak and call for update next week.

## 2023-12-06 NOTE — TELEPHONE ENCOUNTER
Per Mine Anna APRN: Ok to decrease to 5mg if she is feeling better. Please have pt schedule a followup THV when she is able with her daughter. Thanks    Pt. Called and notified of the above recommendation. Pt feels fine and is ok with taking Prednisone 5 mg daily. She is asking for a refill. Pt states will arrange with dtr to see APRN via TH[facility].

## 2023-12-08 ENCOUNTER — TELEMEDICINE (OUTPATIENT)
Facility: CLINIC | Age: 88
End: 2023-12-08
Payer: MEDICARE

## 2023-12-08 DIAGNOSIS — J96.11 CHRONIC RESPIRATORY FAILURE WITH HYPOXIA (HCC): Primary | ICD-10-CM

## 2023-12-08 DIAGNOSIS — I50.9 CONGESTIVE HEART FAILURE, UNSPECIFIED HF CHRONICITY, UNSPECIFIED HEART FAILURE TYPE (HCC): ICD-10-CM

## 2023-12-08 DIAGNOSIS — J41.8 MIXED SIMPLE AND MUCOPURULENT CHRONIC BRONCHITIS (HCC): ICD-10-CM

## 2023-12-08 DIAGNOSIS — J45.50 SEVERE PERSISTENT ASTHMA WITHOUT COMPLICATION: ICD-10-CM

## 2023-12-08 PROCEDURE — 99214 OFFICE O/P EST MOD 30 MIN: CPT | Performed by: NURSE PRACTITIONER

## 2023-12-08 RX ORDER — PREDNISONE 5 MG/1
5 TABLET ORAL DAILY
Qty: 30 TABLET | Refills: 1 | Status: SHIPPED | OUTPATIENT
Start: 2023-12-08

## 2023-12-08 NOTE — PROGRESS NOTES
[facility] General Pulmonary Progress Note    History of Present Illness:  Shukri Rahman is a 80year old female with significant PMH Asthma who presents today for follow up. Reports feeling \"Glorious\". Breathing is good; tolerating prednisone 5mg daily. Using nebs as directed and oxygen 2L at night. No f/c/ns, cough or congestion. Previously 11/2023    a 80year old female with significant PMH asthma who presents today for sick 2 week follow up. Reports she feels more congested, not able to cough up phlegm, heard wheezing last night. Denies increased SOB. Using budesonide and duonebs and prednisone 10mg daily. No f/c/ns. Not using saline nebs     Previously 10/24/23  a 80year old female with significant PMHx of asthma who presents today for follow up. Pt recently hospitalized for AECOPD 10/13-10/15/23 after noticing increased SOB and right sided chest discomfort, treated with steroids and nebs. No Abx. Completed prednisone post discharge yesterday. Feels well today; mild cough remains. No f/c/ns. Using budesonide BID, Duonebs q 5-6 hrs and saline nebs prn. Using oxygen 2L at night only. Previously [address]23  a 80year old female with significant PMH of asthma who presents today for follow up s/p Abx and steroids. Currently taking prednisone 20mg daily, completed ABx. Reports feeling \"much, much better\". Not using arformoterol and prefers to not take it. Previously 8/29/23   a 80year old female with significant PMH asthma/COPD who presents today for evaluation of increased cough, chest congestion, wheeze and SOB. Decreased prednisone from 10mg to 5mg after our last visit. Restarted Arformoterol on her own and felt great initially then after 5-7 days had a harder time coughing up phlegm. She has since stopped the Arformoterol and increased prednisone from 5 to 10mg for the past 3 days as her sx worsened. Denies f/ns; + chills. Unable to cough up phlegm. Currently using Budesonide nebs and Duonebs BID. Previously 8/8/23    presents today for follow up. Reports breathing is perfect. Very happy with new medications. Continues on prednisone 10mg as she has increased cough on 5mg. Denies new complaints. Previously 7/27/23 Dr Andrey Haynes  a 80year old female with hx of Asthma, CKD, and CHF who presents for follow up of asthma/COPD. She reports having been well until today when she felt her cough worsen with white phlegm. Again after clearing the phlegm she feels well. She did use arformoterol +budesonide in early June but did not feel better so stopped - of note she stopped using duoneb altogether during this time. No pain. No fevers. +BLE edema     June 2023 previously  She was hospitalized in May with CHF exacerbation and hyponatremia, her breathing was controlled during her hospitalization. After going home she developed chest congestion and frequent coughing. She has continued to use her nebulizers - budesonide BID, duoneb PRN and saline nebs PRN. She remains on baseline pred 5mg daily  No fevers. No pain/pleurisy  Weight is up and +BLE - planning to see cardiology soon      May 2023 previously  Discharged  last week after treated for fluid overload. Overall feels well; breathing is significantly better than prior to hospitalization. Pt hospitalized from 5/5-5/11/23 with Acute HFpEF with acute pulmonary edema, BNP > 5000, diuresed on lasix gtt with > 4L of fluid removed. Meds adjusted per cardiologist, plan for labs and CXR today and plan to go to the heart failure clinic next week. No f/c/ns. Currently with occasional cough and CORONA. BLE edema unchanged per pt. Previously 3/23/23 Dr Andrey Haynes  80year old female former smoker (quit 30 years ago, 20 pack years) with significant PMH asthma, HTN and obesity who presents today for follow up. She was hospitalized with acute asthma exacerbation and CHF exacerbation, managed on steroids, nebs and diuretics with improvement.  She feels better today, breathing is back to normal. Has sore throat and some mild dysphagia over the last week or so. She is not watching her weight of fluid balance. Previously  She has followed with me for several years and managed for asthma. She has had variable exacerbations of her asthma in the past leading to initiation of chronic steroids which was weaned to 5mg daily with good success and mitigation of repeated asthma exacerbations. She presents today without acute concern. Notes occasional cough/phlegm, relieved with nebs. Using budesonide neb once to twice a day. Using advair BID. Using duoneb prn and only needing to use a few times/week. No fevers. No pain or pleurisy. Limited mobility due to BLE swelling, denies CORONA. Past Medical History:   Past Medical History:   Diagnosis Date    Accelerated hypertension 1/24/2014    Asthma     Cough 8/5/2016    Disorder of thyroid     Dyspnea 8/4/2016    Dyspnea, unspecified type 8/4/2016    Endocrine disorder     HYPOTHYROID    Osteoarthrosis, unspecified whether generalized or localized, unspecified site     Other and unspecified hyperlipidemia     Pneumonia, organism unspecified(486)     Severe persistent asthma with status asthmaticus 12/30/2019    Thyroid disease     Unspecified essential hypertension     Ventricular ectopy 1/15/2019    Visual impairment         Past Surgical History:   Past Surgical History:   Procedure Laterality Date    KNEE REPLACEMENT SURGERY      OTHER SURGICAL HISTORY      TOTAL KNEE REPLACEMENT Bilateral     16 yrs ago         Family Medical History:   Family History   Problem Relation Age of Onset    Cancer Father         bladder    Heart Disorder Mother     Hypertension Mother     Diabetes Mother         Social History:   Social History     Socioeconomic History    Marital status:       Spouse name: Not on file    Number of children: Not on file    Years of education: Not on file    Highest education level: Not on file   Occupational History    Not on file   Tobacco Use    Smoking status: Former     Packs/day: 2.50     Years: 30.00     Additional pack years: 0.00     Total pack years: 75.00     Types: Cigarettes     Quit date: 1991     Years since quittin.3    Smokeless tobacco: Never   Vaping Use    Vaping Use: Never used   Substance and Sexual Activity    Alcohol use: Not Currently     Comment: 2 times per month    Drug use: No    Sexual activity: Not on file   Other Topics Concern     Service Not Asked    Blood Transfusions Not Asked    Caffeine Concern Yes    Occupational Exposure Not Asked    Hobby Hazards Not Asked    Sleep Concern Not Asked    Stress Concern Not Asked    Weight Concern Not Asked    Special Diet Not Asked    Back Care Not Asked    Exercise Yes    Bike Helmet Not Asked    Seat Belt Not Asked    Self-Exams Not Asked   Social History Narrative    Not on file     Social Determinants of Health     Financial Resource Strain: Low Risk  (5/15/2023)    Financial Resource Strain     Difficulty of Paying Living Expenses: Not hard at all     Med Affordability: No   Food Insecurity: No Food Insecurity (10/13/2023)    Food Insecurity     Food Insecurity: Never true   Transportation Needs: No Transportation Needs (10/13/2023)    Transportation Needs     Lack of Transportation: No   Physical Activity: Not on file   Stress: Not on file   Social Connections: Not on file   Housing Stability: [address]  (10/13/2023)    Housing Stability     Housing Instability: No     Housing Instability Emergency: Not on file        Medications:   Current Outpatient Medications   Medication Sig Dispense Refill    predniSONE 5 MG Oral Tab Take 1 tablet (5 mg total) by mouth daily. 30 tablet 1    predniSONE 10 MG Oral Tab Take 4 tabs (40mg) for 3 days, then 3 tabs (30mg) daily for 3 days, then take 2 tabs (20mg) daily for 3 days, then take 1 tab (10mg) daily for 3 days.  30 tablet 0    azithromycin 250 MG Oral Tab take 2 tablet (500MG)  by ORAL route  every day for 1 day then 1 tablet (250 mg) by oral route once daily for 4 days 6 tablet 0    potassium chloride 20 MEQ Oral Tab CR Take 1 tablet (20 mEq total) by mouth daily. levothyroxine 137 MCG Oral Tab Take 137 mcg by mouth before breakfast. 90 tablet 3    torsemide 20 MG Oral Tab Take 1 tablet (20 mg total) by mouth BID (Diuretic). TAKE 1 TABLET BY MOUTH TWICE DAILY 360 tablet 0    metoprolol tartrate 100 MG Oral Tab Take 1 tablet (100 mg total) by mouth 2 (two) times daily. 180 tablet 0    ipratropium-albuterol 0.5-2.5 (3) MG/3ML Inhalation Solution Take 3 mL by nebulization every 6 (six) hours as needed. 360 mL 3    budesonide 0.5 MG/2ML Inhalation Suspension Take 2 mL (0.5 mg total) by nebulization 2 (two) times daily. 360 mL 3    hydrALAZINE 25 MG Oral Tab Take 1 tablet (25 mg total) by mouth in the morning and 1 tablet (25 mg total) before bedtime. (Patient taking differently: Take 1 tablet (25 mg total) by mouth in the morning and 1 tablet (25 mg total) before bedtime. Lunch and night time. ) 180 tablet 1    Desoximetasone 0.25 % External Cream desoximetasone 0.25 % topical cream, [RxNorm: 343272]      cyanocobalamin 250 MCG Oral Tab Take 1 tablet (250 mcg total) by mouth daily. Cholecalciferol (VITAMIN D-3 OR) Take by mouth daily. Cranberry 500 MG Oral Cap Take 500 mg by mouth daily. sodium chloride 0.9 % Inhalation Nebu Soln Take 3 mL by nebulization as needed for Wheezing. 360 mL 3    acetaminophen 500 MG Oral Tab Take 1 tablet (500 mg total) by mouth every 6 (six) hours. 30 tablet 0    albuterol (PROAIR HFA) 108 (90 Base) MCG/ACT Inhalation Aero Soln Inhale 2 puffs into the lungs every 6 (six) hours as needed for Wheezing or Shortness of Breath. 1 each 11    Cod Liver Oil 1000 MG Oral Cap Take 1 capsule by mouth daily. Calcium Carbonate-Vitamin D (CALTRATE 600+D OR) Take 1 tablet by mouth daily. Multiple Vitamins-Minerals (CENTRUM SILVER OR) Take 1 tablet by mouth daily. Review of Systems: Review of Systems   Constitutional:  Negative for activity change, appetite change, chills, diaphoresis, fatigue, fever and unexpected weight change. HENT:  Negative for congestion, postnasal drip, rhinorrhea, sinus pressure, sinus pain, sneezing, sore throat, trouble swallowing and voice change. Respiratory:  Positive for shortness of breath. Negative for apnea, cough, choking, chest tightness, wheezing and stridor. Cardiovascular:  Negative for chest pain, palpitations and leg swelling. Musculoskeletal:  Negative for arthralgias. Skin:  Negative for pallor and rash. Allergic/Immunologic: Negative for immunocompromised state. Neurological:  Negative for dizziness and headaches. Hematological:  Negative for adenopathy. Physical Exam:  There were no vitals taken for this visit. Constitutional: alert, cooperative. No acute distress. HEENT: Head NC/AT. Respiratory: Thorax symmetrical with no labored breathing. Neurologic: A&Ox3. No gross motor deficits. Skin:  dry  Psych: Calm, cooperative. Pleasant affect. Results:  Personally reviewed    WBC: 6.7, done on 11/6/2023. HGB: 12.4, done on 11/6/2023. PLT: 293, done on 11/6/2023. Glucose: 209, done on 11/6/2023. Cr: 1.4, done on 11/6/2023. GFR(AA): 40, done on 7/23/2022. GFR (non-AA): 35, done on 7/23/2022. CA: 9.1, done on 11/6/2023. Na: 134, done on 11/6/2023. K: 4.1, done on 11/6/2023. Cl: 96, done on 11/6/2023. CO2: 27, done on 11/6/2023. Last ALB was 3.6% done on 11/2/2023. XR CHEST AP PORTABLE  (CPT=71045)    Result Date: 10/13/2023  CONCLUSION:  Increased density lung bases bilaterally is most likely dependent atelectasis or chronic scar. This is not significantly changed. Preliminary report was reviewed and there is no significant discrepancy.     LOCATION:  [facility]      Dictated by (CST): Jemima Villalba MD on 10/13/2023 at 8:13 AM     Finalized by (CST): Jemima Villalba MD on 10/13/2023 at 8:14 AM         Assessment/Plan:  #1. Asthma   Severe, persistent  Has had multiple exacerbations in the past, improved on chronic prednisone 5mg daily  Changed from advair to budesonide neb BID; does not feel that arformoterol helped. Will continue on budesonide BID and duonebs PRN  Continue duonebs and saline nebs PRN  Given her chronic cough and multiple exacerbations over the past year with multiple hospitalizations, limited response to current airway clearance therapies, she would be a great candidate for oxcillating lung therapy/ lung expansion therapy, will plan to proceed with Volara airway clearance to be used with her neb medications  Will increase pred to 10mg daily for this week. Will have her f/u in 2 weeks to reassess with hope to deescalate back to 5mg daily  We had previously reviewed options to consider biologics but she has declined but now she is agreeable to proceed - given information for Dr. Olivier Rosenbaum  Encouraged to exercise and lose weight  We have discussed concern for DANE/OHS in the past with recommendation for PSG. However she is opposed to testing for sleep apnea and denies that she would ever use a device to help her sleep  8/8/2023 Improved; less SOB and minimal cough with light yellow phlegm. Recommend to continue current nebs; budesonide BID, Duonebs once daily and saline prn. Will try to decrease prednisone to 5mg daily and may alternate if cough returns. Discussed longterm effects of chronic steroids. Will followup in 4 weeks by [facility] unless symptomatic  8/29/2023 Reports np cough, chest congestion, wheeze, slight chills and SOB. Recommend prednisone taper, zpak and increase duonebs to 3-4 times a day. Discussed going to the ER if sx worsen  9/2023 Improved; minimal congestion today due to the weather but otherwise breathing has been better. Will continue to hold arformoterol and take budesonide and duonebs BID.  Will continue to decrease prednisone to 10mg and try to decrease to 5mg when weather improves; may alternate 10mg and 5mg if needed. 10/2023 s/p AE asthma, hospitalized 10/1310/15/23 treated with steroids and nebs. Will remain on prednisone 10mg daily and re-evaluate in 2 weeks and if stable will return to alternate 10mg and 5mg every other day. CCT including duonebs QID, budesonide BID and saline nebs prn.  11/2023 Stable; continue nebs and prednisone 10mg daily. Will give zpak to cover for AE asthma   12/2023 Improved; CCT (duonebs q 5-6hrs, budesonide nebs BID, saline prn) including prednisone 5mg daily     #2. Hypoxia, chronic  Hypoxia on exertion diagnosed on last hospitalization and has since improved  Continues to use oxygen with sleep  She continues to use and benefit from supplemental o2  8/2023 Using and benefiting from oxygen tx  9/2023 Using 2L at night only  10/2023 Using and benefitting from oxygen tx; using 2L at night ilia  11/2023 Stable; CCT  12/2023 Stable; using and benefiting from oxygen tx  Using 2L at night only     #3. Chronic bronchitis with mucus plugging  As above  8/2023 See above  9/2023 Improved; see above  10/2023 Improved; will contineu to monitor  11/2023 See above; will restart saline nebs, continue mucinex  12/2023 Improved; CCT including saline nebs when increased congestion     #4. CHF  Following with heart failure clinic and cardiology, remains on diuretics    DAMIR Altamirano  [facility]   12/8/2023    This visit is conducted using Telemedicine with live, interactive video and audio. Patient has been referred to the [facility] website at [email or web address] to review the yearly Consent to Treat document. Patient understands and accepts financial responsibility for any deductible, co-insurance and/or co-pays associated with this service.

## 2023-12-08 NOTE — PATIENT INSTRUCTIONS
Continue current nebulizer medications: Duonebs every 5-6 hours, Budesonide nebs twice daily and saline nebs as needed for increased congesiton  Continue prednisone 5mg daily  Followup in 1 month  Please contact office at [phone] with any decline in respiratory status, questions or concerns

## 2023-12-13 ENCOUNTER — TELEPHONE (OUTPATIENT)
Dept: FAMILY MEDICINE CLINIC | Facility: CLINIC | Age: 88
End: 2023-12-13

## 2023-12-13 NOTE — TELEPHONE ENCOUNTER
FYI:  gave verbal authorization for RN to reopen home care services. Evaluation will be done tomorrow.

## 2023-12-13 NOTE — TELEPHONE ENCOUNTER
Palak Carlson called and is requesting [facility] 65 orders for patient to re-establish. She was just released from [facility] with nothing.

## 2023-12-14 RX ORDER — HYDRALAZINE HYDROCHLORIDE 25 MG/1
25 TABLET, FILM COATED ORAL 3 TIMES DAILY
COMMUNITY
Start: 2023-12-14

## 2023-12-14 NOTE — TELEPHONE ENCOUNTER
[facility]) [address] called back and advised on orders from MANJU, she advises she will relay orders to [facility] Alexi Farmer that works with patient. Advised to call back our office if orders need to be faxed.

## 2023-12-14 NOTE — TELEPHONE ENCOUNTER
Dr. Florencio Castillo reviewed [place] discharge medication list and advises:  Resume Cozaar 25mg PO BID (per hospital discharge instructions). Increase hydralazine to 25mg PO TID, parameters: hold if SBP less than 110. Med rec updated.     [facility]) [phone]

## 2023-12-14 NOTE — TELEPHONE ENCOUNTER
Also while at [facility] her metoprolol was changed frOM 100mg to 50mg, pt says BP has been at times in the 180's. [facility] bp was 144/62  heart rate 64 this morning.  Please call pt to let her know what dosage she should be taking

## 2023-12-18 DIAGNOSIS — I50.32 CHRONIC DIASTOLIC HEART FAILURE (HCC): Primary | ICD-10-CM

## 2023-12-19 ENCOUNTER — HOSPITAL ENCOUNTER (OUTPATIENT)
Dept: CARDIOLOGY CLINIC | Facility: HOSPITAL | Age: 88
Discharge: HOME | End: 2023-12-19
Attending: NURSE PRACTITIONER
Payer: MEDICARE

## 2023-12-19 ENCOUNTER — HOSPITAL ENCOUNTER (OUTPATIENT)
Dept: PERIOP | Facility: HOSPITAL | Age: 88
Discharge: HOME | End: 2023-12-19
Attending: NURSE PRACTITIONER
Payer: MEDICARE

## 2023-12-19 VITALS
RESPIRATION RATE: 20 BRPM | WEIGHT: 193 LBS | HEART RATE: 79 BPM | DIASTOLIC BLOOD PRESSURE: 71 MMHG | BODY MASS INDEX: 43 KG/M2 | SYSTOLIC BLOOD PRESSURE: 155 MMHG | OXYGEN SATURATION: 95 %

## 2023-12-19 DIAGNOSIS — N18.30 STAGE 3 CHRONIC KIDNEY DISEASE, UNSPECIFIED WHETHER STAGE 3A OR 3B CKD (HCC): ICD-10-CM

## 2023-12-19 DIAGNOSIS — I10 ESSENTIAL HYPERTENSION: ICD-10-CM

## 2023-12-19 DIAGNOSIS — I50.32 CHRONIC DIASTOLIC HEART FAILURE (HCC): Primary | ICD-10-CM

## 2023-12-19 DIAGNOSIS — I89.0 LYMPHEDEMA: ICD-10-CM

## 2023-12-19 DIAGNOSIS — E87.1 HYPONATREMIA: ICD-10-CM

## 2023-12-19 LAB
ANION GAP SERPL CALC-SCNC: 5 MMOL/L (ref 0–18)
BUN BLD-MCNC: 31 MG/DL (ref 9–23)
CALCIUM BLD-MCNC: 9.7 MG/DL (ref 8.5–10.1)
CHLORIDE SERPL-SCNC: 97 MMOL/L (ref 98–112)
CO2 SERPL-SCNC: 32 MMOL/L (ref 21–32)
CREAT BLD-MCNC: 1.36 MG/DL
EGFRCR SERPLBLD CKD-EPI 2021: 37 ML/MIN/1.73M2 (ref 60–?)
FASTING STATUS PATIENT QL REPORTED: NO
GLUCOSE BLD-MCNC: 129 MG/DL (ref 70–99)
OSMOLALITY SERPL CALC.SUM OF ELEC: 286 MOSM/KG (ref 275–295)
POTASSIUM SERPL-SCNC: 4.3 MMOL/L (ref 3.5–5.1)
SODIUM SERPL-SCNC: 134 MMOL/L (ref 136–145)

## 2023-12-19 PROCEDURE — 99215 OFFICE O/P EST HI 40 MIN: CPT | Performed by: NURSE PRACTITIONER

## 2023-12-19 RX ORDER — TORSEMIDE 20 MG/1
40 TABLET ORAL
COMMUNITY
Start: 2023-12-19

## 2023-12-19 RX ORDER — LOSARTAN POTASSIUM 50 MG/1
50 TABLET ORAL 2 TIMES DAILY
COMMUNITY

## 2023-12-19 RX ORDER — BUMETANIDE 0.25 MG/ML
4 INJECTION INTRAMUSCULAR; INTRAVENOUS ONCE
Status: COMPLETED | OUTPATIENT
Start: 2023-12-19 | End: 2023-12-19

## 2023-12-19 RX ADMIN — BUMETANIDE 4 MG: 0.25 INJECTION INTRAMUSCULAR; INTRAVENOUS at 14:55:00

## 2023-12-19 NOTE — PATIENT INSTRUCTIONS
Heart Failure Discharge Instructions    Increase Torsemide to 40mg twice per day. Hold afternoon Torsemide today. Weight yourself daily at home and write down. Take to your next appointment. Activity: Regular exercise and activity is important for your overall health and to help keep your heart strong and functioning as well as possible. Walk at a slow to moderate pace for 15-20 minutes 3-5 days per week. Follow these instructions every day to keep yourself in the [address] you are feeling well and your symptoms are under control                                    Medications  Take your medication every day as instructed  Do not stop taking your medicine or change the amount you are taking without instructions from your doctor or nurse  Do Not take non-steroidal antiinflammatory drugs such as ibuprofen, aleve, advil, or motrin                                    Diet/Fluids  People with heart failure should eat less sodium (salt) and limit fluid. Sodium attracts water and makes the body hold fluid. This extra fluid makes the heart work harder and can worsen the symptoms of heart failure. Diet    2000 mg sodium daily  Fluid restriction    64 ounces daily  (8 oz. = 1 cup)                                     Body Weight  Weigh yourself every day before breakfast and write your weight down  Use the same scale and wear about the same amount of clothes each time  A sudden weight increase is due to fluid retention rather than fat                                         Activity  Pace your activities to avoid getting overtired  Take rest periods as needed  Elevate your feet to reduce ankle swelling when resting                             Signs of Worsening Heart Failure    You are entering the Yellow Zone - this is a warning zone    Call your doctor or nurse if you have any of these signs or symptoms:   You gain 2 or more pounds overnight or 3-5 pounds in 3-7 days  You have more trouble breathing  You get more tired with regular activity, or are limiting activity because of shortness of breath or fatigue  You are short of breath lying down, you need more pillows to breathe comfortably,  or wake up during the night short of breath  You urinate less often during the day and more often at night  You have a bloated feeling, upset stomach, loss of appetite, or your clothes are fitting tighter    GO TO THE EMERGENCY DEPARTMENT or CALL 911 IF: These are signs you are in the RED ZONE - THIS IS AN EMERGENCY  You have tightness or pain in your chest  You are extremely short of breath or can't catch your breath  You cough up frothy pink mucous  You feel confused or can't think clearly  You are traveling and develop symptoms of worsening heart failure     We respect everyone's time and availability. Please be aware that this is not a walk-in clinic and we require appointments in order to facilitate timely care for all patients. We ask you to arrive 30 minutes before your appointment to allow time for you to check-in and have your bloodwork drawn. Please understand if you are late for your appointment, you may be asked to reschedule. If possible, all attempts will be made to accommodate but realize this is no guarantee that this will always be available. We understand there are extenuating circumstances. If you need to cancel or reschedule your appointment, please call the [facility] [employer] within 24 hours at [phone]. Thank you for your cooperation, [facility] Staff. IF YOU HAVE QUESTIONS REGARDING YOUR BILL, FEEL FREE TO CONTACT [facility] PATIENT ACCOUNTS -[phone]. IF YOU NEED FINANCIAL ASSISTANCE, PLEASE CALL AN [facility] FINANCIAL COUNSELOR -[phone].              [facility]     [phone]

## 2023-12-19 NOTE — PROGRESS NOTES
Patient assessed. Patient complaining of increased edema in her lower extremities, hands and face. Patient denies bloating and reports her shortness of breath is at baseline. Weight up 9 lbs at 193.0 lbs. APN notified of all the above information. Labs ordered and drawn by Vascular Access Team while inserting an IV. Reviewed allergies and list of current medications with patient and updated it in the Electronic Medical Record.  mg diuril given and IVP 4 mg bumex given. Patient tolerated it well. Educated patient on low sodium diet and food choices, fluid restriction of 2 liters, and daily weights. Reviewed follow-up appointments and discharge Heart Failure instructions with patient. Patient verbalized an understanding. IV discontinued; catheter intact. Pressure held and gauze applied. Patient to return to the clinic at the next available appointment in 3 weeks.

## 2023-12-27 ENCOUNTER — TELEPHONE (OUTPATIENT)
Dept: CARDIOLOGY CLINIC | Facility: HOSPITAL | Age: 88
End: 2023-12-27

## 2023-12-27 DIAGNOSIS — I50.32 CHRONIC DIASTOLIC HEART FAILURE (HCC): Primary | ICD-10-CM

## 2023-12-27 NOTE — TELEPHONE ENCOUNTER
Dtr in law Abdias Silva called and patient is up 4 lbs. Most recent visit Gali Leach increased her Torsemide to 40 BID she also received Diuril and Bumex that visit and had good response. Offered appt today but could not come so Friday was offered and dtr in law took that appt and VAT appt was made as well. In the interim any changes to Po dosing? Pls advise.

## 2023-12-29 ENCOUNTER — HOSPITAL ENCOUNTER (OUTPATIENT)
Dept: PERIOP | Facility: HOSPITAL | Age: 88
Discharge: HOME | End: 2023-12-29
Attending: NURSE PRACTITIONER
Payer: MEDICARE

## 2023-12-29 ENCOUNTER — HOSPITAL ENCOUNTER (OUTPATIENT)
Dept: CARDIOLOGY CLINIC | Facility: HOSPITAL | Age: 88
Discharge: HOME | End: 2023-12-29
Attending: NURSE PRACTITIONER
Payer: MEDICARE

## 2023-12-29 VITALS
BODY MASS INDEX: 42 KG/M2 | DIASTOLIC BLOOD PRESSURE: 52 MMHG | RESPIRATION RATE: 21 BRPM | WEIGHT: 185.19 LBS | HEART RATE: 68 BPM | OXYGEN SATURATION: 97 % | SYSTOLIC BLOOD PRESSURE: 96 MMHG

## 2023-12-29 DIAGNOSIS — I50.32 CHRONIC DIASTOLIC HEART FAILURE (HCC): ICD-10-CM

## 2023-12-29 DIAGNOSIS — E87.5 HYPERKALEMIA: ICD-10-CM

## 2023-12-29 DIAGNOSIS — I89.0 LYMPHEDEMA: ICD-10-CM

## 2023-12-29 DIAGNOSIS — E87.1 HYPONATREMIA: ICD-10-CM

## 2023-12-29 DIAGNOSIS — R60.0 EXTREMITY EDEMA: ICD-10-CM

## 2023-12-29 DIAGNOSIS — I10 ESSENTIAL HYPERTENSION: Primary | ICD-10-CM

## 2023-12-29 LAB
ANION GAP SERPL CALC-SCNC: 5 MMOL/L (ref 0–18)
BUN BLD-MCNC: 41 MG/DL (ref 9–23)
CALCIUM BLD-MCNC: 10.1 MG/DL (ref 8.5–10.1)
CHLORIDE SERPL-SCNC: 97 MMOL/L (ref 98–112)
CO2 SERPL-SCNC: 36 MMOL/L (ref 21–32)
CREAT BLD-MCNC: 1.26 MG/DL
EGFRCR SERPLBLD CKD-EPI 2021: 40 ML/MIN/1.73M2 (ref 60–?)
GLUCOSE BLD-MCNC: 86 MG/DL (ref 70–99)
NT-PROBNP SERPL-MCNC: 1859 PG/ML (ref ?–450)
OSMOLALITY SERPL CALC.SUM OF ELEC: 295 MOSM/KG (ref 275–295)
POTASSIUM SERPL-SCNC: 3.8 MMOL/L (ref 3.5–5.1)
SODIUM SERPL-SCNC: 138 MMOL/L (ref 136–145)

## 2023-12-29 PROCEDURE — 99215 OFFICE O/P EST HI 40 MIN: CPT | Performed by: NURSE PRACTITIONER

## 2023-12-29 RX ORDER — METOPROLOL TARTRATE 100 MG/1
50 TABLET ORAL 2 TIMES DAILY
COMMUNITY
Start: 2023-12-29

## 2023-12-29 RX ORDER — HYDRALAZINE HYDROCHLORIDE 25 MG/1
12.5 TABLET, FILM COATED ORAL 3 TIMES DAILY
COMMUNITY
Start: 2023-12-29

## 2023-12-29 NOTE — PATIENT INSTRUCTIONS
Heart Failure Discharge Instructions    Reduce Torsemide to 40 mg twice a day. Reduce Hydralazine to 12.5 mg three times a day and hold if SBP is <110 mmhg. Bring in record of blood pressures to next follow up. Activity: Regular exercise and activity is important for your overall health and to help keep your heart strong and functioning as well as possible. Walk at a slow to moderate pace for 15-20 minutes 3-5 days per week. Follow these instructions every day to keep yourself in the [address] you are feeling well and your symptoms are under control                                    Medications  Take your medication every day as instructed  Do not stop taking your medicine or change the amount you are taking without instructions from your doctor or nurse  Do Not take non-steroidal antiinflammatory drugs such as ibuprofen, aleve, advil, or motrin                                    Diet/Fluids  People with heart failure should eat less sodium (salt) and limit fluid. Sodium attracts water and makes the body hold fluid. This extra fluid makes the heart work harder and can worsen the symptoms of heart failure. Diet    2000 mg sodium daily  Fluid restriction    64 ounces daily  (8 oz. = 1 cup)                                     Body Weight  Weigh yourself every day before breakfast and write your weight down  Use the same scale and wear about the same amount of clothes each time  A sudden weight increase is due to fluid retention rather than fat                                         Activity  Pace your activities to avoid getting overtired  Take rest periods as needed  Elevate your feet to reduce ankle swelling when resting                             Signs of Worsening Heart Failure    You are entering the Yellow Zone - this is a warning zone    Call your doctor or nurse if you have any of these signs or symptoms:   You gain 2 or more pounds overnight or 3-5 pounds in 3-7 days  You have more trouble breathing  You get more tired with regular activity, or are limiting activity because of shortness of breath or fatigue  You are short of breath lying down, you need more pillows to breathe comfortably,  or wake up during the night short of breath  You urinate less often during the day and more often at night  You have a bloated feeling, upset stomach, loss of appetite, or your clothes are fitting tighter    GO TO THE EMERGENCY DEPARTMENT or CALL 911 IF: These are signs you are in the RED ZONE - THIS IS AN EMERGENCY  You have tightness or pain in your chest  You are extremely short of breath or can't catch your breath  You cough up frothy pink mucous  You feel confused or can't think clearly  You are traveling and develop symptoms of worsening heart failure     We respect everyone's time and availability. Please be aware that this is not a walk-in clinic and we require appointments in order to facilitate timely care for all patients. We ask you to arrive 30 minutes before your appointment to allow time for you to check-in and have your bloodwork drawn. Please understand if you are late for your appointment, you may be asked to reschedule. If possible, all attempts will be made to accommodate but realize this is no guarantee that this will always be available. We understand there are extenuating circumstances. If you need to cancel or reschedule your appointment, please call the [facility] within 24 hours at [phone]. Thank you for your cooperation, [facility] Staff. IF YOU HAVE QUESTIONS REGARDING YOUR BILL, FEEL FREE TO CONTACT [facility] PATIENT ACCOUNTS -[phone]. IF YOU NEED FINANCIAL ASSISTANCE, PLEASE CALL AN [facility] FINANCIAL COUNSELOR -[phone].              [facility]     [phone]

## 2023-12-29 NOTE — PROGRESS NOTES
Pt. Assessed. No signs or symptoms of shortness of breath, fatigue, chest pain noted. Has been taking an extra 20 mg of torsemide at lunch as recommended by APN when she called earlier this week. Weight down 8 lbs at 185 lbs. Reviewed current list of patient's allergies and medication; updated the Electronic Medical Record. Labs ordered to assess kidney function and drawn by [facility]. Reviewed follow-up appointment and Heart Failure discharge instructions with patient. Patient verbalized an understanding.      Will RTC in 10 days

## 2024-01-01 ENCOUNTER — HOSPITAL ENCOUNTER (INPATIENT)
Facility: HOSPITAL | Age: 89
LOS: 1 days | DRG: 871 | End: 2024-01-01
Attending: EMERGENCY MEDICINE | Admitting: HOSPITALIST

## 2024-01-01 ENCOUNTER — APPOINTMENT (OUTPATIENT)
Dept: GENERAL RADIOLOGY | Facility: HOSPITAL | Age: 89
DRG: 871 | End: 2024-01-01
Attending: EMERGENCY MEDICINE

## 2024-01-01 ENCOUNTER — APPOINTMENT (OUTPATIENT)
Dept: CT IMAGING | Facility: HOSPITAL | Age: 89
DRG: 871 | End: 2024-01-01
Attending: EMERGENCY MEDICINE

## 2024-01-01 ENCOUNTER — TELEPHONE (OUTPATIENT)
Dept: FAMILY MEDICINE CLINIC | Facility: CLINIC | Age: 89
End: 2024-01-01

## 2024-01-01 VITALS
OXYGEN SATURATION: 94 % | TEMPERATURE: 99 F | HEART RATE: 72 BPM | SYSTOLIC BLOOD PRESSURE: 43 MMHG | RESPIRATION RATE: 18 BRPM | BODY MASS INDEX: 45 KG/M2 | WEIGHT: 200 LBS | DIASTOLIC BLOOD PRESSURE: 22 MMHG

## 2024-01-01 DIAGNOSIS — K25.1 ACUTE GASTRIC ULCER WITH PERFORATION (HCC): Primary | ICD-10-CM

## 2024-01-01 LAB
ALBUMIN SERPL-MCNC: 3.2 G/DL (ref 3.4–5)
ALBUMIN/GLOB SERPL: 0.9 {RATIO} (ref 1–2)
ALP LIVER SERPL-CCNC: 57 U/L
ALT SERPL-CCNC: 23 U/L
ANION GAP SERPL CALC-SCNC: 11 MMOL/L (ref 0–18)
AST SERPL-CCNC: 19 U/L (ref 15–37)
ATRIAL RATE: 92 BPM
BASOPHILS # BLD AUTO: 0.05 X10(3) UL (ref 0–0.2)
BASOPHILS NFR BLD AUTO: 0.4 %
BILIRUB SERPL-MCNC: 0.5 MG/DL (ref 0.1–2)
BILIRUB UR QL STRIP.AUTO: NEGATIVE
BUN BLD-MCNC: 52 MG/DL (ref 9–23)
CALCIUM BLD-MCNC: 9.3 MG/DL (ref 8.5–10.1)
CHLORIDE SERPL-SCNC: 94 MMOL/L (ref 98–112)
CLARITY UR REFRACT.AUTO: CLEAR
CO2 SERPL-SCNC: 26 MMOL/L (ref 21–32)
CREAT BLD-MCNC: 1.52 MG/DL
EGFRCR SERPLBLD CKD-EPI 2021: 32 ML/MIN/1.73M2 (ref 60–?)
EOSINOPHIL # BLD AUTO: 0.01 X10(3) UL (ref 0–0.7)
EOSINOPHIL NFR BLD AUTO: 0.1 %
ERYTHROCYTE [DISTWIDTH] IN BLOOD BY AUTOMATED COUNT: 14.1 %
EST. AVERAGE GLUCOSE BLD GHB EST-MCNC: 163 MG/DL (ref 68–126)
GLOBULIN PLAS-MCNC: 3.7 G/DL (ref 2.8–4.4)
GLUCOSE BLD-MCNC: 159 MG/DL (ref 70–99)
GLUCOSE BLD-MCNC: 160 MG/DL (ref 70–99)
GLUCOSE BLD-MCNC: 169 MG/DL (ref 70–99)
GLUCOSE BLD-MCNC: 212 MG/DL (ref 70–99)
GLUCOSE UR STRIP.AUTO-MCNC: NORMAL MG/DL
HBA1C MFR BLD: 7.3 % (ref ?–5.7)
HCT VFR BLD AUTO: 38.1 %
HGB BLD-MCNC: 13.5 G/DL
IMM GRANULOCYTES # BLD AUTO: 0.11 X10(3) UL (ref 0–1)
IMM GRANULOCYTES NFR BLD: 0.9 %
KETONES UR STRIP.AUTO-MCNC: NEGATIVE MG/DL
LACTATE SERPL-SCNC: 2.3 MMOL/L (ref 0.4–2)
LACTATE SERPL-SCNC: 2.6 MMOL/L (ref 0.4–2)
LACTATE SERPL-SCNC: 4.1 MMOL/L (ref 0.4–2)
LEUKOCYTE ESTERASE UR QL STRIP.AUTO: 500
LIPASE SERPL-CCNC: 39 U/L (ref 13–75)
LYMPHOCYTES # BLD AUTO: 2.14 X10(3) UL (ref 1–4)
LYMPHOCYTES NFR BLD AUTO: 17.1 %
MCH RBC QN AUTO: 33.8 PG (ref 26–34)
MCHC RBC AUTO-ENTMCNC: 35.4 G/DL (ref 31–37)
MCV RBC AUTO: 95.5 FL
MONOCYTES # BLD AUTO: 0.71 X10(3) UL (ref 0.1–1)
MONOCYTES NFR BLD AUTO: 5.7 %
NEUTROPHILS # BLD AUTO: 9.49 X10 (3) UL (ref 1.5–7.7)
NEUTROPHILS # BLD AUTO: 9.49 X10(3) UL (ref 1.5–7.7)
NEUTROPHILS NFR BLD AUTO: 75.8 %
OSMOLALITY SERPL CALC.SUM OF ELEC: 292 MOSM/KG (ref 275–295)
P AXIS: 36 DEGREES
P-R INTERVAL: 148 MS
PH UR STRIP.AUTO: 7 [PH] (ref 5–8)
PLATELET # BLD AUTO: 356 10(3)UL (ref 150–450)
PLATELETS.RETICULATED NFR BLD AUTO: 1.6 % (ref 0–7)
POTASSIUM SERPL-SCNC: 4.8 MMOL/L (ref 3.5–5.1)
PROT SERPL-MCNC: 6.9 G/DL (ref 6.4–8.2)
PROT UR STRIP.AUTO-MCNC: NEGATIVE MG/DL
Q-T INTERVAL: 412 MS
QRS DURATION: 150 MS
QTC CALCULATION (BEZET): 509 MS
R AXIS: -35 DEGREES
RBC # BLD AUTO: 3.99 X10(6)UL
RBC UR QL AUTO: NEGATIVE
SODIUM SERPL-SCNC: 131 MMOL/L (ref 136–145)
SP GR UR STRIP.AUTO: 1.03 (ref 1–1.03)
T AXIS: 131 DEGREES
TROPONIN I SERPL HS-MCNC: 52 NG/L
UROBILINOGEN UR STRIP.AUTO-MCNC: NORMAL MG/DL
VENTRICULAR RATE: 92 BPM
WBC # BLD AUTO: 12.5 X10(3) UL (ref 4–11)
WBC #/AREA URNS AUTO: >50 /HPF

## 2024-01-01 PROCEDURE — 99223 1ST HOSP IP/OBS HIGH 75: CPT | Performed by: HOSPITALIST

## 2024-01-01 PROCEDURE — 71045 X-RAY EXAM CHEST 1 VIEW: CPT | Performed by: EMERGENCY MEDICINE

## 2024-01-01 PROCEDURE — 99223 1ST HOSP IP/OBS HIGH 75: CPT | Performed by: STUDENT IN AN ORGANIZED HEALTH CARE EDUCATION/TRAINING PROGRAM

## 2024-01-01 PROCEDURE — 74177 CT ABD & PELVIS W/CONTRAST: CPT | Performed by: EMERGENCY MEDICINE

## 2024-01-01 PROCEDURE — 99497 ADVNCD CARE PLAN 30 MIN: CPT | Performed by: HOSPITALIST

## 2024-01-01 RX ORDER — DEXTROSE MONOHYDRATE 25 G/50ML
50 INJECTION, SOLUTION INTRAVENOUS
Status: DISCONTINUED | OUTPATIENT
Start: 2024-01-01 | End: 2024-01-01

## 2024-01-01 RX ORDER — METOCLOPRAMIDE HYDROCHLORIDE 5 MG/ML
5 INJECTION INTRAMUSCULAR; INTRAVENOUS EVERY 8 HOURS PRN
Status: DISCONTINUED | OUTPATIENT
Start: 2024-01-01 | End: 2024-01-01

## 2024-01-01 RX ORDER — IPRATROPIUM BROMIDE AND ALBUTEROL SULFATE 2.5; .5 MG/3ML; MG/3ML
3 SOLUTION RESPIRATORY (INHALATION) ONCE
Status: COMPLETED | OUTPATIENT
Start: 2024-01-01 | End: 2024-01-01

## 2024-01-01 RX ORDER — SODIUM CHLORIDE, SODIUM LACTATE, POTASSIUM CHLORIDE, CALCIUM CHLORIDE 600; 310; 30; 20 MG/100ML; MG/100ML; MG/100ML; MG/100ML
INJECTION, SOLUTION INTRAVENOUS CONTINUOUS
Status: DISCONTINUED | OUTPATIENT
Start: 2024-01-01 | End: 2024-01-01

## 2024-01-01 RX ORDER — HYDROMORPHONE HYDROCHLORIDE 1 MG/ML
0.8 INJECTION, SOLUTION INTRAMUSCULAR; INTRAVENOUS; SUBCUTANEOUS EVERY 2 HOUR PRN
Status: DISCONTINUED | OUTPATIENT
Start: 2024-01-01 | End: 2024-01-01

## 2024-01-01 RX ORDER — ECHINACEA PURPUREA EXTRACT 125 MG
1 TABLET ORAL
Status: DISCONTINUED | OUTPATIENT
Start: 2024-01-01 | End: 2024-01-01

## 2024-01-01 RX ORDER — MELATONIN
3 NIGHTLY PRN
Status: DISCONTINUED | OUTPATIENT
Start: 2024-01-01 | End: 2024-01-01

## 2024-01-01 RX ORDER — ONDANSETRON 2 MG/ML
4 INJECTION INTRAMUSCULAR; INTRAVENOUS ONCE
Status: COMPLETED | OUTPATIENT
Start: 2024-01-01 | End: 2024-01-01

## 2024-01-01 RX ORDER — HYDROMORPHONE HYDROCHLORIDE 1 MG/ML
0.4 INJECTION, SOLUTION INTRAMUSCULAR; INTRAVENOUS; SUBCUTANEOUS EVERY 2 HOUR PRN
Status: DISCONTINUED | OUTPATIENT
Start: 2024-01-01 | End: 2024-01-01

## 2024-01-01 RX ORDER — ENEMA 19; 7 G/133ML; G/133ML
1 ENEMA RECTAL ONCE AS NEEDED
Status: DISCONTINUED | OUTPATIENT
Start: 2024-01-01 | End: 2024-01-01

## 2024-01-01 RX ORDER — POLYETHYLENE GLYCOL 3350 17 G/17G
17 POWDER, FOR SOLUTION ORAL DAILY PRN
Status: DISCONTINUED | OUTPATIENT
Start: 2024-01-01 | End: 2024-01-01

## 2024-01-01 RX ORDER — SODIUM CHLORIDE 9 MG/ML
125 INJECTION, SOLUTION INTRAVENOUS CONTINUOUS
Status: DISCONTINUED | OUTPATIENT
Start: 2024-01-01 | End: 2024-01-01

## 2024-01-01 RX ORDER — BISACODYL 10 MG
10 SUPPOSITORY, RECTAL RECTAL
Status: DISCONTINUED | OUTPATIENT
Start: 2024-01-01 | End: 2024-01-01

## 2024-01-01 RX ORDER — NICOTINE POLACRILEX 4 MG
30 LOZENGE BUCCAL
Status: DISCONTINUED | OUTPATIENT
Start: 2024-01-01 | End: 2024-01-01

## 2024-01-01 RX ORDER — METOPROLOL TARTRATE 50 MG/1
50 TABLET, FILM COATED ORAL 2 TIMES DAILY
COMMUNITY

## 2024-01-01 RX ORDER — SENNOSIDES 8.6 MG
17.2 TABLET ORAL NIGHTLY PRN
Status: DISCONTINUED | OUTPATIENT
Start: 2024-01-01 | End: 2024-01-01

## 2024-01-01 RX ORDER — NICOTINE POLACRILEX 4 MG
15 LOZENGE BUCCAL
Status: DISCONTINUED | OUTPATIENT
Start: 2024-01-01 | End: 2024-01-01

## 2024-01-01 RX ORDER — ACETAMINOPHEN 500 MG
500 TABLET ORAL EVERY 4 HOURS PRN
Status: DISCONTINUED | OUTPATIENT
Start: 2024-01-01 | End: 2024-01-01

## 2024-01-01 RX ORDER — INSULIN DEGLUDEC 100 U/ML
10 INJECTION, SOLUTION SUBCUTANEOUS DAILY
Status: DISCONTINUED | OUTPATIENT
Start: 2024-01-01 | End: 2024-01-01

## 2024-01-01 RX ORDER — ONDANSETRON 2 MG/ML
4 INJECTION INTRAMUSCULAR; INTRAVENOUS EVERY 6 HOURS PRN
Status: DISCONTINUED | OUTPATIENT
Start: 2024-01-01 | End: 2024-01-01

## 2024-01-01 RX ORDER — HYDROMORPHONE HYDROCHLORIDE 1 MG/ML
0.2 INJECTION, SOLUTION INTRAMUSCULAR; INTRAVENOUS; SUBCUTANEOUS EVERY 2 HOUR PRN
Status: DISCONTINUED | OUTPATIENT
Start: 2024-01-01 | End: 2024-01-01

## 2024-01-01 RX ORDER — HYDROMORPHONE HYDROCHLORIDE 1 MG/ML
0.5 INJECTION, SOLUTION INTRAMUSCULAR; INTRAVENOUS; SUBCUTANEOUS EVERY 30 MIN PRN
Status: DISCONTINUED | OUTPATIENT
Start: 2024-01-01 | End: 2024-01-01

## 2024-01-02 ENCOUNTER — TELEPHONE (OUTPATIENT)
Dept: FAMILY MEDICINE CLINIC | Facility: CLINIC | Age: 89
End: 2024-01-02

## 2024-01-02 ENCOUNTER — TELEMEDICINE (OUTPATIENT)
Facility: CLINIC | Age: 89
End: 2024-01-02
Payer: MEDICARE

## 2024-01-02 DIAGNOSIS — J96.11 CHRONIC RESPIRATORY FAILURE WITH HYPOXIA (HCC): Primary | ICD-10-CM

## 2024-01-02 DIAGNOSIS — J41.8 MIXED SIMPLE AND MUCOPURULENT CHRONIC BRONCHITIS (HCC): ICD-10-CM

## 2024-01-02 DIAGNOSIS — J45.50 SEVERE PERSISTENT ASTHMA WITHOUT COMPLICATION: ICD-10-CM

## 2024-01-02 PROCEDURE — 99214 OFFICE O/P EST MOD 30 MIN: CPT | Performed by: NURSE PRACTITIONER

## 2024-01-02 RX ORDER — PREDNISONE 1 MG/1
10 TABLET ORAL
Status: DISCONTINUED | OUTPATIENT
Start: 2024-01-03 | End: 2024-01-02

## 2024-01-02 RX ORDER — PREDNISONE 10 MG/1
TABLET ORAL
Qty: 30 TABLET | Refills: 1 | Status: SHIPPED | OUTPATIENT
Start: 2024-01-02

## 2024-01-02 NOTE — PATIENT INSTRUCTIONS
May alternate prednisone 10mg and 5 mg every other day for 1 week if respiratory status remains stable then decrease back to 5mg  Please contact office at [phone] with any decline in respiratory status, questions or concerns   Followup January18 at 2:30 pm by telehealth or sooner if needed

## 2024-01-02 NOTE — PROGRESS NOTES
[facility] General Pulmonary Progress Note    History of Present Illness:  Nancy Hensley is a 89 year old female with significant PMH asthma who presents today for follow up. Reports having increased cough yesterday that seems better today after coughing up clear phlegm. No f/c/ns. Using inhalers and nebs as directed. Has been on prednisone 10mg daily for the past week.     Previously 12/8/23  a 89 year old female with significant PMH Asthma who presents today for follow up. Reports feeling \"Glorious\". Breathing is good; tolerating prednisone 5mg daily. Using nebs as directed and oxygen 2L at night. No f/c/ns, cough or congestion.      Previously 11/2023     a 89 year old female with significant PMH asthma who presents today for sick 2 week follow up. Reports she feels more congested, not able to cough up phlegm, heard wheezing last night. Denies increased SOB. Using budesonide and duonebs and prednisone 10mg daily. No f/c/ns. Not using saline nebs     Previously 10/24/23  a 89 year old female with significant PMHx of asthma who presents today for follow up. Pt recently hospitalized for AECOPD 10/13-10/15/23 after noticing increased SOB and right sided chest discomfort, treated with steroids and nebs. No Abx. Completed prednisone post discharge yesterday. Feels well today; mild cough remains. No f/c/ns. Using budesonide BID, Duonebs q 5-6 hrs and saline nebs prn. Using oxygen 2L at night only.     Previously 9/2023  a 89 year old female with significant PMH of asthma who presents today for follow up s/p Abx and steroids. Currently taking prednisone 20mg daily, completed ABx. Reports feeling \"much, much better\". Not using arformoterol and prefers to not take it.      Previously 8/29/23   a 89 year old female with significant PMH asthma/COPD who presents today for evaluation of increased cough, chest congestion, wheeze and SOB. Decreased prednisone from 10mg to 5mg after our last visit. Restarted Arformoterol on her own  and felt great initially then after 5-7 days had a harder time coughing up phlegm. She has since stopped the Arformoterol and increased prednisone from 5 to 10mg for the past 3 days as her sx worsened.  Denies f/ns; + chills. Unable to cough up phlegm. Currently using Budesonide nebs and Duonebs BID.      Previously 8/8/23    presents today for follow up. Reports breathing is perfect. Very happy with new medications. Continues on prednisone 10mg as she has increased cough on 5mg. Denies new complaints.      Previously 7/27/23 Dr England  a 89 year old female with hx of Asthma, CKD, and CHF who presents for follow up of asthma/COPD. She reports having been well until today when she felt her cough worsen with white phlegm. Again after clearing the phlegm she feels well.  She did use arformoterol +budesonide in early June but did not feel better so stopped - of note she stopped using duoneb altogether during this time. No pain. No fevers. +BLE edema     June 2023 previously  She was hospitalized in May with CHF exacerbation and hyponatremia, her breathing was controlled during her hospitalization. After going home she developed chest congestion and frequent coughing. She has continued to use her nebulizers - budesonide BID, duoneb PRN and saline nebs PRN. She remains on baseline pred 5mg daily  No fevers. No pain/pleurisy  Weight is up and +BLE - planning to see cardiology soon      May 2023 previously  Discharged  last week after treated for fluid overload. Overall feels well; breathing is significantly better than prior to hospitalization. Pt hospitalized from 5/5-5/11/23 with Acute HFpEF with acute pulmonary edema, BNP > 5000, diuresed on lasix gtt with > 4L of fluid removed. Meds adjusted per cardiologist, plan for labs and CXR today and plan to go to the heart failure clinic next week. No f/c/ns.  Currently with occasional cough and CORONA. BLE edema unchanged per pt.     Previously 3/23/23 Dr England  89 year old  female former smoker (quit 30 years ago, 20 pack years) with significant PMH asthma, HTN and obesity who presents today for follow up. She was hospitalized with acute asthma exacerbation and CHF exacerbation, managed on steroids, nebs and diuretics with improvement. She feels better today, breathing is back to normal. Has sore throat and some mild dysphagia over the last week or so. She is not watching her weight of fluid balance.      Previously  She has followed with me for several years and managed for asthma. She has had variable exacerbations of her asthma in the past leading to initiation of chronic steroids which was weaned to 5mg daily with good success and mitigation of repeated asthma exacerbations. She presents today without acute concern. Notes occasional cough/phlegm, relieved with nebs. Using budesonide neb once to twice a day. Using advair BID. Using duoneb prn and only needing to use a few times/week. No fevers. No pain or pleurisy.  Limited mobility due to BLE swelling, denies CORONA.     Past Medical History:   Past Medical History:   Diagnosis Date    Accelerated hypertension 1/24/2014    Asthma     Cough 8/5/2016    Disorder of thyroid     Dyspnea 8/4/2016    Dyspnea, unspecified type 8/4/2016    Endocrine disorder     HYPOTHYROID    Osteoarthrosis, unspecified whether generalized or localized, unspecified site     Other and unspecified hyperlipidemia     Pneumonia, organism unspecified(486)     Severe persistent asthma with status asthmaticus 12/30/2019    Thyroid disease     Unspecified essential hypertension     Ventricular ectopy 1/15/2019    Visual impairment         Past Surgical History:   Past Surgical History:   Procedure Laterality Date    KNEE REPLACEMENT SURGERY      OTHER SURGICAL HISTORY      TOTAL KNEE REPLACEMENT Bilateral     16 yrs ago         Family Medical History:   Family History   Problem Relation Age of Onset    Cancer Father         bladder    Heart Disorder Mother      Hypertension Mother     Diabetes Mother         Social History:   Social History     Socioeconomic History    Marital status:      Spouse name: Not on file    Number of children: Not on file    Years of education: Not on file    Highest education level: Not on file   Occupational History    Not on file   Tobacco Use    Smoking status: Former     Packs/day: 2.50     Years: 30.00     Additional pack years: 0.00     Total pack years: 75.00     Types: Cigarettes     Quit date: 1991     Years since quittin.4    Smokeless tobacco: Never   Vaping Use    Vaping Use: Never used   Substance and Sexual Activity    Alcohol use: Not Currently     Comment: 2 times per month    Drug use: No    Sexual activity: Not on file   Other Topics Concern     Service Not Asked    Blood Transfusions Not Asked    Caffeine Concern Yes    Occupational Exposure Not Asked    Hobby Hazards Not Asked    Sleep Concern Not Asked    Stress Concern Not Asked    Weight Concern Not Asked    Special Diet Not Asked    Back Care Not Asked    Exercise Yes    Bike Helmet Not Asked    Seat Belt Not Asked    Self-Exams Not Asked   Social History Narrative    Not on file     Social Determinants of Health     Financial Resource Strain: Low Risk  (5/15/2023)    Financial Resource Strain     Difficulty of Paying Living Expenses: Not hard at all     Med Affordability: No   Food Insecurity: No Food Insecurity (10/13/2023)    Food Insecurity     Food Insecurity: Never true   Transportation Needs: No Transportation Needs (10/13/2023)    Transportation Needs     Lack of Transportation: No   Physical Activity: Not on file   Stress: Not on file   Social Connections: Not on file   Housing Stability: Low Risk  (10/13/2023)    Housing Stability     Housing Instability: No     Housing Instability Emergency: Not on file        Medications:   Current Outpatient Medications   Medication Sig Dispense Refill    metoprolol tartrate 100 MG Oral Tab Take 0.5  tablets (50 mg total) by mouth 2 (two) times daily.      hydrALAZINE 25 MG Oral Tab Take 0.5 tablets (12.5 mg total) by mouth 3 (three) times daily. parameters: hold if SBP less than 110.      losartan 50 MG Oral Tab Take 1 tablet (50 mg total) by mouth 2 (two) times daily.      torsemide 20 MG Oral Tab Take 2 tablets (40 mg total) by mouth BID (Diuretic).      predniSONE 5 MG Oral Tab Take 1 tablet (5 mg total) by mouth daily. 30 tablet 1    potassium chloride 20 MEQ Oral Tab CR Take 1 tablet (20 mEq total) by mouth daily.      levothyroxine 137 MCG Oral Tab Take 137 mcg by mouth before breakfast. 90 tablet 3    ipratropium-albuterol 0.5-2.5 (3) MG/3ML Inhalation Solution Take 3 mL by nebulization every 6 (six) hours as needed. 360 mL 3    budesonide 0.5 MG/2ML Inhalation Suspension Take 2 mL (0.5 mg total) by nebulization 2 (two) times daily. 360 mL 3    cyanocobalamin 250 MCG Oral Tab Take 1 tablet (250 mcg total) by mouth daily.      Cholecalciferol (VITAMIN D-3 OR) Take by mouth daily.      Cranberry 500 MG Oral Cap Take 500 mg by mouth daily.      sodium chloride 0.9 % Inhalation Nebu Soln Take 3 mL by nebulization as needed for Wheezing. 360 mL 3    acetaminophen 500 MG Oral Tab Take 1 tablet (500 mg total) by mouth every 6 (six) hours. 30 tablet 0    albuterol (PROAIR HFA) 108 (90 Base) MCG/ACT Inhalation Aero Soln Inhale 2 puffs into the lungs every 6 (six) hours as needed for Wheezing or Shortness of Breath. 1 each 11    Cod Liver Oil 1000 MG Oral Cap Take 1 capsule by mouth daily.      Calcium Carbonate-Vitamin D (CALTRATE 600+D OR) Take 1 tablet by mouth daily.        Multiple Vitamins-Minerals (CENTRUM SILVER OR) Take 1 tablet by mouth daily.           Review of Systems: Review of Systems   Constitutional:  Negative for activity change, appetite change, chills, diaphoresis, fatigue, fever and unexpected weight change.   HENT:  Negative for congestion, postnasal drip, rhinorrhea, sinus pressure, sinus  pain, sneezing, sore throat, trouble swallowing and voice change.    Respiratory:  Positive for cough. Negative for apnea, choking, chest tightness, shortness of breath, wheezing and stridor.    Cardiovascular:  Negative for chest pain, palpitations and leg swelling.   Musculoskeletal:  Negative for arthralgias.   Skin:  Negative for pallor and rash.   Allergic/Immunologic: Negative for immunocompromised state.   Neurological:  Negative for dizziness and headaches.   Hematological:  Negative for adenopathy.        Physical Exam:  There were no vitals taken for this visit.     Constitutional: alert, cooperative. No acute distress.  HEENT: Head NC/AT.   Respiratory: Thorax symmetrical with no labored breathing.   Neurologic: A&Ox3. No gross motor deficits.  Skin:  dry  Psych: Calm, cooperative. Pleasant affect.    Results:  Personally reviewed    WBC: 6.7, done on 11/6/2023.  HGB: 12.4, done on 11/6/2023.  PLT: 293, done on 11/6/2023.     Glucose: 86, done on 12/29/2023.  Cr: 1.26, done on 12/29/2023.  GFR(AA): 40, done on 7/23/2022.  GFR (non-AA): 35, done on 7/23/2022.  CA: 10.1, done on 12/29/2023.  Na: 138, done on 12/29/2023.  K: 3.8, done on 12/29/2023.  Cl: 97, done on 12/29/2023.  CO2: 36, done on 12/29/2023.  Last ALB was 3.6% done on 11/2/2023.     XR CHEST AP PORTABLE  (CPT=71045)    Result Date: 10/13/2023  CONCLUSION:  Increased density lung bases bilaterally is most likely dependent atelectasis or chronic scar.  This is not significantly changed.  Preliminary report was reviewed and there is no significant discrepancy.    LOCATION:  [place]      Dictated by (CST): Linden Cook MD on 10/13/2023 at 8:13 AM     Finalized by (CST): Linden Cook MD on 10/13/2023 at 8:14 AM         Assessment/Plan:  #1. Asthma   Severe, persistent  Has had multiple exacerbations in the past, improved on chronic prednisone 5mg daily  Changed from advair to budesonide neb BID; does not feel that arformoterol helped.  Will  continue on budesonide BID and duonebs PRN  Continue duonebs and saline nebs PRN  Given her chronic cough and multiple exacerbations over the past year with multiple hospitalizations, limited response to current airway clearance therapies, she would be a great candidate for oxcillating lung therapy/ lung expansion therapy, will plan to proceed with Volara airway clearance to be used with her neb medications  Will increase pred to 10mg daily for this week. Will have her f/u in 2 weeks to reassess with hope to deescalate back to 5mg daily  We had previously reviewed options to consider biologics but she has declined but now she is agreeable to proceed - given information for Dr. Escamilla  Encouraged to exercise and lose weight  We have discussed concern for DANE/OHS in the past with recommendation for PSG. However she is opposed to testing for sleep apnea and denies that she would ever use a device to help her sleep  8/8/2023 Improved; less SOB and minimal cough with light yellow phlegm. Recommend to continue current nebs; budesonide BID, Duonebs once daily and saline prn. Will try to decrease prednisone to 5mg daily and may alternate if cough returns. Discussed longterm effects of chronic steroids. Will followup in 4 weeks by [facility] unless symptomatic  8/29/2023 Reports np cough, chest congestion, wheeze, slight chills and SOB. Recommend prednisone taper, zpak and increase duonebs to 3-4 times a day. Discussed going to the ER if sx worsen  9/2023 Improved; minimal congestion today due to the weather but otherwise breathing has been better. Will continue to hold arformoterol and take budesonide and duonebs BID. Will continue to decrease prednisone to 10mg and try to decrease to 5mg when weather improves; may alternate 10mg and 5mg if needed.   10/2023 s/p AE asthma, hospitalized 10/1310/15/23 treated with steroids and nebs. Will remain on prednisone 10mg daily and re-evaluate in 2 weeks and if stable will return to alternate  10mg and 5mg every other day. CCT including duonebs QID, budesonide BID and saline nebs prn.  11/2023 Stable; continue nebs and prednisone 10mg daily. Will give zpak to cover for AE asthma   12/2023 Improved; CCT (duonebs q 5-6hrs, budesonide nebs BID, saline prn) including prednisone 5mg daily  1/2/2024 Continue current treatment. Ok to continue on prednisone 10mg and alternate with 5mg if respiratory status remains stable. Will followup in 2-3 weeks     #2. Hypoxia, chronic  Hypoxia on exertion diagnosed on last hospitalization and has since improved  Continues to use oxygen with sleep  She continues to use and benefit from supplemental o2  8/2023 Using and benefiting from oxygen tx  9/2023 Using 2L at night only  10/2023 Using and benefitting from oxygen tx; using 2L at night ilia  11/2023 Stable; CCT  12/2023 Stable; using and benefiting from oxygen tx  Using 2L at night only  1/2024 Using and benefiting from oxygen tx     #3. Chronic bronchitis with mucus plugging  As above  8/2023 See above  9/2023 Improved; see above  10/2023 Improved; will contineu to monitor  11/2023 See above; will restart saline nebs, continue mucinex  12/2023 Improved; CCT including saline nebs when increased congestion  1/2024 Stable     #4. CHF  Following with heart failure clinic and cardiology, remains on diuretics      [facility]   1/2/2024    This visit is conducted using Telemedicine with live, interactive video and audio.    Patient has been referred to the [facility] website at [email or web address] to review the yearly Consent to Treat document.    Patient understands and accepts financial responsibility for any deductible, co-insurance and/or co-pays associated with this service.

## 2024-01-02 NOTE — TELEPHONE ENCOUNTER
PT MISSED A VISIT THE WEEK BEFORE CHRISTMAS.    MOVING THE VISIT TO THE WEEK OF JAN 8TH.    DANIEL

## 2024-01-08 DIAGNOSIS — I50.32 CHRONIC HEART FAILURE WITH PRESERVED EJECTION FRACTION (HFPEF) (HCC): Primary | ICD-10-CM

## 2024-01-11 ENCOUNTER — TELEPHONE (OUTPATIENT)
Dept: FAMILY MEDICINE CLINIC | Facility: CLINIC | Age: 89
End: 2024-01-11

## 2024-01-11 RX ORDER — HYDRALAZINE HYDROCHLORIDE 10 MG/1
TABLET, FILM COATED ORAL
Qty: 90 TABLET | Refills: 5 | Status: SHIPPED | OUTPATIENT
Start: 2024-01-11

## 2024-01-11 NOTE — TELEPHONE ENCOUNTER
RE: hydrALAZINE 25 MG - pt instructed to take 1/2 (12.5 mg 3x daily).  Pt having trouble cutting in half and requesting script for hydralazine 12.5mg

## 2024-01-11 NOTE — TELEPHONE ENCOUNTER
YP,   please see message,   there is no 12.5 mg in the system.   There is 10mg, do you want to send 10mg?  by mouth 3 (three) times daily. parameters: hold if SBP less than 110.

## 2024-01-14 NOTE — TELEPHONE ENCOUNTER
I had already sent the 10mg based on previous message.  So that is fine.  But also please have pt follow up in 10 days.  Thank you.

## 2024-01-18 ENCOUNTER — VIRTUAL PHONE E/M (OUTPATIENT)
Facility: CLINIC | Age: 89
End: 2024-01-18
Payer: MEDICARE

## 2024-01-18 DIAGNOSIS — I50.32 CHRONIC HEART FAILURE WITH PRESERVED EJECTION FRACTION (HFPEF) (HCC): Primary | ICD-10-CM

## 2024-01-18 DIAGNOSIS — J45.50 SEVERE PERSISTENT ASTHMA WITHOUT COMPLICATION: ICD-10-CM

## 2024-01-18 DIAGNOSIS — J96.11 CHRONIC RESPIRATORY FAILURE WITH HYPOXIA (HCC): Primary | ICD-10-CM

## 2024-01-18 DIAGNOSIS — J41.8 MIXED SIMPLE AND MUCOPURULENT CHRONIC BRONCHITIS (HCC): ICD-10-CM

## 2024-01-18 PROCEDURE — 99442 PHONE E/M BY PHYS 11-20 MIN: CPT | Performed by: NURSE PRACTITIONER

## 2024-01-18 NOTE — PROGRESS NOTES
[facility] General Pulmonary Progress Note    History of Present Illness:  Nancy Hensley is a 89 year old female with significant PMH asthma who presents today for follow up. Continues to alternate 10 mg and 5 mg prednisone every other day and feels well. \"Breathing is really good\". No complaints. Unable to connect to video as daughter [place] of state.     Previously 1/2024  a 89 year old female with significant PMH asthma who presents today for follow up. Reports having increased cough yesterday that seems better today after coughing up clear phlegm. No f/c/ns. Using inhalers and nebs as directed. Has been on prednisone 10mg daily for the past week.      Previously 12/8/23  a 89 year old female with significant PMH Asthma who presents today for follow up. Reports feeling \"Glorious\". Breathing is good; tolerating prednisone 5mg daily. Using nebs as directed and oxygen 2L at night. No f/c/ns, cough or congestion.      Previously 11/2023     a 89 year old female with significant PMH asthma who presents today for sick 2 week follow up. Reports she feels more congested, not able to cough up phlegm, heard wheezing last night. Denies increased SOB. Using budesonide and duonebs and prednisone 10mg daily. No f/c/ns. Not using saline nebs     Previously 10/24/23  a 89 year old female with significant PMHx of asthma who presents today for follow up. Pt recently hospitalized for AECOPD 10/13-10/15/23 after noticing increased SOB and right sided chest discomfort, treated with steroids and nebs. No Abx. Completed prednisone post discharge yesterday. Feels well today; mild cough remains. No f/c/ns. Using budesonide BID, Duonebs q 5-6 hrs and saline nebs prn. Using oxygen 2L at night only.     Previously 9/2023  a 89 year old female with significant PMH of asthma who presents today for follow up s/p Abx and steroids. Currently taking prednisone 20mg daily, completed ABx. Reports feeling \"much, much better\". Not using arformoterol and  prefers to not take it.      Previously 8/29/23   a 89 year old female with significant PMH asthma/COPD who presents today for evaluation of increased cough, chest congestion, wheeze and SOB. Decreased prednisone from 10mg to 5mg after our last visit. Restarted Arformoterol on her own and felt great initially then after 5-7 days had a harder time coughing up phlegm. She has since stopped the Arformoterol and increased prednisone from 5 to 10mg for the past 3 days as her sx worsened.  Denies f/ns; + chills. Unable to cough up phlegm. Currently using Budesonide nebs and Duonebs BID.      Previously 8/8/23    presents today for follow up. Reports breathing is perfect. Very happy with new medications. Continues on prednisone 10mg as she has increased cough on 5mg. Denies new complaints.      Previously 7/27/23 Dr England  a 89 year old female with hx of Asthma, CKD, and CHF who presents for follow up of asthma/COPD. She reports having been well until today when she felt her cough worsen with white phlegm. Again after clearing the phlegm she feels well.  She did use arformoterol +budesonide in early June but did not feel better so stopped - of note she stopped using duoneb altogether during this time. No pain. No fevers. +BLE edema     June 2023 previously  She was hospitalized in May with CHF exacerbation and hyponatremia, her breathing was controlled during her hospitalization. After going home she developed chest congestion and frequent coughing. She has continued to use her nebulizers - budesonide BID, duoneb PRN and saline nebs PRN. She remains on baseline pred 5mg daily  No fevers. No pain/pleurisy  Weight is up and +BLE - planning to see cardiology soon      May 2023 previously  Discharged  last week after treated for fluid overload. Overall feels well; breathing is significantly better than prior to hospitalization. Pt hospitalized from 5/5-5/11/23 with Acute HFpEF with acute pulmonary edema, BNP > 5000, diuresed  on lasix gtt with > 4L of fluid removed. Meds adjusted per cardiologist, plan for labs and CXR today and plan to go to the heart failure clinic next week. No f/c/ns.  Currently with occasional cough and CORONA. BLE edema unchanged per pt.     Previously 3/23/23 Dr England  89 year old female former smoker (quit 30 years ago, 20 pack years) with significant PMH asthma, HTN and obesity who presents today for follow up. She was hospitalized with acute asthma exacerbation and CHF exacerbation, managed on steroids, nebs and diuretics with improvement. She feels better today, breathing is back to normal. Has sore throat and some mild dysphagia over the last week or so. She is not watching her weight of fluid balance.      Previously  She has followed with me for several years and managed for asthma. She has had variable exacerbations of her asthma in the past leading to initiation of chronic steroids which was weaned to 5mg daily with good success and mitigation of repeated asthma exacerbations. She presents today without acute concern. Notes occasional cough/phlegm, relieved with nebs. Using budesonide neb once to twice a day. Using advair BID. Using duoneb prn and only needing to use a few times/week. No fevers. No pain or pleurisy.  Limited mobility due to BLE swelling, denies CORONA.   Past Medical History:   Past Medical History:   Diagnosis Date    Accelerated hypertension 1/24/2014    Asthma     Cough 8/5/2016    Disorder of thyroid     Dyspnea 8/4/2016    Dyspnea, unspecified type 8/4/2016    Endocrine disorder     HYPOTHYROID    Osteoarthrosis, unspecified whether generalized or localized, unspecified site     Other and unspecified hyperlipidemia     Pneumonia, organism unspecified(486)     Severe persistent asthma with status asthmaticus 12/30/2019    Thyroid disease     Unspecified essential hypertension     Ventricular ectopy 1/15/2019    Visual impairment         Past Surgical History:   Past Surgical History:    Procedure Laterality Date    KNEE REPLACEMENT SURGERY      OTHER SURGICAL HISTORY      TOTAL KNEE REPLACEMENT Bilateral     16 yrs ago         Family Medical History:   Family History   Problem Relation Age of Onset    Cancer Father         bladder    Heart Disorder Mother     Hypertension Mother     Diabetes Mother         Social History:   Social History     Socioeconomic History    Marital status:      Spouse name: Not on file    Number of children: Not on file    Years of education: Not on file    Highest education level: Not on file   Occupational History    Not on file   Tobacco Use    Smoking status: Former     Packs/day: 2.50     Years: 30.00     Additional pack years: 0.00     Total pack years: 75.00     Types: Cigarettes     Quit date: 1991     Years since quittin.4    Smokeless tobacco: Never   Vaping Use    Vaping Use: Never used   Substance and Sexual Activity    Alcohol use: Not Currently     Comment: 2 times per month    Drug use: No    Sexual activity: Not on file   Other Topics Concern     Service Not Asked    Blood Transfusions Not Asked    Caffeine Concern Yes    Occupational Exposure Not Asked    Hobby Hazards Not Asked    Sleep Concern Not Asked    Stress Concern Not Asked    Weight Concern Not Asked    Special Diet Not Asked    Back Care Not Asked    Exercise Yes    Bike Helmet Not Asked    Seat Belt Not Asked    Self-Exams Not Asked   Social History Narrative    Not on file     Social Determinants of Health     Financial Resource Strain: Low Risk  (5/15/2023)    Financial Resource Strain     Difficulty of Paying Living Expenses: Not hard at all     Med Affordability: No   Food Insecurity: No Food Insecurity (10/13/2023)    Food Insecurity     Food Insecurity: Never true   Transportation Needs: No Transportation Needs (10/13/2023)    Transportation Needs     Lack of Transportation: No   Physical Activity: Not on file   Stress: Not on file   Social Connections: Not on  file   Housing Stability: Low Risk  (10/13/2023)    Housing Stability     Housing Instability: No     Housing Instability Emergency: Not on file        Medications:   Current Outpatient Medications   Medication Sig Dispense Refill    hydrALAZINE 10 MG Oral Tab One tablet, by mouth 3 (three) times daily. parameters: hold if SBP less than 110. 90 tablet 5    predniSONE 10 MG Oral Tab Take1 tab PO daily and may alternate with 5mg tabs if respiratory status remains stable 30 tablet 1    metoprolol tartrate 100 MG Oral Tab Take 0.5 tablets (50 mg total) by mouth 2 (two) times daily.      losartan 50 MG Oral Tab Take 1 tablet (50 mg total) by mouth 2 (two) times daily.      torsemide 20 MG Oral Tab Take 2 tablets (40 mg total) by mouth BID (Diuretic).      predniSONE 5 MG Oral Tab Take 1 tablet (5 mg total) by mouth daily. 30 tablet 1    potassium chloride 20 MEQ Oral Tab CR Take 1 tablet (20 mEq total) by mouth daily.      levothyroxine 137 MCG Oral Tab Take 137 mcg by mouth before breakfast. 90 tablet 3    ipratropium-albuterol 0.5-2.5 (3) MG/3ML Inhalation Solution Take 3 mL by nebulization every 6 (six) hours as needed. 360 mL 3    budesonide 0.5 MG/2ML Inhalation Suspension Take 2 mL (0.5 mg total) by nebulization 2 (two) times daily. 360 mL 3    cyanocobalamin 250 MCG Oral Tab Take 1 tablet (250 mcg total) by mouth daily.      Cholecalciferol (VITAMIN D-3 OR) Take by mouth daily.      Cranberry 500 MG Oral Cap Take 500 mg by mouth daily.      sodium chloride 0.9 % Inhalation Nebu Soln Take 3 mL by nebulization as needed for Wheezing. 360 mL 3    acetaminophen 500 MG Oral Tab Take 1 tablet (500 mg total) by mouth every 6 (six) hours. 30 tablet 0    albuterol (PROAIR HFA) 108 (90 Base) MCG/ACT Inhalation Aero Soln Inhale 2 puffs into the lungs every 6 (six) hours as needed for Wheezing or Shortness of Breath. 1 each 11    Cod Liver Oil 1000 MG Oral Cap Take 1 capsule by mouth daily.      Calcium Carbonate-Vitamin D  (CALTRATE 600+D OR) Take 1 tablet by mouth daily.        Multiple Vitamins-Minerals (CENTRUM SILVER OR) Take 1 tablet by mouth daily.           Review of Systems: Review of Systems   Constitutional:  Negative for activity change, appetite change, chills, diaphoresis, fatigue, fever and unexpected weight change.   HENT:  Negative for congestion, postnasal drip, rhinorrhea, sinus pressure, sinus pain, sneezing, sore throat, trouble swallowing and voice change.    Respiratory:  Positive for cough. Negative for apnea, choking, chest tightness, shortness of breath, wheezing and stridor.    Cardiovascular:  Negative for chest pain, palpitations and leg swelling.   Musculoskeletal:  Negative for arthralgias.   Skin:  Negative for pallor and rash.   Allergic/Immunologic: Negative for immunocompromised state.   Neurological:  Negative for dizziness and headaches.   Hematological:  Negative for adenopathy.            Results:  Personally reviewed    WBC: 6.7, done on 11/6/2023.  HGB: 12.4, done on 11/6/2023.  PLT: 293, done on 11/6/2023.     Glucose: 86, done on 12/29/2023.  Cr: 1.26, done on 12/29/2023.  GFR(AA): 40, done on 7/23/2022.  GFR (non-AA): 35, done on 7/23/2022.  CA: 10.1, done on 12/29/2023.  Na: 138, done on 12/29/2023.  K: 3.8, done on 12/29/2023.  Cl: 97, done on 12/29/2023.  CO2: 36, done on 12/29/2023.  Last ALB was 3.6% done on 11/2/2023.     No results found.     Assessment/Plan:    #1. Asthma   Severe, persistent  Has had multiple exacerbations in the past, improved on chronic prednisone 5mg daily  Changed from advair to budesonide neb BID; does not feel that arformoterol helped.  Will continue on budesonide BID and duonebs PRN  Continue duonebs and saline nebs PRN  Given her chronic cough and multiple exacerbations over the past year with multiple hospitalizations, limited response to current airway clearance therapies, she would be a great candidate for oxcillating lung therapy/ lung expansion therapy,  will plan to proceed with Volara airway clearance to be used with her neb medications  Will increase pred to 10mg daily for this week. Will have her f/u in 2 weeks to reassess with hope to deescalate back to 5mg daily  We had previously reviewed options to consider biologics but she has declined but now she is agreeable to proceed - given information for Dr. Escamilla  Encouraged to exercise and lose weight  We have discussed concern for DANE/OHS in the past with recommendation for PSG. However she is opposed to testing for sleep apnea and denies that she would ever use a device to help her sleep  8/8/2023 Improved; less SOB and minimal cough with light yellow phlegm. Recommend to continue current nebs; budesonide BID, Duonebs once daily and saline prn. Will try to decrease prednisone to 5mg daily and may alternate if cough returns. Discussed longterm effects of chronic steroids. Will followup in 4 weeks by [facility] unless symptomatic  8/29/2023 Reports np cough, chest congestion, wheeze, slight chills and SOB. Recommend prednisone taper, zpak and increase duonebs to 3-4 times a day. Discussed going to the ER if sx worsen  9/2023 Improved; minimal congestion today due to the weather but otherwise breathing has been better. Will continue to hold arformoterol and take budesonide and duonebs BID. Will continue to decrease prednisone to 10mg and try to decrease to 5mg when weather improves; may alternate 10mg and 5mg if needed.   10/2023 s/p AE asthma, hospitalized 10/1310/15/23 treated with steroids and nebs. Will remain on prednisone 10mg daily and re-evaluate in 2 weeks and if stable will return to alternate 10mg and 5mg every other day. CCT including duonebs QID, budesonide BID and saline nebs prn.  11/2023 Stable; continue nebs and prednisone 10mg daily. Will give zpak to cover for AE asthma   12/2023 Improved; CCT (duonebs q 5-6hrs, budesonide nebs BID, saline prn) including prednisone 5mg daily  1/2/2024 Continue current  treatment. Ok to continue on prednisone 10mg and alternate with 5mg if respiratory status remains stable. Will followup in 2-3 weeks  1/18/24 CCT including alternating 10mg and 5 mg every other day. Will plan to continue during the winter months per pt request. Continue nebs as previously discussed     #2. Hypoxia, chronic  Hypoxia on exertion diagnosed on last hospitalization and has since improved  Continues to use oxygen with sleep  She continues to use and benefit from supplemental o2  8/2023 Using and benefiting from oxygen tx  9/2023 Using 2L at night only  10/2023 Using and benefitting from oxygen tx; using 2L at night ilia  11/2023 Stable; CCT  12/2023 Stable; using and benefiting from oxygen tx  Using 2L at night only  1/2024 Using and benefiting from oxygen tx  1/18/24 Continue 2L at night; using and benefiting from oxygen tx     #3. Chronic bronchitis with mucus plugging  As above  8/2023 See above  9/2023 Improved; see above  10/2023 Improved; will contineu to monitor  11/2023 See above; will restart saline nebs, continue mucinex  12/2023 Improved; CCT including saline nebs when increased congestion  1/2024 Stable  1/18/24 Stable     #4. CHF  Following with heart failure clinic and cardiology, remains on diuretics    DAMIR Yin  [facility]   1/18/2024    This visit is conducted using Telemedicine with live, interactive audio.    Patient has been referred to the [facility] website at [email or web address] to review the yearly Consent to Treat document.    Patient understands and accepts financial responsibility for any deductible, co-insurance and/or co-pays associated with this service.

## 2024-01-18 NOTE — PATIENT INSTRUCTIONS
Continue to alternate 5mg and 10mg of prednisone every other day until next appointment  Continue nebulizers and oxygen as previously discussed  Followup in 6 weeks; telehealth is ok with daughter  Please contact office at [phone] with any decline in respiratory status, questions or concerns

## 2024-01-19 NOTE — PROGRESS NOTES
[facility] Progress Note    Nancy Hensley is a 89 year old female who presents to clinic for APN assessment and management of chronic diastolic heart failure and is functional class 3b.     Subjective:  Since her last clinic visit on 12/29 when she was advised to decrease Torsemide back to 40 mg BID and decrease Hydralazine to 12.5 mg TID from 25 mg TID with holding parameters for SBP <110. She had to reschedule appointment that was scheduled for 1.9 d.t the weather.      She saw Karen Gonzalez 1/2 and Prednisone was reduced to 5 mg every other day with 10  mg on alternate days for a week and then to decrease the dose further to 5 mg daily if doing well. She had follow up virtual visit on 1/18; doing well on alternating dose of Prednisone. To continue same dose for now.     Her weight is up 10 lbs today on our scale today but she reports stable weights at home. She has good and bad days with breathing. Her biggest complaint is arthritic pain that has been \"bad.\"  Leg swelling \"may\" be a little worse. She continues to have legs massaged and occasionally wears compression stockings and lymphedema boots. Legs are dependent most of the day since she gets congested when she raises them when she is sitting in the recliner. Her appetite is \"too good.\" She does not get full fast and wishes she did. She drinks about 1 to 1.5 liters of fluid a day. She doesn't add salt to foods but cooks with it. Home BP's have been in the 130-140's per report and she never reduced Hydralazine since she couldn't cut the pills. She checks BP 4x per day and has not had to hold Hydralazine. She doesn't feel like the diuretics are working and doesn't urinate as much as she used to.     Hospitalizations:  [facility] from 12/9-12/11 for shortness of breath, generalized pain and cough that started that same morning. She was found to be bradycardic in the 30's. Found to have mild pulmonary edema on CXR.  Diuresed with IV Bumex. She was seen by [facility] and ARB was discontinued; which was not on last clinic visit but restarted by Dr. Reyes in November. [facility] saw her on 12/13, reviewed with PCP, and Cozaar was increased to 50mg BID and Hydralazine was increased 25mg TID for worsening hypertension with SBP's in the 170's, to hold if SBP less than 110.     Hospitalized from 10/13-10/15 with worsening shortness of breath, thought to be a COPD exacerbation. She was treated with IV steroids and nebs. She was weaned to oral steroids. She was seen by Nephrology due to her hyponatremia and hyperkalemia, given Tolvaptan and Veltassa.     Review of Systems   Constitutional: Positive for weight gain.   Cardiovascular:  Positive for dyspnea on exertion and leg swelling.   Respiratory:  Positive for shortness of breath.    Musculoskeletal:  Positive for arthritis.   Gastrointestinal:  Positive for bloating.       HISTORY:  Past Medical History:   Diagnosis Date    Accelerated hypertension 1/24/2014    Asthma     Cough 8/5/2016    Disorder of thyroid     Dyspnea 8/4/2016    Dyspnea, unspecified type 8/4/2016    Endocrine disorder     HYPOTHYROID    Osteoarthrosis, unspecified whether generalized or localized, unspecified site     Other and unspecified hyperlipidemia     Pneumonia, organism unspecified(486)     Severe persistent asthma with status asthmaticus 12/30/2019    Thyroid disease     Unspecified essential hypertension     Ventricular ectopy 1/15/2019    Visual impairment       Past Surgical History:   Procedure Laterality Date    KNEE REPLACEMENT SURGERY      OTHER SURGICAL HISTORY      TOTAL KNEE REPLACEMENT Bilateral     16 yrs ago      Family History   Problem Relation Age of Onset    Cancer Father         bladder    Heart Disorder Mother     Hypertension Mother     Diabetes Mother       Social History     Socioeconomic History    Marital status:    Tobacco Use    Smoking status: Former     Packs/day: 2.50      Years: 30.00     Additional pack years: 0.00     Total pack years: 75.00     Types: Cigarettes     Quit date: 1991     Years since quittin.4    Smokeless tobacco: Never   Vaping Use    Vaping Use: Never used   Substance and Sexual Activity    Alcohol use: Not Currently     Comment: 2 times per month    Drug use: No   Other Topics Concern    Caffeine Concern Yes    Exercise Yes     Social Determinants of Health     Financial Resource Strain: Low Risk  (5/15/2023)    Financial Resource Strain     Difficulty of Paying Living Expenses: Not hard at all     Med Affordability: No   Food Insecurity: No Food Insecurity (10/13/2023)    Food Insecurity     Food Insecurity: Never true   Transportation Needs: No Transportation Needs (10/13/2023)    Transportation Needs     Lack of Transportation: No   Housing Stability: Low Risk  (10/13/2023)    Housing Stability     Housing Instability: No           Objective:    Telemetry:  SR     Vitals:    24 1549   BP: 110/49   Pulse: 86   Resp: 22       Wt Readings from Last 6 Encounters:   24 195 lb 3.2 oz (88.5 kg)   23 185 lb 3.2 oz (84 kg)   23 193 lb (87.5 kg)   23 190 lb (86.2 kg)   23 184 lb 9.6 oz (83.7 kg)   10/15/23 183 lb 13.8 oz (83.4 kg)        Component      Latest Ref Rng 2024   Glucose      70 - 99 mg/dL 135 (H)    Sodium      136 - 145 mmol/L 133 (L)    Potassium      3.5 - 5.1 mmol/L 4.8    Chloride      98 - 112 mmol/L 96 (L)    Carbon Dioxide, Total      21.0 - 32.0 mmol/L 33.0 (H)    ANION GAP      0 - 18 mmol/L 4    BUN      9 - 23 mg/dL 37 (H)    CREATININE      0.55 - 1.02 mg/dL 1.20 (H)    CALCIUM      8.5 - 10.1 mg/dL 9.1    CALCULATED OSMOLALITY      275 - 295 mOsm/kg 287    EGFR      >=60 mL/min/1.73m2 43 (L)    Patient Fasting for BMP? No    WBC      4.0 - 11.0 x10(3) uL 9.3    RBC      3.80 - 5.30 x10(6)uL 3.65 (L)    Hemoglobin      12.0 - 16.0 g/dL 12.4    Hematocrit      35.0 - 48.0 % 37.7    Platelet  Count      150.0 - 450.0 10(3)uL 280.0    MCV      80.0 - 100.0 fL 103.3 (H)    MCH      26.0 - 34.0 pg 34.0    MCHC      31.0 - 37.0 g/dL 32.9    RDW      % 13.1       Legend:  (H) High  (L) Low    Current Outpatient Medications:     predniSONE 10 MG Oral Tab, Take1 tab PO daily and may alternate with 5mg tabs if respiratory status remains stable, Disp: 30 tablet, Rfl: 1    metoprolol tartrate 100 MG Oral Tab, Take 0.5 tablets (50 mg total) by mouth 2 (two) times daily., Disp: , Rfl:     hydrALAZINE 25 MG Oral Tab, Take 0.5 tablets (12.5 mg total) by mouth 3 (three) times daily. parameters: hold if SBP less than 110., Disp: , Rfl:     losartan 50 MG Oral Tab, Take 1 tablet (50 mg total) by mouth 2 (two) times daily., Disp: , Rfl:     torsemide 20 MG Oral Tab, Take 2 tablets (40 mg total) by mouth BID (Diuretic)., Disp: , Rfl:     predniSONE 5 MG Oral Tab, Take 1 tablet (5 mg total) by mouth daily., Disp: 30 tablet, Rfl: 1    potassium chloride 20 MEQ Oral Tab CR, Take 1 tablet (20 mEq total) by mouth daily., Disp: , Rfl:     levothyroxine 137 MCG Oral Tab, Take 137 mcg by mouth before breakfast., Disp: 90 tablet, Rfl: 3    ipratropium-albuterol 0.5-2.5 (3) MG/3ML Inhalation Solution, Take 3 mL by nebulization every 6 (six) hours as needed., Disp: 360 mL, Rfl: 3    budesonide 0.5 MG/2ML Inhalation Suspension, Take 2 mL (0.5 mg total) by nebulization 2 (two) times daily., Disp: 360 mL, Rfl: 3    cyanocobalamin 250 MCG Oral Tab, Take 1 tablet (250 mcg total) by mouth daily., Disp: , Rfl:     Cholecalciferol (VITAMIN D-3 OR), Take by mouth daily., Disp: , Rfl:     Cranberry 500 MG Oral Cap, Take 500 mg by mouth daily., Disp: , Rfl:     sodium chloride 0.9 % Inhalation Nebu Soln, Take 3 mL by nebulization as needed for Wheezing., Disp: 360 mL, Rfl: 3    acetaminophen 500 MG Oral Tab, Take 1 tablet (500 mg total) by mouth every 6 (six) hours., Disp: 30 tablet, Rfl: 0    albuterol (PROAIR HFA) 108 (90 Base) MCG/ACT  Inhalation Aero Soln, Inhale 2 puffs into the lungs every 6 (six) hours as needed for Wheezing or Shortness of Breath., Disp: 1 each, Rfl: 11    Cod Liver Oil 1000 MG Oral Cap, Take 1 capsule by mouth daily., Disp: , Rfl:     Calcium Carbonate-Vitamin D (CALTRATE 600+D OR), Take 1 tablet by mouth daily.  , Disp: , Rfl:     Multiple Vitamins-Minerals (CENTRUM SILVER OR), Take 1 tablet by mouth daily.  , Disp: , Rfl:     Exam:   General:         Alert, in no apparent distress  HEENT:          elevated JVD   Lungs:            mildly diminished bases                      CV:                  S1, S2 regular  Abdomen:       distended/round, semi-firm, non-tender  Extremities:    +3 pitting LE edema below up thighs bilaterally superimposed on chronic lymphedema    Neuro:             A&O x 3  Skin:                brawny LE's, chronic venous stasis changes LE's     Education:  Patient instructed regarding sodium restricted diet, low sodium foods, fluid restriction, daily weights, medication regimen, s/s HF exacerbation and when to call APN/clinic.    Assessment:   Chronic diastolic heart failure, ACC/AHA Stage C, NYHA Class 3- LVEF 55-60% with grade 2 DD on echo 3/2023. Prior BVA inconclusive, patient does not want to repeat test. Last ProBNP 1,859 down from 2,065 in November. Avoiding MRA due to chronic hyponatremia. SGTL2i cost prohibitive. Hypervolemic.   Essential HTN -Labile. Low last visit promptly decrease in Hydralazine that she never did. Recent increase in Losartan and Hydralazine d/t high BP. Home BP's reported in the 130-140's today.   LBBB  Hyponatremia, chronic - Hx of Tolvaptan. Labile. Worsens with hypervolemia. Na 133 today.   Anemia - HGB 12.4 g/dl and overall stable. Last Iron sat 20, Ferritin 155.8 in June.    Chronic lymphedema - using lymphedema boots occasionally. Wrapping legs and has legs massaged. Not a candidate for vein treatment due to minimal reflux on prior US. Follows with Dr. Villatoro.    CKD stage 3b - unknown baseline creatinine. SGLT2i is cost prohibited. Follows with Dr. Walton/Dr. Reyes. Saw Dr. Reyes 11/20; restarted ARB since SBP was in the 160's.   Morbid obesity -Body mass index is 43.76 kg/m².  COPD/asthma - nocturnal oxygen use, inhalers. Follows with Dr. England and Karen Gonzalez. On Volara for airway clearance. On alternating dose Prednisone, 5 mg and 10 mg daily.   Possible DANE - not agreeable to sleep study in past since she would not ever use a device.   Hx chronic tobacco use - quit 30 years ago, 20 pack years.  DM type 2 - well controlled. Last a1c 6.5% on 11/2, on no medications.    Hypothyroidism - on Levothyroxine. TSH 2.657 on 12/10. Followed by PCP.   Hx ecoli UTI - s/p abx.   Hx Hyperkalemia, hypokalemia. K slightly hemolyzed today.   HLD - FLP on 11/2 with triglycerides 539, , HDL 66.     Plan:     Repeat Potassium since slightly hemolyzed.  IVP Diuril 250 mg x1.  IVP Bumex 4 mg x1.  Stop Torsemide and start Bumex 2 mg BID to see if she responds better.   Return to clinic in 1-2 weeks, encouraged to follow daily weights and call us with weight gain, worsening shortness of breath.   Continue to follow BP at home regularly.   Continue leg wraps, low salt diet.   F/U  with Dr. Villatoro regularly, last seen 5/2023 with recommended follow up in 4-5 months.    CHF discharge instructions given    50 minutes spent with patient and her son; greater than 50% of the time was spent counseling and coordinating care.    Klarissa Steiner NP   1/22/2024  3:59 PM

## 2024-01-22 ENCOUNTER — HOSPITAL ENCOUNTER (OUTPATIENT)
Dept: CARDIOLOGY CLINIC | Facility: HOSPITAL | Age: 89
End: 2024-01-22
Attending: NURSE PRACTITIONER
Payer: MEDICARE

## 2024-01-22 VITALS
RESPIRATION RATE: 20 BRPM | BODY MASS INDEX: 44 KG/M2 | HEART RATE: 80 BPM | OXYGEN SATURATION: 97 % | WEIGHT: 195.19 LBS | DIASTOLIC BLOOD PRESSURE: 57 MMHG | SYSTOLIC BLOOD PRESSURE: 153 MMHG

## 2024-01-22 DIAGNOSIS — I10 ESSENTIAL HYPERTENSION: ICD-10-CM

## 2024-01-22 DIAGNOSIS — I50.32 CHRONIC HEART FAILURE WITH PRESERVED EJECTION FRACTION (HCC): ICD-10-CM

## 2024-01-22 DIAGNOSIS — I89.0 LYMPHEDEMA: ICD-10-CM

## 2024-01-22 DIAGNOSIS — I50.32 CHRONIC HEART FAILURE WITH PRESERVED EJECTION FRACTION (HFPEF) (HCC): ICD-10-CM

## 2024-01-22 DIAGNOSIS — N18.30 STAGE 3 CHRONIC KIDNEY DISEASE, UNSPECIFIED WHETHER STAGE 3A OR 3B CKD (HCC): ICD-10-CM

## 2024-01-22 DIAGNOSIS — E87.1 HYPONATREMIA: ICD-10-CM

## 2024-01-22 DIAGNOSIS — I50.9 ACUTE ON CHRONIC CONGESTIVE HEART FAILURE, UNSPECIFIED HEART FAILURE TYPE (HCC): Primary | ICD-10-CM

## 2024-01-22 LAB
ANION GAP SERPL CALC-SCNC: 4 MMOL/L (ref 0–18)
BUN BLD-MCNC: 37 MG/DL (ref 9–23)
CALCIUM BLD-MCNC: 9.1 MG/DL (ref 8.5–10.1)
CHLORIDE SERPL-SCNC: 96 MMOL/L (ref 98–112)
CO2 SERPL-SCNC: 33 MMOL/L (ref 21–32)
CREAT BLD-MCNC: 1.2 MG/DL
EGFRCR SERPLBLD CKD-EPI 2021: 43 ML/MIN/1.73M2 (ref 60–?)
ERYTHROCYTE [DISTWIDTH] IN BLOOD BY AUTOMATED COUNT: 13.1 %
FASTING STATUS PATIENT QL REPORTED: NO
GLUCOSE BLD-MCNC: 135 MG/DL (ref 70–99)
HCT VFR BLD AUTO: 37.7 %
HGB BLD-MCNC: 12.4 G/DL
MCH RBC QN AUTO: 34 PG (ref 26–34)
MCHC RBC AUTO-ENTMCNC: 32.9 G/DL (ref 31–37)
MCV RBC AUTO: 103.3 FL
OSMOLALITY SERPL CALC.SUM OF ELEC: 287 MOSM/KG (ref 275–295)
PLATELET # BLD AUTO: 280 10(3)UL (ref 150–450)
POTASSIUM SERPL-SCNC: 3.8 MMOL/L (ref 3.5–5.1)
POTASSIUM SERPL-SCNC: 4.8 MMOL/L (ref 3.5–5.1)
RBC # BLD AUTO: 3.65 X10(6)UL
SODIUM SERPL-SCNC: 133 MMOL/L (ref 136–145)
WBC # BLD AUTO: 9.3 X10(3) UL (ref 4–11)

## 2024-01-22 PROCEDURE — 99215 OFFICE O/P EST HI 40 MIN: CPT | Performed by: NURSE PRACTITIONER

## 2024-01-22 RX ORDER — HYDRALAZINE HYDROCHLORIDE 25 MG/1
25 TABLET, FILM COATED ORAL 3 TIMES DAILY
COMMUNITY
Start: 2024-01-22

## 2024-01-22 RX ORDER — BUMETANIDE 0.25 MG/ML
4 INJECTION INTRAMUSCULAR; INTRAVENOUS ONCE
Status: COMPLETED | OUTPATIENT
Start: 2024-01-22 | End: 2024-01-22

## 2024-01-22 RX ORDER — BUMETANIDE 2 MG/1
2 TABLET ORAL 2 TIMES DAILY
Qty: 60 TABLET | Refills: 1 | Status: SHIPPED | OUTPATIENT
Start: 2024-01-23

## 2024-01-22 RX ADMIN — BUMETANIDE 4 MG: 0.25 INJECTION INTRAMUSCULAR; INTRAVENOUS at 15:46:00

## 2024-01-22 NOTE — PROGRESS NOTES
Pt. Assessed. Picked up from VAT. Patient was asked to bring in BP log but left on the table at home. Reported ranges 130-140/50's. Pt. complaining of shortness of breath, and dyspnea on activity. Patient denies chest pain, dizziness,no falls. Weight up 10 lbs 195.2 lbs. Patient weight at home same as previous reported. APN notified of patient's complaint of shortness of breath, and dyspnea on activity. Labs ordered and drawn by  Lab. Reviewed allergies and list of current medications with patient and updated it in the Electronic Medical Record. IV established per protocol.  mg IV diuril followed by 4 mg bumexIV  given. Patient tolerated it well. Educated patient on low sodium diet and food choices, fluid restriction of 2 liters, and daily weights. Reviewed follow-up appointments and discharge Heart Failure instructions with patient. Patient verbalized an understanding. IV discontinued; catheter intact. Pressure held and gauze applied. C[facility] 2 weeks.

## 2024-01-22 NOTE — PATIENT INSTRUCTIONS
Heart Failure Discharge Instructions    Bring in record of weight and BP to next visit. Call with weight gain.   Hold Torsemide tonight.   Starting tomorrow take Bumex 2 mg twice a day.   Call [facility] to make an appointment with Dr. Villatoro, [phone].       Activity: Regular exercise and activity is important for your overall health and to help keep your heart strong and functioning as well as possible.   Walk at a slow to moderate pace for 15-20 minutes 3-5 days per week.     Follow these instructions every day to keep yourself in the Green Zone     The Green Zone means you are feeling well and your symptoms are under control                                    Medications  Take your medication every day as instructed  Do not stop taking your medicine or change the amount you are taking without instructions from your doctor or nurse  Do Not take non-steroidal antiinflammatory drugs such as ibuprofen, aleve, advil, or motrin                                    Diet/Fluids  People with heart failure should eat less sodium (salt) and limit fluid. Sodium attracts water and makes the body hold fluid. This extra fluid makes the heart work harder and can worsen the symptoms of heart failure.     Diet    2000 mg sodium daily  Fluid restriction    64 ounces daily  (8 oz. = 1 cup)                                     Body Weight  Weigh yourself every day before breakfast and write your weight down  Use the same scale and wear about the same amount of clothes each time  A sudden weight increase is due to fluid retention rather than fat                                         Activity  Pace your activities to avoid getting overtired  Take rest periods as needed  Elevate your feet to reduce ankle swelling when resting                             Signs of Worsening Heart Failure    You are entering the Yellow Zone - this is a warning zone    Call your doctor or nurse if you have any of these signs or symptoms:  You gain 2 or  more pounds overnight or 3-5 pounds in 3-7 days  You have more trouble breathing  You get more tired with regular activity, or are limiting activity because of shortness of breath or fatigue  You are short of breath lying down, you need more pillows to breathe comfortably,  or wake up during the night short of breath  You urinate less often during the day and more often at night  You have a bloated feeling, upset stomach, loss of appetite, or your clothes are fitting tighter    GO TO THE EMERGENCY DEPARTMENT or CALL 911 IF:    These are signs you are in the RED ZONE - THIS IS AN EMERGENCY  You have tightness or pain in your chest  You are extremely short of breath or can't catch your breath  You cough up frothy pink mucous  You feel confused or can't think clearly  You are traveling and develop symptoms of worsening heart failure     We respect everyone's time and availability. Please be aware that this is not a walk-in clinic and we require appointments in order to facilitate timely care for all patients. Please understand if you are more than 20 minutes late for your appointment, you may be asked to reschedule. If possible, all attempts will be made to accommodate but realize this is no guarantee that this will always be available. We understand there are extenuating circumstances. If you need to cancel or reschedule your appointment, please call the [facility] within 24 hours at [phone].  Thank you for your cooperation, [facility] Staff.             [facility]     [phone]

## 2024-01-24 ENCOUNTER — TELEPHONE (OUTPATIENT)
Dept: FAMILY MEDICINE CLINIC | Facility: CLINIC | Age: 89
End: 2024-01-24

## 2024-01-24 NOTE — TELEPHONE ENCOUNTER
Daughter requesting letter written to the bank from Dr. Henning stating pt is \"incapacitated\".  Daughter said POA form not enough.  Requested daughter send marker.to message (she has access) with detailed information of her needs to Dr. Henning.  Daughter not interested and wants to speak with nurse.

## 2024-02-01 DIAGNOSIS — I50.32 CHRONIC HEART FAILURE WITH PRESERVED EJECTION FRACTION (HFPEF) (HCC): Primary | ICD-10-CM

## 2024-02-05 ENCOUNTER — HOSPITAL ENCOUNTER (OUTPATIENT)
Dept: CARDIOLOGY CLINIC | Facility: HOSPITAL | Age: 89
End: 2024-02-05
Attending: NURSE PRACTITIONER
Payer: MEDICARE

## 2024-02-05 ENCOUNTER — HOSPITAL ENCOUNTER (OUTPATIENT)
Dept: PERIOP | Facility: HOSPITAL | Age: 89
Discharge: HOME | End: 2024-02-05
Attending: NURSE PRACTITIONER
Payer: MEDICARE

## 2024-02-05 VITALS
OXYGEN SATURATION: 98 % | RESPIRATION RATE: 14 BRPM | HEART RATE: 72 BPM | DIASTOLIC BLOOD PRESSURE: 59 MMHG | SYSTOLIC BLOOD PRESSURE: 157 MMHG

## 2024-02-05 DIAGNOSIS — I10 ESSENTIAL HYPERTENSION: ICD-10-CM

## 2024-02-05 DIAGNOSIS — E87.1 HYPONATREMIA: ICD-10-CM

## 2024-02-05 DIAGNOSIS — I89.0 LYMPHEDEMA: ICD-10-CM

## 2024-02-05 DIAGNOSIS — I50.32 CHRONIC DIASTOLIC HEART FAILURE (HCC): Primary | ICD-10-CM

## 2024-02-05 LAB
ANION GAP SERPL CALC-SCNC: 4 MMOL/L (ref 0–18)
BUN BLD-MCNC: 44 MG/DL (ref 9–23)
CALCIUM BLD-MCNC: 9.4 MG/DL (ref 8.5–10.1)
CHLORIDE SERPL-SCNC: 97 MMOL/L (ref 98–112)
CO2 SERPL-SCNC: 35 MMOL/L (ref 21–32)
CREAT BLD-MCNC: 1.51 MG/DL
EGFRCR SERPLBLD CKD-EPI 2021: 32 ML/MIN/1.73M2 (ref 60–?)
GLUCOSE BLD-MCNC: 178 MG/DL (ref 70–99)
NT-PROBNP SERPL-MCNC: 1650 PG/ML (ref ?–450)
OSMOLALITY SERPL CALC.SUM OF ELEC: 298 MOSM/KG (ref 275–295)
POTASSIUM SERPL-SCNC: 4.3 MMOL/L (ref 3.5–5.1)
SODIUM SERPL-SCNC: 136 MMOL/L (ref 136–145)

## 2024-02-05 PROCEDURE — 99215 OFFICE O/P EST HI 40 MIN: CPT | Performed by: NURSE PRACTITIONER

## 2024-02-05 RX ORDER — BUMETANIDE 0.25 MG/ML
4 INJECTION INTRAMUSCULAR; INTRAVENOUS ONCE
Status: COMPLETED | OUTPATIENT
Start: 2024-02-05 | End: 2024-02-05

## 2024-02-05 RX ORDER — METOLAZONE 2.5 MG/1
2.5 TABLET ORAL ONCE
Qty: 1 TABLET | Refills: 0 | Status: SHIPPED
Start: 2024-02-05 | End: 2024-02-05

## 2024-02-05 RX ADMIN — BUMETANIDE 4 MG: 0.25 INJECTION INTRAMUSCULAR; INTRAVENOUS at 14:48:00

## 2024-02-05 NOTE — PATIENT INSTRUCTIONS
Heart Failure Discharge Instructions    Take Metolazone 2.5mg one time on Thursday Feb 8th.     Activity: Regular exercise and activity is important for your overall health and to help keep your heart strong and functioning as well as possible.   Walk at a slow to moderate pace for 15-20 minutes 3-5 days per week.     Follow these instructions every day to keep yourself in the Green Zone     The Green Zone means you are feeling well and your symptoms are under control                                    Medications  Take your medication every day as instructed  Do not stop taking your medicine or change the amount you are taking without instructions from your doctor or nurse  Do Not take non-steroidal antiinflammatory drugs such as ibuprofen, aleve, advil, or motrin                                    Diet/Fluids  People with heart failure should eat less sodium (salt) and limit fluid. Sodium attracts water and makes the body hold fluid. This extra fluid makes the heart work harder and can worsen the symptoms of heart failure.     Diet    2000 mg sodium daily  Fluid restriction    64 ounces daily  (8 oz. = 1 cup)                                     Body Weight  Weigh yourself every day before breakfast and write your weight down  Use the same scale and wear about the same amount of clothes each time  A sudden weight increase is due to fluid retention rather than fat                                         Activity  Pace your activities to avoid getting overtired  Take rest periods as needed  Elevate your feet to reduce ankle swelling when resting                             Signs of Worsening Heart Failure    You are entering the Yellow Zone - this is a warning zone    Call your doctor or nurse if you have any of these signs or symptoms:  You gain 2 or more pounds overnight or 3-5 pounds in 3-7 days  You have more trouble breathing  You get more tired with regular activity, or are limiting activity because of  shortness of breath or fatigue  You are short of breath lying down, you need more pillows to breathe comfortably,  or wake up during the night short of breath  You urinate less often during the day and more often at night  You have a bloated feeling, upset stomach, loss of appetite, or your clothes are fitting tighter    GO TO THE EMERGENCY DEPARTMENT or CALL 911 IF:    These are signs you are in the RED ZONE - THIS IS AN EMERGENCY  You have tightness or pain in your chest  You are extremely short of breath or can't catch your breath  You cough up frothy pink mucous  You feel confused or can't think clearly  You are traveling and develop symptoms of worsening heart failure     We respect everyone's time and availability. Please be aware that this is not a walk-in clinic and we require appointments in order to facilitate timely care for all patients. We ask you to arrive 30 minutes before your appointment to allow time for you to check-in and have your bloodwork drawn. Please understand if you are late for your appointment, you may be asked to reschedule. If possible, all attempts will be made to accommodate but realize this is no guarantee that this will always be available. We understand there are extenuating circumstances. If you need to cancel or reschedule your appointment, please call the [facility] within 24 hours at [phone].  Thank you for your cooperation, [facility] Staff.    IF YOU HAVE QUESTIONS REGARDING YOUR BILL, FEEL FREE TO CONTACT Critical access hospital PATIENT ACCOUNTS -[phone]. IF YOU NEED FINANCIAL ASSISTANCE, PLEASE CALL AN Critical access hospital FINANCIAL COUNSELOR -[phone].             [facility]     [phone]

## 2024-02-05 NOTE — PROGRESS NOTES
Patient assessed with daughter. Pt. complaining of swollen legs and low energy. Weight 199 lbs. APN notified of patient's complaint of all of the above. Labs ordered and drawn by  Lab. Reviewed allergies and list of current medications with patient and updated it in the Electronic Medical Record. IV established per VAT. IV 4 mg bumex & 50 mg diuril given. Patient tolerated it well. Educated patient on low sodium diet and food choices, fluid restriction of 2 liters, and daily weights. Reviewed follow-up appointments and discharge Heart Failure instructions with patient. Patient verbalized an understanding. IV discontinued; catheter intact. Pressure held and gauze applied.     RTC in 1 week.

## 2024-02-05 NOTE — PROGRESS NOTES
[facility] Progress Note    Nancy Hensley is a 89 year old female who presents to clinic for APN assessment and management of chronic diastolic heart failure and is functional class 3.     Subjective:  Since her last clinic visit on 1/22; when she was given IV Diuril 250mg and Bumex 4mg, also was transitioned from Torsemide to Bumex 2mg BID, he weight is up 2 lbs. Her home weights have began to increase, from 184 to 187 lbs. She did not notice a difference in edema or breathing when she received IV diuretics last visit. Her breathing has remained stable. She does not think either Torsemide or Bumex has been working. She continues to have her legs massaged and occasionally wears compression stockings and lymphedema boots. Her home BP's have been in the 130-170's systolic. She checks BP 2x per day.     She saw Karen Gonzalez 1/2 and Prednisone was reduced to 5 mg every other day with 10  mg on alternate days for a week and then to decrease the dose further to 5 mg daily if doing well. She had follow up virtual visit on 1/18; doing well on alternating dose of Prednisone. To continue same dose for now.       Hospitalizations:    [facility] from 12/9-12/11 for shortness of breath, generalized pain and cough that started that same morning. She was found to be bradycardic in the 30's. Found to have mild pulmonary edema on CXR. Diuresed with IV Bumex. She was seen by Renal and ARB was discontinued; which was not on last clinic visit but restarted by Dr. Reyes in November. [facility] saw her on 12/13, reviewed with PCP, and Cozaar was increased to 50mg BID and Hydralazine was increased 25mg TID for worsening hypertension with SBP's in the 170's, to hold if SBP less than 110.      Hospitalized from 10/13-10/15 with worsening shortness of breath, thought to be a COPD exacerbation. She was treated with IV steroids and nebs. She was weaned to oral steroids. She was seen by Nephrology  due to her hyponatremia and hyperkalemia, given Tolvaptan and Veltassa.        Review of Systems   Constitutional: Positive for malaise/fatigue and weight gain.   Cardiovascular:  Positive for dyspnea on exertion (unchanged) and leg swelling.        Had swelling, bilateral   Respiratory: Negative.     Gastrointestinal:  Positive for bloating.       HISTORY:  Past Medical History:   Diagnosis Date    Accelerated hypertension 2014    Asthma     Cough 2016    Disorder of thyroid     Dyspnea 2016    Dyspnea, unspecified type 2016    Endocrine disorder     HYPOTHYROID    Osteoarthrosis, unspecified whether generalized or localized, unspecified site     Other and unspecified hyperlipidemia     Pneumonia, organism unspecified(486)     Severe persistent asthma with status asthmaticus 2019    Thyroid disease     Unspecified essential hypertension     Ventricular ectopy 1/15/2019    Visual impairment       Past Surgical History:   Procedure Laterality Date    KNEE REPLACEMENT SURGERY      OTHER SURGICAL HISTORY      TOTAL KNEE REPLACEMENT Bilateral     16 yrs ago      Family History   Problem Relation Age of Onset    Cancer Father         bladder    Heart Disorder Mother     Hypertension Mother     Diabetes Mother       Social History     Socioeconomic History    Marital status:    Tobacco Use    Smoking status: Former     Packs/day: 2.50     Years: 30.00     Additional pack years: 0.00     Total pack years: 75.00     Types: Cigarettes     Quit date: 1991     Years since quittin.5    Smokeless tobacco: Never   Vaping Use    Vaping Use: Never used   Substance and Sexual Activity    Alcohol use: Not Currently     Comment: 2 times per month    Drug use: No   Other Topics Concern    Caffeine Concern Yes    Exercise Yes     Social Determinants of Health     Financial Resource Strain: Low Risk  (5/15/2023)    Financial Resource Strain     Difficulty of Paying Living Expenses: Not hard at all      Med Affordability: No   Food Insecurity: No Food Insecurity (10/13/2023)    Food Insecurity     Food Insecurity: Never true   Transportation Needs: No Transportation Needs (10/13/2023)    Transportation Needs     Lack of Transportation: No   Housing Stability: Low Risk  (10/13/2023)    Housing Stability     Housing Instability: No           Objective:    Telemetry: NSR         /59   Pulse 72   Resp 14   SpO2 98%  WT: 197 lbs    Wt Readings from Last 6 Encounters:   01/22/24 195 lb 3.2 oz (88.5 kg)   12/29/23 185 lb 3.2 oz (84 kg)   12/19/23 193 lb (87.5 kg)   11/20/23 190 lb (86.2 kg)   11/06/23 184 lb 9.6 oz (83.7 kg)   10/15/23 183 lb 13.8 oz (83.4 kg)       Lab Results   Component Value Date    CREATSERUM 1.51 02/05/2024    BUN 44 02/05/2024     02/05/2024    K 4.3 02/05/2024    CL 97 02/05/2024    CO2 35.0 02/05/2024     02/05/2024    CA 9.4 02/05/2024       Recent Results (from the past 24 hour(s))   Basic Metabolic Panel (8)    Collection Time: 02/05/24  1:02 PM   Result Value Ref Range    Glucose 178 (H) 70 - 99 mg/dL    Sodium 136 136 - 145 mmol/L    Potassium 4.3 3.5 - 5.1 mmol/L    Chloride 97 (L) 98 - 112 mmol/L    CO2 35.0 (H) 21.0 - 32.0 mmol/L    Anion Gap 4 0 - 18 mmol/L    BUN 44 (H) 9 - 23 mg/dL    Creatinine 1.51 (H) 0.55 - 1.02 mg/dL    Calcium, Total 9.4 8.5 - 10.1 mg/dL    Calculated Osmolality 298 (H) 275 - 295 mOsm/kg    eGFR-Cr 32 (L) >=60 mL/min/1.73m2    Patient Fasting for BMP? Patient not present    Pro Beta Natriuretic Peptide    Collection Time: 02/05/24  1:02 PM   Result Value Ref Range    Pro-Beta Natriuretic Peptide 1,650 (H) <450 pg/mL         Current Outpatient Medications:     metOLazone 2.5 MG Oral Tab, Take 1 tablet (2.5 mg total) by mouth one time for 1 dose. Take a one time dose on Thursday, 2/8., Disp: 1 tablet, Rfl: 0    hydrALAZINE 25 MG Oral Tab, Take 1 tablet (25 mg total) by mouth 3 (three) times daily. Hold if SBP is <110, Disp: , Rfl:      bumetanide 2 MG Oral Tab, Take 1 tablet (2 mg total) by mouth 2 (two) times daily., Disp: 60 tablet, Rfl: 1    predniSONE 10 MG Oral Tab, Take1 tab PO daily and may alternate with 5mg tabs if respiratory status remains stable, Disp: 30 tablet, Rfl: 1    metoprolol tartrate 100 MG Oral Tab, Take 0.5 tablets (50 mg total) by mouth 2 (two) times daily., Disp: , Rfl:     losartan 50 MG Oral Tab, Take 1 tablet (50 mg total) by mouth 2 (two) times daily., Disp: , Rfl:     predniSONE 5 MG Oral Tab, Take 1 tablet (5 mg total) by mouth daily., Disp: 30 tablet, Rfl: 1    potassium chloride 20 MEQ Oral Tab CR, Take 1 tablet (20 mEq total) by mouth daily., Disp: , Rfl:     levothyroxine 137 MCG Oral Tab, Take 137 mcg by mouth before breakfast., Disp: 90 tablet, Rfl: 3    ipratropium-albuterol 0.5-2.5 (3) MG/3ML Inhalation Solution, Take 3 mL by nebulization every 6 (six) hours as needed., Disp: 360 mL, Rfl: 3    budesonide 0.5 MG/2ML Inhalation Suspension, Take 2 mL (0.5 mg total) by nebulization 2 (two) times daily., Disp: 360 mL, Rfl: 3    cyanocobalamin 250 MCG Oral Tab, Take 1 tablet (250 mcg total) by mouth daily., Disp: , Rfl:     Cholecalciferol (VITAMIN D-3 OR), Take by mouth daily., Disp: , Rfl:     Cranberry 500 MG Oral Cap, Take 500 mg by mouth daily., Disp: , Rfl:     sodium chloride 0.9 % Inhalation Nebu Soln, Take 3 mL by nebulization as needed for Wheezing., Disp: 360 mL, Rfl: 3    acetaminophen 500 MG Oral Tab, Take 1 tablet (500 mg total) by mouth every 6 (six) hours., Disp: 30 tablet, Rfl: 0    albuterol (PROAIR HFA) 108 (90 Base) MCG/ACT Inhalation Aero Soln, Inhale 2 puffs into the lungs every 6 (six) hours as needed for Wheezing or Shortness of Breath., Disp: 1 each, Rfl: 11    Cod Liver Oil 1000 MG Oral Cap, Take 1 capsule by mouth daily., Disp: , Rfl:     Calcium Carbonate-Vitamin D (CALTRATE 600+D OR), Take 1 tablet by mouth daily.  , Disp: , Rfl:     Multiple Vitamins-Minerals (CENTRUM SILVER OR), Take  1 tablet by mouth daily.  , Disp: , Rfl:     Exam:   General:         Alert, in no apparent distress  HEENT:          +JVD  Lungs:            Slightly diminished                     CV:                  S1, S2 regular  Abdomen:       distended, semi-firm, non-tender, BS+  Extremities:    +3 pitting LE edema up into thighs, chronic lymphedema  Neuro:             A&O x 3  Skin:                Pink, warm, dry    Education:  Patient instructed regarding sodium restricted diet, low sodium foods, fluid restriction, daily weights, medication regimen, s/s HF exacerbation and when to call APN/clinic.    Assessment:   Chronic diastolic heart failure, ACC/AHA Stage C, NYHA Class 3- LVEF 55-60% with grade 2 DD on echo 3/2023. Prior BVA inconclusive, patient does not want to repeat test. Last ProBNP  2,065 in November, now trending down to 1,650. Avoiding MRA due to chronic hyponatremia. SGTL2i cost prohibitive. Hypervolemic.   Essential HTN -Labile. Low last visit promptly decrease in Hydralazine that she never did. Recent increase in Losartan and Hydralazine due to worsening hypertension. Home BP's reported in the 130-170's.  LBBB  Hyponatremia, chronic - Hx of Tolvaptan. Labile. Worsens with hypervolemia. Na 133 today.   Anemia - last HGB 12.4 g/dl and overall stable. Last Iron sat 20, Ferritin 155.8 in June.    Chronic lymphedema - using lymphedema boots occasionally. Wrapping legs and has legs massaged. Not a candidate for vein treatment due to minimal reflux on prior US. Follows with Dr. Villatoro.   CKD stage 3b - unknown baseline creatinine. SGLT2i is cost prohibited. Follows with Dr. Walton/Dr. Reyes. Saw Dr. Reyes 11/20; restarted ARB since SBP was in the 160's.   Morbid obesity -Body mass index is 43.76 kg/m².  COPD/asthma - nocturnal oxygen use, inhalers. Follows with Dr. England and Karen Gonzalez. On Volara for airway clearance. On alternating dose Prednisone, 5 mg and 10 mg daily.   Possible DANE - not agreeable to  sleep study in past since she would not ever use a device.   Hx chronic tobacco use - quit 30 years ago, 20 pack years.  DM type 2 - well controlled. Last a1c 6.5% on 11/2, on no medications.    Hypothyroidism - on Levothyroxine. TSH 2.657 on 12/10. Followed by PCP.   Hx ecoli UTI - s/p abx.   Hx Hyperkalemia, hypokalemia.   HLD - FLP on 11/2 with triglycerides 539, , HDL 66.     Plan:   Diuril 500mg IVP x 1  Bumex 4mg IVP x 1  Will try one dose of Metolazone on Thursday at home. See if she responds.   Return to clinic in 1 week.  F/U  with Dr. Villatoro regularly, last seen 5/2023 with recommended follow up in 4-5 months.     CHF discharge instructions given    >55 minutes spent with patient and greater than 50% of the time was spent counseling and coordinating care.    DAMIR Poon

## 2024-02-07 ENCOUNTER — TELEPHONE (OUTPATIENT)
Dept: CARDIOLOGY CLINIC | Facility: HOSPITAL | Age: 89
End: 2024-02-07

## 2024-02-07 NOTE — TELEPHONE ENCOUNTER
[facility] pharmacist called to clarify metolazone prescription as there could be a potential allergy to sulfa, and bactrim is on her allergy list. Reviewed with patient and EZEKIEL Ornelas. Patient does not have a true allergy to bastrIm, only nausea reaction. Called patient and she confirmed she does not have a sulfa allergy, or any allergic reaction before to any medication. [facility] notified and will fill prescription.

## 2024-02-09 ENCOUNTER — TELEPHONE (OUTPATIENT)
Dept: FAMILY MEDICINE CLINIC | Facility: CLINIC | Age: 89
End: 2024-02-09

## 2024-02-11 DIAGNOSIS — J44.1 COPD EXACERBATION (HCC): ICD-10-CM

## 2024-02-11 DIAGNOSIS — J41.8 MIXED SIMPLE AND MUCOPURULENT CHRONIC BRONCHITIS (HCC): ICD-10-CM

## 2024-02-11 DIAGNOSIS — J96.11 CHRONIC RESPIRATORY FAILURE WITH HYPOXIA (HCC): Primary | ICD-10-CM

## 2024-02-11 DIAGNOSIS — R05.9 COUGH, UNSPECIFIED TYPE: ICD-10-CM

## 2024-02-11 DIAGNOSIS — J45.50 SEVERE PERSISTENT ASTHMA WITHOUT COMPLICATION: ICD-10-CM

## 2024-02-12 DIAGNOSIS — I50.32 CHRONIC HEART FAILURE WITH PRESERVED EJECTION FRACTION (HFPEF) (HCC): Primary | ICD-10-CM

## 2024-02-12 RX ORDER — PREDNISONE 5 MG/1
5 TABLET ORAL DAILY
Qty: 90 TABLET | Refills: 3 | Status: SHIPPED | OUTPATIENT
Start: 2024-02-12

## 2024-02-12 RX ORDER — LOSARTAN POTASSIUM 50 MG/1
50 TABLET ORAL 2 TIMES DAILY
Qty: 180 TABLET | Refills: 0 | Status: SHIPPED | OUTPATIENT
Start: 2024-02-12

## 2024-02-12 NOTE — TELEPHONE ENCOUNTER
A refill request was received for:  Requested Prescriptions     Pending Prescriptions Disp Refills    LOSARTAN 50 MG Oral Tab [Pharmacy Med Name: LOSARTAN 50MG TABLETS] 180 tablet 0     Sig: TAKE 1 TABLET(50 MG) BY MOUTH TWICE DAILY       Last refill date:       Last office visit: 11/14/2023    Follow up due:  Future Appointments   Date Time Provider Department Center   2/14/2024  2:00 PM PERIOP/VAT [facility]   2/14/2024  3:00 PM HEART FAILURE APN 1 [facility] HF CLIN [facility]   2/28/2024  2:00 PM Karen Gonzalez APRN  [facility]

## 2024-02-12 NOTE — TELEPHONE ENCOUNTER
Received RX refill request for: prednisone.  Pt has next scheduled appt: 02/28/24.  Pt last OV: . Last OV note states: \"may alternate prednisone 10mg and 5mg every other day for 1 week if respiratory status remains stable then decrease back to 5 mg.\"    RX pended and routed to provider.

## 2024-02-14 ENCOUNTER — HOSPITAL ENCOUNTER (OUTPATIENT)
Dept: PERIOP | Facility: HOSPITAL | Age: 89
Discharge: HOME | End: 2024-02-14
Attending: NURSE PRACTITIONER
Payer: MEDICARE

## 2024-02-14 ENCOUNTER — HOSPITAL ENCOUNTER (OUTPATIENT)
Dept: CARDIOLOGY CLINIC | Facility: HOSPITAL | Age: 89
Discharge: HOME | End: 2024-02-14
Attending: NURSE PRACTITIONER
Payer: MEDICARE

## 2024-02-14 VITALS
BODY MASS INDEX: 43 KG/M2 | RESPIRATION RATE: 18 BRPM | HEART RATE: 70 BPM | SYSTOLIC BLOOD PRESSURE: 144 MMHG | WEIGHT: 192.38 LBS | DIASTOLIC BLOOD PRESSURE: 65 MMHG | OXYGEN SATURATION: 96 %

## 2024-02-14 DIAGNOSIS — I89.0 LYMPHEDEMA: ICD-10-CM

## 2024-02-14 DIAGNOSIS — I10 ESSENTIAL HYPERTENSION: ICD-10-CM

## 2024-02-14 DIAGNOSIS — I50.32 CHRONIC HEART FAILURE WITH PRESERVED EJECTION FRACTION (HFPEF) (HCC): Primary | ICD-10-CM

## 2024-02-14 DIAGNOSIS — E03.9 ACQUIRED HYPOTHYROIDISM: ICD-10-CM

## 2024-02-14 DIAGNOSIS — N18.30 STAGE 3 CHRONIC KIDNEY DISEASE, UNSPECIFIED WHETHER STAGE 3A OR 3B CKD (HCC): ICD-10-CM

## 2024-02-14 DIAGNOSIS — E87.1 HYPONATREMIA: ICD-10-CM

## 2024-02-14 LAB
ANION GAP SERPL CALC-SCNC: 4 MMOL/L (ref 0–18)
BUN BLD-MCNC: 42 MG/DL (ref 9–23)
CALCIUM BLD-MCNC: 9.2 MG/DL (ref 8.5–10.1)
CHLORIDE SERPL-SCNC: 96 MMOL/L (ref 98–112)
CO2 SERPL-SCNC: 34 MMOL/L (ref 21–32)
CREAT BLD-MCNC: 1.19 MG/DL
EGFRCR SERPLBLD CKD-EPI 2021: 43 ML/MIN/1.73M2 (ref 60–?)
GLUCOSE BLD-MCNC: 133 MG/DL (ref 70–99)
OSMOLALITY SERPL CALC.SUM OF ELEC: 290 MOSM/KG (ref 275–295)
POTASSIUM SERPL-SCNC: 4.2 MMOL/L (ref 3.5–5.1)
SODIUM SERPL-SCNC: 134 MMOL/L (ref 136–145)
T4 FREE SERPL-MCNC: 1.2 NG/DL (ref 0.8–1.7)
TSI SER-ACNC: 6.94 MIU/ML (ref 0.36–3.74)

## 2024-02-14 PROCEDURE — 99215 OFFICE O/P EST HI 40 MIN: CPT | Performed by: NURSE PRACTITIONER

## 2024-02-14 RX ORDER — HYDRALAZINE HYDROCHLORIDE 25 MG/1
TABLET, FILM COATED ORAL
Qty: 120 TABLET | Refills: 3 | Status: SHIPPED | OUTPATIENT
Start: 2024-02-14

## 2024-02-14 RX ORDER — METOLAZONE 2.5 MG/1
2.5 TABLET ORAL
Qty: 6 TABLET | Refills: 1 | Status: SHIPPED | OUTPATIENT
Start: 2024-02-16

## 2024-02-14 NOTE — PROGRESS NOTES
Pt. Assessed. No signs or symptoms of shortness of breath, fatigue, chest pain or edema noted. Weight stable at 192.4 lbs down 7 lbs since last [facility] appt. Reviewed current list of patient's allergies and medication; updated the Electronic Medical Record. States she took her first dose of metolazone last Friday and tolerated it well (there was some concern about a cross allergy with sulfa, so patient took pill then took her inhaler after). Patient states she also had to take an extra 50 mg po metoprolol 2 times since last [facility] appt as her  systolic blood pressure was >170.      Labs ordered to assess kidney function and drawn by  Lab. Reviewed follow-up appointment and Heart Failure discharge instructions with patient. Patient verbalized an understanding.     Will have labs in 2 weeks and RTC in 3 weeks- will add to call wait list of something opens sooner.

## 2024-02-14 NOTE — PATIENT INSTRUCTIONS
Heart Failure Discharge Instructions    Take Metolazone 2.5mg once weekly, on Fridays.    Take Hydralazine 25mg in the morning and 25mg in the afternoon. Increase Hydralazine in the evenings from 25mg to 50mg.  Needs new scale and new blood pressure machine.  Please have your blood drawn in 2 weeks.     Activity: Regular exercise and activity is important for your overall health and to help keep your heart strong and functioning as well as possible.   Walk at a slow to moderate pace for 15-20 minutes 3-5 days per week.     Follow these instructions every day to keep yourself in the Green Zone     The Green Zone means you are feeling well and your symptoms are under control                                    Medications  Take your medication every day as instructed  Do not stop taking your medicine or change the amount you are taking without instructions from your doctor or nurse  Do Not take non-steroidal antiinflammatory drugs such as ibuprofen, aleve, advil, or motrin                                    Diet/Fluids  People with heart failure should eat less sodium (salt) and limit fluid. Sodium attracts water and makes the body hold fluid. This extra fluid makes the heart work harder and can worsen the symptoms of heart failure.     Diet    2000 mg sodium daily  Fluid restriction    64 ounces daily  (8 oz. = 1 cup)                                     Body Weight  Weigh yourself every day before breakfast and write your weight down  Use the same scale and wear about the same amount of clothes each time  A sudden weight increase is due to fluid retention rather than fat                                         Activity  Pace your activities to avoid getting overtired  Take rest periods as needed  Elevate your feet to reduce ankle swelling when resting                             Signs of Worsening Heart Failure    You are entering the Yellow Zone - this is a warning zone    Call your doctor or nurse if you have any of  these signs or symptoms:  You gain 2 or more pounds overnight or 3-5 pounds in 3-7 days  You have more trouble breathing  You get more tired with regular activity, or are limiting activity because of shortness of breath or fatigue  You are short of breath lying down, you need more pillows to breathe comfortably,  or wake up during the night short of breath  You urinate less often during the day and more often at night  You have a bloated feeling, upset stomach, loss of appetite, or your clothes are fitting tighter    GO TO THE EMERGENCY DEPARTMENT or CALL 911 IF:    These are signs you are in the RED ZONE - THIS IS AN EMERGENCY  You have tightness or pain in your chest  You are extremely short of breath or can't catch your breath  You cough up frothy pink mucous  You feel confused or can't think clearly  You are traveling and develop symptoms of worsening heart failure     We respect everyone's time and availability. Please be aware that this is not a walk-in clinic and we require appointments in order to facilitate timely care for all patients. We ask you to arrive 30 minutes before your appointment to allow time for you to check-in and have your bloodwork drawn. Please understand if you are late for your appointment, you may be asked to reschedule. If possible, all attempts will be made to accommodate but realize this is no guarantee that this will always be available. We understand there are extenuating circumstances. If you need to cancel or reschedule your appointment, please call the [facility] within 24 hours at [phone].  Thank you for your cooperation, [facility] Staff.    IF YOU HAVE QUESTIONS REGARDING YOUR BILL, FEEL FREE TO CONTACT [facility] PATIENT ACCOUNTS -[phone]. IF YOU NEED FINANCIAL ASSISTANCE, PLEASE CALL AN [facility] FINANCIAL COUNSELOR -[phone].             [facility]     [phone]

## 2024-02-14 NOTE — PROGRESS NOTES
[facility] Progress Note    Nancy Hensley is a 89 year old female who presents to clinic for APN assessment and management of chronic diastolic heart failure and is functional class 3.     Subjective:  Since her last clinic visit on 2/5; when she received IV Diuril 500mg and Bumex 4mg, and was instructed to take a one time dose of Metolazone on 2/7 to see how she responds, her weight is down 7 lbs. She notes improvement in her breathing. She said she noticed increased urine output with the metolazone. did not think either Torsemide or Bumex had been working. Her home weights range from 184-187 lbs. She continues to have her legs massaged and occasionally wears compression stockings using lymphedema pumps at night Her home BP's have been in the 130-170's systolic, more elevated during the evening. She checks BP 2x per day. She took an extra 50mg of Metoprolol for 2 days due to elevated BP in the evening up to 170's systolic.      She saw Karen Gonzalez 1/2 and Prednisone was reduced to 5 mg every other day with 10  mg on alternate days for a week and then to decrease the dose further to 5 mg daily if doing well. She had follow up virtual visit on 1/18; doing well on alternating dose of Prednisone. To continue same dose for now.       Hospitalizations:     [facility] from 12/9-12/11 for shortness of breath, generalized pain and cough that started that same morning. She was found to be bradycardic in the 30's. Found to have mild pulmonary edema on CXR. Diuresed with IV Bumex. She was seen by Renal and ARB was discontinued; which was not on last clinic visit but restarted by Dr. Reyes in November. [facility] saw her on 12/13, reviewed with PCP, and Cozaar was increased to 50mg BID and Hydralazine was increased 25mg TID for worsening hypertension with SBP's in the 170's, to hold if SBP less than 110.      Hospitalized from 10/13-10/15 with worsening shortness of breath,  thought to be a COPD exacerbation. She was treated with IV steroids and nebs. She was weaned to oral steroids. She was seen by Nephrology due to her hyponatremia and hyperkalemia, given Tolvaptan and Veltassa.       Review of Systems   Constitutional: Positive for weight loss.   Cardiovascular:  Positive for dyspnea on exertion (improved) and leg swelling (alittle better).   Respiratory: Negative.     Gastrointestinal:  Positive for bloating.       HISTORY:  Past Medical History:   Diagnosis Date    Accelerated hypertension 2014    Asthma     Cough 2016    Disorder of thyroid     Dyspnea 2016    Dyspnea, unspecified type 2016    Endocrine disorder     HYPOTHYROID    Osteoarthrosis, unspecified whether generalized or localized, unspecified site     Other and unspecified hyperlipidemia     Pneumonia, organism unspecified(486)     Severe persistent asthma with status asthmaticus 2019    Thyroid disease     Unspecified essential hypertension     Ventricular ectopy 1/15/2019    Visual impairment       Past Surgical History:   Procedure Laterality Date    KNEE REPLACEMENT SURGERY      OTHER SURGICAL HISTORY      TOTAL KNEE REPLACEMENT Bilateral     16 yrs ago      Family History   Problem Relation Age of Onset    Cancer Father         bladder    Heart Disorder Mother     Hypertension Mother     Diabetes Mother       Social History     Socioeconomic History    Marital status:    Tobacco Use    Smoking status: Former     Packs/day: 2.50     Years: 30.00     Additional pack years: 0.00     Total pack years: 75.00     Types: Cigarettes     Quit date: 1991     Years since quittin.5    Smokeless tobacco: Never   Vaping Use    Vaping Use: Never used   Substance and Sexual Activity    Alcohol use: Not Currently     Comment: 2 times per month    Drug use: No   Other Topics Concern    Caffeine Concern Yes    Exercise Yes     Social Determinants of Health     Financial Resource Strain: Low Risk   (5/15/2023)    Financial Resource Strain     Difficulty of Paying Living Expenses: Not hard at all     Med Affordability: No   Food Insecurity: No Food Insecurity (10/13/2023)    Food Insecurity     Food Insecurity: Never true   Transportation Needs: No Transportation Needs (10/13/2023)    Transportation Needs     Lack of Transportation: No   Housing Stability: Low Risk  (10/13/2023)    Housing Stability     Housing Instability: No           Objective:    Telemetry: NSR       /65   Pulse 70   Resp 18   Wt 192 lb 6.4 oz (87.3 kg)   SpO2 96%   BMI 43.14 kg/m²     Wt Readings from Last 6 Encounters:   02/14/24 192 lb 6.4 oz (87.3 kg)   01/22/24 195 lb 3.2 oz (88.5 kg)   12/29/23 185 lb 3.2 oz (84 kg)   12/19/23 193 lb (87.5 kg)   11/20/23 190 lb (86.2 kg)   11/06/23 184 lb 9.6 oz (83.7 kg)       Lab Results   Component Value Date    CREATSERUM 1.19 02/14/2024    BUN 42 02/14/2024     02/14/2024    K 4.2 02/14/2024    CL 96 02/14/2024    CO2 34.0 02/14/2024     02/14/2024    CA 9.2 02/14/2024    T4F 1.2 02/14/2024    TSH 6.940 02/14/2024       Recent Results (from the past 24 hour(s))   Basic Metabolic Panel (8)    Collection Time: 02/14/24  2:22 PM   Result Value Ref Range    Glucose 133 (H) 70 - 99 mg/dL    Sodium 134 (L) 136 - 145 mmol/L    Potassium 4.2 3.5 - 5.1 mmol/L    Chloride 96 (L) 98 - 112 mmol/L    CO2 34.0 (H) 21.0 - 32.0 mmol/L    Anion Gap 4 0 - 18 mmol/L    BUN 42 (H) 9 - 23 mg/dL    Creatinine 1.19 (H) 0.55 - 1.02 mg/dL    Calcium, Total 9.2 8.5 - 10.1 mg/dL    Calculated Osmolality 290 275 - 295 mOsm/kg    eGFR-Cr 43 (L) >=60 mL/min/1.73m2    Patient Fasting for BMP? Patient not present    TSH W Reflex To Free T4    Collection Time: 02/14/24  2:22 PM   Result Value Ref Range    TSH 6.940 (H) 0.358 - 3.740 mIU/mL   T4, FREE (S)    Collection Time: 02/14/24  2:22 PM   Result Value Ref Range    Free T4 1.2 0.8 - 1.7 ng/dL         Current Outpatient Medications:     hydrALAZINE 25  MG Oral Tab, Take 1 tablet (25 mg total) by mouth every morning AND 1 tablet (25 mg total) Every afternoon at 2:00 pm AND 2 tablets (50 mg total) every evening. Hold if SBP is <110., Disp: 120 tablet, Rfl: 3    [START ON 2/16/2024] metOLazone 2.5 MG Oral Tab, Take 1 tablet (2.5 mg total) by mouth Every Friday., Disp: 6 tablet, Rfl: 1    losartan 50 MG Oral Tab, Take 1 tablet (50 mg total) by mouth 2 (two) times daily., Disp: 180 tablet, Rfl: 0    PREDNISONE 5 MG Oral Tab, TAKE 1 TABLET(5 MG) BY MOUTH DAILY, Disp: 90 tablet, Rfl: 3    bumetanide 2 MG Oral Tab, Take 1 tablet (2 mg total) by mouth 2 (two) times daily., Disp: 60 tablet, Rfl: 1    predniSONE 10 MG Oral Tab, Take1 tab PO daily and may alternate with 5mg tabs if respiratory status remains stable, Disp: 30 tablet, Rfl: 1    metoprolol tartrate 100 MG Oral Tab, Take 0.5 tablets (50 mg total) by mouth 2 (two) times daily., Disp: , Rfl:     potassium chloride 20 MEQ Oral Tab CR, Take 1 tablet (20 mEq total) by mouth daily., Disp: , Rfl:     levothyroxine 137 MCG Oral Tab, Take 137 mcg by mouth before breakfast., Disp: 90 tablet, Rfl: 3    ipratropium-albuterol 0.5-2.5 (3) MG/3ML Inhalation Solution, Take 3 mL by nebulization every 6 (six) hours as needed., Disp: 360 mL, Rfl: 3    budesonide 0.5 MG/2ML Inhalation Suspension, Take 2 mL (0.5 mg total) by nebulization 2 (two) times daily., Disp: 360 mL, Rfl: 3    cyanocobalamin 250 MCG Oral Tab, Take 1 tablet (250 mcg total) by mouth daily., Disp: , Rfl:     Cholecalciferol (VITAMIN D-3 OR), Take by mouth daily., Disp: , Rfl:     Cranberry 500 MG Oral Cap, Take 500 mg by mouth daily., Disp: , Rfl:     sodium chloride 0.9 % Inhalation Nebu Soln, Take 3 mL by nebulization as needed for Wheezing., Disp: 360 mL, Rfl: 3    acetaminophen 500 MG Oral Tab, Take 1 tablet (500 mg total) by mouth every 6 (six) hours., Disp: 30 tablet, Rfl: 0    albuterol (PROAIR HFA) 108 (90 Base) MCG/ACT Inhalation Aero Soln, Inhale 2 puffs  into the lungs every 6 (six) hours as needed for Wheezing or Shortness of Breath., Disp: 1 each, Rfl: 11    Cod Liver Oil 1000 MG Oral Cap, Take 1 capsule by mouth daily., Disp: , Rfl:     Calcium Carbonate-Vitamin D (CALTRATE 600+D OR), Take 1 tablet by mouth daily.  , Disp: , Rfl:     Multiple Vitamins-Minerals (CENTRUM SILVER OR), Take 1 tablet by mouth daily.  , Disp: , Rfl:     Exam:   General:         Alert, in no apparent distress  HEENT:          hard to assess due to body habitus  Lungs:            CTAB                     CV:                  S1, S2 regular  Abdomen:       Non-distended, soft, non-tender, BS+  Extremities:    +2-3 pitting LE edema with chronic lymphedema  Neuro:             A&O x 3  Skin:                Pink, warm, dry    Education:  Patient instructed regarding sodium restricted diet, low sodium foods, fluid restriction, daily weights, medication regimen, s/s HF exacerbation and when to call APN/clinic.    Assessment:   Chronic diastolic heart failure, ACC/AHA Stage C, NYHA Class 3- LVEF 55-60% with grade 2 DD on echo 3/2023. Prior BVA inconclusive, patient does not want to repeat test. Last ProBNP  2,065 in November, now trending down to 1,650. Avoiding MRA due to chronic hyponatremia. SGTL2i cost prohibitive. Volume status improving.   Essential HTN - Labile. Low last visit promptly decrease in Hydralazine that she never did. Recent increase in Losartan and Hydralazine due to worsening hypertension. Home BP's reported in the 130-170's.  LBBB  Hyponatremia, chronic - Hx of Tolvaptan. Labile. Na 134 today.   Anemia - last HGB 12.4 g/dl and overall stable. Last Iron sat 20, Ferritin 155.8 in June.    Chronic lymphedema - using lymphedema pumps. Wrapping legs and has legs massaged. Not a candidate for vein treatment due to minimal reflux on prior US. Follows with Dr. Villatoro.   CKD stage 3b - unknown baseline creatinine. SGLT2i is cost prohibited. Follows with Dr. Walton/Dr. Reyes. Saw  Dr. Reyes 11/20; restarted ARB since SBP was in the 160's recently.   Morbid obesity - Body mass index is 43.14 kg/m².  COPD/asthma - nocturnal oxygen use, inhalers. Follows with Dr. England and Karen Gonzalez. On Volara for airway clearance. On alternating dose Prednisone, 5 mg and 10 mg daily.   Possible DANE - not agreeable to sleep study in past since she would not ever use a device.   Hx chronic tobacco use - quit 30 years ago, 20 pack years.  DM type 2 - well controlled. Last a1c 6.5% on 11/2, on no medications.    Hypothyroidism - on Levothyroxine. TSH 6.940 today, awaiting Free T4. Followed by PCP.   Hx ecoli UTI - s/p abx.   Hx Hyperkalemia, hypokalemia.   HLD - FLP on 11/2 with triglycerides 539, , HDL 66.     Plan:   Take Metolazone 2.5mg once weekly, on Fridays.    Recurrent worsening hypertension in the evenings. Increase Hydralazine in the evenings from 25mg to 50mg. Continue taking 25mg in the morning and 25mg in the afternoon.   Return to clinic in 3 weeks. Did not have any available appts before that time.   BMP in 2 weeks.   Recommended getting a new scale and new blood pressure.   F/U with Dr. Villatoro regularly, last seen 5/2023 with recommended follow up in 4-5 months.    CHF discharge instructions given    45 minutes spent with patient and greater than 50% of the time was spent counseling and coordinating care.    DAMIR Poon

## 2024-02-19 ENCOUNTER — TELEPHONE (OUTPATIENT)
Dept: CARDIOLOGY CLINIC | Facility: HOSPITAL | Age: 89
End: 2024-02-19

## 2024-02-19 NOTE — TELEPHONE ENCOUNTER
Called patient's daughter in law, Yohana, per patient request, to review open appointments on wait list for this week.     Yohana stated she would like to keep the March 6 appt that was previously scheduled for now. She further stated that when Nancy took her second dose of metolazone last Friday, she didn't feel good and likely was not going to take the medication anymore. When asked specifically what happened when she took pill, she stated her head felt \"weird\" and felt like she had a hard time moving for 3 hours. It then resolved. Yohana also noted Nancy's thyroid medication had recently changed. Yohana also inquired if Nancy would need the ordered labs in 2 weeks if Nancy was not going to take the metolazone anymore.       Told Yohana the above yonny be reviewed with Klarissa FALCON and will call Yohana back with an update. She verbalized an understanding.

## 2024-02-20 NOTE — TELEPHONE ENCOUNTER
Started taking metOLazone 2.5 MG Oral Tab on last Friday.  Since then she is dizzy,weak,tired, lethargic        Also taking Increase in thyroid meds.  Wants to know if one of these could be causing her symptoms    Needs a call back

## 2024-02-20 NOTE — TELEPHONE ENCOUNTER
Pt started on Metalazone 2.5mg on Friday.  Since then feeling weak, tired, fatigue more than usual    Pt also has changed her levo to 150mcg.        Her b/p today 142/72, has been ranging 130-160 systolic since Friday.  Did drop on Friday to 102/43.   No other symptoms    Could change in meds be causing her weakness?    Pt has appt 2/27/24

## 2024-02-27 ENCOUNTER — PATIENT MESSAGE (OUTPATIENT)
Dept: FAMILY MEDICINE CLINIC | Facility: CLINIC | Age: 89
End: 2024-02-27

## 2024-02-27 ENCOUNTER — TELEMEDICINE (OUTPATIENT)
Dept: FAMILY MEDICINE CLINIC | Facility: CLINIC | Age: 89
End: 2024-02-27
Payer: MEDICARE

## 2024-02-27 DIAGNOSIS — E03.9 HYPOTHYROIDISM, UNSPECIFIED TYPE: ICD-10-CM

## 2024-02-27 DIAGNOSIS — M19.90 ARTHRITIS: Primary | ICD-10-CM

## 2024-02-27 PROCEDURE — 99213 OFFICE O/P EST LOW 20 MIN: CPT | Performed by: FAMILY MEDICINE

## 2024-02-27 RX ORDER — MELOXICAM 7.5 MG/1
7.5 TABLET ORAL DAILY PRN
Qty: 30 TABLET | Refills: 5 | Status: SHIPPED | OUTPATIENT
Start: 2024-02-27 | End: 2024-03-06

## 2024-02-27 NOTE — TELEPHONE ENCOUNTER
From: Nancy Hensley  To: Thomas Henning  Sent: 2/27/2024 12:03 PM CST  Subject: Video call scheduled today at 11:30. We waited 30 minutes until 12:00 nobody showed???    We waited 30 minutes no one showed for our video appointment.   well nourished

## 2024-02-28 ENCOUNTER — TELEMEDICINE (OUTPATIENT)
Facility: CLINIC | Age: 89
End: 2024-02-28
Payer: MEDICARE

## 2024-02-28 DIAGNOSIS — J41.8 MIXED SIMPLE AND MUCOPURULENT CHRONIC BRONCHITIS (HCC): ICD-10-CM

## 2024-02-28 DIAGNOSIS — J45.50 SEVERE PERSISTENT ASTHMA WITHOUT COMPLICATION (HCC): ICD-10-CM

## 2024-02-28 DIAGNOSIS — R05.9 COUGH, UNSPECIFIED TYPE: ICD-10-CM

## 2024-02-28 DIAGNOSIS — J96.11 CHRONIC RESPIRATORY FAILURE WITH HYPOXIA (HCC): Primary | ICD-10-CM

## 2024-02-28 PROCEDURE — 99214 OFFICE O/P EST MOD 30 MIN: CPT | Performed by: NURSE PRACTITIONER

## 2024-02-28 RX ORDER — PREDNISONE 10 MG/1
TABLET ORAL
Qty: 30 TABLET | Refills: 3 | Status: SHIPPED | OUTPATIENT
Start: 2024-02-28

## 2024-02-28 RX ORDER — AZITHROMYCIN 250 MG/1
TABLET, FILM COATED ORAL
Qty: 6 TABLET | Refills: 0 | Status: SHIPPED | OUTPATIENT
Start: 2024-02-28

## 2024-02-28 RX ORDER — PROMETHAZINE HYDROCHLORIDE 25 MG/1
3 TABLET ORAL AS NEEDED
Qty: 360 ML | Refills: 3 | Status: SHIPPED | OUTPATIENT
Start: 2024-02-28

## 2024-02-28 NOTE — PROGRESS NOTES
Subjective:   Patient ID: Nancy Hensley is a 89 year old female.    + joint pains.  + hypothyroidism.  Joint pains increased when she increased levothyroxine. At same time changed to stronger diuretic.          History/Other:   Review of Systems   All other systems reviewed and are negative.    Current Outpatient Medications   Medication Sig Dispense Refill    levothyroxine 137 MCG Oral Tab Take 137 mcg by mouth before breakfast.      metoprolol tartrate 50 MG Oral Tab Take 1 tablet (50 mg total) by mouth 2 (two) times daily.      hydrALAZINE 25 MG Oral Tab Take 1 tablet (25 mg total) by mouth every morning AND 1 tablet (25 mg total) Every afternoon at 2:00 pm AND 1 tablet (25 mg total) every evening. Hold if SBP is <110.      torsemide 20 MG Oral Tab Take 2 tablets (40 mg total) by mouth 2 (two) times daily. 120 tablet 1    sodium chloride 0.9 % Inhalation Nebu Soln Take 3 mL by nebulization as needed for Wheezing. 360 mL 3    predniSONE 10 MG Oral Tab Take1 tab PO daily 30 tablet 3    losartan 50 MG Oral Tab Take 1 tablet (50 mg total) by mouth 2 (two) times daily. 180 tablet 0    PREDNISONE 5 MG Oral Tab TAKE 1 TABLET(5 MG) BY MOUTH DAILY 90 tablet 3    potassium chloride 20 MEQ Oral Tab CR Take 1 tablet (20 mEq total) by mouth daily.      ipratropium-albuterol 0.5-2.5 (3) MG/3ML Inhalation Solution Take 3 mL by nebulization every 6 (six) hours as needed. 360 mL 3    budesonide 0.5 MG/2ML Inhalation Suspension Take 2 mL (0.5 mg total) by nebulization 2 (two) times daily. 360 mL 3    cyanocobalamin 250 MCG Oral Tab Take 1 tablet (250 mcg total) by mouth daily.      Cholecalciferol (VITAMIN D-3 OR) Take by mouth daily.      Cranberry 500 MG Oral Cap Take 500 mg by mouth daily.      acetaminophen 500 MG Oral Tab Take 1 tablet (500 mg total) by mouth every 6 (six) hours. 30 tablet 0    albuterol (PROAIR HFA) 108 (90 Base) MCG/ACT Inhalation Aero Soln Inhale 2 puffs into the lungs every 6 (six) hours as needed  for Wheezing or Shortness of Breath. 1 each 11    Cod Liver Oil 1000 MG Oral Cap Take 1 capsule by mouth daily.      Calcium Carbonate-Vitamin D (CALTRATE 600+D OR) Take 1 tablet by mouth daily.        Multiple Vitamins-Minerals (CENTRUM SILVER OR) Take 1 tablet by mouth daily.         Allergies:  Allergies   Allergen Reactions    Oxycodone HALLUCINATION    Bactrim [Sulfamethoxazole W/Trimethoprim] NAUSEA ONLY    Ace Inhibitors Coughing    Statins Coughing       Objective:   Physical Exam  Video visit    Assessment & Plan:   1. Arthritis    2. Hypothyroidism, unspecified type      Video visit  1. Arthritis  - Meloxicam 7.5 MG Oral Tab; Take 1 tablet (7.5 mg total) by mouth daily as needed for Pain.  Dispense: 30 tablet; Refill: 5    2) hypothyroidism  Stay on same dose.    Meds This Visit:  Requested Prescriptions      No prescriptions requested or ordered in this encounter       Imaging & Referrals:  None

## 2024-02-28 NOTE — PATIENT INSTRUCTIONS
Continue nebs, mucinex, prednisone 10mg daily and start zpak.   Followup in 2 weeks   Please contact office at [phone] with any decline in respiratory status, questions or concerns   Call 911 or go to the nearest ER with worsening symptoms

## 2024-02-28 NOTE — PROGRESS NOTES
[facility] General Pulmonary Progress Note    History of Present Illness:  Nancy Hensley is a 89 year old female with significant PMH asthma and bronchitis who presents today for follow up. Reports overall not feeling well the past 6 weeks; increased cough with white-yellow phlegm, chest congestion, chills. HHRN told pt she heard congestion in the left lower lung.  Still using nebs as directed, has been taking prednisone 10mg daily for the past couple days. Feels the saline nebs helps the most. No fever or night sweats. Using oxygen 2L at night.     Previously 1/2024  a 89 year old female with significant PMH Asthma who presents today for follow up. Reports feeling \"Glorious\". Breathing is good; tolerating prednisone 5mg daily. Using nebs as directed and oxygen 2L at night. No f/c/ns, cough or congestion.      Previously 11/2023  a 89 year old female with significant PMH asthma who presents today for sick 2 week follow up. Reports she feels more congested, not able to cough up phlegm, heard wheezing last night. Denies increased SOB. Using budesonide and duonebs and prednisone 10mg daily. No f/c/ns. Not using saline nebs     Previously 10/24/23  a 89 year old female with significant PMHx of asthma who presents today for follow up. Pt recently hospitalized for AECOPD 10/13-10/15/23 after noticing increased SOB and right sided chest discomfort, treated with steroids and nebs. No Abx. Completed prednisone post discharge yesterday. Feels well today; mild cough remains. No f/c/ns. Using budesonide BID, Duonebs q 5-6 hrs and saline nebs prn. Using oxygen 2L at night only.     Previously 9/2023  a 89 year old female with significant PMH of asthma who presents today for follow up s/p Abx and steroids. Currently taking prednisone 20mg daily, completed ABx. Reports feeling \"much, much better\". Not using arformoterol and prefers to not take it.      Previously 8/29/23   a 89 year old female with significant PMH asthma/COPD who  presents today for evaluation of increased cough, chest congestion, wheeze and SOB. Decreased prednisone from 10mg to 5mg after our last visit. Restarted Arformoterol on her own and felt great initially then after 5-7 days had a harder time coughing up phlegm. She has since stopped the Arformoterol and increased prednisone from 5 to 10mg for the past 3 days as her sx worsened.  Denies f/ns; + chills. Unable to cough up phlegm. Currently using Budesonide nebs and Duonebs BID.      Previously 8/8/23    presents today for follow up. Reports breathing is perfect. Very happy with new medications. Continues on prednisone 10mg as she has increased cough on 5mg. Denies new complaints.      Previously 7/27/23 Dr England  a 89 year old female with hx of Asthma, CKD, and CHF who presents for follow up of asthma/COPD. She reports having been well until today when she felt her cough worsen with white phlegm. Again after clearing the phlegm she feels well.  She did use arformoterol +budesonide in early June but did not feel better so stopped - of note she stopped using duoneb altogether during this time. No pain. No fevers. +BLE edema     June 2023 previously  She was hospitalized in May with CHF exacerbation and hyponatremia, her breathing was controlled during her hospitalization. After going home she developed chest congestion and frequent coughing. She has continued to use her nebulizers - budesonide BID, duoneb PRN and saline nebs PRN. She remains on baseline pred 5mg daily  No fevers. No pain/pleurisy  Weight is up and +BLE - planning to see cardiology soon      May 2023 previously  Discharged  last week after treated for fluid overload. Overall feels well; breathing is significantly better than prior to hospitalization. Pt hospitalized from 5/5-5/11/23 with Acute HFpEF with acute pulmonary edema, BNP > 5000, diuresed on lasix gtt with > 4L of fluid removed. Meds adjusted per cardiologist, plan for labs and CXR today and  plan to go to the heart failure clinic next week. No f/c/ns.  Currently with occasional cough and CORONA. BLE edema unchanged per pt.     Previously 3/23/23 Dr England  89 year old female former smoker (quit 30 years ago, 20 pack years) with significant PMH asthma, HTN and obesity who presents today for follow up. She was hospitalized with acute asthma exacerbation and CHF exacerbation, managed on steroids, nebs and diuretics with improvement. She feels better today, breathing is back to normal. Has sore throat and some mild dysphagia over the last week or so. She is not watching her weight of fluid balance.      Previously  She has followed with me for several years and managed for asthma. She has had variable exacerbations of her asthma in the past leading to initiation of chronic steroids which was weaned to 5mg daily with good success and mitigation of repeated asthma exacerbations. She presents today without acute concern. Notes occasional cough/phlegm, relieved with nebs. Using budesonide neb once to twice a day. Using advair BID. Using duoneb prn and only needing to use a few times/week. No fevers. No pain or pleurisy.  Limited mobility due to BLE swelling, denies CORONA.        Past Medical History:   Past Medical History:   Diagnosis Date    Accelerated hypertension 1/24/2014    Asthma (HCC)     Cough 8/5/2016    Disorder of thyroid     Dyspnea 8/4/2016    Dyspnea, unspecified type 8/4/2016    Endocrine disorder     HYPOTHYROID    Osteoarthrosis, unspecified whether generalized or localized, unspecified site     Other and unspecified hyperlipidemia     Pneumonia, organism unspecified(486)     Severe persistent asthma with status asthmaticus (HCC) 12/30/2019    Thyroid disease     Unspecified essential hypertension     Ventricular ectopy 1/15/2019    Visual impairment         Past Surgical History:   Past Surgical History:   Procedure Laterality Date    KNEE REPLACEMENT SURGERY      OTHER SURGICAL HISTORY       TOTAL KNEE REPLACEMENT Bilateral     16 yrs ago         Family Medical History:   Family History   Problem Relation Age of Onset    Cancer Father         bladder    Heart Disorder Mother     Hypertension Mother     Diabetes Mother         Social History:   Social History     Socioeconomic History    Marital status:      Spouse name: Not on file    Number of children: Not on file    Years of education: Not on file    Highest education level: Not on file   Occupational History    Not on file   Tobacco Use    Smoking status: Former     Packs/day: 2.50     Years: 30.00     Additional pack years: 0.00     Total pack years: 75.00     Types: Cigarettes     Quit date: 1991     Years since quittin.5    Smokeless tobacco: Never   Vaping Use    Vaping Use: Never used   Substance and Sexual Activity    Alcohol use: Not Currently     Comment: 2 times per month    Drug use: No    Sexual activity: Not on file   Other Topics Concern     Service Not Asked    Blood Transfusions Not Asked    Caffeine Concern Yes    Occupational Exposure Not Asked    Hobby Hazards Not Asked    Sleep Concern Not Asked    Stress Concern Not Asked    Weight Concern Not Asked    Special Diet Not Asked    Back Care Not Asked    Exercise Yes    Bike Helmet Not Asked    Seat Belt Not Asked    Self-Exams Not Asked   Social History Narrative    Not on file     Social Determinants of Health     Financial Resource Strain: Low Risk  (5/15/2023)    Financial Resource Strain     Difficulty of Paying Living Expenses: Not hard at all     Med Affordability: No   Food Insecurity: No Food Insecurity (10/13/2023)    Food Insecurity     Food Insecurity: Never true   Transportation Needs: No Transportation Needs (10/13/2023)    Transportation Needs     Lack of Transportation: No   Physical Activity: Not on file   Stress: Not on file   Social Connections: Not on file   Housing Stability: Low Risk  (10/13/2023)    Housing Stability     Housing  Instability: No     Housing Instability Emergency: Not on file        Medications:   Current Outpatient Medications   Medication Sig Dispense Refill    azithromycin 250 MG Oral Tab take 2 tablet (500MG)  by ORAL route  every day for 1 day then 1 tablet (250 mg) by oral route once daily for 4 days 6 tablet 0    sodium chloride 0.9 % Inhalation Nebu Soln Take 3 mL by nebulization as needed for Wheezing. 360 mL 3    predniSONE 10 MG Oral Tab Take1 tab PO daily 30 tablet 3    Meloxicam 7.5 MG Oral Tab Take 1 tablet (7.5 mg total) by mouth daily as needed for Pain. 30 tablet 5    hydrALAZINE 25 MG Oral Tab Take 1 tablet (25 mg total) by mouth every morning AND 1 tablet (25 mg total) Every afternoon at 2:00 pm AND 2 tablets (50 mg total) every evening. Hold if SBP is <110. 120 tablet 3    metOLazone 2.5 MG Oral Tab Take 1 tablet (2.5 mg total) by mouth Every Friday. 6 tablet 1    levothyroxine 150 MCG Oral Tab Take 1 tablet (150 mcg total) by mouth before breakfast. 90 tablet 3    losartan 50 MG Oral Tab Take 1 tablet (50 mg total) by mouth 2 (two) times daily. 180 tablet 0    PREDNISONE 5 MG Oral Tab TAKE 1 TABLET(5 MG) BY MOUTH DAILY 90 tablet 3    bumetanide 2 MG Oral Tab Take 1 tablet (2 mg total) by mouth 2 (two) times daily. 60 tablet 1    metoprolol tartrate 100 MG Oral Tab Take 0.5 tablets (50 mg total) by mouth 2 (two) times daily.      potassium chloride 20 MEQ Oral Tab CR Take 1 tablet (20 mEq total) by mouth daily.      levothyroxine 137 MCG Oral Tab Take 137 mcg by mouth before breakfast. 90 tablet 3    ipratropium-albuterol 0.5-2.5 (3) MG/3ML Inhalation Solution Take 3 mL by nebulization every 6 (six) hours as needed. 360 mL 3    budesonide 0.5 MG/2ML Inhalation Suspension Take 2 mL (0.5 mg total) by nebulization 2 (two) times daily. 360 mL 3    cyanocobalamin 250 MCG Oral Tab Take 1 tablet (250 mcg total) by mouth daily.      Cholecalciferol (VITAMIN D-3 OR) Take by mouth daily.      Cranberry 500 MG Oral  Cap Take 500 mg by mouth daily.      acetaminophen 500 MG Oral Tab Take 1 tablet (500 mg total) by mouth every 6 (six) hours. 30 tablet 0    albuterol (PROAIR HFA) 108 (90 Base) MCG/ACT Inhalation Aero Soln Inhale 2 puffs into the lungs every 6 (six) hours as needed for Wheezing or Shortness of Breath. 1 each 11    Cod Liver Oil 1000 MG Oral Cap Take 1 capsule by mouth daily.      Calcium Carbonate-Vitamin D (CALTRATE 600+D OR) Take 1 tablet by mouth daily.        Multiple Vitamins-Minerals (CENTRUM SILVER OR) Take 1 tablet by mouth daily.           Review of Systems: Review of Systems   Constitutional:  Positive for fatigue. Negative for activity change, appetite change, chills, diaphoresis, fever and unexpected weight change.   HENT:  Negative for congestion, postnasal drip, rhinorrhea, sinus pressure, sinus pain, sneezing, sore throat, trouble swallowing and voice change.    Respiratory:  Positive for cough and wheezing. Negative for apnea, choking, chest tightness, shortness of breath and stridor.    Cardiovascular:  Negative for chest pain, palpitations and leg swelling.   Musculoskeletal:  Negative for arthralgias.   Skin:  Negative for pallor and rash.   Allergic/Immunologic: Negative for immunocompromised state.   Neurological:  Negative for dizziness and headaches.   Hematological:  Negative for adenopathy.        Physical Exam:  There were no vitals taken for this visit.     Constitutional: alert, cooperative. No acute distress.  HEENT: Head NC/AT.   Respiratory: Thorax symmetrical with no labored breathing.   Neurologic: A&Ox3. No gross motor deficits.  Skin:  dry  Psych: Calm, cooperative. Pleasant affect.    Results:  Personally reviewed    WBC: 9.3, done on 1/22/2024.  HGB: 12.4, done on 1/22/2024.  PLT: 280, done on 1/22/2024.     Glucose: 133, done on 2/14/2024.  Cr: 1.19, done on 2/14/2024.  GFR(AA): 40, done on 7/23/2022.  GFR (non-AA): 35, done on 7/23/2022.  CA: 9.2, done on 2/14/2024.  Na: 134,  done on 2/14/2024.  K: 4.2, done on 2/14/2024.  Cl: 96, done on 2/14/2024.  CO2: 34, done on 2/14/2024.  Last ALB was 3.6% done on 11/2/2023.     No results found.     Assessment/Plan:  #1. Asthma   Severe, persistent  Has had multiple exacerbations in the past, improved on chronic prednisone 5mg daily  Changed from advair to budesonide neb BID; does not feel that arformoterol helped.  Will continue on budesonide BID and duonebs PRN  Continue duonebs and saline nebs PRN  Given her chronic cough and multiple exacerbations over the past year with multiple hospitalizations, limited response to current airway clearance therapies, she would be a great candidate for oxcillating lung therapy/ lung expansion therapy, will plan to proceed with Volara airway clearance to be used with her neb medications  Will increase pred to 10mg daily for this week. Will have her f/u in 2 weeks to reassess with hope to deescalate back to 5mg daily  We had previously reviewed options to consider biologics but she has declined but now she is agreeable to proceed - given information for Dr. Escamilla  Encouraged to exercise and lose weight  We have discussed concern for DANE/OHS in the past with recommendation for PSG. However she is opposed to testing for sleep apnea and denies that she would ever use a device to help her sleep  8/8/2023 Improved; less SOB and minimal cough with light yellow phlegm. Recommend to continue current nebs; budesonide BID, Duonebs once daily and saline prn. Will try to decrease prednisone to 5mg daily and may alternate if cough returns. Discussed longterm effects of chronic steroids. Will followup in 4 weeks by [facility] unless symptomatic  8/29/2023 Reports np cough, chest congestion, wheeze, slight chills and SOB. Recommend prednisone taper, zpak and increase duonebs to 3-4 times a day. Discussed going to the ER if sx worsen  9/2023 Improved; minimal congestion today due to the weather but otherwise breathing has been  better. Will continue to hold arformoterol and take budesonide and duonebs BID. Will continue to decrease prednisone to 10mg and try to decrease to 5mg when weather improves; may alternate 10mg and 5mg if needed.   10/2023 s/p AE asthma, hospitalized 10/1310/15/23 treated with steroids and nebs. Will remain on prednisone 10mg daily and re-evaluate in 2 weeks and if stable will return to alternate 10mg and 5mg every other day. CCT including duonebs QID, budesonide BID and saline nebs prn.  11/2023 Stable; continue nebs and prednisone 10mg daily. Will give zpak to cover for AE asthma   12/2023 Improved; CCT (duonebs q 5-6hrs, budesonide nebs BID, saline prn) including prednisone 5mg daily  2/2024 s/sx c/w mild flareup; will continue nebs, prednisone 10mg daily and start zpak.   Followup in 2 weeks or sooner if needed     #2. Hypoxia, chronic  Hypoxia on exertion diagnosed on last hospitalization and has since improved  Continues to use oxygen with sleep  She continues to use and benefit from supplemental o2  8/2023 Using and benefiting from oxygen tx  9/2023 Using 2L at night only  10/2023 Using and benefitting from oxygen tx; using 2L at night ilia  11/2023 Stable; CCT  12/2023 Stable; using and benefiting from oxygen tx  Using 2L at night only  2/2024 Using and benefiting from oxygen tx; 2L at noc     #3. Chronic bronchitis with mucus plugging  As above  8/2023 See above  9/2023 Improved; see above  10/2023 Improved; will contineu to monitor  11/2023 See above; will restart saline nebs, continue mucinex  12/2023 Improved; CCT including saline nebs when increased congestion  2/2024 s/sx c/w with increased congestion. Recommended to continue saline nebs and mucolytics     #4. CHF  Following with heart failure clinic and cardiology, remains on diuretics     DAMIR Yin  [facility]   2/28/2024    This visit is conducted using Telemedicine with live, interactive video and audio.    Patient has been referred  to the [facility] website at [email or web address] to review the yearly Consent to Treat document.    Patient understands and accepts financial responsibility for any deductible, co-insurance and/or co-pays associated with this service.

## 2024-03-04 DIAGNOSIS — I50.32 CHRONIC HEART FAILURE WITH PRESERVED EJECTION FRACTION (HFPEF) (HCC): Primary | ICD-10-CM

## 2024-03-06 ENCOUNTER — HOSPITAL ENCOUNTER (OUTPATIENT)
Dept: CARDIOLOGY CLINIC | Facility: HOSPITAL | Age: 89
Discharge: HOME | End: 2024-03-06
Attending: NURSE PRACTITIONER
Payer: MEDICARE

## 2024-03-06 ENCOUNTER — HOSPITAL ENCOUNTER (OUTPATIENT)
Dept: PERIOP | Facility: HOSPITAL | Age: 89
Discharge: HOME | End: 2024-03-06
Attending: NURSE PRACTITIONER
Payer: MEDICARE

## 2024-03-06 VITALS
HEART RATE: 75 BPM | RESPIRATION RATE: 21 BRPM | WEIGHT: 198.63 LBS | OXYGEN SATURATION: 95 % | DIASTOLIC BLOOD PRESSURE: 75 MMHG | SYSTOLIC BLOOD PRESSURE: 121 MMHG | BODY MASS INDEX: 45 KG/M2

## 2024-03-06 DIAGNOSIS — I50.32 CHRONIC HEART FAILURE WITH PRESERVED EJECTION FRACTION (HFPEF) (HCC): Primary | ICD-10-CM

## 2024-03-06 DIAGNOSIS — E87.1 HYPONATREMIA: ICD-10-CM

## 2024-03-06 DIAGNOSIS — E03.9 ACQUIRED HYPOTHYROIDISM: ICD-10-CM

## 2024-03-06 DIAGNOSIS — I89.0 LYMPHEDEMA: ICD-10-CM

## 2024-03-06 DIAGNOSIS — N18.30 STAGE 3 CHRONIC KIDNEY DISEASE, UNSPECIFIED WHETHER STAGE 3A OR 3B CKD (HCC): ICD-10-CM

## 2024-03-06 LAB
ANION GAP SERPL CALC-SCNC: 3 MMOL/L (ref 0–18)
BUN BLD-MCNC: 40 MG/DL (ref 9–23)
CALCIUM BLD-MCNC: 9.1 MG/DL (ref 8.5–10.1)
CHLORIDE SERPL-SCNC: 94 MMOL/L (ref 98–112)
CO2 SERPL-SCNC: 31 MMOL/L (ref 21–32)
CREAT BLD-MCNC: 1.38 MG/DL
EGFRCR SERPLBLD CKD-EPI 2021: 36 ML/MIN/1.73M2 (ref 60–?)
FASTING STATUS PATIENT QL REPORTED: NO
GLUCOSE BLD-MCNC: 115 MG/DL (ref 70–99)
OSMOLALITY SERPL CALC.SUM OF ELEC: 277 MOSM/KG (ref 275–295)
POTASSIUM SERPL-SCNC: 4.4 MMOL/L (ref 3.5–5.1)
SODIUM SERPL-SCNC: 128 MMOL/L (ref 136–145)
T4 FREE SERPL-MCNC: 1.4 NG/DL (ref 0.8–1.7)
TSI SER-ACNC: 5.39 MIU/ML (ref 0.36–3.74)

## 2024-03-06 PROCEDURE — 99215 OFFICE O/P EST HI 40 MIN: CPT | Performed by: NURSE PRACTITIONER

## 2024-03-06 RX ORDER — HYDRALAZINE HYDROCHLORIDE 25 MG/1
TABLET, FILM COATED ORAL
COMMUNITY
Start: 2024-03-06

## 2024-03-06 RX ORDER — TORSEMIDE 20 MG/1
40 TABLET ORAL 2 TIMES DAILY
Qty: 120 TABLET | Refills: 1 | Status: SHIPPED | OUTPATIENT
Start: 2024-03-06

## 2024-03-06 RX ORDER — TOLVAPTAN 15 MG/1
15 TABLET ORAL ONCE
Status: COMPLETED | OUTPATIENT
Start: 2024-03-06 | End: 2024-03-06

## 2024-03-06 RX ORDER — METOPROLOL TARTRATE 50 MG/1
50 TABLET, FILM COATED ORAL 2 TIMES DAILY
COMMUNITY
Start: 2023-12-11

## 2024-03-06 RX ADMIN — TOLVAPTAN 15 MG: 15 TABLET ORAL at 14:30:00

## 2024-03-06 NOTE — PROGRESS NOTES
Escorted patient from [facility] where labs were drawn and IV placed. Pt. Assessed with daughter in law present. Pt. complaining of edema to lower legs. +3 edema to feet. States She had an episode of low heart rate in 40's yesterday. She rested for 3 hours and checked HR again, and it was in 60's. Weight up 6 lbs at 198.6. APN notified of above.    Labs ordered and drawn by  Lab. Reviewed allergies and list of current medications with patient and updated it in the Electronic Medical Record. Patient requesting to change from Bumex to torsemide because she believes she feels better on torsemide. States she has \"too many side effects\" from Bumex including tingling in head, shortness of breath and back pain. Will address with APN      Patient tolerated it well. Educated patient on low sodium diet and food choices, fluid restriction of 2 liters, and daily weights. Reviewed follow-up appointments and discharge Heart Failure instructions with patient. Patient verbalized an understanding. IV discontinued; catheter intact. Pressure held and gauze applied.       WILL RTC IN 1 WEEK

## 2024-03-06 NOTE — PROGRESS NOTES
[facility] Progress Note    Nancy Hensley is a 89 year old female who presents to clinic for APN assessment and management of chronic diastolic heart failure and is functional class 3.     Subjective:  Since her last clinic visit on 2/14; she had reported over the phone that after her second dose of Metolazone she did not feel well and did not want to take the medication anymore, her weight is up 6 lbs. Her home weight has remained stable from ~187-188 lbs, up from prior 184-187 lbs. She is using her new scale the last 2 weeks. She did stop the Metolazone and restarted a wekk in a half ago and has take two doses since that time. When she takes her Metolazone, she feels like her head gets very hot, pain in her shoulders and notes bradycardia (but was not recorded). She also has these symptoms intermittently when she takes oral Bumex. She tolerates IV Bumex. Her home BP's have been very labile, 120-160's systolic, previously more elevated during the evening. She checks BP 3x per day. Her evening dose of Hydralazine had been increased to 50mg for worsening hypertension, but she reduced her dose back to 25mg TID.     She had a televisit with Dr. Henning for her arthritis. He started her on Meloxicam (NSAID) for pain. She also had a televisit with Karen FALCON, Pulmonary on 2/28 for asthma flare. Continued nebs, prednisone 10mg daily and started zpack.     She continues to have her legs massaged and occasionally wears compression stockings using lymphedema pumps at night    She saw Karen Gonzalez 1/2 and Prednisone was reduced to 5 mg every other day with 10  mg on alternate days for a week and then to decrease the dose further to 5 mg daily if doing well. She had follow up virtual visit on 1/18; doing well on alternating dose of Prednisone. To continue same dose for now.       Hospitalizations:     [facility] from 12/9-12/11 for shortness of breath, generalized pain and  cough that started that same morning. She was found to be bradycardic in the 30's. Found to have mild pulmonary edema on CXR. Diuresed with IV Bumex. She was seen by Renal and ARB was discontinued; which was not on last clinic visit but restarted by Dr. Reyes in November. [facility] saw her on 12/13, reviewed with PCP, and Cozaar was increased to 50mg BID and Hydralazine was increased 25mg TID for worsening hypertension with SBP's in the 170's, to hold if SBP less than 110.      Hospitalized from 10/13-10/15 with worsening shortness of breath, thought to be a COPD exacerbation. She was treated with IV steroids and nebs. She was weaned to oral steroids. She was seen by Nephrology due to her hyponatremia and hyperkalemia, given Tolvaptan and Veltassa.     Other Specialty visits:      Last Hospitalizations/ED visits:    ROS    HISTORY:  Past Medical History:   Diagnosis Date    Accelerated hypertension 1/24/2014    Asthma ([facility])     Cough 8/5/2016    Disorder of thyroid     Dyspnea 8/4/2016    Dyspnea, unspecified type 8/4/2016    Endocrine disorder     HYPOTHYROID    Osteoarthrosis, unspecified whether generalized or localized, unspecified site     Other and unspecified hyperlipidemia     Pneumonia, organism unspecified(486)     Severe persistent asthma with status asthmaticus (HCC) 12/30/2019    Thyroid disease     Unspecified essential hypertension     Ventricular ectopy 1/15/2019    Visual impairment       Past Surgical History:   Procedure Laterality Date    KNEE REPLACEMENT SURGERY      OTHER SURGICAL HISTORY      TOTAL KNEE REPLACEMENT Bilateral     16 yrs ago      Family History   Problem Relation Age of Onset    Cancer Father         bladder    Heart Disorder Mother     Hypertension Mother     Diabetes Mother       Social History     Socioeconomic History    Marital status:    Tobacco Use    Smoking status: Former     Packs/day: 2.50     Years: 30.00     Additional pack years: 0.00     Total pack  years: 75.00     Types: Cigarettes     Quit date: 1991     Years since quittin.6    Smokeless tobacco: Never   Vaping Use    Vaping Use: Never used   Substance and Sexual Activity    Alcohol use: Not Currently     Comment: 2 times per month    Drug use: No   Other Topics Concern    Caffeine Concern Yes    Exercise Yes     Social Determinants of Health     Financial Resource Strain: Low Risk  (5/15/2023)    Financial Resource Strain     Difficulty of Paying Living Expenses: Not hard at all     Med Affordability: No   Food Insecurity: No Food Insecurity (10/13/2023)    Food Insecurity     Food Insecurity: Never true   Transportation Needs: No Transportation Needs (10/13/2023)    Transportation Needs     Lack of Transportation: No   Housing Stability: Low Risk  (10/13/2023)    Housing Stability     Housing Instability: No           Objective:    Telemetry:          There were no vitals taken for this visit.    Wt Readings from Last 6 Encounters:   24 192 lb 6.4 oz (87.3 kg)   24 195 lb 3.2 oz (88.5 kg)   23 185 lb 3.2 oz (84 kg)   23 193 lb (87.5 kg)   23 190 lb (86.2 kg)   23 184 lb 9.6 oz (83.7 kg)            No results found for this or any previous visit (from the past 24 hour(s)).      Current Outpatient Medications:     azithromycin 250 MG Oral Tab, take 2 tablet (500MG)  by ORAL route  every day for 1 day then 1 tablet (250 mg) by oral route once daily for 4 days, Disp: 6 tablet, Rfl: 0    sodium chloride 0.9 % Inhalation Nebu Soln, Take 3 mL by nebulization as needed for Wheezing., Disp: 360 mL, Rfl: 3    predniSONE 10 MG Oral Tab, Take1 tab PO daily, Disp: 30 tablet, Rfl: 3    Meloxicam 7.5 MG Oral Tab, Take 1 tablet (7.5 mg total) by mouth daily as needed for Pain., Disp: 30 tablet, Rfl: 5    hydrALAZINE 25 MG Oral Tab, Take 1 tablet (25 mg total) by mouth every morning AND 1 tablet (25 mg total) Every afternoon at 2:00 pm AND 2 tablets (50 mg total) every evening.  Hold if SBP is <110., Disp: 120 tablet, Rfl: 3    metOLazone 2.5 MG Oral Tab, Take 1 tablet (2.5 mg total) by mouth Every Friday., Disp: 6 tablet, Rfl: 1    levothyroxine 150 MCG Oral Tab, Take 1 tablet (150 mcg total) by mouth before breakfast., Disp: 90 tablet, Rfl: 3    losartan 50 MG Oral Tab, Take 1 tablet (50 mg total) by mouth 2 (two) times daily., Disp: 180 tablet, Rfl: 0    PREDNISONE 5 MG Oral Tab, TAKE 1 TABLET(5 MG) BY MOUTH DAILY, Disp: 90 tablet, Rfl: 3    bumetanide 2 MG Oral Tab, Take 1 tablet (2 mg total) by mouth 2 (two) times daily., Disp: 60 tablet, Rfl: 1    metoprolol tartrate 100 MG Oral Tab, Take 0.5 tablets (50 mg total) by mouth 2 (two) times daily., Disp: , Rfl:     potassium chloride 20 MEQ Oral Tab CR, Take 1 tablet (20 mEq total) by mouth daily., Disp: , Rfl:     levothyroxine 137 MCG Oral Tab, Take 137 mcg by mouth before breakfast., Disp: 90 tablet, Rfl: 3    ipratropium-albuterol 0.5-2.5 (3) MG/3ML Inhalation Solution, Take 3 mL by nebulization every 6 (six) hours as needed., Disp: 360 mL, Rfl: 3    budesonide 0.5 MG/2ML Inhalation Suspension, Take 2 mL (0.5 mg total) by nebulization 2 (two) times daily., Disp: 360 mL, Rfl: 3    cyanocobalamin 250 MCG Oral Tab, Take 1 tablet (250 mcg total) by mouth daily., Disp: , Rfl:     Cholecalciferol (VITAMIN D-3 OR), Take by mouth daily., Disp: , Rfl:     Cranberry 500 MG Oral Cap, Take 500 mg by mouth daily., Disp: , Rfl:     acetaminophen 500 MG Oral Tab, Take 1 tablet (500 mg total) by mouth every 6 (six) hours., Disp: 30 tablet, Rfl: 0    albuterol (PROAIR HFA) 108 (90 Base) MCG/ACT Inhalation Aero Soln, Inhale 2 puffs into the lungs every 6 (six) hours as needed for Wheezing or Shortness of Breath., Disp: 1 each, Rfl: 11    Cod Liver Oil 1000 MG Oral Cap, Take 1 capsule by mouth daily., Disp: , Rfl:     Calcium Carbonate-Vitamin D (CALTRATE 600+D OR), Take 1 tablet by mouth daily.  , Disp: , Rfl:     Multiple Vitamins-Minerals (CENTRUM  SILVER OR), Take 1 tablet by mouth daily.  , Disp: , Rfl:     Exam:   General:         Alert, in no apparent distress  HEENT:          +8cm JVD  Lungs:            expiratory rales                     CV:                  S1, S2 regular  Abdomen:       distended, semi-firm, non-tender, BS+  Extremities:    +3 pitting LE edema, chronic lymphedema  Neuro:             A&O x 3  Skin:                Pink, warm, dry    Education:  Patient instructed regarding sodium restricted diet, low sodium foods, fluid restriction, daily weights, medication regimen, s/s HF exacerbation and when to call APN/clinic.    Assessment:   Chronic diastolic heart failure, ACC/AHA Stage C, NYHA Class 3- LVEF 55-60% with grade 2 DD on echo 3/2023. Prior BVA inconclusive, patient does not want to repeat test. Last ProBNP  2,065 in November, now trending down to 1,650. Avoiding MRA due to chronic hyponatremia. SGTL2i cost prohibitive. Volume status improving.   Essential HTN - Labile. Recent increase in Losartan and Hydralazine due to worsening hypertension. Hydralazine recently increased, but she decreased herself back to original dose. Home BP's reported in the 120-160's.  LBBB  Hyponatremia, chronic - Hx Tolvaptan. Labile. Na 128 today likely due to metolazone use.   Anemia - last HGB 12.4 g/dl and overall stable. Last Iron sat 20, Ferritin 155.8 in June.    Chronic lymphedema - using lymphedema pumps. Wraps legs and has legs massaged. Not a candidate for vein treatment due to minimal reflux on prior US. Follows with Dr. Villatoro.   CKD stage 3b - unknown baseline creatinine. SGLT2i is cost prohibited. Follows with Dr. Walton/Dr. Reyes. Saw Dr. Reyes 11/20; restarted ARB since SBP was in the 160's recently.   Morbid obesity - Body mass index is 44.53 kg/m².  COPD/asthma - nocturnal oxygen use, inhalers. Follows with Dr. England and Karen Gonzalez. On Volara for airway clearance. On alternating dose Prednisone, 5 mg and 10 mg daily.   Possible  DANE - not agreeable to sleep study in past since she would not ever use a device.   Hx chronic tobacco use - quit 30 years ago, 20 pack years.  DM type 2 - well controlled. Last a1c 6.5% on 11/2, on no medications.    Hypothyroidism - on Levothyroxine. TSH 5.390 today, Free T4 1.4. Followed by PCP.   Hx ecoli UTI - s/p abx.   Hx Hyperkalemia, hypokalemia.   HLD - FLP on 11/2 with triglycerides 539, , HDL 66.     Plan:   Tolvaptan 15mg PO x 1  Stop Metolazone.  Stop Bumex. (Patient requesting a switch back to Torsemide due to side effects)  Start Torsemide 40mg twice per day.  Return to clinic in 1 week.  F/U  with Dr. Villatoro in about 4 months.   CHF discharge instructions given    >50 minutes spent with patient and greater than 50% of the time was spent counseling and coordinating care.    DAMIR Poon

## 2024-03-06 NOTE — PATIENT INSTRUCTIONS
Heart Failure Discharge Instructions    Stop Metolazone.  Stop Bumex.  Start Torsemide 40mg twice per day.    Activity: Regular exercise and activity is important for your overall health and to help keep your heart strong and functioning as well as possible.   Walk at a slow to moderate pace for 15-20 minutes 3-5 days per week.     Follow these instructions every day to keep yourself in the Green Zone     The Green Zone means you are feeling well and your symptoms are under control                                    Medications  Take your medication every day as instructed  Do not stop taking your medicine or change the amount you are taking without instructions from your doctor or nurse  Do Not take non-steroidal antiinflammatory drugs such as ibuprofen, aleve, advil, or motrin                                    Diet/Fluids  People with heart failure should eat less sodium (salt) and limit fluid. Sodium attracts water and makes the body hold fluid. This extra fluid makes the heart work harder and can worsen the symptoms of heart failure.     Diet    2000 mg sodium daily  Fluid restriction    64 ounces daily  (8 oz. = 1 cup)                                     Body Weight  Weigh yourself every day before breakfast and write your weight down  Use the same scale and wear about the same amount of clothes each time  A sudden weight increase is due to fluid retention rather than fat                                         Activity  Pace your activities to avoid getting overtired  Take rest periods as needed  Elevate your feet to reduce ankle swelling when resting                             Signs of Worsening Heart Failure    You are entering the Yellow Zone - this is a warning zone    Call your doctor or nurse if you have any of these signs or symptoms:  You gain 2 or more pounds overnight or 3-5 pounds in 3-7 days  You have more trouble breathing  You get more tired with regular activity, or are limiting activity  because of shortness of breath or fatigue  You are short of breath lying down, you need more pillows to breathe comfortably,  or wake up during the night short of breath  You urinate less often during the day and more often at night  You have a bloated feeling, upset stomach, loss of appetite, or your clothes are fitting tighter    GO TO THE EMERGENCY DEPARTMENT or CALL 911 IF:    These are signs you are in the RED ZONE - THIS IS AN EMERGENCY  You have tightness or pain in your chest  You are extremely short of breath or can't catch your breath  You cough up frothy pink mucous  You feel confused or can't think clearly  You are traveling and develop symptoms of worsening heart failure     We respect everyone's time and availability. Please be aware that this is not a walk-in clinic and we require appointments in order to facilitate timely care for all patients. We ask you to arrive 30 minutes before your appointment to allow time for you to check-in and have your bloodwork drawn. Please understand if you are late for your appointment, you may be asked to reschedule. If possible, all attempts will be made to accommodate but realize this is no guarantee that this will always be available. We understand there are extenuating circumstances. If you need to cancel or reschedule your appointment, please call the [facility] within 24 hours at [phone].  Thank you for your cooperation, [facility] Staff.    IF YOU HAVE QUESTIONS REGARDING YOUR BILL, FEEL FREE TO CONTACT [facility] PATIENT ACCOUNTS -[phone]. IF YOU NEED FINANCIAL ASSISTANCE, PLEASE CALL AN [facility] FINANCIAL COUNSELOR -[phone].             [facility]     [phone]

## 2024-03-11 ENCOUNTER — MED REC SCAN ONLY (OUTPATIENT)
Facility: CLINIC | Age: 89
End: 2024-03-11

## 2024-03-12 DIAGNOSIS — I50.32 CHRONIC HEART FAILURE WITH PRESERVED EJECTION FRACTION (HFPEF) (HCC): Primary | ICD-10-CM

## 2024-03-13 ENCOUNTER — TELEMEDICINE (OUTPATIENT)
Facility: CLINIC | Age: 89
End: 2024-03-13
Payer: MEDICARE

## 2024-03-13 DIAGNOSIS — J45.50 SEVERE PERSISTENT ASTHMA WITHOUT COMPLICATION (HCC): Primary | ICD-10-CM

## 2024-03-13 DIAGNOSIS — R05.9 COUGH, UNSPECIFIED TYPE: ICD-10-CM

## 2024-03-13 DIAGNOSIS — J41.8 MIXED SIMPLE AND MUCOPURULENT CHRONIC BRONCHITIS (HCC): ICD-10-CM

## 2024-03-13 DIAGNOSIS — J96.11 CHRONIC RESPIRATORY FAILURE WITH HYPOXIA (HCC): ICD-10-CM

## 2024-03-13 PROCEDURE — 99213 OFFICE O/P EST LOW 20 MIN: CPT | Performed by: NURSE PRACTITIONER

## 2024-03-13 NOTE — PROGRESS NOTES
[facility] General Pulmonary Progress Note    History of Present Illness:  Nancy Hensley is a 89 year old female with significant PMH asthma who presents today for follow up s/p Abx for increased chest congestion. Overall feels ok; no new complaints. Has good days and some not so good. No f/c/ns.    Previously 2/28/24  a 89 year old female with significant PMH asthma and bronchitis who presents today for follow up. Reports overall not feeling well the past 6 weeks; increased cough with white-yellow phlegm, chest congestion, chills. HHRN told pt she heard congestion in the left lower lung.  Still using nebs as directed, has been taking prednisone 10mg daily for the past couple days. Feels the saline nebs helps the most. No fever or night sweats. Using oxygen 2L at night.      Previously 1/2024  a 89 year old female with significant PMH Asthma who presents today for follow up. Reports feeling \"Glorious\". Breathing is good; tolerating prednisone 5mg daily. Using nebs as directed and oxygen 2L at night. No f/c/ns, cough or congestion.      Previously 11/2023  a 89 year old female with significant PMH asthma who presents today for sick 2 week follow up. Reports she feels more congested, not able to cough up phlegm, heard wheezing last night. Denies increased SOB. Using budesonide and duonebs and prednisone 10mg daily. No f/c/ns. Not using saline nebs     Previously 10/24/23  a 89 year old female with significant PMHx of asthma who presents today for follow up. Pt recently hospitalized for AECOPD 10/13-10/15/23 after noticing increased SOB and right sided chest discomfort, treated with steroids and nebs. No Abx. Completed prednisone post discharge yesterday. Feels well today; mild cough remains. No f/c/ns. Using budesonide BID, Duonebs q 5-6 hrs and saline nebs prn. Using oxygen 2L at night only.     Previously 9/2023  a 89 year old female with significant PMH of asthma who presents today for follow up s/p Abx and steroids.  Currently taking prednisone 20mg daily, completed ABx. Reports feeling \"much, much better\". Not using arformoterol and prefers to not take it.      Previously 8/29/23   a 89 year old female with significant PMH asthma/COPD who presents today for evaluation of increased cough, chest congestion, wheeze and SOB. Decreased prednisone from 10mg to 5mg after our last visit. Restarted Arformoterol on her own and felt great initially then after 5-7 days had a harder time coughing up phlegm. She has since stopped the Arformoterol and increased prednisone from 5 to 10mg for the past 3 days as her sx worsened.  Denies f/ns; + chills. Unable to cough up phlegm. Currently using Budesonide nebs and Duonebs BID.      Previously 8/8/23    presents today for follow up. Reports breathing is perfect. Very happy with new medications. Continues on prednisone 10mg as she has increased cough on 5mg. Denies new complaints.      Previously 7/27/23 Dr England  a 89 year old female with hx of Asthma, CKD, and CHF who presents for follow up of asthma/COPD. She reports having been well until today when she felt her cough worsen with white phlegm. Again after clearing the phlegm she feels well.  She did use arformoterol +budesonide in early June but did not feel better so stopped - of note she stopped using duoneb altogether during this time. No pain. No fevers. +BLE edema     June 2023 previously  She was hospitalized in May with CHF exacerbation and hyponatremia, her breathing was controlled during her hospitalization. After going home she developed chest congestion and frequent coughing. She has continued to use her nebulizers - budesonide BID, duoneb PRN and saline nebs PRN. She remains on baseline pred 5mg daily  No fevers. No pain/pleurisy  Weight is up and +BLE - planning to see cardiology soon      May 2023 previously  Discharged  last week after treated for fluid overload. Overall feels well; breathing is significantly better than prior  to hospitalization. Pt hospitalized from 5/5-5/11/23 with Acute HFpEF with acute pulmonary edema, BNP > 5000, diuresed on lasix gtt with > 4L of fluid removed. Meds adjusted per cardiologist, plan for labs and CXR today and plan to go to the heart failure clinic next week. No f/c/ns.  Currently with occasional cough and CORONA. BLE edema unchanged per pt.     Previously 3/23/23 Dr England  89 year old female former smoker (quit 30 years ago, 20 pack years) with significant PMH asthma, HTN and obesity who presents today for follow up. She was hospitalized with acute asthma exacerbation and CHF exacerbation, managed on steroids, nebs and diuretics with improvement. She feels better today, breathing is back to normal. Has sore throat and some mild dysphagia over the last week or so. She is not watching her weight of fluid balance.      Previously  She has followed with me for several years and managed for asthma. She has had variable exacerbations of her asthma in the past leading to initiation of chronic steroids which was weaned to 5mg daily with good success and mitigation of repeated asthma exacerbations. She presents today without acute concern. Notes occasional cough/phlegm, relieved with nebs. Using budesonide neb once to twice a day. Using advair BID. Using duoneb prn and only needing to use a few times/week. No fevers. No pain or pleurisy.  Limited mobility due to BLE swelling, denies CORONA.     Past Medical History:   Past Medical History:   Diagnosis Date    Accelerated hypertension 1/24/2014    Asthma (HCC)     Cough 8/5/2016    Disorder of thyroid     Dyspnea 8/4/2016    Dyspnea, unspecified type 8/4/2016    Endocrine disorder     HYPOTHYROID    Osteoarthrosis, unspecified whether generalized or localized, unspecified site     Other and unspecified hyperlipidemia     Pneumonia, organism unspecified(486)     Severe persistent asthma with status asthmaticus (HCC) 12/30/2019    Thyroid disease     Unspecified  essential hypertension     Ventricular ectopy 1/15/2019    Visual impairment         Past Surgical History:   Past Surgical History:   Procedure Laterality Date    KNEE REPLACEMENT SURGERY      OTHER SURGICAL HISTORY      TOTAL KNEE REPLACEMENT Bilateral     16 yrs ago         Family Medical History:   Family History   Problem Relation Age of Onset    Cancer Father         bladder    Heart Disorder Mother     Hypertension Mother     Diabetes Mother         Social History:   Social History     Socioeconomic History    Marital status:      Spouse name: Not on file    Number of children: Not on file    Years of education: Not on file    Highest education level: Not on file   Occupational History    Not on file   Tobacco Use    Smoking status: Former     Packs/day: 2.50     Years: 30.00     Additional pack years: 0.00     Total pack years: 75.00     Types: Cigarettes     Quit date: 1991     Years since quittin.6    Smokeless tobacco: Never   Vaping Use    Vaping Use: Never used   Substance and Sexual Activity    Alcohol use: Not Currently     Comment: 2 times per month    Drug use: No    Sexual activity: Not on file   Other Topics Concern     Service Not Asked    Blood Transfusions Not Asked    Caffeine Concern Yes    Occupational Exposure Not Asked    Hobby Hazards Not Asked    Sleep Concern Not Asked    Stress Concern Not Asked    Weight Concern Not Asked    Special Diet Not Asked    Back Care Not Asked    Exercise Yes    Bike Helmet Not Asked    Seat Belt Not Asked    Self-Exams Not Asked   Social History Narrative    Not on file     Social Determinants of Health     Financial Resource Strain: Low Risk  (5/15/2023)    Financial Resource Strain     Difficulty of Paying Living Expenses: Not hard at all     Med Affordability: No   Food Insecurity: No Food Insecurity (10/13/2023)    Food Insecurity     Food Insecurity: Never true   Transportation Needs: No Transportation Needs (10/13/2023)     Transportation Needs     Lack of Transportation: No   Physical Activity: Not on file   Stress: Not on file   Social Connections: Not on file   Housing Stability: Low Risk  (10/13/2023)    Housing Stability     Housing Instability: No     Housing Instability Emergency: Not on file        Medications:   Current Outpatient Medications   Medication Sig Dispense Refill    levothyroxine 175 MCG Oral Tab Take 1 tablet (175 mcg total) by mouth before breakfast. 30 tablet 2    metoprolol tartrate 50 MG Oral Tab Take 1 tablet (50 mg total) by mouth 2 (two) times daily.      hydrALAZINE 25 MG Oral Tab Take 1 tablet (25 mg total) by mouth every morning AND 1 tablet (25 mg total) Every afternoon at 2:00 pm AND 1 tablet (25 mg total) every evening. Hold if SBP is <110.      torsemide 20 MG Oral Tab Take 2 tablets (40 mg total) by mouth 2 (two) times daily. 120 tablet 1    sodium chloride 0.9 % Inhalation Nebu Soln Take 3 mL by nebulization as needed for Wheezing. 360 mL 3    predniSONE 10 MG Oral Tab Take1 tab PO daily 30 tablet 3    losartan 50 MG Oral Tab Take 1 tablet (50 mg total) by mouth 2 (two) times daily. 180 tablet 0    PREDNISONE 5 MG Oral Tab TAKE 1 TABLET(5 MG) BY MOUTH DAILY 90 tablet 3    potassium chloride 20 MEQ Oral Tab CR Take 1 tablet (20 mEq total) by mouth daily.      ipratropium-albuterol 0.5-2.5 (3) MG/3ML Inhalation Solution Take 3 mL by nebulization every 6 (six) hours as needed. 360 mL 3    budesonide 0.5 MG/2ML Inhalation Suspension Take 2 mL (0.5 mg total) by nebulization 2 (two) times daily. 360 mL 3    cyanocobalamin 250 MCG Oral Tab Take 1 tablet (250 mcg total) by mouth daily.      Cholecalciferol (VITAMIN D-3 OR) Take by mouth daily.      Cranberry 500 MG Oral Cap Take 500 mg by mouth daily.      acetaminophen 500 MG Oral Tab Take 1 tablet (500 mg total) by mouth every 6 (six) hours. 30 tablet 0    albuterol (PROAIR HFA) 108 (90 Base) MCG/ACT Inhalation Aero Soln Inhale 2 puffs into the lungs  every 6 (six) hours as needed for Wheezing or Shortness of Breath. 1 each 11    Cod Liver Oil 1000 MG Oral Cap Take 1 capsule by mouth daily.      Calcium Carbonate-Vitamin D (CALTRATE 600+D OR) Take 1 tablet by mouth daily.        Multiple Vitamins-Minerals (CENTRUM SILVER OR) Take 1 tablet by mouth daily.           Review of Systems: Review of Systems   Constitutional:  Negative for activity change, appetite change, chills, diaphoresis, fatigue, fever and unexpected weight change.   HENT:  Negative for congestion, postnasal drip, rhinorrhea, sinus pressure, sinus pain, sneezing, sore throat, trouble swallowing and voice change.    Respiratory:  Positive for cough and shortness of breath. Negative for apnea, choking, chest tightness, wheezing and stridor.    Cardiovascular:  Negative for chest pain, palpitations and leg swelling.   Musculoskeletal:  Negative for arthralgias.   Skin:  Negative for pallor and rash.   Allergic/Immunologic: Negative for immunocompromised state.   Neurological:  Negative for dizziness and headaches.   Hematological:  Negative for adenopathy.        Physical Exam:  There were no vitals taken for this visit.     Constitutional: alert, cooperative. No acute distress.  HEENT: Head NC/AT.   Respiratory: Thorax symmetrical with no labored breathing.   Neurologic: A&Ox3. No gross motor deficits.  Skin: dry  Psych: Calm, cooperative. Pleasant affect.    Results:  Personally reviewed    WBC: 9.3, done on 1/22/2024.  HGB: 12.4, done on 1/22/2024.  PLT: 280, done on 1/22/2024.     Glucose: 115, done on 3/6/2024.  Cr: 1.38, done on 3/6/2024.  GFR(AA): 40, done on 7/23/2022.  GFR (non-AA): 35, done on 7/23/2022.  CA: 9.1, done on 3/6/2024.  Na: 128, done on 3/6/2024.  K: 4.4, done on 3/6/2024.  Cl: 94, done on 3/6/2024.  CO2: 31, done on 3/6/2024.  Last ALB was 3.6% done on 11/2/2023.     No results found.     Assessment/Plan:  #1. Asthma   Severe, persistent  Has had multiple exacerbations in the  past, improved on chronic prednisone 5mg daily  Changed from advair to budesonide neb BID; does not feel that arformoterol helped.  Will continue on budesonide BID and duonebs PRN  Continue duonebs and saline nebs PRN  Given her chronic cough and multiple exacerbations over the past year with multiple hospitalizations, limited response to current airway clearance therapies, she would be a great candidate for oxcillating lung therapy/ lung expansion therapy, will plan to proceed with Volara airway clearance to be used with her neb medications  Will increase pred to 10mg daily for this week. Will have her f/u in 2 weeks to reassess with hope to deescalate back to 5mg daily  We had previously reviewed options to consider biologics but she has declined but now she is agreeable to proceed - given information for Dr. Escamilla  Encouraged to exercise and lose weight  We have discussed concern for DANE/OHS in the past with recommendation for PSG. However she is opposed to testing for sleep apnea and denies that she would ever use a device to help her sleep  8/8/2023 Improved; less SOB and minimal cough with light yellow phlegm. Recommend to continue current nebs; budesonide BID, Duonebs once daily and saline prn. Will try to decrease prednisone to 5mg daily and may alternate if cough returns. Discussed longterm effects of chronic steroids. Will followup in 4 weeks by [facility] unless symptomatic  8/29/2023 Reports np cough, chest congestion, wheeze, slight chills and SOB. Recommend prednisone taper, zpak and increase duonebs to 3-4 times a day. Discussed going to the ER if sx worsen  9/2023 Improved; minimal congestion today due to the weather but otherwise breathing has been better. Will continue to hold arformoterol and take budesonide and duonebs BID. Will continue to decrease prednisone to 10mg and try to decrease to 5mg when weather improves; may alternate 10mg and 5mg if needed.   10/2023 s/p AE asthma, hospitalized  10/1310/15/23 treated with steroids and nebs. Will remain on prednisone 10mg daily and re-evaluate in 2 weeks and if stable will return to alternate 10mg and 5mg every other day. CCT including duonebs QID, budesonide BID and saline nebs prn.  11/2023 Stable; continue nebs and prednisone 10mg daily. Will give zpak to cover for AE asthma   12/2023 Improved; CCT (duonebs q 5-6hrs, budesonide nebs BID, saline prn) including prednisone 5mg daily  2/2024 s/sx c/w mild flareup; will continue nebs, prednisone 10mg daily and start zpak.   Followup in 2 weeks or sooner if needed  3/2024 Some improvement after taking Abx and alternating prednisone; contines with intermittent cough and congestion which improves with prednisone, nebs  Recommend to continue nebs, prednisone and followup in 3-4 weeks     #2. Hypoxia, chronic  Hypoxia on exertion diagnosed on last hospitalization and has since improved  Continues to use oxygen with sleep  She continues to use and benefit from supplemental o2  8/2023 Using and benefiting from oxygen tx  9/2023 Using 2L at night only  10/2023 Using and benefitting from oxygen tx; using 2L at night ilia  11/2023 Stable; CCT  12/2023 Stable; using and benefiting from oxygen tx  Using 2L at night only  2/2024 Using and benefiting from oxygen tx; 2L at noc  3/2024 Continue current treatment     #3. Chronic bronchitis with mucus plugging  As above  8/2023 See above  9/2023 Improved; see above  10/2023 Improved; will contineu to monitor  11/2023 See above; will restart saline nebs, continue mucinex  12/2023 Improved; CCT including saline nebs when increased congestion  2/2024 s/sx c/w with increased congestion. Recommended to continue saline nebs and mucolytics  3/2024 Continue current treatment     #4. CHF  Following with heart failure clinic and cardiology, remains on diuretics         DAMIR Yin  [facility]   3/13/2024    This visit is conducted using Telemedicine with live, interactive  video and audio.    Patient has been referred to the [facility] website at [email or web address] to review the yearly Consent to Treat document.    Patient understands and accepts financial responsibility for any deductible, co-insurance and/or co-pays associated with this service.

## 2024-03-13 NOTE — PATIENT INSTRUCTIONS
Continue nebs, oxygen as directed  Continue to alternate prednisone 5 and 10mg as discussed  Followup in 3 weeks or sooner if needed;  4/3 at 2:30 telehealth  Please contact office at [phone] with any decline in respiratory status, questions or concerns

## 2024-03-14 ENCOUNTER — HOSPITAL ENCOUNTER (OUTPATIENT)
Dept: CARDIOLOGY CLINIC | Facility: HOSPITAL | Age: 89
Discharge: HOME | End: 2024-03-14
Attending: NURSE PRACTITIONER
Payer: MEDICARE

## 2024-03-14 ENCOUNTER — HOSPITAL ENCOUNTER (OUTPATIENT)
Dept: PERIOP | Facility: HOSPITAL | Age: 89
Discharge: HOME | End: 2024-03-14
Attending: NURSE PRACTITIONER
Payer: MEDICARE

## 2024-03-14 VITALS
OXYGEN SATURATION: 96 % | DIASTOLIC BLOOD PRESSURE: 69 MMHG | HEART RATE: 68 BPM | RESPIRATION RATE: 15 BRPM | SYSTOLIC BLOOD PRESSURE: 176 MMHG

## 2024-03-14 DIAGNOSIS — I50.32 CHRONIC HEART FAILURE WITH PRESERVED EJECTION FRACTION (HFPEF) (HCC): Primary | ICD-10-CM

## 2024-03-14 DIAGNOSIS — I89.0 LYMPHEDEMA: ICD-10-CM

## 2024-03-14 DIAGNOSIS — E87.1 HYPONATREMIA: ICD-10-CM

## 2024-03-14 DIAGNOSIS — E03.9 HYPOTHYROIDISM, ADULT: ICD-10-CM

## 2024-03-14 DIAGNOSIS — N18.30 STAGE 3 CHRONIC KIDNEY DISEASE, UNSPECIFIED WHETHER STAGE 3A OR 3B CKD (HCC): ICD-10-CM

## 2024-03-14 LAB
ANION GAP SERPL CALC-SCNC: 7 MMOL/L (ref 0–18)
BUN BLD-MCNC: 45 MG/DL (ref 9–23)
CALCIUM BLD-MCNC: 9.6 MG/DL (ref 8.5–10.1)
CHLORIDE SERPL-SCNC: 92 MMOL/L (ref 98–112)
CO2 SERPL-SCNC: 32 MMOL/L (ref 21–32)
CREAT BLD-MCNC: 1.63 MG/DL
EGFRCR SERPLBLD CKD-EPI 2021: 30 ML/MIN/1.73M2 (ref 60–?)
GLUCOSE BLD-MCNC: 160 MG/DL (ref 70–99)
OSMOLALITY SERPL CALC.SUM OF ELEC: 287 MOSM/KG (ref 275–295)
POTASSIUM SERPL-SCNC: 4.2 MMOL/L (ref 3.5–5.1)
SODIUM SERPL-SCNC: 131 MMOL/L (ref 136–145)
T4 FREE SERPL-MCNC: 1.5 NG/DL (ref 0.8–1.7)
TSI SER-ACNC: 3.2 MIU/ML (ref 0.36–3.74)

## 2024-03-14 PROCEDURE — 99215 OFFICE O/P EST HI 40 MIN: CPT | Performed by: NURSE PRACTITIONER

## 2024-03-14 RX ORDER — LEVOTHYROXINE SODIUM 137 UG/1
137 TABLET ORAL
COMMUNITY

## 2024-03-14 NOTE — PROGRESS NOTES
Received patient from [facility] where IV was placed and labs drawn. Pt. Assessed with daughter in law present. Patient states she has been feeing \"good\" since last [facility] appt and stopping Bumex and starting torsemide again.  Weight down 4 lbs at 194.6 lbs. APN notified of above. Labs ordered and drawn by  Lab. Reviewed allergies and list of current medications with patient and updated it in the Electronic Medical Record.       Patient tolerated it well. Educated patient on low sodium diet and food choices, fluid restriction of 2 liters, and daily weights. Reviewed follow-up appointments and discharge Heart Failure instructions with patient. Patient verbalized an understanding. IV discontinued; catheter intact. Pressure held and gauze applied.         RTC in 2 weeks

## 2024-03-14 NOTE — PROGRESS NOTES
[facility] Progress Note    Nanyc Hensley is a 89 year old female who presents to clinic for APN assessment and management of chronic diastolic heart failure and is functional class 3.     Subjective:  Since her last clinic visit on 3/6; when she was received tolvaptan, also metolazone was stopped and she was switched from Bumex to Torsemide per patient request, her weight is down 4 lbs. Her home weight has trended down to 182-184 lbs from ~187-188 lbs. Her home BP's have been very labile, 120-150's systolic, previously more elevated during the evening. She checks BP 2x per day. Her breathing remains unchanged. Her arthritis is very painful today and she almost did not come to her visit.     Previously, when she took Metolazone, she felt like her head got very hot, pain in her shoulders and notes bradycardia (but was not recorded). She also has these symptoms intermittently when she takes oral Bumex. She tolerates IV Bumex.      She had a televisit with Dr. Henning for her arthritis. He started her on Meloxicam (NSAID) for pain; which she did not start because she knows she cannot take due to her heart failure. She also had a televisit with Karen FALCON, Pulmonary on 2/28 for asthma flare. Continued nebs, prednisone 10mg daily and started zpack.      She continues to have her legs massaged and occasionally wears compression stockings using lymphedema pumps at night.    She saw Karen Gonzalez 1/2 and Prednisone was reduced to 5 mg every other day with 10  mg on alternate days for a week and then to decrease the dose further to 5 mg daily if doing well. She had follow up virtual visit on 1/18; doing well on alternating dose of Prednisone. She saw her again on 3/13, no changes to medications were made. She saw her again on 3/13, and continued current treatment.       Hospitalizations:     [facility] from 12/9-12/11 for shortness of breath, generalized pain and cough that  started that same morning. She was found to be bradycardic in the 30's. Found to have mild pulmonary edema on CXR. Diuresed with IV Bumex. She was seen by Renal and ARB was discontinued; which was not on last clinic visit but restarted by Dr. Reyes in November. [facility] saw her on 12/13, reviewed with PCP, and Cozaar was increased to 50mg BID and Hydralazine was increased 25mg TID for worsening hypertension with SBP's in the 170's, to hold if SBP less than 110.      Hospitalized from 10/13-10/15 with worsening shortness of breath, thought to be a COPD exacerbation. She was treated with IV steroids and nebs. She was weaned to oral steroids. She was seen by Nephrology due to her hyponatremia and hyperkalemia, given Tolvaptan and Veltassa.       Review of Systems   Constitutional: Positive for weight loss.   Cardiovascular:  Positive for dyspnea on exertion (unchanged) and leg swelling (stable).   Respiratory: Negative.     Gastrointestinal:  Positive for bloating (mild).       HISTORY:  Past Medical History:   Diagnosis Date    Accelerated hypertension 1/24/2014    Asthma (HCC)     Cough 8/5/2016    Disorder of thyroid     Dyspnea 8/4/2016    Dyspnea, unspecified type 8/4/2016    Endocrine disorder     HYPOTHYROID    Osteoarthrosis, unspecified whether generalized or localized, unspecified site     Other and unspecified hyperlipidemia     Pneumonia, organism unspecified(486)     Severe persistent asthma with status asthmaticus (HCC) 12/30/2019    Thyroid disease     Unspecified essential hypertension     Ventricular ectopy 1/15/2019    Visual impairment       Past Surgical History:   Procedure Laterality Date    KNEE REPLACEMENT SURGERY      OTHER SURGICAL HISTORY      TOTAL KNEE REPLACEMENT Bilateral     16 yrs ago      Family History   Problem Relation Age of Onset    Cancer Father         bladder    Heart Disorder Mother     Hypertension Mother     Diabetes Mother       Social History     Socioeconomic  History    Marital status:    Tobacco Use    Smoking status: Former     Packs/day: 2.50     Years: 30.00     Additional pack years: 0.00     Total pack years: 75.00     Types: Cigarettes     Quit date: 1991     Years since quittin.6    Smokeless tobacco: Never   Vaping Use    Vaping Use: Never used   Substance and Sexual Activity    Alcohol use: Not Currently     Comment: 2 times per month    Drug use: No   Other Topics Concern    Caffeine Concern Yes    Exercise Yes     Social Determinants of Health     Financial Resource Strain: Low Risk  (5/15/2023)    Financial Resource Strain     Difficulty of Paying Living Expenses: Not hard at all     Med Affordability: No   Food Insecurity: No Food Insecurity (10/13/2023)    Food Insecurity     Food Insecurity: Never true   Transportation Needs: No Transportation Needs (10/13/2023)    Transportation Needs     Lack of Transportation: No   Housing Stability: Low Risk  (10/13/2023)    Housing Stability     Housing Instability: No           Objective:    Telemetry: NSR       Elevated due to hip pain  BP (!) 176/69   Pulse 68   Resp 15   SpO2 96%     Wt Readings from Last 6 Encounters:   24 198 lb 9.6 oz (90.1 kg)   24 192 lb 6.4 oz (87.3 kg)   24 195 lb 3.2 oz (88.5 kg)   23 185 lb 3.2 oz (84 kg)   23 193 lb (87.5 kg)   23 190 lb (86.2 kg)       Lab Results   Component Value Date    CREATSERUM 1.63 2024    BUN 45 2024     2024    K 4.2 2024    CL 92 2024    CO2 32.0 2024     2024    CA 9.6 2024    T4F 1.5 2024    TSH 3.200 2024       Recent Results (from the past 24 hour(s))   Basic Metabolic Panel (8)    Collection Time: 24  3:19 PM   Result Value Ref Range    Glucose 160 (H) 70 - 99 mg/dL    Sodium 131 (L) 136 - 145 mmol/L    Potassium 4.2 3.5 - 5.1 mmol/L    Chloride 92 (L) 98 - 112 mmol/L    CO2 32.0 21.0 - 32.0 mmol/L    Anion Gap 7 0 - 18  mmol/L    BUN 45 (H) 9 - 23 mg/dL    Creatinine 1.63 (H) 0.55 - 1.02 mg/dL    Calcium, Total 9.6 8.5 - 10.1 mg/dL    Calculated Osmolality 287 275 - 295 mOsm/kg    eGFR-Cr 30 (L) >=60 mL/min/1.73m2    Patient Fasting for BMP? Patient not present    TSH and Free T4    Collection Time: 03/14/24  3:19 PM   Result Value Ref Range    Free T4 1.5 0.8 - 1.7 ng/dL    TSH 3.200 0.358 - 3.740 mIU/mL         Current Outpatient Medications:     levothyroxine 137 MCG Oral Tab, Take 137 mcg by mouth before breakfast., Disp: , Rfl:     metoprolol tartrate 50 MG Oral Tab, Take 1 tablet (50 mg total) by mouth 2 (two) times daily., Disp: , Rfl:     hydrALAZINE 25 MG Oral Tab, Take 1 tablet (25 mg total) by mouth every morning AND 1 tablet (25 mg total) Every afternoon at 2:00 pm AND 1 tablet (25 mg total) every evening. Hold if SBP is <110., Disp: , Rfl:     torsemide 20 MG Oral Tab, Take 2 tablets (40 mg total) by mouth 2 (two) times daily., Disp: 120 tablet, Rfl: 1    sodium chloride 0.9 % Inhalation Nebu Soln, Take 3 mL by nebulization as needed for Wheezing., Disp: 360 mL, Rfl: 3    predniSONE 10 MG Oral Tab, Take1 tab PO daily, Disp: 30 tablet, Rfl: 3    losartan 50 MG Oral Tab, Take 1 tablet (50 mg total) by mouth 2 (two) times daily., Disp: 180 tablet, Rfl: 0    PREDNISONE 5 MG Oral Tab, TAKE 1 TABLET(5 MG) BY MOUTH DAILY, Disp: 90 tablet, Rfl: 3    potassium chloride 20 MEQ Oral Tab CR, Take 1 tablet (20 mEq total) by mouth daily., Disp: , Rfl:     ipratropium-albuterol 0.5-2.5 (3) MG/3ML Inhalation Solution, Take 3 mL by nebulization every 6 (six) hours as needed., Disp: 360 mL, Rfl: 3    budesonide 0.5 MG/2ML Inhalation Suspension, Take 2 mL (0.5 mg total) by nebulization 2 (two) times daily., Disp: 360 mL, Rfl: 3    cyanocobalamin 250 MCG Oral Tab, Take 1 tablet (250 mcg total) by mouth daily., Disp: , Rfl:     Cholecalciferol (VITAMIN D-3 OR), Take by mouth daily., Disp: , Rfl:     Cranberry 500 MG Oral Cap, Take 500 mg  by mouth daily., Disp: , Rfl:     acetaminophen 500 MG Oral Tab, Take 1 tablet (500 mg total) by mouth every 6 (six) hours., Disp: 30 tablet, Rfl: 0    albuterol (PROAIR HFA) 108 (90 Base) MCG/ACT Inhalation Aero Soln, Inhale 2 puffs into the lungs every 6 (six) hours as needed for Wheezing or Shortness of Breath., Disp: 1 each, Rfl: 11    Cod Liver Oil 1000 MG Oral Cap, Take 1 capsule by mouth daily., Disp: , Rfl:     Calcium Carbonate-Vitamin D (CALTRATE 600+D OR), Take 1 tablet by mouth daily.  , Disp: , Rfl:     Multiple Vitamins-Minerals (CENTRUM SILVER OR), Take 1 tablet by mouth daily.  , Disp: , Rfl:     Exam:   General:         Alert, in no apparent distress  HEENT:          +7cm JVD  Lungs:            Slightly diminished at the bases                     CV:                  S1, S2 regular  Abdomen:       mildly distended, semi-firm, non-tender, BS+  Extremities:     +2-3 pitting LE edema, chronic lymphedema   Neuro:             A&O x 3  Skin:                Pink, warm, dry    Education:  Patient instructed regarding sodium restricted diet, low sodium foods, fluid restriction, daily weights, medication regimen, s/s HF exacerbation and when to call APN/clinic.    Assessment:   Chronic diastolic heart failure, ACC/AHA Stage C, NYHA Class 3- LVEF 55-60% with grade 2 DD on echo 3/2023. Prior BVA inconclusive, patient does not want to repeat test. Last ProBNP  2,065 in November, now trending down to 1,650. Avoiding MRA due to chronic hyponatremia. SGTL2i cost prohibitive. Volume status improving.   Essential HTN - Labile. Recent increase in Losartan and Hydralazine due to worsening hypertension. Hydralazine recently increased, but she decreased herself back to original dose. Home BP's reported in the 120-150's with mild improvement. Elevated today due to arthritic hip pain.   LBBB  Hyponatremia, chronic - Hx Tolvaptan. Labile. Na 128 today likely due to recent metolazone use. Now improving.  Anemia - last HGB  12.4 g/dl and overall stable. Last Iron sat 20, Ferritin 155.8 in June.    Chronic lymphedema - using lymphedema pumps. Wraps legs and has legs massaged. Not a candidate for vein treatment due to minimal reflux on prior US. Follows with Dr. Villatoro.   CKD stage 3b - unknown baseline creatinine. SGLT2i is cost prohibited. Follows with Dr. Walton/Dr. Reyes. Saw Dr. Reyes 11/20; restarted ARB since SBP was in the 160's recently.   Morbid obesity - Body mass index is 44.53 kg/m².  COPD/asthma - nocturnal oxygen use, inhalers. Follows with Dr. England and Karen Gonzalez. On Volara for airway clearance. On alternating dose Prednisone, 5 mg and 10 mg daily.   Possible DANE - not agreeable to sleep study in past since she would not ever use a device.   Hx chronic tobacco use - quit 30 years ago, 20 pack years.  DM type 2 - well controlled. Last a1c 6.5% on 11/2, on no medications.    Hypothyroidism - on Levothyroxine. TSH 3.2, Free T4 1.5 and improved today. Followed by PCP.   Hx ecoli UTI - s/p abx.   Hx Hyperkalemia, hypokalemia.   HLD - FLP on 11/2 with triglycerides 539, , HDL 66.     Plan:   Continue current medications.    Continue daily weights and BP.  Return to clinic in 2 weeks as scheduled.   F/U Dr. Villatoro in about 3-4 months.   CHF discharge instructions given    40 minutes spent with patient and greater than 50% of the time was spent counseling and coordinating care.    DAMIR Poon

## 2024-03-14 NOTE — PATIENT INSTRUCTIONS
Heart Failure Discharge Instructions      Activity: Regular exercise and activity is important for your overall health and to help keep your heart strong and functioning as well as possible.   Walk at a slow to moderate pace for 15-20 minutes 3-5 days per week.     Follow these instructions every day to keep yourself in the Green Zone     The Green Zone means you are feeling well and your symptoms are under control                                    Medications  Take your medication every day as instructed  Do not stop taking your medicine or change the amount you are taking without instructions from your doctor or nurse  Do Not take non-steroidal antiinflammatory drugs such as ibuprofen, aleve, advil, or motrin                                    Diet/Fluids  People with heart failure should eat less sodium (salt) and limit fluid. Sodium attracts water and makes the body hold fluid. This extra fluid makes the heart work harder and can worsen the symptoms of heart failure.     Diet    2000 mg sodium daily  Fluid restriction    64 ounces daily  (8 oz. = 1 cup)                                     Body Weight  Weigh yourself every day before breakfast and write your weight down  Use the same scale and wear about the same amount of clothes each time  A sudden weight increase is due to fluid retention rather than fat                                         Activity  Pace your activities to avoid getting overtired  Take rest periods as needed  Elevate your feet to reduce ankle swelling when resting                             Signs of Worsening Heart Failure    You are entering the Yellow Zone - this is a warning zone    Call your doctor or nurse if you have any of these signs or symptoms:  You gain 2 or more pounds overnight or 3-5 pounds in 3-7 days  You have more trouble breathing  You get more tired with regular activity, or are limiting activity because of shortness of breath or fatigue  You are short of breath lying  down, you need more pillows to breathe comfortably,  or wake up during the night short of breath  You urinate less often during the day and more often at night  You have a bloated feeling, upset stomach, loss of appetite, or your clothes are fitting tighter    GO TO THE EMERGENCY DEPARTMENT or CALL 911 IF:    These are signs you are in the RED ZONE - THIS IS AN EMERGENCY  You have tightness or pain in your chest  You are extremely short of breath or can't catch your breath  You cough up frothy pink mucous  You feel confused or can't think clearly  You are traveling and develop symptoms of worsening heart failure     We respect everyone's time and availability. Please be aware that this is not a walk-in clinic and we require appointments in order to facilitate timely care for all patients. We ask you to arrive 30 minutes before your appointment to allow time for you to check-in and have your bloodwork drawn. Please understand if you are late for your appointment, you may be asked to reschedule. If possible, all attempts will be made to accommodate but realize this is no guarantee that this will always be available. We understand there are extenuating circumstances. If you need to cancel or reschedule your appointment, please call the [facility] within 24 hours at [phone].  Thank you for your cooperation, [facility] Staff.    IF YOU HAVE QUESTIONS REGARDING YOUR BILL, FEEL FREE TO CONTACT [facility] PATIENT ACCOUNTS -[phone]. IF YOU NEED FINANCIAL ASSISTANCE, PLEASE CALL AN [facility] FINANCIAL COUNSELOR -[phone].             [facility]     [phone]

## 2024-03-19 PROBLEM — J06.9 VIRAL UPPER RESPIRATORY TRACT INFECTION: Status: RESOLVED | Noted: 2019-01-15 | Resolved: 2024-01-01

## 2024-03-19 PROBLEM — J06.9 VIRAL UPPER RESPIRATORY TRACT INFECTION: Status: RESOLVED | Noted: 2019-01-15 | Resolved: 2024-03-19

## 2024-03-22 DIAGNOSIS — I50.32 CHRONIC HEART FAILURE WITH PRESERVED EJECTION FRACTION (HFPEF) (HCC): Primary | ICD-10-CM

## 2024-03-29 ENCOUNTER — TELEPHONE (OUTPATIENT)
Facility: CLINIC | Age: 89
End: 2024-03-29

## 2024-03-29 DIAGNOSIS — J96.11 CHRONIC RESPIRATORY FAILURE WITH HYPOXIA (HCC): ICD-10-CM

## 2024-03-29 DIAGNOSIS — J44.1 COPD EXACERBATION (HCC): ICD-10-CM

## 2024-03-29 RX ORDER — IPRATROPIUM BROMIDE AND ALBUTEROL SULFATE 2.5; .5 MG/3ML; MG/3ML
3 SOLUTION RESPIRATORY (INHALATION) EVERY 6 HOURS PRN
Qty: 360 ML | Refills: 3 | Status: SHIPPED | OUTPATIENT
Start: 2024-03-29

## 2024-03-29 NOTE — TELEPHONE ENCOUNTER
Call placed to [facility].  [facility] Medicare form is needed.  We have not received it in our office.  They will fax another one to our office.  Daughter notified we are awaiting fax and will update her by end of day.  Pt needs refills of duoneb also.  Pt last seen by Karol REICH on 3-13-24.  Per OV notes:  Continue duonebs and saline nebs PRN.  Pt advised to follow up on 4-3-24 and appt made.  Refill for duonebs sent to [facility].

## 2024-03-29 NOTE — TELEPHONE ENCOUNTER
Yesterday, daughter tried to get rx for generic Duoneb. Pharmacy needs more information from our office for MCR. Did our office get this message?

## 2024-03-29 NOTE — TELEPHONE ENCOUNTER
Office did not receive Medicare form for Duonebs.  Another call placed to [facility] and message left.  Call placed to daughter and notified her that we did not receive form.  Pt has 7 days of duoneb left.  Discussed with her that there are other pharmacies that bill under medicare and ship to pt's home in a day or two.  Provided daughter with number for [facility].  She will discuss with pt and call back after Monday to see if [facility] faxes the form.

## 2024-04-02 ENCOUNTER — TELEPHONE (OUTPATIENT)
Dept: FAMILY MEDICINE CLINIC | Facility: CLINIC | Age: 89
End: 2024-04-02

## 2024-04-03 ENCOUNTER — TELEMEDICINE (OUTPATIENT)
Facility: CLINIC | Age: 89
End: 2024-04-03
Payer: MEDICARE

## 2024-04-03 DIAGNOSIS — J45.50 SEVERE PERSISTENT ASTHMA WITHOUT COMPLICATION (HCC): ICD-10-CM

## 2024-04-03 DIAGNOSIS — J41.8 MIXED SIMPLE AND MUCOPURULENT CHRONIC BRONCHITIS (HCC): ICD-10-CM

## 2024-04-03 DIAGNOSIS — J96.11 CHRONIC RESPIRATORY FAILURE WITH HYPOXIA (HCC): Primary | ICD-10-CM

## 2024-04-03 PROCEDURE — 99213 OFFICE O/P EST LOW 20 MIN: CPT | Performed by: NURSE PRACTITIONER

## 2024-04-03 RX ORDER — PREDNISONE 10 MG/1
TABLET ORAL
Qty: 90 TABLET | Refills: 3 | Status: SHIPPED | OUTPATIENT
Start: 2024-04-03

## 2024-04-03 NOTE — PATIENT INSTRUCTIONS
Continue nebs, oxygen as directed  Continue 10mg as discussed and  alternate every other day with prednisone 5mg when able  Followup in 4 weeks or sooner if needed  Please contact office at [phone] with any decline in respiratory status, questions or concerns

## 2024-04-03 NOTE — PROGRESS NOTES
[facility] Progress Note    History of Present Illness:  Nancy Hensley is a 89 year old female with significant PMH asthma who presents today for follow up. Overall feels well. Has had some days with increased congestion but barirall feels well. Taking prednisone 10mg daily for the past week with the weather changes. Using nebs and oxygen as directed.    Previously 3/13/24  a 89 year old female with significant PMH asthma who presents today for follow up s/p Abx for increased chest congestion. Overall feels ok; no new complaints. Has good days and some not so good. No f/c/ns.     Previously 2/28/24  a 89 year old female with significant PMH asthma and bronchitis who presents today for follow up. Reports overall not feeling well the past 6 weeks; increased cough with white-yellow phlegm, chest congestion, chills. HHRN told pt she heard congestion in the left lower lung.  Still using nebs as directed, has been taking prednisone 10mg daily for the past couple days. Feels the saline nebs helps the most. No fever or night sweats. Using oxygen 2L at night.      Previously 1/2024  a 89 year old female with significant PMH Asthma who presents today for follow up. Reports feeling \"Glorious\". Breathing is good; tolerating prednisone 5mg daily. Using nebs as directed and oxygen 2L at night. No f/c/ns, cough or congestion.      Previously 11/2023  a 89 year old female with significant PMH asthma who presents today for sick 2 week follow up. Reports she feels more congested, not able to cough up phlegm, heard wheezing last night. Denies increased SOB. Using budesonide and duonebs and prednisone 10mg daily. No f/c/ns. Not using saline nebs     Previously 10/24/23  a 89 year old female with significant PMHx of asthma who presents today for follow up. Pt recently hospitalized for AECOPD 10/13-10/15/23 after noticing increased SOB and right sided chest discomfort, treated with steroids and nebs. No Abx. Completed  prednisone post discharge yesterday. Feels well today; mild cough remains. No f/c/ns. Using budesonide BID, Duonebs q 5-6 hrs and saline nebs prn. Using oxygen 2L at night only.     Previously 9/2023  a 89 year old female with significant PMH of asthma who presents today for follow up s/p Abx and steroids. Currently taking prednisone 20mg daily, completed ABx. Reports feeling \"much, much better\". Not using arformoterol and prefers to not take it.      Previously 8/29/23   a 89 year old female with significant PMH asthma/COPD who presents today for evaluation of increased cough, chest congestion, wheeze and SOB. Decreased prednisone from 10mg to 5mg after our last visit. Restarted Arformoterol on her own and felt great initially then after 5-7 days had a harder time coughing up phlegm. She has since stopped the Arformoterol and increased prednisone from 5 to 10mg for the past 3 days as her sx worsened.  Denies f/ns; + chills. Unable to cough up phlegm. Currently using Budesonide nebs and Duonebs BID.      Previously 8/8/23    presents today for follow up. Reports breathing is perfect. Very happy with new medications. Continues on prednisone 10mg as she has increased cough on 5mg. Denies new complaints.      Previously 7/27/23 Dr England  a 89 year old female with hx of Asthma, CKD, and CHF who presents for follow up of asthma/COPD. She reports having been well until today when she felt her cough worsen with white phlegm. Again after clearing the phlegm she feels well.  She did use arformoterol +budesonide in early June but did not feel better so stopped - of note she stopped using duoneb altogether during this time. No pain. No fevers. +BLE edema     June 2023 previously  She was hospitalized in May with CHF exacerbation and hyponatremia, her breathing was controlled during her hospitalization. After going home she developed chest congestion and frequent coughing. She has continued to use her nebulizers - budesonide  BID, duoneb PRN and saline nebs PRN. She remains on baseline pred 5mg daily  No fevers. No pain/pleurisy  Weight is up and +BLE - planning to see cardiology soon      May 2023 previously  Discharged  last week after treated for fluid overload. Overall feels well; breathing is significantly better than prior to hospitalization. Pt hospitalized from 5/5-5/11/23 with Acute HFpEF with acute pulmonary edema, BNP > 5000, diuresed on lasix gtt with > 4L of fluid removed. Meds adjusted per cardiologist, plan for labs and CXR today and plan to go to the heart failure clinic next week. No f/c/ns.  Currently with occasional cough and CORONA. BLE edema unchanged per pt.     Previously 3/23/23 Dr England  89 year old female former smoker (quit 30 years ago, 20 pack years) with significant PMH asthma, HTN and obesity who presents today for follow up. She was hospitalized with acute asthma exacerbation and CHF exacerbation, managed on steroids, nebs and diuretics with improvement. She feels better today, breathing is back to normal. Has sore throat and some mild dysphagia over the last week or so. She is not watching her weight of fluid balance.      Previously  She has followed with me for several years and managed for asthma. She has had variable exacerbations of her asthma in the past leading to initiation of chronic steroids which was weaned to 5mg daily with good success and mitigation of repeated asthma exacerbations. She presents today without acute concern. Notes occasional cough/phlegm, relieved with nebs. Using budesonide neb once to twice a day. Using advair BID. Using duoneb prn and only needing to use a few times/week. No fevers. No pain or pleurisy.  Limited mobility due to BLE swelling, denies CORONA.        Past Medical History:   Past Medical History:   Diagnosis Date    Accelerated hypertension 1/24/2014    Asthma (HCC)     Cough 8/5/2016    Disorder of thyroid     Dyspnea 8/4/2016    Dyspnea, unspecified type 8/4/2016     Endocrine disorder     HYPOTHYROID    Osteoarthrosis, unspecified whether generalized or localized, unspecified site     Other and unspecified hyperlipidemia     Pneumonia, organism unspecified(486)     Severe persistent asthma with status asthmaticus ([facility]) 2019    Thyroid disease     Unspecified essential hypertension     Ventricular ectopy 1/15/2019    Visual impairment         Past Surgical History:   Past Surgical History:   Procedure Laterality Date    KNEE REPLACEMENT SURGERY      OTHER SURGICAL HISTORY      TOTAL KNEE REPLACEMENT Bilateral     16 yrs ago         Family Medical History:   Family History   Problem Relation Age of Onset    Cancer Father         bladder    Heart Disorder Mother     Hypertension Mother     Diabetes Mother         Social History:   Social History     Socioeconomic History    Marital status:      Spouse name: Not on file    Number of children: Not on file    Years of education: Not on file    Highest education level: Not on file   Occupational History    Not on file   Tobacco Use    Smoking status: Former     Packs/day: 2.50     Years: 30.00     Additional pack years: 0.00     Total pack years: 75.00     Types: Cigarettes     Quit date: 1991     Years since quittin.6    Smokeless tobacco: Never   Vaping Use    Vaping Use: Never used   Substance and Sexual Activity    Alcohol use: Not Currently     Comment: 2 times per month    Drug use: No    Sexual activity: Not on file   Other Topics Concern     Service Not Asked    Blood Transfusions Not Asked    Caffeine Concern Yes    Occupational Exposure Not Asked    Hobby Hazards Not Asked    Sleep Concern Not Asked    Stress Concern Not Asked    Weight Concern Not Asked    Special Diet Not Asked    Back Care Not Asked    Exercise Yes    Bike Helmet Not Asked    Seat Belt Not Asked    Self-Exams Not Asked   Social History Narrative    Not on file     Social Determinants of Health     Financial Resource  Strain: Low Risk  (5/15/2023)    Financial Resource Strain     Difficulty of Paying Living Expenses: Not hard at all     Med Affordability: No   Food Insecurity: No Food Insecurity (10/13/2023)    Food Insecurity     Food Insecurity: Never true   Transportation Needs: No Transportation Needs (10/13/2023)    Transportation Needs     Lack of Transportation: No   Physical Activity: Not on file   Stress: Not on file   Social Connections: Not on file   Housing Stability: Low Risk  (10/13/2023)    Housing Stability     Housing Instability: No     Housing Instability Emergency: Not on file        Medications:   Current Outpatient Medications   Medication Sig Dispense Refill    ipratropium-albuterol 0.5-2.5 (3) MG/3ML Inhalation Solution Take 3 mL by nebulization every 6 (six) hours as needed. 360 mL 3    levothyroxine 137 MCG Oral Tab Take 137 mcg by mouth before breakfast.      metoprolol tartrate 50 MG Oral Tab Take 1 tablet (50 mg total) by mouth 2 (two) times daily.      hydrALAZINE 25 MG Oral Tab Take 1 tablet (25 mg total) by mouth every morning AND 1 tablet (25 mg total) Every afternoon at 2:00 pm AND 1 tablet (25 mg total) every evening. Hold if SBP is <110.      torsemide 20 MG Oral Tab Take 2 tablets (40 mg total) by mouth 2 (two) times daily. 120 tablet 1    sodium chloride 0.9 % Inhalation Nebu Soln Take 3 mL by nebulization as needed for Wheezing. 360 mL 3    predniSONE 10 MG Oral Tab Take1 tab PO daily 30 tablet 3    losartan 50 MG Oral Tab Take 1 tablet (50 mg total) by mouth 2 (two) times daily. 180 tablet 0    PREDNISONE 5 MG Oral Tab TAKE 1 TABLET(5 MG) BY MOUTH DAILY 90 tablet 3    potassium chloride 20 MEQ Oral Tab CR Take 1 tablet (20 mEq total) by mouth daily.      budesonide 0.5 MG/2ML Inhalation Suspension Take 2 mL (0.5 mg total) by nebulization 2 (two) times daily. 360 mL 3    cyanocobalamin 250 MCG Oral Tab Take 1 tablet (250 mcg total) by mouth daily.      Cholecalciferol (VITAMIN D-3 OR) Take  by mouth daily.      Cranberry 500 MG Oral Cap Take 500 mg by mouth daily.      acetaminophen 500 MG Oral Tab Take 1 tablet (500 mg total) by mouth every 6 (six) hours. 30 tablet 0    albuterol (PROAIR HFA) 108 (90 Base) MCG/ACT Inhalation Aero Soln Inhale 2 puffs into the lungs every 6 (six) hours as needed for Wheezing or Shortness of Breath. 1 each 11    Cod Liver Oil 1000 MG Oral Cap Take 1 capsule by mouth daily.      Calcium Carbonate-Vitamin D (CALTRATE 600+D OR) Take 1 tablet by mouth daily.        Multiple Vitamins-Minerals (CENTRUM SILVER OR) Take 1 tablet by mouth daily.           Review of Systems: Review of Systems   Constitutional:  Negative for activity change, appetite change, chills, diaphoresis, fatigue, fever and unexpected weight change.   HENT:  Negative for congestion, postnasal drip, rhinorrhea, sinus pressure, sinus pain, sneezing, sore throat, trouble swallowing and voice change.    Respiratory:  Positive for cough. Negative for apnea, choking, chest tightness, shortness of breath, wheezing and stridor.    Cardiovascular:  Negative for chest pain, palpitations and leg swelling.   Musculoskeletal:  Negative for arthralgias.   Skin:  Negative for pallor and rash.   Allergic/Immunologic: Negative for immunocompromised state.   Neurological:  Negative for dizziness and headaches.   Hematological:  Negative for adenopathy.        Physical Exam:  There were no vitals taken for this visit.     Constitutional: alert, cooperative. No acute distress.  HEENT: Head NC/AT.    Respiratory: Thorax symmetrical with no labored breathing.   Neurologic: A&Ox3. No gross motor deficits.  Skin: dry  Psych: Calm, cooperative. Pleasant affect.    Results:  Personally reviewed    WBC: 9.3, done on 1/22/2024.  HGB: 12.4, done on 1/22/2024.  PLT: 280, done on 1/22/2024.     Glucose: 160, done on 3/14/2024.  Cr: 1.63, done on 3/14/2024.  GFR(AA): 40, done on 7/23/2022.  GFR (non-AA): 35, done on 7/23/2022.  CA: 9.6,  done on 3/14/2024.  Na: 131, done on 3/14/2024.  K: 4.2, done on 3/14/2024.  Cl: 92, done on 3/14/2024.  CO2: 32, done on 3/14/2024.  Last ALB was 3.6% done on 11/2/2023.     No results found.     Assessment/Plan:  #1. Asthma   Severe, persistent  Has had multiple exacerbations in the past, improved on chronic prednisone 5mg daily  Changed from advair to budesonide neb BID; does not feel that arformoterol helped.  Will continue on budesonide BID and duonebs PRN  Continue duonebs and saline nebs PRN  Given her chronic cough and multiple exacerbations over the past year with multiple hospitalizations, limited response to current airway clearance therapies, she would be a great candidate for oxcillating lung therapy/ lung expansion therapy, will plan to proceed with Volara airway clearance to be used with her neb medications  Will increase pred to 10mg daily for this week. Will have her f/u in 2 weeks to reassess with hope to deescalate back to 5mg daily  We had previously reviewed options to consider biologics but she has declined but now she is agreeable to proceed - given information for Dr. Escamilla  Encouraged to exercise and lose weight  We have discussed concern for DANE/OHS in the past with recommendation for PSG. However she is opposed to testing for sleep apnea and denies that she would ever use a device to help her sleep  8/8/2023 Improved; less SOB and minimal cough with light yellow phlegm. Recommend to continue current nebs; budesonide BID, Duonebs once daily and saline prn. Will try to decrease prednisone to 5mg daily and may alternate if cough returns. Discussed longterm effects of chronic steroids. Will followup in 4 weeks by [facility] unless symptomatic  8/29/2023 Reports np cough, chest congestion, wheeze, slight chills and SOB. Recommend prednisone taper, zpak and increase duonebs to 3-4 times a day. Discussed going to the ER if sx worsen  9/2023 Improved; minimal congestion today due to the weather but  otherwise breathing has been better. Will continue to hold arformoterol and take budesonide and duonebs BID. Will continue to decrease prednisone to 10mg and try to decrease to 5mg when weather improves; may alternate 10mg and 5mg if needed.   10/2023 s/p AE asthma, hospitalized 10/1310/15/23 treated with steroids and nebs. Will remain on prednisone 10mg daily and re-evaluate in 2 weeks and if stable will return to alternate 10mg and 5mg every other day. CCT including duonebs QID, budesonide BID and saline nebs prn.  11/2023 Stable; continue nebs and prednisone 10mg daily. Will give zpak to cover for AE asthma   12/2023 Improved; CCT (duonebs q 5-6hrs, budesonide nebs BID, saline prn) including prednisone 5mg daily  2/2024 s/sx c/w mild flareup; will continue nebs, prednisone 10mg daily and start zpak.   Followup in 2 weeks or sooner if needed  3/2024 Some improvement after taking Abx and alternating prednisone; contines with intermittent cough and congestion which improves with prednisone, nebs  Recommend to continue nebs, prednisone and followup in 3-4 weeks  4/2024 Stable; continue current treatment including prednisone 10mg daily  Followup in 1 month     #2. Hypoxia, chronic  Hypoxia on exertion diagnosed on last hospitalization and has since improved  Continues to use oxygen with sleep  She continues to use and benefit from supplemental o2  8/2023 Using and benefiting from oxygen tx  9/2023 Using 2L at night only  10/2023 Using and benefitting from oxygen tx; using 2L at night ilia  11/2023 Stable; CCT  12/2023 Stable; using and benefiting from oxygen tx  Using 2L at night only  2/2024 Using and benefiting from oxygen tx; 2L at noc  3/2024 Continue current treatment  4/2024 Using and benefiting from oxygen tx; 2L at noc     #3. Chronic bronchitis with mucus plugging  As above  8/2023 See above  9/2023 Improved; see above  10/2023 Improved; will contineu to monitor  11/2023 See above; will restart saline nebs,  continue mucinex  12/2023 Improved; CCT including saline nebs when increased congestion  2/2024 s/sx c/w with increased congestion. Recommended to continue saline nebs and mucolytics  3/2024 Continue current treatment  4/2024 CCT; see above     #4. CHF  Following with heart failure clinic and cardiology, remains on diuretics            [facility]   4/3/2024    This visit is conducted using Telemedicine with live, interactive video and audio.    Patient has been referred to the [facility] website at [email or web address] to review the yearly Consent to Treat document.    Patient understands and accepts financial responsibility for any deductible, co-insurance and/or co-pays associated with this service.

## 2024-04-05 NOTE — TELEPHONE ENCOUNTER
Left detailed message  for Peggy CUENCA at [phone] instructing her ok to percert another round of home health per Dr Henning.

## 2024-04-08 DIAGNOSIS — I50.32 CHRONIC HEART FAILURE WITH PRESERVED EJECTION FRACTION (HFPEF) (HCC): Primary | ICD-10-CM

## 2024-04-09 ENCOUNTER — HOSPITAL ENCOUNTER (OUTPATIENT)
Dept: PERIOP | Facility: HOSPITAL | Age: 89
Discharge: HOME | End: 2024-04-09
Attending: NURSE PRACTITIONER
Payer: MEDICARE

## 2024-04-09 ENCOUNTER — HOSPITAL ENCOUNTER (OUTPATIENT)
Dept: CARDIOLOGY CLINIC | Facility: HOSPITAL | Age: 89
Discharge: HOME | End: 2024-04-09
Attending: NURSE PRACTITIONER
Payer: MEDICARE

## 2024-04-09 ENCOUNTER — HOSPITAL ENCOUNTER (OUTPATIENT)
Dept: GENERAL RADIOLOGY | Facility: HOSPITAL | Age: 89
Discharge: HOME | End: 2024-04-09
Attending: NURSE PRACTITIONER
Payer: MEDICARE

## 2024-04-09 VITALS
WEIGHT: 201.19 LBS | HEART RATE: 41 BPM | SYSTOLIC BLOOD PRESSURE: 134 MMHG | OXYGEN SATURATION: 100 % | RESPIRATION RATE: 19 BRPM | BODY MASS INDEX: 45 KG/M2 | DIASTOLIC BLOOD PRESSURE: 39 MMHG

## 2024-04-09 DIAGNOSIS — I89.0 LYMPHEDEMA: ICD-10-CM

## 2024-04-09 DIAGNOSIS — E87.1 HYPONATREMIA: ICD-10-CM

## 2024-04-09 DIAGNOSIS — R00.1 BRADYCARDIA: ICD-10-CM

## 2024-04-09 DIAGNOSIS — N18.30 STAGE 3 CHRONIC KIDNEY DISEASE, UNSPECIFIED WHETHER STAGE 3A OR 3B CKD (HCC): ICD-10-CM

## 2024-04-09 DIAGNOSIS — I50.32 CHRONIC DIASTOLIC (CONGESTIVE) HEART FAILURE (HCC): Primary | ICD-10-CM

## 2024-04-09 LAB
ANION GAP SERPL CALC-SCNC: 5 MMOL/L (ref 0–18)
BUN BLD-MCNC: 63 MG/DL (ref 9–23)
CALCIUM BLD-MCNC: 9.5 MG/DL (ref 8.5–10.1)
CHLORIDE SERPL-SCNC: 97 MMOL/L (ref 98–112)
CO2 SERPL-SCNC: 29 MMOL/L (ref 21–32)
CREAT BLD-MCNC: 1.63 MG/DL
EGFRCR SERPLBLD CKD-EPI 2021: 30 ML/MIN/1.73M2 (ref 60–?)
ERYTHROCYTE [DISTWIDTH] IN BLOOD BY AUTOMATED COUNT: 14.2 %
FASTING STATUS PATIENT QL REPORTED: NO
GLUCOSE BLD-MCNC: 187 MG/DL (ref 70–99)
HCT VFR BLD AUTO: 38.5 %
HGB BLD-MCNC: 12.7 G/DL
MCH RBC QN AUTO: 32.9 PG (ref 26–34)
MCHC RBC AUTO-ENTMCNC: 33 G/DL (ref 31–37)
MCV RBC AUTO: 99.7 FL
OSMOLALITY SERPL CALC.SUM OF ELEC: 295 MOSM/KG (ref 275–295)
PLATELET # BLD AUTO: 315 10(3)UL (ref 150–450)
RBC # BLD AUTO: 3.86 X10(6)UL
SODIUM SERPL-SCNC: 131 MMOL/L (ref 136–145)
WBC # BLD AUTO: 11.5 X10(3) UL (ref 4–11)

## 2024-04-09 PROCEDURE — 99215 OFFICE O/P EST HI 40 MIN: CPT | Performed by: NURSE PRACTITIONER

## 2024-04-09 PROCEDURE — 71045 X-RAY EXAM CHEST 1 VIEW: CPT | Performed by: NURSE PRACTITIONER

## 2024-04-09 RX ORDER — BUMETANIDE 0.25 MG/ML
4 INJECTION INTRAMUSCULAR; INTRAVENOUS ONCE
Status: COMPLETED | OUTPATIENT
Start: 2024-04-09 | End: 2024-04-09

## 2024-04-09 RX ORDER — METOPROLOL TARTRATE 50 MG/1
TABLET, FILM COATED ORAL
COMMUNITY
Start: 2024-04-09

## 2024-04-09 RX ADMIN — BUMETANIDE 4 MG: 0.25 INJECTION INTRAMUSCULAR; INTRAVENOUS at 16:35:00

## 2024-04-09 NOTE — PROGRESS NOTES
Pt. Escorted from [facility] IV Team to the [facility]. Pt. Assessed. Pt's skin warm, temp 97.2 via forehead. Pt. complaining of chills and shortness of breath. Weight 201.2lbs, up 7 pounds since last visit. Pt. Had difficulty getting on the scale today. APN notified of patient's complaint of chills and shortness of breath, along with SB 39. Pt. Has tight cough and is using her nebulizer machine;last treatment right before her appointment today. Pt. Expectorating yellow phlegm. APN notified. Labs ordered and drawn by  Lab. Reviewed allergies and list of current medications with patient and updated it in the Electronic Medical Record. IV established per protocol by IV Team. BMP drawn off IV line in the [facility]. IV catheter slipped out of vein when flushing it with saline. Gauze and pressure held and coban applied. APN notified. New IV started per protocol. Called agustín BERNARDO hemolyzed;Klarissa BUSCH APN notified. IV Diuril 500mg given along with IV Bumex 4mg. Patient tolerated it well. STAT portable chest x-ray ordered and taken in the [facility]. Educated patient on low sodium diet and food choices, fluid restriction of 2 liters, and daily weights. Reviewed follow-up appointments and discharge Heart Failure instructions with patient. Patient verbalized an understanding. PHQ2 completed;results below.  IV dc'd;gauze and pressure held with coban. Pt. Discharged in stable condition with caregiver in wheelchair.  Outpatient labs faxed to [facility]. Also called [facility] and left confidential VM re: lab orders. E-confirmation received.  Fall Risk Assessment  Do you feel unsteady when standing or walking?: No  Do you worry about falling?: No  Have you fallen in the past year?: No

## 2024-04-09 NOTE — PROGRESS NOTES
[facility] Progress Note    Nancy Hensley is a 89 year old female who presents to clinic for APN assessment and management of chronic diastolic heart failure and is functional class 3.     Subjective:  Since her last clinic visit on 3/14; her weight is up 7 lbs. She has increased exertional dyspnea since yesterday. She notes chills the last few days but does not have a fever in the clinic. She looks very flushed. She has an elevated white count on her blood work today. She is using her oxygen more during the day, when normally only uses at night. Her daughter in law states that she has been having severe worsening joint pain, especially in her hips. Recommended she try to get a pain specialist referral from her PCP.     She had a televisit with Karen Hassan for chronic bronchitis and asthma. Continued prednisone 10mg daily. F/U 1 month.      Previously, when she took Metolazone, she felt like her head got very hot, pain in her shoulders and notes bradycardia (but was not recorded). She also has these symptoms intermittently when she takes oral Bumex. She tolerates IV Bumex.       She had a televisit with Dr. Henning for her arthritis. He started her on Meloxicam (NSAID) for pain; which she did not start because she knows she cannot take due to her heart failure. She also had a televisit with [facility] on 2/28 for asthma flare. Continued nebs, prednisone 10mg daily and started zpack.      Hospitalizations:     [facility] from 12/9-12/11 for shortness of breath, generalized pain and cough that started that same morning. She was found to be bradycardic in the 30's. Found to have mild pulmonary edema on CXR. Diuresed with IV Bumex. She was seen by [facility] and ARB was discontinued; which was not on last clinic visit but restarted by Dr. Reyes in November. [facility] saw her on 12/13, reviewed with PCP, and Cozaar was increased to 50mg BID and  Hydralazine was increased 25mg TID for worsening hypertension with SBP's in the 170's, to hold if SBP less than 110.      Hospitalized from 10/13-10/15 with worsening shortness of breath, thought to be a COPD exacerbation. She was treated with IV steroids and nebs. She was weaned to oral steroids. She was seen by Nephrology due to her hyponatremia and hyperkalemia, given Tolvaptan and Veltassa.     Review of Systems   Constitutional: Positive for weight gain.   Cardiovascular:  Positive for dyspnea on exertion and leg swelling.   Respiratory:  Positive for cough and wheezing.    Gastrointestinal:  Positive for bloating.       HISTORY:  Past Medical History:   Diagnosis Date    Accelerated hypertension 2014    Asthma (HCC)     Cough 2016    Disorder of thyroid     Dyspnea 2016    Dyspnea, unspecified type 2016    Endocrine disorder     HYPOTHYROID    Osteoarthrosis, unspecified whether generalized or localized, unspecified site     Other and unspecified hyperlipidemia     Pneumonia, organism unspecified(486)     Severe persistent asthma with status asthmaticus (HCC) 2019    Thyroid disease     Unspecified essential hypertension     Ventricular ectopy 1/15/2019    Visual impairment       Past Surgical History:   Procedure Laterality Date    KNEE REPLACEMENT SURGERY      OTHER SURGICAL HISTORY      TOTAL KNEE REPLACEMENT Bilateral     16 yrs ago      Family History   Problem Relation Age of Onset    Cancer Father         bladder    Heart Disorder Mother     Hypertension Mother     Diabetes Mother       Social History     Socioeconomic History    Marital status:    Tobacco Use    Smoking status: Former     Packs/day: 2.50     Years: 30.00     Additional pack years: 0.00     Total pack years: 75.00     Types: Cigarettes     Quit date: 1991     Years since quittin.7    Smokeless tobacco: Never   Vaping Use    Vaping Use: Never used   Substance and Sexual Activity    Alcohol use: Not  Currently     Comment: 2 times per month    Drug use: No   Other Topics Concern    Caffeine Concern Yes    Exercise Yes     Social Determinants of Health     Financial Resource Strain: Low Risk  (5/15/2023)    Financial Resource Strain     Difficulty of Paying Living Expenses: Not hard at all     Med Affordability: No   Food Insecurity: No Food Insecurity (10/13/2023)    Food Insecurity     Food Insecurity: Never true   Transportation Needs: No Transportation Needs (10/13/2023)    Transportation Needs     Lack of Transportation: No   Housing Stability: Low Risk  (10/13/2023)    Housing Stability     Housing Instability: No           Objective:    Cardiac Rhythm: Sinus bradycardia    /32   Pulse (!) 40   Resp 19   Wt 201 lb 3.2 oz (91.3 kg)   SpO2 96%   BMI 45.11 kg/m²     Wt Readings from Last 6 Encounters:   04/09/24 201 lb 3.2 oz (91.3 kg)   03/06/24 198 lb 9.6 oz (90.1 kg)   02/14/24 192 lb 6.4 oz (87.3 kg)   01/22/24 195 lb 3.2 oz (88.5 kg)   12/29/23 185 lb 3.2 oz (84 kg)   12/19/23 193 lb (87.5 kg)       Lab Results   Component Value Date    WBC 11.5 04/09/2024    HGB 12.7 04/09/2024    HCT 38.5 04/09/2024    .0 04/09/2024    CREATSERUM 1.63 04/09/2024    BUN 63 04/09/2024     04/09/2024    K 6.8 04/09/2024    CL 97 04/09/2024    CO2 29.0 04/09/2024     04/09/2024    CA 9.5 04/09/2024       Recent Results (from the past 24 hour(s))   CBC, Platelet; No Differential    Collection Time: 04/09/24  2:01 PM   Result Value Ref Range    WBC 11.5 (H) 4.0 - 11.0 x10(3) uL    RBC 3.86 3.80 - 5.30 x10(6)uL    HGB 12.7 12.0 - 16.0 g/dL    HCT 38.5 35.0 - 48.0 %    .0 150.0 - 450.0 10(3)uL    MCV 99.7 80.0 - 100.0 fL    MCH 32.9 26.0 - 34.0 pg    MCHC 33.0 31.0 - 37.0 g/dL    RDW 14.2 %   Basic Metabolic Panel (8)    Collection Time: 04/09/24  3:00 PM   Result Value Ref Range    Glucose 187 (H) 70 - 99 mg/dL    Sodium 131 (L) 136 - 145 mmol/L    Potassium 6.8 (HH) 3.5 - 5.1 mmol/L     Chloride 97 (L) 98 - 112 mmol/L    CO2 29.0 21.0 - 32.0 mmol/L    Anion Gap 5 0 - 18 mmol/L    BUN 63 (H) 9 - 23 mg/dL    Creatinine 1.63 (H) 0.55 - 1.02 mg/dL    Calcium, Total 9.5 8.5 - 10.1 mg/dL    Calculated Osmolality 295 275 - 295 mOsm/kg    eGFR-Cr 30 (L) >=60 mL/min/1.73m2    Patient Fasting for BMP? No          Current Outpatient Medications:     predniSONE 10 MG Oral Tab, Take1 tab PO daily, Disp: 90 tablet, Rfl: 3    ipratropium-albuterol 0.5-2.5 (3) MG/3ML Inhalation Solution, Take 3 mL by nebulization every 6 (six) hours as needed., Disp: 360 mL, Rfl: 3    levothyroxine 137 MCG Oral Tab, Take 137 mcg by mouth before breakfast., Disp: , Rfl:     metoprolol tartrate 50 MG Oral Tab, Take 1 tablet (50 mg total) by mouth 2 (two) times daily., Disp: , Rfl:     hydrALAZINE 25 MG Oral Tab, Take 1 tablet (25 mg total) by mouth every morning AND 1 tablet (25 mg total) Every afternoon at 2:00 pm AND 1 tablet (25 mg total) every evening. Hold if SBP is <110., Disp: , Rfl:     torsemide 20 MG Oral Tab, Take 2 tablets (40 mg total) by mouth 2 (two) times daily., Disp: 120 tablet, Rfl: 1    sodium chloride 0.9 % Inhalation Nebu Soln, Take 3 mL by nebulization as needed for Wheezing., Disp: 360 mL, Rfl: 3    losartan 50 MG Oral Tab, Take 1 tablet (50 mg total) by mouth 2 (two) times daily., Disp: 180 tablet, Rfl: 0    PREDNISONE 5 MG Oral Tab, TAKE 1 TABLET(5 MG) BY MOUTH DAILY, Disp: 90 tablet, Rfl: 3    potassium chloride 20 MEQ Oral Tab CR, Take 1 tablet (20 mEq total) by mouth daily., Disp: , Rfl:     budesonide 0.5 MG/2ML Inhalation Suspension, Take 2 mL (0.5 mg total) by nebulization 2 (two) times daily., Disp: 360 mL, Rfl: 3    cyanocobalamin 250 MCG Oral Tab, Take 1 tablet (250 mcg total) by mouth daily., Disp: , Rfl:     Cholecalciferol (VITAMIN D-3 OR), Take by mouth daily., Disp: , Rfl:     Cranberry 500 MG Oral Cap, Take 500 mg by mouth daily., Disp: , Rfl:     acetaminophen 500 MG Oral Tab, Take 1 tablet  (500 mg total) by mouth every 6 (six) hours., Disp: 30 tablet, Rfl: 0    albuterol (PROAIR HFA) 108 (90 Base) MCG/ACT Inhalation Aero Soln, Inhale 2 puffs into the lungs every 6 (six) hours as needed for Wheezing or Shortness of Breath., Disp: 1 each, Rfl: 11    Cod Liver Oil 1000 MG Oral Cap, Take 1 capsule by mouth daily., Disp: , Rfl:     Calcium Carbonate-Vitamin D (CALTRATE 600+D OR), Take 1 tablet by mouth daily.  , Disp: , Rfl:     Multiple Vitamins-Minerals (CENTRUM SILVER OR), Take 1 tablet by mouth daily.  , Disp: , Rfl:     Exam:   General:         Alert, in no apparent distress  HEENT:           +JVD  Lungs:            UL wheezing, mild crackles at bases                     CV:                  S1, S2 regular, bradycardia  Abdomen:       distended/obese, semi firm, non-tender, BS+  Extremities:    +3 pitting LE edema  Neuro:             A&O x 3  Skin:                Pink, warm, dry    Education:  Patient instructed regarding sodium restricted diet, low sodium foods, fluid restriction, daily weights, medication regimen, s/s HF exacerbation and when to call APN/clinic.    Assessment:   Chronic diastolic heart failure, ACC/AHA Stage C, NYHA Class 3- LVEF 55-60% with grade 2 DD on echo 3/2023. Prior BVA inconclusive, patient does not want to repeat test. Last ProBNP  2,065 in November, now trending down to 1,650. Avoiding MRA due to chronic hyponatremia. SGTL2i cost prohibitive. Hypervolemic today.   Essential HTN - Labile. Recent increase in Losartan and Hydralazine due to worsening hypertension. Hydralazine recently increased, but she decreased herself back to original dose. Controlled today.  LBBB  Hyponatremia, chronic - Hx Tolvaptan. Labile. Na 131 and stable. Worsening prior with Metolazone.   Anemia - HGB 12.7 g/dl and overall stable. Last Iron sat 20, Ferritin 155.8 in June.    Chronic lymphedema - using lymphedema pumps. Wraps legs and has legs massaged. Not a candidate for vein treatment due to  minimal reflux on prior US. Follows with Dr. Villatoro.   CKD stage 3b - unknown baseline creatinine. SGLT2i is cost prohibited. Follows with Dr. Walton/Dr. Reyes. Saw Dr. Reyes 11/20; restarted ARB due to hypertension.  Morbid obesity - Body mass index is 45.11 kg/m².  COPD/asthma - nocturnal oxygen use, inhalers. Follows with Dr. England and Karen Gonzalez. On Volara for airway clearance. On alternating dose Prednisone, 5 mg and 10 mg daily.   Possible DANE - not agreeable to sleep study in past since she would not ever use a device.   Hx chronic tobacco use - quit 30 years ago, 20 pack years.  DM type 2 - well controlled. Last a1c 6.5% on 11/2, on no medications.    Hypothyroidism - on Levothyroxine. TSH 3.2, Free T4 1.5 and improved on 3/14/24. Followed by PCP.   Hx ecoli UTI - s/p abx.   Hx Hyperkalemia, hypokalemia.   HLD - FLP on 11/2 with triglycerides 539, , HDL 66.   Mild leukocytosis - CTM       Plan:   Ordered STAT CXR that is unchanged. No pulmonary edema or pneumonia.    Diuril 500mg IVP x 1  Bumex 4mg IVP x 1  Potassium hemolyzed, plan to recheck through [facility] this week. Patient is a very hard stick.  Decrease Metoprolol from 50mg twice per day to 50mg in the morning and 25mg in the evening due to bradycardia.   Return to clinic in 1-2 weeks.  F/U with Dr. Villatoro around July.   CHF discharge instructions given    >60 minutes spent with patient and greater than 50% of the time was spent counseling and coordinating care.    DAMIR Poon

## 2024-04-09 NOTE — PATIENT INSTRUCTIONS
Heart Failure Discharge Instructions    Decrease Metoprolol from 50mg twice per day to 50mg in the morning and 25mg in the evening.   Hold afternoon torsemide today only      Activity: Regular exercise and activity is important for your overall health and to help keep your heart strong and functioning as well as possible.   Walk at a slow to moderate pace for 15-20 minutes 3-5 days per week.     Follow these instructions every day to keep yourself in the Green Zone     The Green Zone means you are feeling well and your symptoms are under control                                    Medications  Take your medication every day as instructed  Do not stop taking your medicine or change the amount you are taking without instructions from your doctor or nurse  Do Not take non-steroidal antiinflammatory drugs such as ibuprofen, aleve, advil, or motrin                                    Diet/Fluids  People with heart failure should eat less sodium (salt) and limit fluid. Sodium attracts water and makes the body hold fluid. This extra fluid makes the heart work harder and can worsen the symptoms of heart failure.     Diet    2000 mg sodium daily  Fluid restriction    64 ounces daily  (8 oz. = 1 cup)                                     Body Weight  Weigh yourself every day before breakfast and write your weight down  Use the same scale and wear about the same amount of clothes each time  A sudden weight increase is due to fluid retention rather than fat                                         Activity  Pace your activities to avoid getting overtired  Take rest periods as needed  Elevate your feet to reduce ankle swelling when resting                             Signs of Worsening Heart Failure    You are entering the Yellow Zone - this is a warning zone    Call your doctor or nurse if you have any of these signs or symptoms:  You gain 2 or more pounds overnight or 3-5 pounds in 3-7 days  You have more trouble breathing  You get  more tired with regular activity, or are limiting activity because of shortness of breath or fatigue  You are short of breath lying down, you need more pillows to breathe comfortably,  or wake up during the night short of breath  You urinate less often during the day and more often at night  You have a bloated feeling, upset stomach, loss of appetite, or your clothes are fitting tighter    GO TO THE EMERGENCY DEPARTMENT or CALL 911 IF:    These are signs you are in the RED ZONE - THIS IS AN EMERGENCY  You have tightness or pain in your chest  You are extremely short of breath or can't catch your breath  You cough up frothy pink mucous  You feel confused or can't think clearly  You are traveling and develop symptoms of worsening heart failure     We respect everyone's time and availability. Please be aware that this is not a walk-in clinic and we require appointments in order to facilitate timely care for all patients. We ask you to arrive 30 minutes before your appointment to allow time for you to check-in and have your bloodwork drawn. Please understand if you are late for your appointment, you may be asked to reschedule. If possible, all attempts will be made to accommodate but realize this is no guarantee that this will always be available. We understand there are extenuating circumstances. If you need to cancel or reschedule your appointment, please call the [place] [facility] within 24 hours at [phone].  Thank you for your cooperation, [facility] Staff.    IF YOU HAVE QUESTIONS REGARDING YOUR BILL, FEEL FREE TO CONTACT [facility] PATIENT ACCOUNTS -[phone]. IF YOU NEED FINANCIAL ASSISTANCE, PLEASE CALL AN [facility] FINANCIAL COUNSELOR -[phone].             [facility]     [phone]

## 2024-04-10 ENCOUNTER — TELEPHONE (OUTPATIENT)
Dept: FAMILY MEDICINE CLINIC | Facility: CLINIC | Age: 89
End: 2024-04-10

## 2024-04-10 NOTE — TELEPHONE ENCOUNTER
Daughter said pt's bank not accept letter from Dr. Henning dated 2/27, as it is a copy.  Pls provide letter, non-copy, and call when ready for

## 2024-04-11 ENCOUNTER — LAB ENCOUNTER (OUTPATIENT)
Dept: LAB | Age: 89
End: 2024-04-11
Attending: FAMILY MEDICINE
Payer: MEDICARE

## 2024-04-12 NOTE — TELEPHONE ENCOUNTER
Please review previous message  Daughter said pt's bank not accept letter from Dr. Henning dated 2/27, as it is a copy.  Pls provide letter, non-copy, and call when ready for

## 2024-04-18 DIAGNOSIS — I50.32 CHRONIC HEART FAILURE WITH PRESERVED EJECTION FRACTION (HFPEF) (HCC): Primary | ICD-10-CM

## 2024-04-22 ENCOUNTER — HOSPITAL ENCOUNTER (OUTPATIENT)
Dept: PERIOP | Facility: HOSPITAL | Age: 89
Discharge: HOME | End: 2024-04-22
Attending: NURSE PRACTITIONER
Payer: MEDICARE

## 2024-04-22 ENCOUNTER — HOSPITAL ENCOUNTER (OUTPATIENT)
Dept: CARDIOLOGY CLINIC | Facility: HOSPITAL | Age: 89
End: 2024-04-22
Attending: NURSE PRACTITIONER
Payer: MEDICARE

## 2024-04-22 VITALS
HEART RATE: 69 BPM | SYSTOLIC BLOOD PRESSURE: 157 MMHG | RESPIRATION RATE: 16 BRPM | BODY MASS INDEX: 44 KG/M2 | OXYGEN SATURATION: 96 % | WEIGHT: 196 LBS | DIASTOLIC BLOOD PRESSURE: 62 MMHG

## 2024-04-22 DIAGNOSIS — I50.32 CHRONIC HEART FAILURE WITH PRESERVED EJECTION FRACTION (HFPEF) (HCC): Primary | ICD-10-CM

## 2024-04-22 DIAGNOSIS — N18.30 STAGE 3 CHRONIC KIDNEY DISEASE, UNSPECIFIED WHETHER STAGE 3A OR 3B CKD (HCC): ICD-10-CM

## 2024-04-22 DIAGNOSIS — I89.0 LYMPHEDEMA: ICD-10-CM

## 2024-04-22 DIAGNOSIS — I50.32 CHRONIC DIASTOLIC CHF (CONGESTIVE HEART FAILURE) (HCC): Primary | ICD-10-CM

## 2024-04-22 DIAGNOSIS — E87.1 HYPONATREMIA: ICD-10-CM

## 2024-04-22 LAB
ANION GAP SERPL CALC-SCNC: 8 MMOL/L (ref 0–18)
BUN BLD-MCNC: 61 MG/DL (ref 9–23)
CALCIUM BLD-MCNC: 9.1 MG/DL (ref 8.5–10.1)
CHLORIDE SERPL-SCNC: 94 MMOL/L (ref 98–112)
CO2 SERPL-SCNC: 29 MMOL/L (ref 21–32)
CREAT BLD-MCNC: 1.61 MG/DL
EGFRCR SERPLBLD CKD-EPI 2021: 30 ML/MIN/1.73M2 (ref 60–?)
FASTING STATUS PATIENT QL REPORTED: NO
GLUCOSE BLD-MCNC: 215 MG/DL (ref 70–99)
NT-PROBNP SERPL-MCNC: 1693 PG/ML (ref ?–450)
OSMOLALITY SERPL CALC.SUM OF ELEC: 296 MOSM/KG (ref 275–295)
POTASSIUM SERPL-SCNC: 4.5 MMOL/L (ref 3.5–5.1)
SODIUM SERPL-SCNC: 131 MMOL/L (ref 136–145)

## 2024-04-22 PROCEDURE — 99215 OFFICE O/P EST HI 40 MIN: CPT | Performed by: NURSE PRACTITIONER

## 2024-04-22 RX ORDER — MELOXICAM 15 MG/1
7.5 TABLET ORAL DAILY PRN
COMMUNITY

## 2024-04-22 NOTE — PROGRESS NOTES
Patient escorted from [facility] to [facility] where labs were drawn and IV was placed. Pt. Assessed with daughter in law present.  Weight 196 down 5. APN notified of above. Labs ordered and drawn by  Lab. Reviewed allergies and list of current medications with patient and updated it in the Electronic Medical Record.       Patient tolerated it well. Educated patient on low sodium diet and food choices, fluid restriction of 2 liters, and daily weights. Reviewed follow-up appointments and discharge Heart Failure instructions with patient. Patient verbalized an understanding. IV discontinued; catheter intact. Pressure held and gauze applied.       Will RTC in 3 weeks

## 2024-04-22 NOTE — PATIENT INSTRUCTIONS
Heart Failure Discharge Instructions    Check weight 3 x per week.  Sit for 5 mins before checking blood pressure.  Continue current medications.     Activity: Regular exercise and activity is important for your overall health and to help keep your heart strong and functioning as well as possible.   Walk at a slow to moderate pace for 15-20 minutes 3-5 days per week.     Follow these instructions every day to keep yourself in the Green Zone     The Green Zone means you are feeling well and your symptoms are under control                                    Medications  Take your medication every day as instructed  Do not stop taking your medicine or change the amount you are taking without instructions from your doctor or nurse  Do Not take non-steroidal antiinflammatory drugs such as ibuprofen, aleve, advil, or motrin                                    Diet/Fluids  People with heart failure should eat less sodium (salt) and limit fluid. Sodium attracts water and makes the body hold fluid. This extra fluid makes the heart work harder and can worsen the symptoms of heart failure.     Diet    2000 mg sodium daily  Fluid restriction    64 ounces daily  (8 oz. = 1 cup)                                     Body Weight  Weigh yourself every day before breakfast and write your weight down  Use the same scale and wear about the same amount of clothes each time  A sudden weight increase is due to fluid retention rather than fat                                         Activity  Pace your activities to avoid getting overtired  Take rest periods as needed  Elevate your feet to reduce ankle swelling when resting                             Signs of Worsening Heart Failure    You are entering the Yellow Zone - this is a warning zone    Call your doctor or nurse if you have any of these signs or symptoms:  You gain 2 or more pounds overnight or 3-5 pounds in 3-7 days  You have more trouble breathing  You get more tired with regular  activity, or are limiting activity because of shortness of breath or fatigue  You are short of breath lying down, you need more pillows to breathe comfortably,  or wake up during the night short of breath  You urinate less often during the day and more often at night  You have a bloated feeling, upset stomach, loss of appetite, or your clothes are fitting tighter    GO TO THE EMERGENCY DEPARTMENT or CALL 911 IF:    These are signs you are in the RED ZONE - THIS IS AN EMERGENCY  You have tightness or pain in your chest  You are extremely short of breath or can't catch your breath  You cough up frothy pink mucous  You feel confused or can't think clearly  You are traveling and develop symptoms of worsening heart failure     We respect everyone's time and availability. Please be aware that this is not a walk-in clinic and we require appointments in order to facilitate timely care for all patients. We ask you to arrive 30 minutes before your appointment to allow time for you to check-in and have your bloodwork drawn. Please understand if you are late for your appointment, you may be asked to reschedule. If possible, all attempts will be made to accommodate but realize this is no guarantee that this will always be available. We understand there are extenuating circumstances. If you need to cancel or reschedule your appointment, please call the [place] [facility] within 24 hours at [phone].  Thank you for your cooperation, [facility] Staff.    IF YOU HAVE QUESTIONS REGARDING YOUR BILL, FEEL FREE TO CONTACT [facility] PATIENT ACCOUNTS -[phone]. IF YOU NEED FINANCIAL ASSISTANCE, PLEASE CALL AN [facility] FINANCIAL COUNSELOR -[phone].             [facility]     [phone]

## 2024-04-22 NOTE — PROGRESS NOTES
[facility] Progress Note    Nancy Hensley is a 89 year old female who presents to clinic for APN assessment and management of chronic  diastolic heart failure and is functional class 3.     Subjective:  Since her last clinic visit on 4/9; when we completed a CXR that had shown no pulmonary edema or pneumonia, and she was given IV Diuril 500mg and Bumex 4mg and Metoprolol was reduced to 50mg in the am and 25mg in the pm for bradycardia; also ordered blood work through [facility] to recheck potassium, BMP completed on 4/11 and K 3.9 with stable renal function.     Today, her weight is down 5 lbs. She feels overall well. Her arthritic pain is better controlled on Meloxicam. She is willing to risk a higher incidence of cardiovascular issues at 89 years old because the pain is bad it causes to not sleep or move. Recommended she try to get a pain specialist referral from her PCP during her last visit. Her shortness of breath is back to her baseline. Her LE edema remains unchanged. She soaks her feet in Epsom salt and it helps the pain and keeps them clean. She is not consistent with using her lymphedema boots because she is unable to lay back to keep them on due to breathing issues. Her home SBP's have been 120-160's. Her weight was 192 lbs today. She is not weighing regularly.      She had a televisit with Karen Hassan on 4/3 for chronic bronchitis and asthma. Continued prednisone 10mg daily. F/U 1 month.      Previously, when she took Metolazone, she felt like her head got very hot, pain in her shoulders and notes bradycardia (but was not recorded). She also has these symptoms intermittently when she takes oral Bumex. She tolerates IV Bumex.       She had a televisit with Dr. Henning for her arthritis. He started her on Meloxicam (NSAID) for pain; which she did not start because she knows she cannot take due to her heart failure. She also had a televisit with Karen Gonzalez  APN, Pulmonary on 2/28 for asthma flare. Continued nebs, prednisone 10mg daily and started zpack.      Hospitalizations:     [facility] from 12/9-12/11 for shortness of breath, generalized pain and cough that started that same morning. She was found to be bradycardic in the 30's. Found to have mild pulmonary edema on CXR. Diuresed with IV Bumex. She was seen by Renal and ARB was discontinued; which was not on last clinic visit but restarted by Dr. Reyes in November. [facility] saw her on 12/13, reviewed with PCP, and Cozaar was increased to 50mg BID and Hydralazine was increased 25mg TID for worsening hypertension with SBP's in the 170's, to hold if SBP less than 110.      Hospitalized from 10/13-10/15 with worsening shortness of breath, thought to be a COPD exacerbation. She was treated with IV steroids and nebs. She was weaned to oral steroids. She was seen by Nephrology due to her hyponatremia and hyperkalemia, given Tolvaptan and Veltassa.     Review of Systems   Constitutional: Positive for weight loss.   Cardiovascular:  Positive for dyspnea on exertion (stable) and leg swelling (stable).   Respiratory: Negative.     Gastrointestinal:  Positive for bloating.       HISTORY:  Past Medical History:    Accelerated hypertension    Asthma (HCC)    Cough    Disorder of thyroid    Dyspnea    Dyspnea, unspecified type    Endocrine disorder    HYPOTHYROID    Osteoarthrosis, unspecified whether generalized or localized, unspecified site    Other and unspecified hyperlipidemia    Pneumonia, organism unspecified(486)    Severe persistent asthma with status asthmaticus (HCC)    Thyroid disease    Unspecified essential hypertension    Ventricular ectopy    Visual impairment      Past Surgical History:   Procedure Laterality Date    Knee replacement surgery      Other surgical history      Total knee replacement Bilateral     16 yrs ago      Family History   Problem Relation Age of Onset    Cancer Father         bladder     Heart Disorder Mother     Hypertension Mother     Diabetes Mother       Social History     Socioeconomic History    Marital status:    Tobacco Use    Smoking status: Former     Current packs/day: 0.00     Average packs/day: 2.5 packs/day for 30.0 years (75.0 ttl pk-yrs)     Types: Cigarettes     Start date: 1961     Quit date: 1991     Years since quittin.7    Smokeless tobacco: Never   Vaping Use    Vaping status: Never Used   Substance and Sexual Activity    Alcohol use: Not Currently     Comment: 2 times per month    Drug use: No   Other Topics Concern    Caffeine Concern Yes    Exercise Yes     Social Determinants of Health     Financial Resource Strain: Low Risk  (5/15/2023)    Financial Resource Strain     Difficulty of Paying Living Expenses: Not hard at all     Med Affordability: No   Food Insecurity: No Food Insecurity (12/10/2023)    [facility]    Food Insecurity     Currently or in the past 3 months, have you worried your food would run out before you had money to buy more?: No     In the past 12 months, have you run out of food or been unable to get more?: No   Transportation Needs: No Transportation Needs (12/10/2023)    [facility], [facility]    Transportation Needs     Medical Transportation Needs?: No    [facility] from [facility], [facility]    Housing Stability           Objective:    Telemetry: NSR       /62   Pulse 69   Resp 16   Wt 196 lb (88.9 kg)   SpO2 96%   BMI 43.94 kg/m²     Wt Readings from Last 6 Encounters:   24 196 lb (88.9 kg)   24 201 lb 3.2 oz (91.3 kg)   24 198 lb 9.6 oz (90.1 kg)   24 192 lb 6.4 oz (87.3 kg)   24 195 lb 3.2 oz (88.5 kg)   23 185 lb 3.2 oz (84 kg)       Lab Results   Component Value Date    CREATSERUM 1.61 2024    BUN 61 2024      04/22/2024    K 4.5 04/22/2024    CL 94 04/22/2024    CO2 29.0 04/22/2024     04/22/2024    CA 9.1 04/22/2024       Recent Results (from the past 24 hour(s))   Pro Beta Natriuretic Peptide    Collection Time: 04/22/24  2:01 PM   Result Value Ref Range    Pro-Beta Natriuretic Peptide 1,693 (H) <450 pg/mL   Basic Metabolic Panel (8)    Collection Time: 04/22/24  2:19 PM   Result Value Ref Range    Glucose 215 (H) 70 - 99 mg/dL    Sodium 131 (L) 136 - 145 mmol/L    Potassium 4.5 3.5 - 5.1 mmol/L    Chloride 94 (L) 98 - 112 mmol/L    CO2 29.0 21.0 - 32.0 mmol/L    Anion Gap 8 0 - 18 mmol/L    BUN 61 (H) 9 - 23 mg/dL    Creatinine 1.61 (H) 0.55 - 1.02 mg/dL    Calcium, Total 9.1 8.5 - 10.1 mg/dL    Calculated Osmolality 296 (H) 275 - 295 mOsm/kg    eGFR-Cr 30 (L) >=60 mL/min/1.73m2    Patient Fasting for BMP? No          Current Outpatient Medications:     OXYGEN-HELIUM IN, 2 L by Nasal route at bedtime., Disp: , Rfl:     Meloxicam 15 MG Oral Tab, Take 0.5 tablets (7.5 mg total) by mouth daily as needed for Pain., Disp: , Rfl:     metoprolol tartrate 50 MG Oral Tab, Take 1 tablet (50 mg total) by mouth every morning AND 0.5 tablets (25 mg total) every evening., Disp: , Rfl:     predniSONE 10 MG Oral Tab, Take1 tab PO daily, Disp: 90 tablet, Rfl: 3    ipratropium-albuterol 0.5-2.5 (3) MG/3ML Inhalation Solution, Take 3 mL by nebulization every 6 (six) hours as needed., Disp: 360 mL, Rfl: 3    levothyroxine 137 MCG Oral Tab, Take 137 mcg by mouth before breakfast., Disp: , Rfl:     hydrALAZINE 25 MG Oral Tab, Take 1 tablet (25 mg total) by mouth every morning AND 1 tablet (25 mg total) Every afternoon at 2:00 pm AND 1 tablet (25 mg total) every evening. Hold if SBP is <110., Disp: , Rfl:     torsemide 20 MG Oral Tab, Take 2 tablets (40 mg total) by mouth 2 (two) times daily., Disp: 120 tablet, Rfl: 1    sodium chloride 0.9 % Inhalation Nebu Soln, Take 3 mL by nebulization as needed for Wheezing., Disp: 360  mL, Rfl: 3    losartan 50 MG Oral Tab, Take 1 tablet (50 mg total) by mouth 2 (two) times daily., Disp: 180 tablet, Rfl: 0    PREDNISONE 5 MG Oral Tab, TAKE 1 TABLET(5 MG) BY MOUTH DAILY, Disp: 90 tablet, Rfl: 3    potassium chloride 20 MEQ Oral Tab CR, Take 1 tablet (20 mEq total) by mouth daily., Disp: , Rfl:     budesonide 0.5 MG/2ML Inhalation Suspension, Take 2 mL (0.5 mg total) by nebulization 2 (two) times daily., Disp: 360 mL, Rfl: 3    cyanocobalamin 250 MCG Oral Tab, Take 1 tablet (250 mcg total) by mouth daily., Disp: , Rfl:     Cholecalciferol (VITAMIN D-3 OR), Take by mouth daily., Disp: , Rfl:     Cranberry 500 MG Oral Cap, Take 500 mg by mouth daily., Disp: , Rfl:     acetaminophen 500 MG Oral Tab, Take 1 tablet (500 mg total) by mouth every 6 (six) hours., Disp: 30 tablet, Rfl: 0    albuterol (PROAIR HFA) 108 (90 Base) MCG/ACT Inhalation Aero Soln, Inhale 2 puffs into the lungs every 6 (six) hours as needed for Wheezing or Shortness of Breath., Disp: 1 each, Rfl: 11    Cod Liver Oil 1000 MG Oral Cap, Take 1 capsule by mouth daily., Disp: , Rfl:     Calcium Carbonate-Vitamin D (CALTRATE 600+D OR), Take 1 tablet by mouth daily.  , Disp: , Rfl:     Multiple Vitamins-Minerals (CENTRUM SILVER OR), Take 1 tablet by mouth daily.  , Disp: , Rfl:     Exam:   General:         Alert, in no apparent distress  HEENT:          +6cm JVD  Lungs:            CTAB, distant sounds due to body habitus                     CV:                  S1, S2 regular  Abdomen:       distended/round, soft, non-tender, BS+  Extremities:    +2-3 LE edema with superimposed lymphedema  Neuro:             A&O x 3  Skin:                Pink, warm, dry    Education:  Patient instructed regarding sodium restricted diet, low sodium foods, fluid restriction, daily weights, medication regimen, s/s HF exacerbation and when to call APN/clinic.    Assessment:   Chronic diastolic heart failure, ACC/AHA Stage C, NYHA Class 3- LVEF 55-60% with grade  2 DD on echo 3/2023. Prior BVA inconclusive, patient does not want to repeat test. ProBNP  1,693 and stable. Avoiding MRA due to chronic hyponatremia. SGTL2i cost prohibitive. Volume status improved.  Essential HTN - Labile. On Losartan and Hydralazine.   LBBB  Hyponatremia, chronic - Hx Tolvaptan. Labile. Na 131 and stable. Worsening prior with Metolazone.   Anemia - last HGB 12.7 g/dl and overall stable. Last Iron sat 20, Ferritin 155.8 in June.    Chronic lymphedema - using lymphedema pumps intermittently. Wraps legs and has legs massaged. Not a candidate for vein treatment due to minimal reflux on prior US. Follows with Dr. Villatoro.   CKD stage 3b - unknown baseline creatinine. SGLT2i is cost prohibited. Follows with Dr. Walton/Dr. Reyes. Saw Dr. Reyes 11/20; restarted ARB due to hypertension.  Morbid obesity - Body mass index is 43.94 kg/m².  COPD/asthma - uses 1 L nocturnal oxygen, inhalers. Follows with Dr. England and Karen Gonzalez. On Volara for airway clearance. On alternating dose Prednisone, 5 mg and 10 mg daily.   Possible DANE - not agreeable to sleep study in past since she would not ever use a device.   Hx chronic tobacco use - quit 30 years ago, 20 pack years.  DM type 2 - well controlled. Last a1c 6.5% on 11/2, on no medications.    Hypothyroidism - on Levothyroxine. TSH 3.2, Free T4 1.5 and improved on 3/14/24. Followed by PCP.   Hx ecoli UTI - s/p abx.   HLD - FLP on 11/2 with triglycerides 539, , HDL 66.       Plan:   Check weight 3 x per week. Not checking regularly.   Instructed to sit for 5 mins before checking blood pressure to see if she can get a lower reading.  Continue current medications.    Return to clinic in 3 weeks  F/U with Dr. Villatoro in June or July with echo prior.   CHF discharge instructions given    40 minutes spent with patient and greater than 50% of the time was spent counseling and coordinating care.    DAMIR Poon

## 2024-04-23 ENCOUNTER — MED REC SCAN ONLY (OUTPATIENT)
Facility: CLINIC | Age: 89
End: 2024-04-23

## 2024-04-25 ENCOUNTER — TELEPHONE (OUTPATIENT)
Facility: CLINIC | Age: 89
End: 2024-04-25

## 2024-04-25 DIAGNOSIS — J44.1 COPD EXACERBATION (HCC): ICD-10-CM

## 2024-04-25 RX ORDER — BUDESONIDE 0.5 MG/2ML
INHALANT ORAL 2 TIMES DAILY
Qty: 360 ML | Refills: 3 | Status: SHIPPED | OUTPATIENT
Start: 2024-04-25

## 2024-04-25 RX ORDER — FLUCONAZOLE 100 MG/1
TABLET ORAL
Qty: 14 TABLET | Refills: 0 | Status: SHIPPED | OUTPATIENT
Start: 2024-04-25

## 2024-04-25 NOTE — TELEPHONE ENCOUNTER
Pt notified that Rx for Fluconazole sent to pharmacy and advised to call if symptoms persist.  Pt verbalizes understanding.

## 2024-04-25 NOTE — TELEPHONE ENCOUNTER
Pt given Fluconazole 100 mg #14 previously by Dr. England.  Message forwarded to Karol REICH for Rx

## 2024-04-25 NOTE — TELEPHONE ENCOUNTER
Pt had visit with Home Health Nurse today and showed her the inside of her mouth.  Pt states that nurse told her she has a \"fungus.\"  States this happened last year and Dr. England prescribed Fluconazole.  Pt is rinsing her mouth after neb treatments.  Please advise.

## 2024-04-25 NOTE — TELEPHONE ENCOUNTER
LOV 4/3/24 with MG    LOV Instructions    Continue nebs, oxygen as directed  Continue 10mg as discussed and  alternate every other day with prednisone 5mg when able  Followup in 4 weeks or sooner if needed  Please contact office at [phone] with any decline in respiratory status, questions or concerns                     Pt has appt scheduled for 5/1/24

## 2024-05-01 ENCOUNTER — TELEMEDICINE (OUTPATIENT)
Facility: CLINIC | Age: 89
End: 2024-05-01
Payer: MEDICARE

## 2024-05-01 DIAGNOSIS — J41.8 MIXED SIMPLE AND MUCOPURULENT CHRONIC BRONCHITIS (HCC): ICD-10-CM

## 2024-05-01 DIAGNOSIS — J45.50 SEVERE PERSISTENT ASTHMA WITHOUT COMPLICATION (HCC): ICD-10-CM

## 2024-05-01 DIAGNOSIS — J44.1 COPD EXACERBATION (HCC): Primary | ICD-10-CM

## 2024-05-01 DIAGNOSIS — R05.9 COUGH, UNSPECIFIED TYPE: ICD-10-CM

## 2024-05-01 PROCEDURE — 99214 OFFICE O/P EST MOD 30 MIN: CPT | Performed by: NURSE PRACTITIONER

## 2024-05-01 RX ORDER — AMOXICILLIN AND CLAVULANATE POTASSIUM 500; 125 MG/1; MG/1
1 TABLET, FILM COATED ORAL 2 TIMES DAILY
Qty: 14 TABLET | Refills: 0 | Status: SHIPPED | OUTPATIENT
Start: 2024-05-01 | End: 2024-05-08

## 2024-05-01 NOTE — PATIENT INSTRUCTIONS
Take prednisone 20mg again tomorrow then decrease to 15mg for 3 days then back to 10mg daily  Start Augmentin 1 pill twice a day for 7 days; please eat something when you take this medicine  Continue nebs, inhalers and oxygen as directed  Followup in 1 week or sooner if needed

## 2024-05-01 NOTE — PROGRESS NOTES
Subjective:   Nancy Hensley is a 89 year old female who presents for 1 month followup. Reports feeling well until 3 days ago; now \"so-so\" and noted increased cough, difficulty coughing up phlegm until today. She increased her prednisone to 20mg daily 2 days ago and feels better. Had wheezing that resolved with duonebs 3 days ago. No fever but felt the chills.      Previously 4/3/24  a 89 year old female with significant PMH asthma who presents today for follow up. Overall feels well. Has had some days with increased congestion but oerall feels well. Taking prednisone 10mg daily for the past week with the weather changes. Using nebs and oxygen as directed.     Previously 3/13/24  a 89 year old female with significant PMH asthma who presents today for follow up s/p Abx for increased chest congestion. Overall feels ok; no new complaints. Has good days and some not so good. No f/c/ns.     Previously 2/28/24  a 89 year old female with significant PMH asthma and bronchitis who presents today for follow up. Reports overall not feeling well the past 6 weeks; increased cough with white-yellow phlegm, chest congestion, chills. HHRN told pt she heard congestion in the left lower lung.  Still using nebs as directed, has been taking prednisone 10mg daily for the past couple days. Feels the saline nebs helps the most. No fever or night sweats. Using oxygen 2L at night.      Previously 1/2024  a 89 year old female with significant PMH Asthma who presents today for follow up. Reports feeling \"Glorious\". Breathing is good; tolerating prednisone 5mg daily. Using nebs as directed and oxygen 2L at night. No f/c/ns, cough or congestion.      Previously 11/2023  a 89 year old female with significant PMH asthma who presents today for sick 2 week follow up. Reports she feels more congested, not able to cough up phlegm, heard wheezing last night. Denies increased SOB. Using budesonide and duonebs and prednisone 10mg daily. No f/c/ns.  Not using saline nebs     Previously 10/24/23  a 89 year old female with significant PMHx of asthma who presents today for follow up. Pt recently hospitalized for AECOPD 10/13-10/15/23 after noticing increased SOB and right sided chest discomfort, treated with steroids and nebs. No Abx. Completed prednisone post discharge yesterday. Feels well today; mild cough remains. No f/c/ns. Using budesonide BID, Duonebs q 5-6 hrs and saline nebs prn. Using oxygen 2L at night only.     Previously 9/2023  a 89 year old female with significant PMH of asthma who presents today for follow up s/p Abx and steroids. Currently taking prednisone 20mg daily, completed ABx. Reports feeling \"much, much better\". Not using arformoterol and prefers to not take it.      Previously 8/29/23   a 89 year old female with significant PMH asthma/COPD who presents today for evaluation of increased cough, chest congestion, wheeze and SOB. Decreased prednisone from 10mg to 5mg after our last visit. Restarted Arformoterol on her own and felt great initially then after 5-7 days had a harder time coughing up phlegm. She has since stopped the Arformoterol and increased prednisone from 5 to 10mg for the past 3 days as her sx worsened.  Denies f/ns; + chills. Unable to cough up phlegm. Currently using Budesonide nebs and Duonebs BID.      Previously 8/8/23    presents today for follow up. Reports breathing is perfect. Very happy with new medications. Continues on prednisone 10mg as she has increased cough on 5mg. Denies new complaints.      Previously 7/27/23 Dr England  a 89 year old female with hx of Asthma, CKD, and CHF who presents for follow up of asthma/COPD. She reports having been well until today when she felt her cough worsen with white phlegm. Again after clearing the phlegm she feels well.  She did use arformoterol +budesonide in early June but did not feel better so stopped - of note she stopped using duoneb altogether during this time. No pain.  No fevers. +BLE edema     June 2023 previously  She was hospitalized in May with CHF exacerbation and hyponatremia, her breathing was controlled during her hospitalization. After going home she developed chest congestion and frequent coughing. She has continued to use her nebulizers - budesonide BID, duoneb PRN and saline nebs PRN. She remains on baseline pred 5mg daily  No fevers. No pain/pleurisy  Weight is up and +BLE - planning to see cardiology soon      May 2023 previously  Discharged  last week after treated for fluid overload. Overall feels well; breathing is significantly better than prior to hospitalization. Pt hospitalized from 5/5-5/11/23 with Acute HFpEF with acute pulmonary edema, BNP > 5000, diuresed on lasix gtt with > 4L of fluid removed. Meds adjusted per cardiologist, plan for labs and CXR today and plan to go to the heart failure clinic next week. No f/c/ns.  Currently with occasional cough and CORONA. BLE edema unchanged per pt.     Previously 3/23/23 Dr England  89 year old female former smoker (quit 30 years ago, 20 pack years) with significant PMH asthma, HTN and obesity who presents today for follow up. She was hospitalized with acute asthma exacerbation and CHF exacerbation, managed on steroids, nebs and diuretics with improvement. She feels better today, breathing is back to normal. Has sore throat and some mild dysphagia over the last week or so. She is not watching her weight of fluid balance.      Previously  She has followed with me for several years and managed for asthma. She has had variable exacerbations of her asthma in the past leading to initiation of chronic steroids which was weaned to 5mg daily with good success and mitigation of repeated asthma exacerbations. She presents today without acute concern. Notes occasional cough/phlegm, relieved with nebs. Using budesonide neb once to twice a day. Using advair BID. Using duoneb prn and only needing to use a few times/week. No fevers.  No pain or pleurisy.  Limited mobility due to BLE swelling, denies CORONA.         History/Other:    No Further Nursing Notes to Review  Tobacco Reviewed  Allergies   Reviewed  Medications Reviewed  Problem List Reviewed  Medical History   Reviewed  Surgical History Reviewed  Family History Reviewed         Tobacco:  She smoked tobacco in the past but quit greater than 12 months ago.  Social History     Tobacco Use   Smoking Status Former    Current packs/day: 0.00    Average packs/day: 2.5 packs/day for 30.0 years (75.0 ttl pk-yrs)    Types: Cigarettes    Start date: 1961    Quit date: 1991    Years since quittin.7   Smokeless Tobacco Never        Current Outpatient Medications   Medication Sig Dispense Refill    amoxicillin clavulanate 500-125 MG Oral Tab Take 1 tablet by mouth 2 (two) times daily for 7 days. 14 tablet 0    BUDESONIDE 0.5 MG/2ML Inhalation Suspension USE 2 ML(0.5 MG) VIA NEBULIZER TWICE DAILY 360 mL 3    fluconazole 100 MG Oral Tab Take one tablet daily PO for 2 weeks 14 tablet 0    OXYGEN-HELIUM IN 2 L by Nasal route at bedtime.      Meloxicam 15 MG Oral Tab Take 0.5 tablets (7.5 mg total) by mouth daily as needed for Pain.      metoprolol tartrate 50 MG Oral Tab Take 1 tablet (50 mg total) by mouth every morning AND 0.5 tablets (25 mg total) every evening.      predniSONE 10 MG Oral Tab Take1 tab PO daily 90 tablet 3    ipratropium-albuterol 0.5-2.5 (3) MG/3ML Inhalation Solution Take 3 mL by nebulization every 6 (six) hours as needed. 360 mL 3    levothyroxine 137 MCG Oral Tab Take 137 mcg by mouth before breakfast.      hydrALAZINE 25 MG Oral Tab Take 1 tablet (25 mg total) by mouth every morning AND 1 tablet (25 mg total) Every afternoon at 2:00 pm AND 1 tablet (25 mg total) every evening. Hold if SBP is <110.      torsemide 20 MG Oral Tab Take 2 tablets (40 mg total) by mouth 2 (two) times daily. 120 tablet 1    sodium chloride 0.9 % Inhalation Nebu Soln Take 3 mL by  nebulization as needed for Wheezing. 360 mL 3    losartan 50 MG Oral Tab Take 1 tablet (50 mg total) by mouth 2 (two) times daily. 180 tablet 0    PREDNISONE 5 MG Oral Tab TAKE 1 TABLET(5 MG) BY MOUTH DAILY 90 tablet 3    potassium chloride 20 MEQ Oral Tab CR Take 1 tablet (20 mEq total) by mouth daily.      cyanocobalamin 250 MCG Oral Tab Take 1 tablet (250 mcg total) by mouth daily.      Cholecalciferol (VITAMIN D-3 OR) Take by mouth daily.      Cranberry 500 MG Oral Cap Take 500 mg by mouth daily.      acetaminophen 500 MG Oral Tab Take 1 tablet (500 mg total) by mouth every 6 (six) hours. 30 tablet 0    albuterol (PROAIR HFA) 108 (90 Base) MCG/ACT Inhalation Aero Soln Inhale 2 puffs into the lungs every 6 (six) hours as needed for Wheezing or Shortness of Breath. 1 each 11    Cod Liver Oil 1000 MG Oral Cap Take 1 capsule by mouth daily.      Calcium Carbonate-Vitamin D (CALTRATE 600+D OR) Take 1 tablet by mouth daily.        Multiple Vitamins-Minerals (CENTRUM SILVER OR) Take 1 tablet by mouth daily.             Review of Systems:  Review of Systems   Constitutional:  Positive for chills. Negative for activity change, appetite change, diaphoresis, fatigue, fever and unexpected weight change.   HENT:  Negative for congestion, postnasal drip, rhinorrhea, sinus pressure, sinus pain, sneezing, sore throat, trouble swallowing and voice change.    Respiratory:  Positive for cough, shortness of breath and wheezing. Negative for apnea, choking, chest tightness and stridor.    Cardiovascular:  Negative for chest pain, palpitations and leg swelling.   Musculoskeletal:  Negative for arthralgias.   Skin:  Negative for pallor and rash.   Allergic/Immunologic: Negative for immunocompromised state.   Neurological:  Negative for dizziness and headaches.   Hematological:  Negative for adenopathy.         Objective:   There were no vitals taken for this visit. Estimated body mass index is 43.94 kg/m² as calculated from the  following:    Height as of 6/15/23: 4' 8\" (1.422 m).    Weight as of 4/22/24: 196 lb (88.9 kg).  Physical Exam  Constitutional:       Appearance: Normal appearance.   HENT:      Head: Normocephalic and atraumatic.   Eyes:      Extraocular Movements: Extraocular movements intact.   Pulmonary:      Effort: Pulmonary effort is normal.      Breath sounds: Normal air entry.   Musculoskeletal:         General: Normal range of motion.      Cervical back: Normal range of motion.   Skin:     General: Skin is dry.   Neurological:      General: No focal deficit present.      Mental Status: She is alert and oriented to person, place, and time.   Psychiatric:         Mood and Affect: Mood normal.         Behavior: Behavior normal.           Assessment & Plan:   #1. Asthma   Severe, persistent  Has had multiple exacerbations in the past, improved on chronic prednisone 5mg daily  Changed from advair to budesonide neb BID; does not feel that arformoterol helped.  Will continue on budesonide BID and duonebs PRN  Continue duonebs and saline nebs PRN  Given her chronic cough and multiple exacerbations over the past year with multiple hospitalizations, limited response to current airway clearance therapies, she would be a great candidate for oxcillating lung therapy/ lung expansion therapy, will plan to proceed with [facility] airway clearance to be used with her neb medications  Will increase pred to 10mg daily for this week. Will have her f/u in 2 weeks to reassess with hope to deescalate back to 5mg daily  We had previously reviewed options to consider biologics but she has declined but now she is agreeable to proceed - given information for Dr. Escamilla  Encouraged to exercise and lose weight  We have discussed concern for DANE/OHS in the past with recommendation for PSG. However she is opposed to testing for sleep apnea and denies that she would ever use a device to help her sleep  8/8/2023 Improved; less SOB and minimal cough with light  yellow phlegm. Recommend to continue current nebs; budesonide BID, Duonebs once daily and saline prn. Will try to decrease prednisone to 5mg daily and may alternate if cough returns. Discussed longterm effects of chronic steroids. Will followup in 4 weeks by [facility] unless symptomatic  8/29/2023 Reports np cough, chest congestion, wheeze, slight chills and SOB. Recommend prednisone taper, zpak and increase duonebs to 3-4 times a day. Discussed going to the ER if sx worsen  9/2023 Improved; minimal congestion today due to the weather but otherwise breathing has been better. Will continue to hold arformoterol and take budesonide and duonebs BID. Will continue to decrease prednisone to 10mg and try to decrease to 5mg when weather improves; may alternate 10mg and 5mg if needed.   10/2023 s/p AE asthma, hospitalized 10/1310/15/23 treated with steroids and nebs. Will remain on prednisone 10mg daily and re-evaluate in 2 weeks and if stable will return to alternate 10mg and 5mg every other day. CCT including duonebs QID, budesonide BID and saline nebs prn.  11/2023 Stable; continue nebs and prednisone 10mg daily. Will give zpak to cover for AE asthma   12/2023 Improved; CCT (duonebs q 5-6hrs, budesonide nebs BID, saline prn) including prednisone 5mg daily  2/2024 s/sx c/w mild flareup; will continue nebs, prednisone 10mg daily and start zpak.   Followup in 2 weeks or sooner if needed  3/2024 Some improvement after taking Abx and alternating prednisone; contines with intermittent cough and congestion which improves with prednisone, nebs  Recommend to continue nebs, prednisone and followup in 3-4 weeks  4/2024 Stable; continue current treatment including prednisone 10mg daily  Followup in 1 month  5/2024 Increased chst congestion, cough with yellow phlegm, chills and SOB; used duonebs and increased prednisone to 20mg 2 days ago with improvement  Recommend to take 20mg in am, then decrease to 15mg x 3 days then back to 10mg  daily  Will also give Abx as pt with increased cough, chills, continue current nebs and followup in 1 week     #2. Hypoxia, chronic  Hypoxia on exertion diagnosed on last hospitalization and has since improved  Continues to use oxygen with sleep  She continues to use and benefit from supplemental o2  8/2023 Using and benefiting from oxygen tx  9/2023 Using 2L at night only  10/2023 Using and benefitting from oxygen tx; using 2L at night ilia  11/2023 Stable; CCT  12/2023 Stable; using and benefiting from oxygen tx  Using 2L at night only  2/2024 Using and benefiting from oxygen tx; 2L at noc  3/2024 Continue current treatment  4/2024 Using and benefiting from oxygen tx; 2L at noc  5/2024 Using and benefiting from oxygen tx; 2L at noc     #3. Chronic bronchitis with mucus plugging  As above  8/2023 See above  9/2023 Improved; see above  10/2023 Improved; will contineu to monitor  11/2023 See above; will restart saline nebs, continue mucinex  12/2023 Improved; CCT including saline nebs when increased congestion  2/2024 s/sx c/w with increased congestion. Recommended to continue saline nebs and mucolytics  3/2024 Continue current treatment  4/2024 CCT; see above  5/2024 See above     #4. CHF  Following with heart failure clinic and cardiology, remains on diuretics    DAMIR Yin, 5/1/2024, 3:19 PM

## 2024-05-08 ENCOUNTER — TELEMEDICINE (OUTPATIENT)
Facility: CLINIC | Age: 89
End: 2024-05-08
Payer: MEDICARE

## 2024-05-08 DIAGNOSIS — B37.0 THRUSH: ICD-10-CM

## 2024-05-08 DIAGNOSIS — J45.50 SEVERE PERSISTENT ASTHMA WITHOUT COMPLICATION (HCC): Primary | ICD-10-CM

## 2024-05-08 DIAGNOSIS — J41.8 MIXED SIMPLE AND MUCOPURULENT CHRONIC BRONCHITIS (HCC): ICD-10-CM

## 2024-05-08 DIAGNOSIS — J96.11 CHRONIC RESPIRATORY FAILURE WITH HYPOXIA (HCC): ICD-10-CM

## 2024-05-08 DIAGNOSIS — R05.9 COUGH, UNSPECIFIED TYPE: ICD-10-CM

## 2024-05-08 DIAGNOSIS — I50.9 CONGESTIVE HEART FAILURE, UNSPECIFIED HF CHRONICITY, UNSPECIFIED HEART FAILURE TYPE (HCC): ICD-10-CM

## 2024-05-08 PROCEDURE — 99214 OFFICE O/P EST MOD 30 MIN: CPT | Performed by: NURSE PRACTITIONER

## 2024-05-08 RX ORDER — FLUCONAZOLE 100 MG/1
TABLET ORAL
Qty: 14 TABLET | Refills: 0 | Status: SHIPPED | OUTPATIENT
Start: 2024-05-08

## 2024-05-08 NOTE — PATIENT INSTRUCTIONS
Continue fluconazole for thrush for 2 weeks; lets talk in 1 week  Hold budesonide for 1 week if able; may restart if needed  Continue prednisone, duonebs, inhalers, oxygen and mucolytics as directed

## 2024-05-08 NOTE — PROGRESS NOTES
3 months.  Needs a physical [facility] Pulmonary Progress Note    History of Present Illness:  Nancy Hensley is a 89 year old female with significant PMH asthma who presents s/p Abx and slight increase in steroids for AE asthma. Overall feels cough is better. Reports thrush is not completely resolved; has small areas that still are sore.  Breathing is ok; continues with nebs.    Previously 5/1/24  a 89 year old female who presents for 1 month followup. Reports feeling well until 3 days ago; now \"so-so\" and noted increased cough, difficulty coughing up phlegm until today. She increased her prednisone to 20mg daily 2 days ago and feels better. Had wheezing that resolved with duonebs 3 days ago. No fever but felt the chills.       Previously 4/3/24  a 89 year old female with significant PMH asthma who presents today for follow up. Overall feels well. Has had some days with increased congestion but oerall feels well. Taking prednisone 10mg daily for the past week with the weather changes. Using nebs and oxygen as directed.     Previously 3/13/24  a 89 year old female with significant PMH asthma who presents today for follow up s/p Abx for increased chest congestion. Overall feels ok; no new complaints. Has good days and some not so good. No f/c/ns.     Previously 2/28/24  a 89 year old female with significant PMH asthma and bronchitis who presents today for follow up. Reports overall not feeling well the past 6 weeks; increased cough with white-yellow phlegm, chest congestion, chills. HHRN told pt she heard congestion in the left lower lung.  Still using nebs as directed, has been taking prednisone 10mg daily for the past couple days. Feels the saline nebs helps the most. No fever or night sweats. Using oxygen 2L at night.      Previously 1/2024  a 89 year old female with significant PMH Asthma who presents today for follow up. Reports feeling \"Glorious\". Breathing is good; tolerating prednisone 5mg daily. Using nebs as directed and oxygen 2L  at night. No f/c/ns, cough or congestion.      Previously 11/2023  a 89 year old female with significant PMH asthma who presents today for sick 2 week follow up. Reports she feels more congested, not able to cough up phlegm, heard wheezing last night. Denies increased SOB. Using budesonide and duonebs and prednisone 10mg daily. No f/c/ns. Not using saline nebs     Previously 10/24/23  a 89 year old female with significant PMHx of asthma who presents today for follow up. Pt recently hospitalized for AECOPD 10/13-10/15/23 after noticing increased SOB and right sided chest discomfort, treated with steroids and nebs. No Abx. Completed prednisone post discharge yesterday. Feels well today; mild cough remains. No f/c/ns. Using budesonide BID, Duonebs q 5-6 hrs and saline nebs prn. Using oxygen 2L at night only.     Previously 9/2023  a 89 year old female with significant PMH of asthma who presents today for follow up s/p Abx and steroids. Currently taking prednisone 20mg daily, completed ABx. Reports feeling \"much, much better\". Not using arformoterol and prefers to not take it.      Previously 8/29/23   a 89 year old female with significant PMH asthma/COPD who presents today for evaluation of increased cough, chest congestion, wheeze and SOB. Decreased prednisone from 10mg to 5mg after our last visit. Restarted Arformoterol on her own and felt great initially then after 5-7 days had a harder time coughing up phlegm. She has since stopped the Arformoterol and increased prednisone from 5 to 10mg for the past 3 days as her sx worsened.  Denies f/ns; + chills. Unable to cough up phlegm. Currently using Budesonide nebs and Duonebs BID.      Previously 8/8/23    presents today for follow up. Reports breathing is perfect. Very happy with new medications. Continues on prednisone 10mg as she has increased cough on 5mg. Denies new complaints.      Previously 7/27/23 Dr England  a 89 year old female with hx of Asthma, CKD, and CHF  who presents for follow up of asthma/COPD. She reports having been well until today when she felt her cough worsen with white phlegm. Again after clearing the phlegm she feels well.  She did use arformoterol +budesonide in early June but did not feel better so stopped - of note she stopped using duoneb altogether during this time. No pain. No fevers. +BLE edema     June 2023 previously  She was hospitalized in May with CHF exacerbation and hyponatremia, her breathing was controlled during her hospitalization. After going home she developed chest congestion and frequent coughing. She has continued to use her nebulizers - budesonide BID, duoneb PRN and saline nebs PRN. She remains on baseline pred 5mg daily  No fevers. No pain/pleurisy  Weight is up and +BLE - planning to see cardiology soon      May 2023 previously  Discharged  last week after treated for fluid overload. Overall feels well; breathing is significantly better than prior to hospitalization. Pt hospitalized from 5/5-5/11/23 with Acute HFpEF with acute pulmonary edema, BNP > 5000, diuresed on lasix gtt with > 4L of fluid removed. Meds adjusted per cardiologist, plan for labs and CXR today and plan to go to the heart failure clinic next week. No f/c/ns.  Currently with occasional cough and CORONA. BLE edema unchanged per pt.     Previously 3/23/23 Dr England  89 year old female former smoker (quit 30 years ago, 20 pack years) with significant PMH asthma, HTN and obesity who presents today for follow up. She was hospitalized with acute asthma exacerbation and CHF exacerbation, managed on steroids, nebs and diuretics with improvement. She feels better today, breathing is back to normal. Has sore throat and some mild dysphagia over the last week or so. She is not watching her weight of fluid balance.      Previously  She has followed with me for several years and managed for asthma. She has had variable exacerbations of her asthma in the past leading to  initiation of chronic steroids which was weaned to 5mg daily with good success and mitigation of repeated asthma exacerbations. She presents today without acute concern. Notes occasional cough/phlegm, relieved with nebs. Using budesonide neb once to twice a day. Using advair BID. Using duoneb prn and only needing to use a few times/week. No fevers. No pain or pleurisy.  Limited mobility due to BLE swelling, denies CORONA.            Past Medical History:   Past Medical History:    Accelerated hypertension    Asthma (HCC)    Cough    Disorder of thyroid    Dyspnea    Dyspnea, unspecified type    Endocrine disorder    HYPOTHYROID    Osteoarthrosis, unspecified whether generalized or localized, unspecified site    Other and unspecified hyperlipidemia    Pneumonia, organism unspecified(486)    Severe persistent asthma with status asthmaticus (HCC)    Thyroid disease    Unspecified essential hypertension    Ventricular ectopy    Visual impairment        Past Surgical History:   Past Surgical History:   Procedure Laterality Date    Knee replacement surgery      Other surgical history      Total knee replacement Bilateral     16 yrs ago         Family Medical History:   Family History   Problem Relation Age of Onset    Cancer Father         bladder    Heart Disorder Mother     Hypertension Mother     Diabetes Mother         Social History:   Social History     Socioeconomic History    Marital status:      Spouse name: Not on file    Number of children: Not on file    Years of education: Not on file    Highest education level: Not on file   Occupational History    Not on file   Tobacco Use    Smoking status: Former     Current packs/day: 0.00     Average packs/day: 2.5 packs/day for 30.0 years (75.0 ttl pk-yrs)     Types: Cigarettes     Start date: 1961     Quit date: 1991     Years since quittin.7    Smokeless tobacco: Never   Vaping Use    Vaping status: Never Used   Substance and Sexual Activity     Alcohol use: Not Currently     Comment: 2 times per month    Drug use: No    Sexual activity: Not on file   Other Topics Concern     Service Not Asked    Blood Transfusions Not Asked    Caffeine Concern Yes    Occupational Exposure Not Asked    Hobby Hazards Not Asked    Sleep Concern Not Asked    Stress Concern Not Asked    Weight Concern Not Asked    Special Diet Not Asked    Back Care Not Asked    Exercise Yes    Bike Helmet Not Asked    Seat Belt Not Asked    Self-Exams Not Asked   Social History Narrative    Not on file     Social Determinants of Health     Financial Resource Strain: Low Risk  (5/15/2023)    Financial Resource Strain     Difficulty of Paying Living Expenses: Not hard at all     Med Affordability: No   Food Insecurity: No Food Insecurity (12/10/2023)    [facility]    Food Insecurity     Currently or in the past 3 months, have you worried your food would run out before you had money to buy more?: No     In the past 12 months, have you run out of food or been unable to get more?: No   Transportation Needs: No Transportation Needs (12/10/2023)    [facility]    Transportation Needs     Currently or in the past 3 months, has lack of transportation kept you from medical appointments, getting food or medicine, or providing care to a family member?: Not on file     : Not on file     Medical Transportation Needs?: No     Daily Living Transportation Needs? [Peds Only] : Not on file   Physical Activity: Not on file   Stress: Not on file   Social Connections: Not on file   Housing Stability: Low Risk  (12/9/2023)    [facility]    Housing Stability     Mortgage Payment Concerns?: Not on file     Number of Places Lived in the Last Year: Not on file     Unstable Housing?: Not on file        Medications:    Current Outpatient Medications   Medication Sig Dispense Refill    fluconazole 100 MG Oral Tab Take one tablet daily PO for 2 weeks 14 tablet 0    amoxicillin clavulanate 500-125 MG Oral Tab Take 1 tablet by mouth 2 (two) times daily for 7 days. 14 tablet 0    BUDESONIDE 0.5 MG/2ML Inhalation Suspension USE 2 ML(0.5 MG) VIA NEBULIZER TWICE DAILY 360 mL 3    OXYGEN-HELIUM IN 2 L by Nasal route at bedtime.      Meloxicam 15 MG Oral Tab Take 0.5 tablets (7.5 mg total) by mouth daily as needed for Pain.      metoprolol tartrate 50 MG Oral Tab Take 1 tablet (50 mg total) by mouth every morning AND 0.5 tablets (25 mg total) every evening.      predniSONE 10 MG Oral Tab Take1 tab PO daily 90 tablet 3    ipratropium-albuterol 0.5-2.5 (3) MG/3ML Inhalation Solution Take 3 mL by nebulization every 6 (six) hours as needed. 360 mL 3    levothyroxine 137 MCG Oral Tab Take 137 mcg by mouth before breakfast.      hydrALAZINE 25 MG Oral Tab Take 1 tablet (25 mg total) by mouth every morning AND 1 tablet (25 mg total) Every afternoon at 2:00 pm AND 1 tablet (25 mg total) every evening. Hold if SBP is <110.      torsemide 20 MG Oral Tab Take 2 tablets (40 mg total) by mouth 2 (two) times daily. 120 tablet 1    sodium chloride 0.9 % Inhalation Nebu Soln Take 3 mL by nebulization as needed for Wheezing. 360 mL 3    losartan 50 MG Oral Tab Take 1 tablet (50 mg total) by mouth 2 (two) times daily. 180 tablet 0    PREDNISONE 5 MG Oral Tab TAKE 1 TABLET(5 MG) BY MOUTH DAILY 90 tablet 3    potassium chloride 20 MEQ Oral Tab CR Take 1 tablet (20 mEq total) by mouth daily.      cyanocobalamin 250 MCG Oral Tab Take 1 tablet (250 mcg total) by mouth daily.      Cholecalciferol (VITAMIN D-3 OR) Take by mouth daily.      Cranberry 500 MG Oral Cap Take 500 mg by mouth daily.      acetaminophen 500 MG Oral Tab Take 1 tablet (500 mg total) by mouth every 6 (six) hours. 30 tablet 0    albuterol (PROAIR HFA) 108 (90 Base) MCG/ACT Inhalation Aero  Soln Inhale 2 puffs into the lungs every 6 (six) hours as needed for Wheezing or Shortness of Breath. 1 each 11    Cod Liver Oil 1000 MG Oral Cap Take 1 capsule by mouth daily.      Calcium Carbonate-Vitamin D (CALTRATE 600+D OR) Take 1 tablet by mouth daily.        Multiple Vitamins-Minerals (CENTRUM SILVER OR) Take 1 tablet by mouth daily.           Review of Systems: Review of Systems   Constitutional:  Negative for activity change, appetite change, chills, diaphoresis, fatigue, fever and unexpected weight change.   HENT:  Positive for mouth sores. Negative for congestion, postnasal drip, rhinorrhea, sinus pressure, sinus pain, sneezing, sore throat, trouble swallowing and voice change.    Respiratory:  Positive for cough and shortness of breath. Negative for apnea, choking, chest tightness, wheezing and stridor.    Cardiovascular:  Negative for chest pain, palpitations and leg swelling.   Musculoskeletal:  Negative for arthralgias.   Skin:  Negative for pallor and rash.   Allergic/Immunologic: Negative for immunocompromised state.   Neurological:  Negative for dizziness and headaches.   Hematological:  Negative for adenopathy.        Physical Exam:  There were no vitals taken for this visit.     Constitutional: alert, cooperative. No acute distress.  HEENT: Head NC/AT.   Respiratory: Thorax symmetrical with no labored breathing.  Neurologic: A&Ox3. No gross motor deficits.  Skin: dry  Psych: Calm, cooperative. Pleasant affect.    Results:  Personally reviewed    WBC: 11.5, done on 4/9/2024.  HGB: 12.7, done on 4/9/2024.  PLT: 315, done on 4/9/2024.     Glucose: 215, done on 4/22/2024.  Cr: 1.61, done on 4/22/2024.  GFR(AA): 40, done on 7/23/2022.  GFR (non-AA): 35, done on 7/23/2022.  CA: 9.1, done on 4/22/2024.  Na: 131, done on 4/22/2024.  K: 4.5, done on 4/22/2024.  Cl: 94, done on 4/22/2024.  CO2: 29, done on 4/22/2024.  Last ALB was 3.6% done on 11/2/2023.     XR CHEST AP PORTABLE  (CPT=71067)    Result  Date: 4/9/2024  CONCLUSION:   Stable cardiac and mediastinal contours.  Streaky left infrahilar opacity favors atelectasis or scar, similar compared to 10/13/2023.  No pulmonary edema or acute airspace disease.  No significant pleural effusion or appreciable pneumothorax.   LOCATION:  [id number]      Dictated by (CST): Juan Antonio Newsome MD on 4/09/2024 at 4:07 PM     Finalized by (CST): Juan Antonio Newsome MD on 4/09/2024 at 4:08 PM         Assessment/Plan:  1. Asthma   Severe, persistent  Has had multiple exacerbations in the past, improved on chronic prednisone 5mg daily  Changed from advair to budesonide neb BID; does not feel that arformoterol helped.  Will continue on budesonide BID and duonebs PRN  Continue duonebs and saline nebs PRN  Given her chronic cough and multiple exacerbations over the past year with multiple hospitalizations, limited response to current airway clearance therapies, she would be a great candidate for oxcillating lung therapy/ lung expansion therapy, will plan to proceed with Volara airway clearance to be used with her neb medications  Will increase pred to 10mg daily for this week. Will have her f/u in 2 weeks to reassess with hope to deescalate back to 5mg daily  We had previously reviewed options to consider biologics but she has declined but now she is agreeable to proceed - given information for Dr. Escamilla  Encouraged to exercise and lose weight  We have discussed concern for DANE/OHS in the past with recommendation for PSG. However she is opposed to testing for sleep apnea and denies that she would ever use a device to help her sleep  8/8/2023 Improved; less SOB and minimal cough with light yellow phlegm. Recommend to continue current nebs; budesonide BID, Duonebs once daily and saline prn. Will try to decrease prednisone to 5mg daily and may alternate if cough returns. Discussed longterm effects of chronic steroids. Will followup in 4 weeks by [facility] unless symptomatic  8/29/2023 Reports np cough,  chest congestion, wheeze, slight chills and SOB. Recommend prednisone taper, zpak and increase duonebs to 3-4 times a day. Discussed going to the ER if sx worsen  9/2023 Improved; minimal congestion today due to the weather but otherwise breathing has been better. Will continue to hold arformoterol and take budesonide and duonebs BID. Will continue to decrease prednisone to 10mg and try to decrease to 5mg when weather improves; may alternate 10mg and 5mg if needed.   10/2023 s/p AE asthma, hospitalized 10/1310/15/23 treated with steroids and nebs. Will remain on prednisone 10mg daily and re-evaluate in 2 weeks and if stable will return to alternate 10mg and 5mg every other day. CCT including duonebs QID, budesonide BID and saline nebs prn.  11/2023 Stable; continue nebs and prednisone 10mg daily. Will give zpak to cover for AE asthma   12/2023 Improved; CCT (duonebs q 5-6hrs, budesonide nebs BID, saline prn) including prednisone 5mg daily  2/2024 s/sx c/w mild flareup; will continue nebs, prednisone 10mg daily and start zpak.   Followup in 2 weeks or sooner if needed  3/2024 Some improvement after taking Abx and alternating prednisone; contines with intermittent cough and congestion which improves with prednisone, nebs  Recommend to continue nebs, prednisone and followup in 3-4 weeks  4/2024 Stable; continue current treatment including prednisone 10mg daily  Followup in 1 month  5/2024 Increased chst congestion, cough with yellow phlegm, chills and SOB; used duonebs and increased prednisone to 20mg 2 days ago with improvement  Recommend to take 20mg in am, then decrease to 15mg x 3 days then back to 10mg daily  Will also give Abx as pt with increased cough, chills, continue current nebs and followup in 1 week  5/2024 Improved; recommended to complete Abx.      #2. Hypoxia, chronic  Hypoxia on exertion diagnosed on last hospitalization and has since improved  Continues to use oxygen with sleep  She continues to use  and benefit from supplemental o2  8/2023 Using and benefiting from oxygen tx  9/2023 Using 2L at night only  10/2023 Using and benefitting from oxygen tx; using 2L at night ilia  11/2023 Stable; CCT  12/2023 Stable; using and benefiting from oxygen tx  Using 2L at night only  2/2024 Using and benefiting from oxygen tx; 2L at noc  3/2024 Continue current treatment  4/2024 Using and benefiting from oxygen tx; 2L at noc  5/2024 Using and benefiting from oxygen tx; 2L at noc     #3. Chronic bronchitis with mucus plugging  As above  8/2023 See above  9/2023 Improved; see above  10/2023 Improved; will contineu to monitor  11/2023 See above; will restart saline nebs, continue mucinex  12/2023 Improved; CCT including saline nebs when increased congestion  2/2024 s/sx c/w with increased congestion. Recommended to continue saline nebs and mucolytics  3/2024 Continue current treatment  4/2024 CCT; see above  5/2024 See above  5/2024 See above; improved     #4. CHF  Following with heart failure clinic and cardiology, remains on diuretics  5/2024 Followup next week in heart clinic     #5. Thrush  S/p 2 weeks fluconazole  with significant improvement; still with mild sx  Will give a second round of treatment and stop budesonide for 1 week; may restart if decline in breathing    [facility]   5/8/2024    This visit is conducted using Telemedicine with live, interactive video and audio.    Patient has been referred to the [facility] website at [email or web address] to review the yearly Consent to Treat document.    Patient understands and accepts financial responsibility for any deductible, co-insurance and/or co-pays associated with this service.

## 2024-05-13 DIAGNOSIS — I50.32 CHRONIC HEART FAILURE WITH PRESERVED EJECTION FRACTION (HFPEF) (HCC): Primary | ICD-10-CM

## 2024-05-13 RX ORDER — LOSARTAN POTASSIUM 50 MG/1
50 TABLET ORAL 2 TIMES DAILY
Qty: 180 TABLET | Refills: 0 | Status: SHIPPED | OUTPATIENT
Start: 2024-05-13

## 2024-05-14 ENCOUNTER — TELEMEDICINE (OUTPATIENT)
Facility: CLINIC | Age: 89
End: 2024-05-14

## 2024-05-14 DIAGNOSIS — J41.8 MIXED SIMPLE AND MUCOPURULENT CHRONIC BRONCHITIS (HCC): ICD-10-CM

## 2024-05-14 DIAGNOSIS — J96.11 CHRONIC RESPIRATORY FAILURE WITH HYPOXIA (HCC): ICD-10-CM

## 2024-05-14 DIAGNOSIS — B37.0 THRUSH: ICD-10-CM

## 2024-05-14 DIAGNOSIS — J45.50 SEVERE PERSISTENT ASTHMA WITHOUT COMPLICATION (HCC): ICD-10-CM

## 2024-05-14 DIAGNOSIS — R05.3 CHRONIC COUGH: Primary | ICD-10-CM

## 2024-05-14 PROCEDURE — 99214 OFFICE O/P EST MOD 30 MIN: CPT | Performed by: NURSE PRACTITIONER

## 2024-05-14 NOTE — PATIENT INSTRUCTIONS
Continue budesonide and duonebs as directed  May try prednisone 5mg twice a day if able; may increase to 10mg in the morning if needed  Continue oxygen at night  Followup in 2 weeks by [facility]; 5/28/24 at 3:00pm  Please contact office at [phone] with any decline in respiratory status, questions or concerns

## 2024-05-14 NOTE — PROGRESS NOTES
[facility] General Pulmonary Progress Note    History of Present Illness:  Nancy Hensley is a 89 year old female with significant PMH of Asthma and thrush who presents today for follow up. Reports today is a beautiful day; her breathing is good. She stopped the second regimen of fluticasone for thrush as her mouth felt better but her breathing worsened without the budesonide on the second day. Has been taking 10mg prednisone in the morning and took 5mg around 6pm and found her breathing is much better in the morning. Asking if she can continue this prednisone dose.    Previously 5/8/24  a 89 year old female with significant PMH asthma who presents s/p Abx and slight increase in steroids for AE asthma. Overall feels cough is better. Reports thrush is not completely resolved; has small areas that still are sore.  Breathing is ok; continues with nebs.     Previously 5/1/24  a 89 year old female who presents for 1 month followup. Reports feeling well until 3 days ago; now \"so-so\" and noted increased cough, difficulty coughing up phlegm until today. She increased her prednisone to 20mg daily 2 days ago and feels better. Had wheezing that resolved with duonebs 3 days ago. No fever but felt the chills.       Previously 4/3/24  a 89 year old female with significant PMH asthma who presents today for follow up. Overall feels well. Has had some days with increased congestion but oerall feels well. Taking prednisone 10mg daily for the past week with the weather changes. Using nebs and oxygen as directed.     Previously 3/13/24  a 89 year old female with significant PMH asthma who presents today for follow up s/p Abx for increased chest congestion. Overall feels ok; no new complaints. Has good days and some not so good. No f/c/ns.     Previously 2/28/24  a 89 year old female with significant PMH asthma and bronchitis who presents today for follow up. Reports overall not feeling well the past 6 weeks; increased cough with  white-yellow phlegm, chest congestion, chills. HHRN told pt she heard congestion in the left lower lung.  Still using nebs as directed, has been taking prednisone 10mg daily for the past couple days. Feels the saline nebs helps the most. No fever or night sweats. Using oxygen 2L at night.      Previously 1/2024  a 89 year old female with significant PMH Asthma who presents today for follow up. Reports feeling \"Glorious\". Breathing is good; tolerating prednisone 5mg daily. Using nebs as directed and oxygen 2L at night. No f/c/ns, cough or congestion.      Previously 11/2023  a 89 year old female with significant PMH asthma who presents today for sick 2 week follow up. Reports she feels more congested, not able to cough up phlegm, heard wheezing last night. Denies increased SOB. Using budesonide and duonebs and prednisone 10mg daily. No f/c/ns. Not using saline nebs     Previously 10/24/23  a 89 year old female with significant PMHx of asthma who presents today for follow up. Pt recently hospitalized for AECOPD 10/13-10/15/23 after noticing increased SOB and right sided chest discomfort, treated with steroids and nebs. No Abx. Completed prednisone post discharge yesterday. Feels well today; mild cough remains. No f/c/ns. Using budesonide BID, Duonebs q 5-6 hrs and saline nebs prn. Using oxygen 2L at night only.     Previously 9/2023  a 89 year old female with significant PMH of asthma who presents today for follow up s/p Abx and steroids. Currently taking prednisone 20mg daily, completed ABx. Reports feeling \"much, much better\". Not using arformoterol and prefers to not take it.      Previously 8/29/23   a 89 year old female with significant PMH asthma/COPD who presents today for evaluation of increased cough, chest congestion, wheeze and SOB. Decreased prednisone from 10mg to 5mg after our last visit. Restarted Arformoterol on her own and felt great initially then after 5-7 days had a harder time coughing up phlegm.  She has since stopped the Arformoterol and increased prednisone from 5 to 10mg for the past 3 days as her sx worsened.  Denies f/ns; + chills. Unable to cough up phlegm. Currently using Budesonide nebs and Duonebs BID.      Previously 8/8/23    presents today for follow up. Reports breathing is perfect. Very happy with new medications. Continues on prednisone 10mg as she has increased cough on 5mg. Denies new complaints.      Previously 7/27/23 Dr England  a 89 year old female with hx of Asthma, CKD, and CHF who presents for follow up of asthma/COPD. She reports having been well until today when she felt her cough worsen with white phlegm. Again after clearing the phlegm she feels well.  She did use arformoterol +budesonide in early June but did not feel better so stopped - of note she stopped using duoneb altogether during this time. No pain. No fevers. +BLE edema     June 2023 previously  She was hospitalized in May with CHF exacerbation and hyponatremia, her breathing was controlled during her hospitalization. After going home she developed chest congestion and frequent coughing. She has continued to use her nebulizers - budesonide BID, duoneb PRN and saline nebs PRN. She remains on baseline pred 5mg daily  No fevers. No pain/pleurisy  Weight is up and +BLE - planning to see cardiology soon      May 2023 previously  Discharged  last week after treated for fluid overload. Overall feels well; breathing is significantly better than prior to hospitalization. Pt hospitalized from 5/5-5/11/23 with Acute HFpEF with acute pulmonary edema, BNP > 5000, diuresed on lasix gtt with > 4L of fluid removed. Meds adjusted per cardiologist, plan for labs and CXR today and plan to go to the heart failure clinic next week. No f/c/ns.  Currently with occasional cough and CORONA. BLE edema unchanged per pt.     Previously 3/23/23 Dr England  89 year old female former smoker (quit 30 years ago, 20 pack years) with significant PMH asthma,  HTN and obesity who presents today for follow up. She was hospitalized with acute asthma exacerbation and CHF exacerbation, managed on steroids, nebs and diuretics with improvement. She feels better today, breathing is back to normal. Has sore throat and some mild dysphagia over the last week or so. She is not watching her weight of fluid balance.      Previously  She has followed with me for several years and managed for asthma. She has had variable exacerbations of her asthma in the past leading to initiation of chronic steroids which was weaned to 5mg daily with good success and mitigation of repeated asthma exacerbations. She presents today without acute concern. Notes occasional cough/phlegm, relieved with nebs. Using budesonide neb once to twice a day. Using advair BID. Using duoneb prn and only needing to use a few times/week. No fevers. No pain or pleurisy.  Limited mobility due to BLE swelling, denies CORONA.         Past Medical History:   Past Medical History:    Accelerated hypertension    Asthma (HCC)    Cough    Disorder of thyroid    Dyspnea    Dyspnea, unspecified type    Endocrine disorder    HYPOTHYROID    Osteoarthrosis, unspecified whether generalized or localized, unspecified site    Other and unspecified hyperlipidemia    Pneumonia, organism unspecified(486)    Severe persistent asthma with status asthmaticus (HCC)    Thyroid disease    Unspecified essential hypertension    Ventricular ectopy    Visual impairment        Past Surgical History:   Past Surgical History:   Procedure Laterality Date    Knee replacement surgery      Other surgical history      Total knee replacement Bilateral     16 yrs ago         Family Medical History:   Family History   Problem Relation Age of Onset    Cancer Father         bladder    Heart Disorder Mother     Hypertension Mother     Diabetes Mother         Social History:   Social History     Socioeconomic History    Marital status:      Spouse name: Not on  file    Number of children: Not on file    Years of education: Not on file    Highest education level: Not on file   Occupational History    Not on file   Tobacco Use    Smoking status: Former     Current packs/day: 0.00     Average packs/day: 2.5 packs/day for 30.0 years (75.0 ttl pk-yrs)     Types: Cigarettes     Start date: 1961     Quit date: 1991     Years since quittin.7    Smokeless tobacco: Never   Vaping Use    Vaping status: Never Used   Substance and Sexual Activity    Alcohol use: Not Currently     Comment: 2 times per month    Drug use: No    Sexual activity: Not on file   Other Topics Concern     Service Not Asked    Blood Transfusions Not Asked    Caffeine Concern Yes    Occupational Exposure Not Asked    Hobby Hazards Not Asked    Sleep Concern Not Asked    Stress Concern Not Asked    Weight Concern Not Asked    Special Diet Not Asked    Back Care Not Asked    Exercise Yes    Bike Helmet Not Asked    Seat Belt Not Asked    Self-Exams Not Asked   Social History Narrative    Not on file     Social Determinants of Health     Financial Resource Strain: Low Risk  (5/15/2023)    Financial Resource Strain     Difficulty of Paying Living Expenses: Not hard at all     Med Affordability: No   Food Insecurity: No Food Insecurity (12/10/2023)    [facility]    Food Insecurity     Currently or in the past 3 months, have you worried your food would run out before you had money to buy more?: No     In the past 12 months, have you run out of food or been unable to get more?: No   Transportation Needs: No Transportation Needs (12/10/2023)    [facility]    Transportation Needs     Currently or in the past 3 months, has lack of transportation kept you from medical appointments, getting food or medicine, or providing care to a family member?: Not on file     : Not on  file     Medical Transportation Needs?: No     Daily Living Transportation Needs? [Peds Only] : Not on file   Physical Activity: Not on file   Stress: Not on file   Social Connections: Not on file   Housing Stability: Low Risk  (12/9/2023)    [facility]    Housing Stability     Mortgage Payment Concerns?: Not on file     Number of Places Lived in the Last Year: Not on file     Unstable Housing?: Not on file        Medications:   Current Outpatient Medications   Medication Sig Dispense Refill    LOSARTAN 50 MG Oral Tab TAKE 1 TABLET(50 MG) BY MOUTH TWICE DAILY 180 tablet 0    fluconazole 100 MG Oral Tab Take one tablet daily PO for 2 weeks 14 tablet 0    BUDESONIDE 0.5 MG/2ML Inhalation Suspension USE 2 ML(0.5 MG) VIA NEBULIZER TWICE DAILY 360 mL 3    OXYGEN-HELIUM IN 2 L by Nasal route at bedtime.      Meloxicam 15 MG Oral Tab Take 0.5 tablets (7.5 mg total) by mouth daily as needed for Pain.      metoprolol tartrate 50 MG Oral Tab Take 1 tablet (50 mg total) by mouth every morning AND 0.5 tablets (25 mg total) every evening.      predniSONE 10 MG Oral Tab Take1 tab PO daily 90 tablet 3    ipratropium-albuterol 0.5-2.5 (3) MG/3ML Inhalation Solution Take 3 mL by nebulization every 6 (six) hours as needed. 360 mL 3    levothyroxine 137 MCG Oral Tab Take 137 mcg by mouth before breakfast.      hydrALAZINE 25 MG Oral Tab Take 1 tablet (25 mg total) by mouth every morning AND 1 tablet (25 mg total) Every afternoon at 2:00 pm AND 1 tablet (25 mg total) every evening. Hold if SBP is <110.      torsemide 20 MG Oral Tab Take 2 tablets (40 mg total) by mouth 2 (two) times daily. 120 tablet 1    sodium chloride 0.9 % Inhalation Nebu Soln Take 3 mL by nebulization as needed for Wheezing. 360 mL 3    PREDNISONE 5 MG Oral Tab TAKE 1 TABLET(5 MG) BY MOUTH DAILY 90 tablet 3    potassium chloride 20 MEQ Oral Tab CR Take 1 tablet (20 mEq total) by mouth daily.       cyanocobalamin 250 MCG Oral Tab Take 1 tablet (250 mcg total) by mouth daily.      Cholecalciferol (VITAMIN D-3 OR) Take by mouth daily.      Cranberry 500 MG Oral Cap Take 500 mg by mouth daily.      acetaminophen 500 MG Oral Tab Take 1 tablet (500 mg total) by mouth every 6 (six) hours. 30 tablet 0    albuterol (PROAIR HFA) 108 (90 Base) MCG/ACT Inhalation Aero Soln Inhale 2 puffs into the lungs every 6 (six) hours as needed for Wheezing or Shortness of Breath. 1 each 11    Cod Liver Oil 1000 MG Oral Cap Take 1 capsule by mouth daily.      Calcium Carbonate-Vitamin D (CALTRATE 600+D OR) Take 1 tablet by mouth daily.        Multiple Vitamins-Minerals (CENTRUM SILVER OR) Take 1 tablet by mouth daily.           Review of Systems: Review of Systems   Constitutional:  Negative for activity change, appetite change, chills, diaphoresis, fatigue, fever and unexpected weight change.   HENT:  Negative for congestion, postnasal drip, rhinorrhea, sinus pressure, sinus pain, sneezing, sore throat, trouble swallowing and voice change.    Respiratory:  Positive for cough and shortness of breath. Negative for apnea, choking, chest tightness, wheezing and stridor.    Cardiovascular:  Negative for chest pain, palpitations and leg swelling.   Musculoskeletal:  Negative for arthralgias.   Skin:  Negative for pallor and rash.   Allergic/Immunologic: Negative for immunocompromised state.   Neurological:  Negative for dizziness and headaches.   Hematological:  Negative for adenopathy.        Physical Exam:  There were no vitals taken for this visit.     Constitutional: alert, cooperative. No acute distress.  HEENT: Head NC/AT.    Respiratory: Thorax symmetrical with no labored breathing.   Neurologic: A&Ox3. No gross motor deficits.  Skin: dry  Psych: Calm, cooperative. Pleasant affect.    Results:  Personally reviewed    WBC: 11.5, done on 4/9/2024.  HGB: 12.7, done on 4/9/2024.  PLT: 315, done on 4/9/2024.     Glucose: 215, done on  4/22/2024.  Cr: 1.61, done on 4/22/2024.  GFR(AA): 40, done on 7/23/2022.  GFR (non-AA): 35, done on 7/23/2022.  CA: 9.1, done on 4/22/2024.  Na: 131, done on 4/22/2024.  K: 4.5, done on 4/22/2024.  Cl: 94, done on 4/22/2024.  CO2: 29, done on 4/22/2024.  Last ALB was 3.6% done on 11/2/2023.     XR CHEST AP PORTABLE  (CPT=71045)    Result Date: 4/9/2024  CONCLUSION:   Stable cardiac and mediastinal contours.  Streaky left infrahilar opacity favors atelectasis or scar, similar compared to 10/13/2023.  No pulmonary edema or acute airspace disease.  No significant pleural effusion or appreciable pneumothorax.   LOCATION:  [id number]      Dictated by (CST): Juan Antonio Newsome MD on 4/09/2024 at 4:07 PM     Finalized by (CST): Juan Antonio Newsome MD on 4/09/2024 at 4:08 PM         Assessment/Plan:  1. Asthma   Severe, persistent  Has had multiple exacerbations in the past, improved on chronic prednisone 5mg daily  Changed from advair to budesonide neb BID; does not feel that arformoterol helped.  Will continue on budesonide BID and duonebs PRN  Continue duonebs and saline nebs PRN  Given her chronic cough and multiple exacerbations over the past year with multiple hospitalizations, limited response to current airway clearance therapies, she would be a great candidate for oxcillating lung therapy/ lung expansion therapy, will plan to proceed with Volara airway clearance to be used with her neb medications  Will increase pred to 10mg daily for this week. Will have her f/u in 2 weeks to reassess with hope to deescalate back to 5mg daily  We had previously reviewed options to consider biologics but she has declined but now she is agreeable to proceed - given information for Dr. Escamilla  Encouraged to exercise and lose weight  We have discussed concern for DANE/OHS in the past with recommendation for PSG. However she is opposed to testing for sleep apnea and denies that she would ever use a device to help her sleep  8/8/2023 Improved; less SOB and  minimal cough with light yellow phlegm. Recommend to continue current nebs; budesonide BID, Duonebs once daily and saline prn. Will try to decrease prednisone to 5mg daily and may alternate if cough returns. Discussed longterm effects of chronic steroids. Will followup in 4 weeks by [facility] unless symptomatic  8/29/2023 Reports np cough, chest congestion, wheeze, slight chills and SOB. Recommend prednisone taper, zpak and increase duonebs to 3-4 times a day. Discussed going to the ER if sx worsen  9/2023 Improved; minimal congestion today due to the weather but otherwise breathing has been better. Will continue to hold arformoterol and take budesonide and duonebs BID. Will continue to decrease prednisone to 10mg and try to decrease to 5mg when weather improves; may alternate 10mg and 5mg if needed.   10/2023 s/p AE asthma, hospitalized 10/1310/15/23 treated with steroids and nebs. Will remain on prednisone 10mg daily and re-evaluate in 2 weeks and if stable will return to alternate 10mg and 5mg every other day. CCT including duonebs QID, budesonide BID and saline nebs prn.  11/2023 Stable; continue nebs and prednisone 10mg daily. Will give zpak to cover for AE asthma   12/2023 Improved; CCT (duonebs q 5-6hrs, budesonide nebs BID, saline prn) including prednisone 5mg daily  2/2024 s/sx c/w mild flareup; will continue nebs, prednisone 10mg daily and start zpak.   Followup in 2 weeks or sooner if needed  3/2024 Some improvement after taking Abx and alternating prednisone; contines with intermittent cough and congestion which improves with prednisone, nebs  Recommend to continue nebs, prednisone and followup in 3-4 weeks  4/2024 Stable; continue current treatment including prednisone 10mg daily  Followup in 1 month  5/2024 Increased chst congestion, cough with yellow phlegm, chills and SOB; used duonebs and increased prednisone to 20mg 2 days ago with improvement  Recommend to take 20mg in am, then decrease to 15mg x 3 days  then back to 10mg daily  Will also give Abx as pt with increased cough, chills, continue current nebs and followup in 1 week  5/2024 Improved; recommended to complete Abx.   5/14/24 Improved, back to baseline. Continue nebs as directed and may try prednisone 5mg BID and if increased shortness of breath will take 10mg in the morning and 5mg in the evening     #2. Hypoxia, chronic  Hypoxia on exertion diagnosed on last hospitalization and has since improved  Continues to use oxygen with sleep  She continues to use and benefit from supplemental o2  8/2023 Using and benefiting from oxygen tx  9/2023 Using 2L at night only  10/2023 Using and benefitting from oxygen tx; using 2L at night ilia  11/2023 Stable; CCT  12/2023 Stable; using and benefiting from oxygen tx  Using 2L at night only  2/2024 Using and benefiting from oxygen tx; 2L at noc  3/2024 Continue current treatment  4/2024 Using and benefiting from oxygen tx; 2L at noc  5/2024 Using and benefiting from oxygen tx; 2L at noc     #3. Chronic bronchitis with mucus plugging  As above  8/2023 See above  9/2023 Improved; see above  10/2023 Improved; will contineu to monitor  11/2023 See above; will restart saline nebs, continue mucinex  12/2023 Improved; CCT including saline nebs when increased congestion  2/2024 s/sx c/w with increased congestion. Recommended to continue saline nebs and mucolytics  3/2024 Continue current treatment  4/2024 CCT; see above  5/2024 See above  5/2024 See above; improved     #4. CHF  Following with heart failure clinic and cardiology, remains on diuretics  5/2024 Followup next week in heart clinic     #5. Thrush  S/p 2 weeks fluconazole  with significant improvement; still with mild sx  Will give a second round of treatment and stop budesonide for 1 week; may restart if decline in breathing  5/14/24 Resolved      DAMIR Yin  [facility]   5/14/2024    This visit is conducted using Telemedicine with live, interactive video  and audio.    Patient has been referred to the [facility] website at [email or web address] to review the yearly Consent to Treat document.    Patient understands and accepts financial responsibility for any deductible, co-insurance and/or co-pays associated with this service.

## 2024-05-14 NOTE — CM/SW NOTE
05/09/23 1100   CM/SW Referral Data   Referral Source    Reason for Referral Readmission; Discharge planning   Informant Patient   Patient Info   Patient's Current Mental Status at Time of Assessment Alert;Oriented   Patient's [address]   Number of Levels in Home 2   Patient lives with Daughter   Patient Status Prior to Admission   Independent with ADLs and Mobility No   Pt. requires assistance with Housework;Driving;Meals   [address] prior to admission [address] [facility];DME/Supplies at home   [address] Provider Info [facility]   DME Provider/Supplier [facility]   Type of DME/Supplies [employer];Home Oxygen; [address] Provider Private Duty   [facility] Care hours per day 6   [facility] Care days per week 5   Discharge Needs   Anticipated D/C needs Home health care   Choice of Post-Acute Provider   Informed patient of right to choose their preferred provider Yes   Patient/family choice [facility]     Met with patient and her caregiver at the bedside to discuss discharge planning and readmissions. Pt confirmed above info. Discussed home management and she confirms she is taking all medications as directed, has not been weighing herself regularly, \"but I'm going to from now on\"  She expressed interest in the CHF clinic which she said was recommended to her by the ED physician and hospitalist.  She reports she is eager to prevent further admissions and wants to be seen in the clinic. Noted order entered for CHF clinic follow up. / to remain available for support and/or discharge planning.      Vale Weinberg MBA MSN, RN CTL/  [id number] Naheed THORNE Cassiakimomariela is seen for discussion regarding possible explant and reimplant of her left cochlear implant. She had been having difficulties with a painful zapping sensation when she wore her processor to the point where she was unable to wear the processor. They and Audiology have been working with [employer] on programming changes and she says that she is now able to wear her implant with no discomfort. She thinks her hearing remains good and her speech understanding is the same. Her audiologist had to disable a program setting on her device which means her implant cannot be linked to the computer and interrogated or programmed so programming changes need to be done with the processor off her head and then the newly programmed processor is placed back on.    Physical examination:  girl in no acute distress.  Alert and answering questions appropriately with no difficulty understanding spoken speech in the clinic room.  HB 1/6 bilaterally.  Wearing her right hearing aid and her left cochlear implant.     Assessment and plan:  Currently doing well with her left cochlear implant after programming changes. I would not recommend explant and reimplant right now given that she is able to wear her implant consistently without discomfort. There are certainly risks to revision surgery including the possibility of partial insertion and changes in performance in addition to the typical surgical risks of bleeding, infection, device failure, dizziness and injury to the taste or face nerve. Her performance can be tracked with speech testing and, certainly, if her performance begins to decline, that would be an indication for replacement. She and her parents had their questions answered and they are comfortable continuing to observe for now.

## 2024-05-15 ENCOUNTER — HOSPITAL ENCOUNTER (OUTPATIENT)
Dept: CARDIOLOGY CLINIC | Facility: HOSPITAL | Age: 89
Discharge: HOME | End: 2024-05-15
Attending: NURSE PRACTITIONER

## 2024-05-15 ENCOUNTER — HOSPITAL ENCOUNTER (OUTPATIENT)
Facility: HOSPITAL | Age: 89
Discharge: HOME | End: 2024-05-15
Attending: FAMILY MEDICINE

## 2024-05-15 VITALS
BODY MASS INDEX: 43 KG/M2 | HEART RATE: 52 BPM | RESPIRATION RATE: 18 BRPM | DIASTOLIC BLOOD PRESSURE: 73 MMHG | SYSTOLIC BLOOD PRESSURE: 88 MMHG | OXYGEN SATURATION: 100 % | WEIGHT: 191.63 LBS

## 2024-05-15 DIAGNOSIS — I50.32 CHRONIC HEART FAILURE WITH PRESERVED EJECTION FRACTION (HFPEF) (HCC): Primary | ICD-10-CM

## 2024-05-15 DIAGNOSIS — E87.1 HYPONATREMIA: ICD-10-CM

## 2024-05-15 DIAGNOSIS — N17.9 AKI (ACUTE KIDNEY INJURY) (HCC): ICD-10-CM

## 2024-05-15 LAB
ANION GAP SERPL CALC-SCNC: 9 MMOL/L (ref 0–18)
BUN BLD-MCNC: 82 MG/DL (ref 9–23)
CALCIUM BLD-MCNC: 9.1 MG/DL (ref 8.5–10.1)
CHLORIDE SERPL-SCNC: 89 MMOL/L (ref 98–112)
CO2 SERPL-SCNC: 29 MMOL/L (ref 21–32)
CREAT BLD-MCNC: 2.31 MG/DL
EGFRCR SERPLBLD CKD-EPI 2021: 19 ML/MIN/1.73M2 (ref 60–?)
GLUCOSE BLD-MCNC: 205 MG/DL (ref 70–99)
OSMOLALITY SERPL CALC.SUM OF ELEC: 295 MOSM/KG (ref 275–295)
POTASSIUM SERPL-SCNC: 4.5 MMOL/L (ref 3.5–5.1)
SODIUM SERPL-SCNC: 127 MMOL/L (ref 136–145)

## 2024-05-15 PROCEDURE — 99215 OFFICE O/P EST HI 40 MIN: CPT | Performed by: NURSE PRACTITIONER

## 2024-05-15 NOTE — PROGRESS NOTES
[facility] Progress Note    Nancy Hensley is a 89 year old female who presents to clinic for APN assessment and management of chronic diastolic heart failure and is functional class 3.     Subjective:  Since her last clinic visit on 4/22; her weight is down 5 lbs. She feels overall well. Her arthritic pain not well controlled today and pain is very sporadic. She does not drink much when her pain increases. Stopped taking Meloxicam because it made her feel \"foggy\". Previously recommended she try to get a pain specialist referral from her PCP during her last visit. Her shortness of breath and LE edema are unchanged. She is not consistent with using her lymphedema boots because she is unable to lay back to keep them on due to breathing issues. Her home SBP's have been 110-160's.      Other Specialty visits:    She had a televisit with Karen Hassan on 5/14 for chronic bronchitis and asthma. She is currently being treated for thrush. Recently took abx for bronchitis. Continued nebs as directed and may try prednisone 5mg BID and if increased shortness of breath will take 10mg in the morning and 5mg in the evening.     She had a televisit with Dr. Henning for her arthritis. He started her on Meloxicam (NSAID) for pain; which she did not start at that time because she knows she cannot take due to her heart failure.     Last Hospitalizations/ED visits:    [facility] from 12/9-12/11 for shortness of breath, generalized pain and cough that started that same morning. She was found to be bradycardic in the 30's. Found to have mild pulmonary edema on CXR. Diuresed with IV Bumex. She was seen by [facility] and ARB was discontinued; which was not on last clinic visit but restarted by Dr. Reyes in November. [facility] saw her on 12/13, reviewed with PCP, and Cozaar was increased to 50mg BID and Hydralazine was increased 25mg TID for worsening hypertension with SBP's in the 170's,  to hold if SBP less than 110.      Hospitalized from 10/13-10/15 with worsening shortness of breath, thought to be a COPD exacerbation. She was treated with IV steroids and nebs. She was weaned to oral steroids. She was seen by Nephrology due to her hyponatremia and hyperkalemia, given Tolvaptan and Veltassa.     Review of Systems   Constitutional: Positive for weight loss.   Cardiovascular:  Positive for dyspnea on exertion (unchanged) and leg swelling (stable).   Respiratory: Negative.     Gastrointestinal: Negative.        HISTORY:  Past Medical History:    Accelerated hypertension    Asthma (HCC)    Cough    Disorder of thyroid    Dyspnea    Dyspnea, unspecified type    Endocrine disorder    HYPOTHYROID    Osteoarthrosis, unspecified whether generalized or localized, unspecified site    Other and unspecified hyperlipidemia    Pneumonia, organism unspecified(486)    Severe persistent asthma with status asthmaticus (HCC)    Thyroid disease    Unspecified essential hypertension    Ventricular ectopy    Visual impairment      Past Surgical History:   Procedure Laterality Date    Knee replacement surgery      Other surgical history      Total knee replacement Bilateral     16 yrs ago      Family History   Problem Relation Age of Onset    Cancer Father         bladder    Heart Disorder Mother     Hypertension Mother     Diabetes Mother       Social History     Socioeconomic History    Marital status:    Tobacco Use    Smoking status: Former     Current packs/day: 0.00     Average packs/day: 2.5 packs/day for 30.0 years (75.0 ttl pk-yrs)     Types: Cigarettes     Start date: 1961     Quit date: 1991     Years since quittin.8    Smokeless tobacco: Never   Vaping Use    Vaping status: Never Used   Substance and Sexual Activity    Alcohol use: Not Currently     Comment: 2 times per month    Drug use: No   Other Topics Concern    Caffeine Concern Yes    Exercise Yes     Social Determinants of Health      Financial Resource Strain: Low Risk  (5/15/2023)    Financial Resource Strain     Difficulty of Paying Living Expenses: Not hard at all     Med Affordability: No   Food Insecurity: No Food Insecurity (12/10/2023)    [facility]    Food Insecurity     Currently or in the past 3 months, have you worried your food would run out before you had money to buy more?: No     In the past 12 months, have you run out of food or been unable to get more?: No   Transportation Needs: No Transportation Needs (12/10/2023)    [facility]    Transportation Needs     Medical Transportation Needs?: No    [facility]    Housing Stability           Objective:    Telemetry: NSR       /74 (BP Location: Left arm)   Pulse (!) 41   Resp 22   Wt 191 lb 9.6 oz (86.9 kg)   SpO2 100%   BMI 42.96 kg/m²     Wt Readings from Last 6 Encounters:   05/15/24 191 lb 9.6 oz (86.9 kg)   04/22/24 196 lb (88.9 kg)   04/09/24 201 lb 3.2 oz (91.3 kg)   03/06/24 198 lb 9.6 oz (90.1 kg)   02/14/24 192 lb 6.4 oz (87.3 kg)   01/22/24 195 lb 3.2 oz (88.5 kg)       Lab Results   Component Value Date    CREATSERUM 2.31 05/15/2024    BUN 82 05/15/2024     05/15/2024    K 4.5 05/15/2024    CL 89 05/15/2024    CO2 29.0 05/15/2024     05/15/2024    CA 9.1 05/15/2024       Recent Results (from the past 24 hour(s))   Basic Metabolic Panel (8)    Collection Time: 05/15/24  2:44 PM   Result Value Ref Range    Glucose 205 (H) 70 - 99 mg/dL    Sodium 127 (L) 136 - 145 mmol/L    Potassium 4.5 3.5 - 5.1 mmol/L    Chloride 89 (L) 98 - 112 mmol/L    CO2 29.0 21.0 - 32.0 mmol/L    Anion Gap 9 0 - 18 mmol/L    BUN 82 (H) 9 - 23 mg/dL    Creatinine 2.31 (H) 0.55 - 1.02 mg/dL    Calcium, Total 9.1 8.5 - 10.1 mg/dL    Calculated Osmolality 295 275 - 295 mOsm/kg     eGFR-Cr 19 (L) >=60 mL/min/1.73m2    Patient Fasting for BMP? Patient not present          Current Outpatient Medications:     LOSARTAN 50 MG Oral Tab, TAKE 1 TABLET(50 MG) BY MOUTH TWICE DAILY, Disp: 180 tablet, Rfl: 0    BUDESONIDE 0.5 MG/2ML Inhalation Suspension, USE 2 ML(0.5 MG) VIA NEBULIZER TWICE DAILY, Disp: 360 mL, Rfl: 3    metoprolol tartrate 50 MG Oral Tab, Take 1 tablet (50 mg total) by mouth every morning AND 0.5 tablets (25 mg total) every evening., Disp: , Rfl:     predniSONE 10 MG Oral Tab, Take1 tab PO daily, Disp: 90 tablet, Rfl: 3    ipratropium-albuterol 0.5-2.5 (3) MG/3ML Inhalation Solution, Take 3 mL by nebulization every 6 (six) hours as needed., Disp: 360 mL, Rfl: 3    levothyroxine 137 MCG Oral Tab, Take 137 mcg by mouth before breakfast., Disp: , Rfl:     hydrALAZINE 25 MG Oral Tab, Take 1 tablet (25 mg total) by mouth every morning AND 1 tablet (25 mg total) Every afternoon at 2:00 pm AND 1 tablet (25 mg total) every evening. Hold if SBP is <110., Disp: , Rfl:     torsemide 20 MG Oral Tab, Take 2 tablets (40 mg total) by mouth 2 (two) times daily., Disp: 120 tablet, Rfl: 1    sodium chloride 0.9 % Inhalation Nebu Soln, Take 3 mL by nebulization as needed for Wheezing., Disp: 360 mL, Rfl: 3    PREDNISONE 5 MG Oral Tab, TAKE 1 TABLET(5 MG) BY MOUTH DAILY, Disp: 90 tablet, Rfl: 3    potassium chloride 20 MEQ Oral Tab CR, Take 1 tablet (20 mEq total) by mouth daily., Disp: , Rfl:     cyanocobalamin 250 MCG Oral Tab, Take 1 tablet (250 mcg total) by mouth daily., Disp: , Rfl:     Cholecalciferol (VITAMIN D-3 OR), Take by mouth daily., Disp: , Rfl:     Cranberry 500 MG Oral Cap, Take 500 mg by mouth daily., Disp: , Rfl:     acetaminophen 500 MG Oral Tab, Take 1 tablet (500 mg total) by mouth every 6 (six) hours., Disp: 30 tablet, Rfl: 0    albuterol (PROAIR HFA) 108 (90 Base) MCG/ACT Inhalation Aero Soln, Inhale 2 puffs into the lungs every 6 (six) hours as needed for Wheezing or Shortness of  Breath., Disp: 1 each, Rfl: 11    Cod Liver Oil 1000 MG Oral Cap, Take 1 capsule by mouth daily., Disp: , Rfl:     Calcium Carbonate-Vitamin D (CALTRATE 600+D OR), Take 1 tablet by mouth daily.  , Disp: , Rfl:     Multiple Vitamins-Minerals (CENTRUM SILVER OR), Take 1 tablet by mouth daily.  , Disp: , Rfl:     OXYGEN-HELIUM IN, 2 L by Nasal route at bedtime., Disp: , Rfl:     Meloxicam 15 MG Oral Tab, Take 0.5 tablets (7.5 mg total) by mouth daily as needed for Pain. (Patient not taking: Reported on 5/15/2024), Disp: , Rfl:     Exam:   General:         Alert, in no apparent distress  HEENT:          hard to assess due to body habitus  Lungs:            Diminished                     CV:                  S1, S2 regular  Abdomen:       distended/round, soft, non-tender, BS+  Extremities:    +23 pitting LE edema; chronic lymphedema  Neuro:             A&O x 3  Skin:                Pink, warm, dry    Education:  Patient instructed regarding sodium restricted diet, low sodium foods, fluid restriction, daily weights, medication regimen, s/s HF exacerbation and when to call APN/clinic.    Assessment:   Chronic diastolic heart failure, ACC/AHA Stage C, NYHA Class 3- LVEF 55-60% with grade 2 DD on echo 3/2023. Prior BVA inconclusive, patient does not want to repeat test. ProBNP  1,693 and stable. Avoiding MRA due to chronic hyponatremia. SGTL2i cost prohibitive. Hypovolemic.  Essential HTN - Labile. On Losartan and Hydralazine.   LBBB  Hyponatremia, chronic - Hx Tolvaptan. Labile. Na 127 and worsened due to dehydration. Worsening prior with Metolazone.   Anemia - last HGB 12.7 g/dl and overall stable. Last Iron sat 20, Ferritin 155.8 in June.    Chronic lymphedema - using lymphedema pumps intermittently. Wraps legs and has legs massaged. Not a candidate for vein treatment due to minimal reflux on prior US. Follows with Dr. Villatoro.   CKD stage 3b - unknown baseline creatinine. SGLT2i is cost prohibited. Follows with   [facility]/Dr. Reyes. Saw Dr. Reyes 11/20; restarted ARB due to hypertension.  Morbid obesity - Body mass index is 42.96 kg/m².  COPD/asthma - uses 1 L nocturnal oxygen, inhalers. Follows with Dr. England and Karen Gonzalez. On Volara for airway clearance. On Prednisone.  Possible DANE - not agreeable to sleep study in past since she would not ever use a device.   Hx chronic tobacco use - quit 30 years ago, 20 pack years.  DM type 2 - well controlled. Last a1c 6.5% on 11/2, on no medications.    Hypothyroidism - on Levothyroxine. TSH 3.2, Free T4 1.5 and improved on 3/14/24. Followed by PCP.   Hx ecoli UTI - s/p abx.   HLD - FLP on 11/2 with triglycerides 539, , HDL 66.     Plan:   Hold 2 doses of Torsemide, tonight and tomorrow morning, then resume normal dosing.   Make sure you drink 40-64 ounces of fluid per day. Do not exceed 64 ounces.   BMP early next week with C.   Return to clinic in 2 weeks  F/U with Dr. Villatoro in June or July with echo prior.   CHF discharge instructions given    45 minutes spent with patient and greater than 50% of the time was spent counseling and coordinating care.    DAMIR Poon

## 2024-05-15 NOTE — PROGRESS NOTES
Pt. Assessed with daughter in law present. Patient states she has good days and bad days. She says some days her arthritic pain is so bad it makes it hard to do anything. Sates she slept well last night. Weight stable at 191.6 lbs down 5 lbs since last [facility] appt. Reviewed current list of patient's allergies and medication; updated the Electronic Medical Record. Labs ordered to assess kidney function and drawn by  Lab. Reviewed follow-up appointment and Heart Failure discharge instructions with patient. Patient verbalized an understanding.     Will rtc in 2 weeks- [facility] to drawn labs next week- orders faxed.

## 2024-05-15 NOTE — PATIENT INSTRUCTIONS
Heart Failure Discharge Instructions      You are dehydrated today.  Hold 2 doses of Torsemide, tonight and tomorrow morning, then resume normal dosing.   Make sure you drink 40-64 ounces of fluid per day. Do not exceed 64 ounces.   We will have home health draw your blood.     Activity: Regular exercise and activity is important for your overall health and to help keep your heart strong and functioning as well as possible.   Walk at a slow to moderate pace for 15-20 minutes 3-5 days per week.     Follow these instructions every day to keep yourself in the Green Zone     The Green Zone means you are feeling well and your symptoms are under control                                    Medications  Take your medication every day as instructed  Do not stop taking your medicine or change the amount you are taking without instructions from your doctor or nurse  Do Not take non-steroidal antiinflammatory drugs such as ibuprofen, aleve, advil, or motrin                                    Diet/Fluids  People with heart failure should eat less sodium (salt) and limit fluid. Sodium attracts water and makes the body hold fluid. This extra fluid makes the heart work harder and can worsen the symptoms of heart failure.     Diet    2000 mg sodium daily  Fluid restriction    64 ounces daily  (8 oz. = 1 cup)                                     Body Weight  Weigh yourself every day before breakfast and write your weight down  Use the same scale and wear about the same amount of clothes each time  A sudden weight increase is due to fluid retention rather than fat                                         Activity  Pace your activities to avoid getting overtired  Take rest periods as needed  Elevate your feet to reduce ankle swelling when resting                             Signs of Worsening Heart Failure    You are entering the Yellow Zone - this is a warning zone    Call your doctor or nurse if you have any of these signs or  symptoms:  You gain 2 or more pounds overnight or 3-5 pounds in 3-7 days  You have more trouble breathing  You get more tired with regular activity, or are limiting activity because of shortness of breath or fatigue  You are short of breath lying down, you need more pillows to breathe comfortably,  or wake up during the night short of breath  You urinate less often during the day and more often at night  You have a bloated feeling, upset stomach, loss of appetite, or your clothes are fitting tighter    GO TO THE EMERGENCY DEPARTMENT or CALL 911 IF:    These are signs you are in the RED ZONE - THIS IS AN EMERGENCY  You have tightness or pain in your chest  You are extremely short of breath or can't catch your breath  You cough up frothy pink mucous  You feel confused or can't think clearly  You are traveling and develop symptoms of worsening heart failure     We respect everyone's time and availability. Please be aware that this is not a walk-in clinic and we require appointments in order to facilitate timely care for all patients. We ask you to arrive 30 minutes before your appointment to allow time for you to check-in and have your bloodwork drawn. Please understand if you are late for your appointment, you may be asked to reschedule. If possible, all attempts will be made to accommodate but realize this is no guarantee that this will always be available. We understand there are extenuating circumstances. If you need to cancel or reschedule your appointment, please call the [facility] within 24 hours at [phone].  Thank you for your cooperation, [facility] Staff.    IF YOU HAVE QUESTIONS REGARDING YOUR BILL, FEEL FREE TO CONTACT [facility] PATIENT ACCOUNTS -[phone]. IF YOU NEED FINANCIAL ASSISTANCE, PLEASE CALL AN [facility] FINANCIAL COUNSELOR -[phone].             [facility]     [phone]

## 2024-05-20 PROBLEM — I95.9 HYPOTENSION: Status: ACTIVE | Noted: 2024-01-01

## 2024-05-20 PROBLEM — I95.9 HYPOTENSION: Status: ACTIVE | Noted: 2024-05-20

## 2024-05-20 PROBLEM — K25.1 ACUTE GASTRIC ULCER WITH PERFORATION (HCC): Status: ACTIVE | Noted: 2024-01-01

## 2024-05-20 PROBLEM — Z71.89 GOALS OF CARE, COUNSELING/DISCUSSION: Status: ACTIVE | Noted: 2024-05-20

## 2024-05-20 PROBLEM — K25.1 ACUTE GASTRIC ULCER WITH PERFORATION (HCC): Status: ACTIVE | Noted: 2024-05-20

## 2024-05-20 PROBLEM — Z51.5 PALLIATIVE CARE BY SPECIALIST: Status: ACTIVE | Noted: 2023-01-05

## 2024-05-20 PROBLEM — M19.90 ARTHRITIS: Status: ACTIVE | Noted: 2024-01-01

## 2024-05-20 PROBLEM — Z71.89 GOALS OF CARE, COUNSELING/DISCUSSION: Status: ACTIVE | Noted: 2024-01-01

## 2024-05-20 PROBLEM — M19.90 ARTHRITIS: Status: ACTIVE | Noted: 2024-05-20

## 2024-05-20 NOTE — CONSULTS
[facility]  Report of Consultation    Nancy Hensley Patient Status:  Inpatient    1932 MRN [id number]   Location [facility] 4NW-A Attending Nicolas Amaro MD   Hosp Day # 0 PCP Thomas Henning DO     Requesting Physician:  Dr. Quinones    Reason for Consultation:  Perforated viscus    Chief Complaint:  Abdominal pain    History of Present Illness:  Nancy Hensley is a 89 year old female with a significant past medical history of arthritis, hypertension, and COPD who presents to [facility] with worsening abdominal pain.  On Friday, she reports right-sided back pain which continued to increase in intensity throughout the weekend. She is on meloxicam as needed for her arthritis and was taking his medication to help alleviate her back pain. This morning around 4:30 AM, she reports excruciating pain which led her to present to the ER for further evaluation.  She denies associated nausea, vomiting, fevers or chills.  Her last bowel movement was yesterday.    Upon presentation to the hospital, the patient was hemodynamically stable.  CBC revealed leukocytosis at 12.5 with left shift of 9.49, hemoglobin 13.5, platelets 356,000.  Hyperglycemic at 212.  Hyponatremic at 131, hypochloremic at 94 with no other electrolyte abnormalities.  BUN and creatinine elevated at 52 and 1.52 respectively.  Albumin slightly decreased at 3.2.  Lactic acid increased at 2.3.    CT of the abdomen pelvis reveals moderate to large amount of free intraperitoneal air in the nondependent part of the abdomen.  There does appear to be a defect in the wall of the gastric antrum near the pylorus.  This is suggestive of perforated peptic ulcer.  There are multiple diverticula of the hepatic flexure of the colon in this area and there is free air around these diverticula as well.  There is cholelithiasis and stranding around the gallbladder.    The patient denies significant past surgical history.  She has a significant past  medical history of arthritis, hypertension, COPD on oxygen at home while sleeping.  She denies taking blood thinning medications.  She has never had a colonoscopy and denies a history of diverticulitis.      History:  Past Medical History:    Accelerated hypertension    Asthma (HCC)    Cough    Disorder of thyroid    Dyspnea    Dyspnea, unspecified type    Endocrine disorder    HYPOTHYROID    Osteoarthrosis, unspecified whether generalized or localized, unspecified site    Other and unspecified hyperlipidemia    Pneumonia, organism unspecified(486)    Severe persistent asthma with status asthmaticus (HCC)    Thyroid disease    Unspecified essential hypertension    Ventricular ectopy    Visual impairment     Past Surgical History:   Procedure Laterality Date    Knee replacement surgery      Other surgical history      Total knee replacement Bilateral     16 yrs ago     Family History   Problem Relation Age of Onset    Cancer Father         bladder    Heart Disorder Mother     Hypertension Mother     Diabetes Mother       reports that she quit smoking about 32 years ago. Her smoking use included cigarettes. She started smoking about 62 years ago. She has a 75 pack-year smoking history. She has never used smokeless tobacco. She reports that she does not currently use alcohol. She reports that she does not use drugs.    Allergies:  Allergies   Allergen Reactions    Metolazone PAIN     Pain in shoulders, felt very hot    Oxycodone HALLUCINATION    Bactrim [Sulfamethoxazole W/Trimethoprim] NAUSEA ONLY    Ace Inhibitors Coughing    Statins Coughing       Medications:  Medications Prior to Admission   Medication Sig    metoprolol tartrate 50 MG Oral Tab Take 1 tablet (50 mg total) by mouth 2 (two) times daily.    LOSARTAN 50 MG Oral Tab TAKE 1 TABLET(50 MG) BY MOUTH TWICE DAILY    BUDESONIDE 0.5 MG/2ML Inhalation Suspension USE 2 ML(0.5 MG) VIA NEBULIZER TWICE DAILY    Meloxicam 7.5 MG Oral Tab Take 1 tablet (7.5 mg total)  by mouth daily as needed for Pain.    predniSONE 10 MG Oral Tab Take1 tab PO daily    ipratropium-albuterol 0.5-2.5 (3) MG/3ML Inhalation Solution Take 3 mL by nebulization every 6 (six) hours as needed.    levothyroxine 137 MCG Oral Tab Take 137 mcg by mouth before breakfast.    hydrALAZINE 25 MG Oral Tab Take 1 tablet (25 mg total) by mouth every morning AND 1 tablet (25 mg total) Every afternoon at 2:00 pm AND 1 tablet (25 mg total) every evening. Hold if SBP is <110.    torsemide 20 MG Oral Tab Take 2 tablets (40 mg total) by mouth 2 (two) times daily.    sodium chloride 0.9 % Inhalation Nebu Soln Take 3 mL by nebulization as needed for Wheezing.    PREDNISONE 5 MG Oral Tab TAKE 1 TABLET(5 MG) BY MOUTH DAILY    potassium chloride 20 MEQ Oral Tab CR Take 1 tablet (20 mEq total) by mouth daily.    cyanocobalamin 250 MCG Oral Tab Take 1 tablet (250 mcg total) by mouth daily.    Cholecalciferol (VITAMIN D-3 OR) Take 1 capsule by mouth daily.    Cranberry 500 MG Oral Cap Take 500 mg by mouth daily.    acetaminophen 500 MG Oral Tab Take 1 tablet (500 mg total) by mouth every 6 (six) hours.    albuterol (PROAIR HFA) 108 (90 Base) MCG/ACT Inhalation Aero Soln Inhale 2 puffs into the lungs every 6 (six) hours as needed for Wheezing or Shortness of Breath.    Cod Liver Oil 1000 MG Oral Cap Take 1 capsule by mouth daily.    Calcium Carbonate-Vitamin D (CALTRATE 600+D OR) Take 1 tablet by mouth daily.      Multiple Vitamins-Minerals (CENTRUM SILVER OR) Take 1 tablet by mouth daily.      OXYGEN-HELIUM IN 2 L by Nasal route at bedtime.         Current Facility-Administered Medications:     sodium chloride 0.9% infusion, 125 mL/hr, Intravenous, Continuous    acetaminophen (Tylenol Extra Strength) tab 500 mg, 500 mg, Oral, Q4H PRN    melatonin tab 3 mg, 3 mg, Oral, Nightly PRN    glycerin-hypromellose- (Artificial Tears) 0.2-0.2-1 % ophthalmic solution 1 drop, 1 drop, Both Eyes, QID PRN    sodium chloride (Saline Mist)  0.65 % nasal solution 1 spray, 1 spray, Each Nare, Q3H PRN    polyethylene glycol (PEG 3350) (Miralax) 17 g oral packet 17 g, 17 g, Oral, Daily PRN    sennosides (Senokot) tab 17.2 mg, 17.2 mg, Oral, Nightly PRN    bisacodyl (Dulcolax) 10 MG rectal suppository 10 mg, 10 mg, Rectal, Daily PRN    fleet enema (Fleet) 7-19 GM/118ML rectal enema 133 mL, 1 enema, Rectal, Once PRN    ondansetron (Zofran) 4 MG/2ML injection 4 mg, 4 mg, Intravenous, Q6H PRN    metoclopramide (Reglan) 5 mg/mL injection 5 mg, 5 mg, Intravenous, Q8H PRN    glucose (Dex4) 15 GM/59ML oral liquid 15 g, 15 g, Oral, Q15 Min PRN **OR** glucose (Glutose) 40% oral gel 15 g, 15 g, Oral, Q15 Min PRN **OR** glucose-vitamin C (Dex-4) chewable tab 4 tablet, 4 tablet, Oral, Q15 Min PRN **OR** dextrose 50% injection 50 mL, 50 mL, Intravenous, Q15 Min PRN **OR** glucose (Dex4) 15 GM/59ML oral liquid 30 g, 30 g, Oral, Q15 Min PRN **OR** glucose (Glutose) 40% oral gel 30 g, 30 g, Oral, Q15 Min PRN **OR** glucose-vitamin C (Dex-4) chewable tab 8 tablet, 8 tablet, Oral, Q15 Min PRN    insulin degludec 100 units/mL flextouch 10 Units, 10 Units, Subcutaneous, Daily    insulin aspart (NovoLOG) 100 Units/mL FlexPen 1-10 Units, 1-10 Units, Subcutaneous, TID AC and HS    insulin aspart (NovoLOG) 100 Units/mL FlexPen 1-68 Units, 1-68 Units, Subcutaneous, TID CC    piperacillin-tazobactam (Zosyn) 3.375 g in dextrose 5% 100 mL IVPB-ADDV, 3.375 g, Intravenous, Q8H    pantoprazole (Protonix) 40 mg in sodium chloride 0.9% PF 10 mL IV push, 40 mg, Intravenous, Q12H    Review of Systems:  Pertinent items are noted in HPI.  Allergic/Immuno:  Review of patient's allergies performed.  Cardiovascular:  Negative for cool extremity and irregular heartbeat/palpitations  Constitutional:  Negative for decreased activity, fever, irritability and lethargy  Endocrine:  Negative for abnormal sleep patterns, increased activity, polydipsia and polyphagia  ENMT:  Negative for ear drainage,  hearing loss and nasal drainage  Eyes:  Negative for eye discharge and vision loss  Gastrointestinal:  Positive for abdominal pain. Negative for  constipation, decreased appetite, diarrhea and vomiting  Genitourinary:  Negative for dysuria and hematuria  Hema/Lymph:  Negative for easy bleeding and easy bruising  Integumentary:  Negative for pruritus and rash  Musculoskeletal:  Negative for bone/joint symptoms  Neurological:  Negative for gait disturbance  Psychiatric:  Negative for inappropriate interaction and psychiatric symptoms  Respiratory:  Negative for cough, dyspnea and wheezing      Physical Exam:  Blood pressure (!) 84/46, pulse 84, temperature 97.7 °F (36.5 °C), temperature source Oral, resp. rate 16, weight 200 lb (90.7 kg), SpO2 99%, not currently breastfeeding.    General: Alert, orientated x3.  Cooperative. Mild apparent distress.    HEENT: Exam is unremarkable.  Without scleral icterus.  Mucous membranes are moist. EOM are WNL.    Neck: No tenderness to palpitation.  Full range of motion to flexion and extension, lateral rotation and lateral flexion of cervical spine.  No JVD. Supple.     Lungs: Clear to auscultation bilaterally. No wheezes, no rales, no rhonchi. Good excursion of the diaphragms. No secondary use of accessory respiratory musculature.    Cardiac: Regular rate and rhythm. No murmur.    Abdomen:  Soft, non-distended, tender to palpation throughout upper abdomen, tympanic to percussion. No peritoneal signs. No guarding.     Extremities:  No lower extremity edema noted.  Without clubbing or cyanosis.      Skin: Normal texture and turgor.    Laboratory Data:  Recent Labs   Lab 05/20/24  0611   RBC 3.99   HGB 13.5   HCT 38.1   MCV 95.5   MCH 33.8   MCHC 35.4   RDW 14.1   NEPRELIM 9.49*   WBC 12.5*   .0       Recent Labs   Lab 05/15/24  1444 05/20/24  0611   * 212*   BUN 82* 52*   CREATSERUM 2.31* 1.52*   CA 9.1 9.3   ALB  --  3.2*   * 131*   K 4.5 4.8   CL 89* 94*   CO2  29.0 26.0   ALKPHO  --  57   AST  --  19   ALT  --  23   BILT  --  0.5   TP  --  6.9         No results for input(s): \"PTP\", \"INR\", \"PTT\" in the last 168 hours.      CT ABDOMEN+PELVIS(CONTRAST ONLY)(CPT=74177)    Result Date: 5/20/2024  CONCLUSION:  1. There is a large amount of free air consistent with perforated viscus. 2. Defect is noted in gastric wall near pylorus suspicious for perforated peptic ulcer disease. 3. There are diverticula of the colon near the duodenal abnormality although there is no other evidence of diverticulitis.  Perforated diverticulum is considered less likely. 4. There is cholelithiasis and there is stranding around gallbladder although this is probably related to the abnormality of the adjacent duodenum. 5. Compression deformity of L2 is of uncertain acuity.  Above findings were reported to Dr. Quinones on May 20, 2024 at 8:47 a.m..   LOCATION:  [facility]   Dictated by (CST): Linden Cook MD on 5/20/2024 at 8:41 AM     Finalized by (CST): Linden Cook MD on 5/20/2024 at 8:47 AM       XR CHEST AP PORTABLE  (CPT=71045)    Result Date: 5/20/2024  CONCLUSION:  Shallow inspiration.  No acute cardiopulmonary pathology.   LOCATION:  [facility]      Dictated by (CST): Ralph Davis MD on 5/20/2024 at 7:35 AM     Finalized by (CST): Ralph Davis MD on 5/20/2024 at 7:36 AM          CRESENCIO Young  5/20/2024  1:24 PM    Is this a shared or split note between Advanced Practice Provider and Physician? Yes      Medical Decision Making         Impression:  Patient Active Problem List   Diagnosis    Screening for other and unspecified endocrine, nutritional, metabolic, and immunity disorders    Screening for iron deficiency anemia    Glossitis    Encounter for screening for osteoporosis    Other screening mammogram    Rash and other nonspecific skin eruption    Closed fracture of unspecified phalanx or phalanges of hand    Osteoarthrosis, unspecified whether generalized or localized, unspecified site     Essential hypertension    Other and unspecified hyperlipidemia    Hypothyroidism    Pain in joint, pelvic region and thigh    Obesity, morbid (HCC)    Osteoarthritis (arthritis due to wear and tear of joints)    Hyponatremia    Dizziness    Benign positional vertigo    Adverse reaction to drug    Osteoporosis    Extremity edema    Moderate persistent asthma with exacerbation (HCC)    Elevated troponin    Azotemia    Abnormal ECG    Closed displaced fracture of surgical neck of right humerus, unspecified fracture morphology, initial encounter    Contusion of back, unspecified laterality, initial encounter    Moderate persistent asthma without complication (HCC)    Hypervolemia, unspecified hypervolemia type    FTT (failure to thrive) in adult    Palliative care encounter    Counseling regarding advance care planning and goals of care    COPD (chronic obstructive pulmonary disease) (HCC)    Bradycardia    Chronic renal disease, stage III (HCC)    Acute on chronic congestive heart failure (HCC)    LBBB (left bundle branch block)    Primary hypertension    Urinary tract infection without hematuria    COPD exacerbation (HCC)    YODIT (acute kidney injury) (HCC)    Lymphedema    Chronic heart failure with preserved ejection fraction (HCC)    Hyperkalemia    Acute gastric ulcer with perforation ([facility])       89 year old female presents with acute onset of abdominal pain that started this morning.  I reviewed the CT scan in detail and provided my interpretation.  There are foci of free air especially in the upper abdomen.  Patient has peritonitis on exam.  Scribes pain that is 10 out of 10 especially in the upper quadrants.  I discussed with her in the presence of her children (POA) a treatment options eluding surgical treatment.  Explained that in the case of hollow viscus perforation the recommended treatment is emergent surgery to perforation and stop the contamination.  I explained in detail the risks and benefits of  surgery including but not limited to bleeding, infection, damage surrounding organ structures, need for intubation, need for ICU stay, need for subacute rehab or nursing home care postdischarge, need for reoperation, need for open procedure.  Discussed possible need for an ostomy if the perforation is within the small bowel or colon.  I also explained her risks of surgery are heightened due to her high frailty score.  Patient has a low functional status, she is mostly bedridden due to severe arthritis but does ambulate with the help of the walker within the house.  She describes her quality of life as poor with suffering and anguish due to pain and age.  I also discussed with him goals of care and end-of-life measures.  Listened to the family and the patient carefully described all the options of treatment.  Patient and her family declined surgery at this time.  They are opting for comfort as the main goal of care at this time.  To continue with IV antibiotics in hopes that this viscus patient will seal off on its own.  Questions were answered in detail.  Palliative care was consulted for further discussion with patient family and patient.  Patient and power of  requested to be DNR/DNI at this time.  This was conveyed to the bedside nurse and documented.      Ирина Gillespie MD  [facility] General surgery

## 2024-05-20 NOTE — CONSULTS
[facility]   part of [facility]  Palliative Care Initial Consult Note    Nancy Hensley Patient Status:  Inpatient    1932 MRN [id number]   Location [facility] 4NW-A Attending Nicolas Amaro MD   Hosp Day # 0 PCP Thomas Henning DO     Date of Consult: 2024  Patient seen at: [facility] Inpatient    Reason for Consultation: Consult ordered by:: Dr. Amaro for evaluation of Palliative Care needs and Goals of care discussion.    Subjective     History of Present Illness: Nancy Hensley is a 89 year old female with severe arthritis, hypothyroidism, HTN who was admitted on 2024 for abdominal pain. Work up in our hospital revealed perforated gastric ulcer and UTI. She was started on Abx and surgical consult placed. Per family and hospitalist, surgery recommended a conservative approach d/t risk.  History was obtained from Epic and son and daughter.     Today is day #0 of hospitalization.   Lactic acid increasing along with hypotension (SBPs in 80s this AM) and worsening pain.     When I entered the room, the patient was sleeping and lying in bed. Son, Daughter, and DIL  present at bedside.   Pt received dilaudid and did not wake up to be a part of this GOC conversation.     See summary of discussion below.      Review of Systems:    Kina. Per notes, significant abdominal pain.   Bowel Movement    No data found in the last 1 encounters.       Wt Readings from Last 6 Encounters:   24 200 lb (90.7 kg)   05/15/24 191 lb 9.6 oz (86.9 kg)   24 196 lb (88.9 kg)   24 201 lb 3.2 oz (91.3 kg)   24 198 lb 9.6 oz (90.1 kg)   24 192 lb 6.4 oz (87.3 kg)        Palliative Care Social History:   Marital Status:   Children: Yes  Living Situation Prior to Admit: Home with family  (lives with Daughter Dilma)    Substance History:   reports that she quit smoking about 32 years ago. Her smoking use included cigarettes. She started smoking about 62 years ago. She has  a 75 pack-year smoking history. She has never used smokeless tobacco.  reports that she does not currently use alcohol.  reports no history of drug use.    Spiritual Assessment:   [facility]    Past Medical History/Past Surgical History:   This is the 3rd hospitalization in the past 12 months.    Medical History: obtained from [facility]  Past Medical History:    Accelerated hypertension    Asthma (HCC)    Cough    Disorder of thyroid    Dyspnea    Dyspnea, unspecified type    Endocrine disorder    HYPOTHYROID    Osteoarthrosis, unspecified whether generalized or localized, unspecified site    Other and unspecified hyperlipidemia    Pneumonia, organism unspecified(486)    Severe persistent asthma with status asthmaticus (HCC)    Thyroid disease    Unspecified essential hypertension    Ventricular ectopy    Visual impairment     Past Surgical History:   Procedure Laterality Date    Knee replacement surgery      Other surgical history      Total knee replacement Bilateral     16 yrs ago       Family History: obtained from [facility]  Family History   Problem Relation Age of Onset    Cancer Father         bladder    Heart Disorder Mother     Hypertension Mother     Diabetes Mother        Allergies:  Allergies   Allergen Reactions    Metolazone PAIN     Pain in shoulders, felt very hot    Oxycodone HALLUCINATION    Bactrim [Sulfamethoxazole W/Trimethoprim] NAUSEA ONLY    Ace Inhibitors Coughing    Statins Coughing       Medications:     Current Facility-Administered Medications:     sodium chloride 0.9% infusion, 125 mL/hr, Intravenous, Continuous    acetaminophen (Tylenol Extra Strength) tab 500 mg, 500 mg, Oral, Q4H PRN    melatonin tab 3 mg, 3 mg, Oral, Nightly PRN    glycerin-hypromellose- (Artificial Tears) 0.2-0.2-1 % ophthalmic solution 1 drop, 1 drop, Both Eyes, QID PRN    sodium chloride (Saline Mist) 0.65 % nasal solution 1 spray, 1 spray, Each Nare, Q3H PRN    polyethylene glycol (PEG 3350)  (Miralax) 17 g oral packet 17 g, 17 g, Oral, Daily PRN    sennosides (Senokot) tab 17.2 mg, 17.2 mg, Oral, Nightly PRN    bisacodyl (Dulcolax) 10 MG rectal suppository 10 mg, 10 mg, Rectal, Daily PRN    fleet enema (Fleet) 7-19 GM/118ML rectal enema 133 mL, 1 enema, Rectal, Once PRN    ondansetron (Zofran) 4 MG/2ML injection 4 mg, 4 mg, Intravenous, Q6H PRN    metoclopramide (Reglan) 5 mg/mL injection 5 mg, 5 mg, Intravenous, Q8H PRN    glucose (Dex4) 15 GM/59ML oral liquid 15 g, 15 g, Oral, Q15 Min PRN **OR** glucose (Glutose) 40% oral gel 15 g, 15 g, Oral, Q15 Min PRN **OR** glucose-vitamin C (Dex-4) chewable tab 4 tablet, 4 tablet, Oral, Q15 Min PRN **OR** dextrose 50% injection 50 mL, 50 mL, Intravenous, Q15 Min PRN **OR** glucose (Dex4) 15 GM/59ML oral liquid 30 g, 30 g, Oral, Q15 Min PRN **OR** glucose (Glutose) 40% oral gel 30 g, 30 g, Oral, Q15 Min PRN **OR** glucose-vitamin C (Dex-4) chewable tab 8 tablet, 8 tablet, Oral, Q15 Min PRN    insulin degludec 100 units/mL flextouch 10 Units, 10 Units, Subcutaneous, Daily    insulin aspart (NovoLOG) 100 Units/mL FlexPen 1-10 Units, 1-10 Units, Subcutaneous, TID AC and HS    insulin aspart (NovoLOG) 100 Units/mL FlexPen 1-68 Units, 1-68 Units, Subcutaneous, TID CC    piperacillin-tazobactam (Zosyn) 3.375 g in dextrose 5% 100 mL IVPB-ADDV, 3.375 g, Intravenous, Q8H    pantoprazole (Protonix) 40 mg in sodium chloride 0.9% PF 10 mL IV push, 40 mg, Intravenous, Q12H    HYDROmorphone (Dilaudid) 1 MG/ML injection 0.2 mg, 0.2 mg, Intravenous, Q2H PRN **OR** HYDROmorphone (Dilaudid) 1 MG/ML injection 0.4 mg, 0.4 mg, Intravenous, Q2H PRN **OR** HYDROmorphone (Dilaudid) 1 MG/ML injection 0.8 mg, 0.8 mg, Intravenous, Q2H PRN    Functional Status History:  ADLs: bathing or showering, dressing, getting in and out of bed or a chair, walking, using the toilet, and eating  - MODERATE  3 - 5 performance deficits  IADLs: use the phone, shop for groceries, meal preparation, manage  medicines, clean living area, use transportation by self, manage money  - MODERATE  3 - 5 performance deficits    Palliative Performance Scale:   Prior to admission Palliative performance scale PPSv2 (%): 50 (pt/family reported)   Current Palliative performance scale PPSv2 (%): 30   % Ambulation Activity Level Self-Care Intake Consciousness   100 Full  Normal  No Disease Full Normal Full   90 Full  Normal  Some Disease Full Normal Full   80 Full  Normal w/effort  Some Disease Full Normal or reduced Full   70 Reduced  Can't Perform Job  Some Disease Full Normal or reduced Full   60 Reduced  Can't Perform Hobby   Significant Disease Occ Assist Normal or reduced Full or confused   50 Mainly sit/lie Can't do any work  Extensive Disease Partial Assist Normal or reduced Full or confused   40 Mainly in bed Can't do any work  Extensive Disease Mainly Assist Normal or reduced Full or confused   30 Bed Bound Can't do any work  Extensive Disease Max Assist  Total Care Reduced  Drowsy/confused   20 Bed Bound Can't do any work  Extensive Disease Max Assist  Total Care Minimal  Drowsy/confused   10 Bed Bound Can't do any work  Extensive Disease Max Assist  Total Care Mouth Care  Drowsy/confused   0 Death        Objective      Vital Signs:  Blood pressure 100/51, pulse 100, temperature 97.7 °F (36.5 °C), temperature source Oral, resp. rate 16, weight 200 lb (90.7 kg), SpO2 98%, not currently breastfeeding.  Body mass index is 44.84 kg/m².  Non-verbal signs of pain present: No    Physical Exam:  General: Sleeping. In no apparent respiratory distress. Body habitus Obese   HEENT: eyes closed  Lungs: Normal effort on RA; snoring at times   Abdomen: deferred   Neurologic: lorene     Hematology:  Lab Results   Component Value Date    WBC 12.5 (H) 05/20/2024    HGB 13.5 05/20/2024    HCT 38.1 05/20/2024    .0 05/20/2024       Coags:  Lab Results   Component Value Date    INR 1.03 03/07/2023    PTT 20.7 (L) 12/30/2019        Chemistry:  Lab Results   Component Value Date    CREATSERUM 1.52 (H) 05/20/2024    BUN 52 (H) 05/20/2024     (L) 05/20/2024    K 4.8 05/20/2024    CL 94 (L) 05/20/2024    CO2 26.0 05/20/2024     (H) 05/20/2024    CA 9.3 05/20/2024    ALB 3.2 (L) 05/20/2024    ALKPHO 57 05/20/2024    BILT 0.5 05/20/2024    TP 6.9 05/20/2024    AST 19 05/20/2024    ALT 23 05/20/2024    DDIMER 1.31 (H) 12/09/2021    MG 2.3 11/02/2023    PHOS 4.1 09/28/2023    TROP 0.751 (HH) 12/30/2019       Imaging:  CT ABDOMEN+PELVIS(CONTRAST ONLY)(CPT=74177)    Result Date: 5/20/2024  CONCLUSION:  1. There is a large amount of free air consistent with perforated viscus. 2. Defect is noted in gastric wall near pylorus suspicious for perforated peptic ulcer disease. 3. There are diverticula of the colon near the duodenal abnormality although there is no other evidence of diverticulitis.  Perforated diverticulum is considered less likely. 4. There is cholelithiasis and there is stranding around gallbladder although this is probably related to the abnormality of the adjacent duodenum. 5. Compression deformity of L2 is of uncertain acuity.  Above findings were reported to Dr. Quinones on May 20, 2024 at 8:47 a.m..   LOCATION:  [facility]   Dictated by (CST): Linden Cook MD on 5/20/2024 at 8:41 AM     Finalized by (CST): Linden Cook MD on 5/20/2024 at 8:47 AM       XR CHEST AP PORTABLE  (CPT=71045)    Result Date: 5/20/2024  CONCLUSION:  Shallow inspiration.  No acute cardiopulmonary pathology.   LOCATION:  [facility]      Dictated by (CST): Ralph Davis MD on 5/20/2024 at 7:35 AM     Finalized by (CST): Ralph Davis MD on 5/20/2024 at 7:36 AM        Summary of Discussion      I discussed reason for palliative care consultation with son, daughter, and DIL.    I differentiated the palliative treatment-focus model versus the hospice comfort-focused philosophy of care. I informed the patient/family that having palliative care support does not  limit medical treatment options or decisions to those who wish to continue curative or restorative medical therapies. I discussed the benefits of palliative care to include assistance with arising symptom management needs, an extra layer of support, to ensure GOC are respected throughout healthcare continuum, and assist with transition to hospice care when appropriate.      Family states that pt was doing very well for her age until her fall and fracture of her arm back in 12/2022. Since then, her mobility has declined and she had several hospitalizations in 2023. Her frequent visits to OP CHF clinic have helped ease her hospitalization needs significantly. However, pt can no longer cook and has a difficult time quilting. She has severe arthritis that was controlled on Celebrex but did not tolerate Ses. Meloxicam was started recently.     Prognostic awareness/understanding: Remains in the information gathering phase   Family understands that pt's acute health issues could be life ending if she does not improve with conservative measures only.   Unfortunately, pt not awake enough to participate in discussion.   They understand that their mom could decompensate quickly and may require ICU level of care. They are okay with ICU care and vasopressor support if needed.     Hopes/goals:   They would like to see their mom participate in decision making.   They remain hopeful for a recovery.     Fears/concerns:   Family worries about her ability to survive with just conservative management.   They have several questions about her potential recovery including TPN vs G/J tube.   Provided emotional support to Multiple family members.    Advance Care Planning counseling and discussion:   HC POA Documentation Completed--Document in Epic.   Daughter Dilma is her POA   We voluntarily discussed the risks vs benefits of life sustaining treatments in the setting of comorbid medical conditions with son and daughter.  POLST FORM  Completed--Document in Epic  DNAR/Selective Treatment  This was confirmed today     Assessment and Recommendations        Principal Problem:    Acute gastric ulcer with perforation (HCC)    Palliative Care encounter    Goals of care: established and treatment focused   Family wants to give pt time to see if conservative management will benefit her.   Code Status: DNAR/Selective Treatment  Confirmed that they are okay with ICU and vasopressor support if needed    Daughter Dilma is POA   They are not ready for comfort measures at this time     Symptoms  #abdominal pain   -IVP dilaudid panel by hospitalist ordered  -family is not ready for comfort measures; they want to avoid gtt. If pt is awake and alert enough, can consider PCA if needed.     Discussed today's visit with Family, POA, RN, and Hospitalist.    Palliative Care Follow Up: Palliative care team will Continue to follow while inpatient.  Palliative care follow up outpatient: TBD.    Thank you for allowing Palliative Care services to participate in the care of Nancy Hensley.    A total of 75 minutes were spent on this consult, which included all of the following: chart review, direct face to face contact, history taking, physical examination, counseling and coordinating care, and documentation.     Ashley Oliva MD  5/20/2024  6:49 PM  Palliative Care Services    The 21st Century Cures Act makes medical notes like these available to patients in the interest of transparency. Please be advised this is a medical document. Medical documents are intended to carry relevant information, facts as evident, and the clinical opinion of the practitioner. The medical note is intended as peer to peer communication and may appear blunt or direct. It is written in medical language and may contain abbreviations or verbiage that are unfamiliar.

## 2024-05-20 NOTE — ED INITIAL ASSESSMENT (HPI)
Pt arrived via EMS for c/o abd pain for 2-3 days with nausea, no vomiting. States \"very little.\" BM. Pt denies urinary sxs. Pt also c/o R shoulder/arm pain from an old injury

## 2024-05-20 NOTE — CM/SW NOTE
SW received consult order for home health services. Patient is current with [facility] for RN only. SW placed resume of care orders for MD sims.     SW will continue to follow for plan of care changes and remain available for any additional DC needs or concerns.     Joy Britton MSW, LSW  Discharge Planner   [phone]

## 2024-05-20 NOTE — ED PROVIDER NOTES
Patient Seen in: [facility] Emergency Department      History     Chief Complaint   Patient presents with    Abdomen/Flank Pain     Stated Complaint: Abdominal pain    Subjective:   HPI    89-year-old female comes to the hospital with a complaint of having pain in the area of her right side of her abdomen over the past 2 days but is much worse today.  Nothing specific makes it better or worse.  She states she has no history of this in the past.  She denies any headaches or dizziness pressures no chest pain or shortness of breath.  She has any fevers or chills.  She denies any vomiting or diarrhea.  She has no dysuria.  She is denying any surgical history.  She denying any other complaints at this time.  She states had no constipation either.  In addition she does says she has arthritis and she is having some pain by her right shoulder.  She denies any new trauma.  Objective:   No pertinent past medical history.            No pertinent past surgical history.              No pertinent social history.            Review of Systems    Positive for stated complaint: Abdominal pain  Other systems are as noted in HPI.  Constitutional and vital signs reviewed.      All other systems reviewed and negative except as noted above.    Physical Exam     ED Triage Vitals   BP 05/20/24 0830 149/49   Pulse 05/20/24 0604 91   Resp 05/20/24 0604 24   Temp 05/20/24 0604 97.4 °F (36.3 °C)   Temp src 05/20/24 0604 Temporal   SpO2 05/20/24 0604 97 %   O2 Device 05/20/24 0604 Nasal cannula       Current Vitals:   Vital Signs  BP: 149/49  Pulse: 108  Resp: 20  Temp: 97.4 °F (36.3 °C)  Temp src: Temporal  MAP (mmHg): 77    Oxygen Therapy  SpO2: 100 %  O2 Device: Nasal cannula  O2 Flow Rate (L/min): 2 L/min            Physical Exam    HEENT : NCAT, EOMI, PEERL,  neck supple, no JVD, trachea midline, No LAD  Heart: S1S2 normal. No murmurs, regular rate and rhythm  Lungs: Clear to auscultation bilaterally  Abdomen: Soft right upper  quadrant region is tender nondistended normal active bowel sounds without rebound, guarding or masses noted  Back nontender without CVA tenderness  Extremity no clubbing, cyanosis or edema noted.  Full range of motion noted without tenderness  Neuro: No focal deficits noted    All measures to prevent infection transmission during my interaction with the patient were taken.  The patient was already wearing droplet mask on my arrival to the room.  Personal protective equipment including a droplet mask as well as gloves were worn throughout the duration of my exam.  Hand washing was performed prior to and after the exam.  Stethoscope and equipment used during my examination was cleaned with a super Sani cloth germicidal wipe following the exam.    ED Course     Labs Reviewed   COMP METABOLIC PANEL (14) - Abnormal; Notable for the following components:       Result Value    Glucose 212 (*)     Sodium 131 (*)     Chloride 94 (*)     BUN 52 (*)     Creatinine 1.52 (*)     eGFR-Cr 32 (*)     Albumin 3.2 (*)     A/G Ratio 0.9 (*)     All other components within normal limits   URINALYSIS WITH CULTURE REFLEX - Abnormal; Notable for the following components:    Nitrite Urine 1+ (*)     Leukocyte Esterase Urine 500 (*)     WBC Urine >50 (*)     RBC Urine 3-5 (*)     Bacteria Urine Rare (*)     Squamous Epi. Cells Few (*)     All other components within normal limits   CBC W/ DIFFERENTIAL - Abnormal; Notable for the following components:    WBC 12.5 (*)     Neutrophil Absolute Prelim 9.49 (*)     Neutrophil Absolute 9.49 (*)     All other components within normal limits   LIPASE - Normal   TROPONIN I HIGH SENSITIVITY - Normal   CBC WITH DIFFERENTIAL WITH PLATELET    Narrative:     The following orders were created for panel order CBC With Differential With Platelet.  Procedure                               Abnormality         Status                     ---------                               -----------         ------                      CBC W/ DIFFERENTIAL[[id number]]          Abnormal            Final result                 Please view results for these tests on the individual orders.   LACTIC ACID, PLASMA   BLOOD CULTURE   BLOOD CULTURE   URINE CULTURE, ROUTINE     EKG    Rate, intervals and axes as noted on EKG Report.  Rate: 92  Rhythm: Sinus Rhythm  Reading: Left axis deviation with left bundle branch block noted without significant change from prior EKG              ED Course as of 05/20/24 0934  ------------------------------------------------------------  Time: 05/20 0932  Comment: While here the patient underwent count of 12.5 thousand.  Patient's glucose was 212.  Her sodium was 131.  Her urine did show greater than 50 whites with bacteria.  The patient had blood cultures and lactic acid level sent.  IV fluid was given as well as Dilaudid for pain and Zofran.  She was given DuoNeb she does have COPD and says she was due for such.  The patient had a CT of the abdomen pelvis that I first interpreted showing free air.  I spoke with radiology is thought this may be due to a perforated ulcer.  Patient was given Zosyn.  I spoke with the hospitalist as well as Dr. Gillespie from surgery the patient be admitted for presumed surgical correction.     CT ABDOMEN+PELVIS(CONTRAST ONLY)(CPT=74177)    Result Date: 5/20/2024  PROCEDURE:  CT ABDOMEN+PELVIS (CONTRAST ONLY) (CPT=74177)  COMPARISON:  [facility] , XR, XR CHEST AP PORTABLE  (CPT=71045), 5/20/2024, 7:05 AM.  INDICATIONS:  Abdominal pain  TECHNIQUE:  CT scanning was performed from the dome of the diaphragm to the pubic symphysis with non-ionic intravenous contrast material. Post contrast coronal MPR imaging was performed.  Dose reduction techniques were used. Dose information is transmitted to the ACR (American College of Radiology) NRDR (National Radiology Data Registry) which includes the Dose Index Registry.  PATIENT STATED HISTORY:(As transcribed by Technologist)  Patient is here with  abdominal pain with nausea for  3 days   CONTRAST USED:  90cc of Isovue 370  FINDINGS:  LIVER:  No enlargement, atrophy, abnormal density, or significant focal lesion.  BILIARY:  There is extensive density within gallbladder lumen consistent with cholelithiasis.  There is mild gallbladder wall thickening which could be secondary to duodenal abnormality. PANCREAS:  No lesion, fluid collection, ductal dilatation, or atrophy.  SPLEEN:  No enlargement or focal lesion.  KIDNEYS:  No mass, obstruction, or calcification.  ADRENALS:  No mass or enlargement.  AORTA/VASCULAR:  Aorta is diffusely atherosclerotic but not aneurysmal. RETROPERITONEUM:  No mass or adenopathy.  BOWEL/MESENTERY:  There is a moderate to large amount of free intraperitoneal air in the nondependent part of the abdomen.  There does appear to be a defect in the wall of the gastric antrum near the pylorus which is seen to best advantage on the coronal  sequence series 4, image 36 suggesting perforated peptic ulcer.  There are multiple diverticula of the hepatic flexure of the colon in this area and there is free air around these diverticula although this is favored to be secondary to a perforated ulcer.  Perforated colonic diverticula is considered less likely. ABDOMINAL WALL:  No mass or hernia.  URINARY BLADDER:  No visible focal wall thickening, lesion, or calculus.  PELVIC NODES:  No adenopathy.  PELVIC ORGANS:  No visible mass.  Pelvic organs appropriate for patient age.  BONES:  There is a compression deformity of L2 which is of uncertain acuity. LUNG BASES:  There is dependent atelectasis in lower lobes. OTHER:  Negative.             CONCLUSION:  1. There is a large amount of free air consistent with perforated viscus. 2. Defect is noted in gastric wall near pylorus suspicious for perforated peptic ulcer disease. 3. There are diverticula of the colon near the duodenal abnormality although there is no other evidence of diverticulitis.  Perforated  diverticulum is considered less likely. 4. There is cholelithiasis and there is stranding around gallbladder although this is probably related to the abnormality of the adjacent duodenum. 5. Compression deformity of L2 is of uncertain acuity.  Above findings were reported to Dr. Quinones on May 20, 2024 at 8:47 a.m..   LOCATION:  [facility]   Dictated by (CST): Linden Cook MD on 5/20/2024 at 8:41 AM     Finalized by (CST): Linden Cook MD on 5/20/2024 at 8:47 AM       XR CHEST AP PORTABLE  (CPT=71045)    Result Date: 5/20/2024  PROCEDURE:  XR CHEST AP PORTABLE  (CPT=71045)  TECHNIQUE:  AP chest radiograph was obtained.  COMPARISON:  [facility] , XR, XR CHEST AP PORTABLE  (CPT=71045), 10/13/2023, 5:11 AM.  [facility] , XR, XR CHEST AP PORTABLE  (CPT=71045), 4/09/2024, 3:27 PM.  INDICATIONS:  Abdominal pain  PATIENT STATED HISTORY: (As transcribed by Technologist)  Pain and weakness.    FINDINGS:  Low lung volumes.  Cardiomegaly.  Crowded pulmonary vessels.  Calcified aorta.  No focal consolidation.  Stable bilateral pleural thickening.  Degenerative changes of spine.  Displaced fracture of right proximal humerus again demonstrated.            CONCLUSION:  Shallow inspiration.  No acute cardiopulmonary pathology.   LOCATION:  [facility]      Dictated by (CST): Ralph Davis MD on 5/20/2024 at 7:35 AM     Finalized by (CST): Ralph Davis MD on 5/20/2024 at 7:36 AM        Medications   sodium chloride 0.9% infusion (125 mL/hr Intravenous New Bag 5/20/24 0620)   HYDROmorphone (Dilaudid) 1 MG/ML injection 0.5 mg (0.5 mg Intravenous Given 5/20/24 0645)   piperacillin-tazobactam (Zosyn) 4.5 g in dextrose 5% 100 mL IVPB-ADDV (has no administration in time range)   ondansetron (Zofran) 4 MG/2ML injection 4 mg (4 mg Intravenous Given 5/20/24 0612)   iopamidol 76% (ISOVUE-370) injection for power injector (90 mL Intravenous Given 5/20/24 0822)   ipratropium-albuterol (Duoneb) 0.5-2.5 (3) MG/3ML inhalation solution 3 mL (3 mL Nebulization  Given 5/20/24 0910)              [facility]      Differential diagnosis included gallbladder disease, gastritis, perforated viscus but not limited such.  The patient does have perforated viscus likely secondary to gastric ulcer.  I spoke with the hospitalist as well as surgery and the the patient be admitted for further management at this time.    Critical Care Note:  The patient arrived with a condition with significant morbidity and mortality associated. The services I provided  were to promote improvement and reduce mortality specifically involving complex record review, complex medical decisions and interventions, and consultations outside the regular procedures and care normally rendered for 45 minutes of critical care time      This note was prepared using Dragon Medical voice recognition dictation software.  As a result errors may occur.  When identified to these areas have been corrected.  While every attempt is made to correct errors during dictation discrepancies may still exist.  Please contact if there are any errors.    Admission disposition: 5/20/2024  9:34 AM                                        Medical Decision Making      Disposition and Plan     Clinical Impression:  1. Acute gastric ulcer with perforation (HCC)         Disposition:  Admit  5/20/2024  9:34 am    Follow-up:  No follow-up provider specified.        Medications Prescribed:  Current Discharge Medication List                            Hospital Problems       Present on Admission  Date Reviewed: 5/14/2024            ICD-10-CM Noted POA    * (Principal) Acute gastric ulcer with perforation (HCC) K25.1 5/20/2024 Unknown

## 2024-05-20 NOTE — HOME CARE LIAISON
Patient is current with [facility] for RN  Services. Updated CM Joy will need [facility]. Updated demographics on the AVS. Will remain available for all home health care needs as they arise

## 2024-05-20 NOTE — SPIRITUAL CARE NOTE
Spiritual Care Visit Note    Patient Name: Nancy Hensley Date of Spiritual Care Visit: 24   : 1932 Primary Dx: Acute gastric ulcer with perforation (HCC)       Referred By:RN      Spiritual Care Taxonomy:    Visit Type/Summary: Identified that there is a POAHC on patient's EMR.      - PoA: Identified Existing PoAH - No Updates Needed:  remains available for follow up.    Spiritual Care support can be requested via an Epic consult.   For urgent/immediate needs, please contact the On Call  at: [facility]: ext [phone]          Paramedian Forehead Flap Text: A decision was made to reconstruct the defect utilizing an interpolation axial flap and a staged reconstruction.  A telfa template was made of the defect.  This telfa template was then used to outline the paramedian forehead pedicle flap.  The donor area for the pedicle flap was then injected with anesthesia.  The flap was excised through the skin and subcutaneous tissue down to the layer of the underlying musculature.  The pedicle flap was carefully excised within this deep plane to maintain its blood supply.  The edges of the donor site were undermined.   The donor site was closed in a primary fashion.  The pedicle was then rotated into position and sutured.  Once the tube was sutured into place, adequate blood supply was confirmed with blanching and refill.  The pedicle was then wrapped with xeroform gauze and dressed appropriately with a telfa and gauze bandage to ensure continued blood supply and protect the attached pedicle.

## 2024-05-20 NOTE — H&P
[facility]     Nancy Hensley Patient Status:  Inpatient    1932 MRN [id number]   Location [facility] 4NW-A Attending Nicolas Amaro MD   Hosp Day # 0 PCP Thomas Henning DO     Chief Complaint: Abdominal pain    Subjective:    History of Present Illness:     Nancy Hensley is a 89 year old female with a PMH of hypothyroidism, asthma, htn who presents with abdominal pain.  Patient has had a few abdominal pain attacks a few days ago that family states she felt was a \"gallbladder attack\", which she has suffered from in the past.  She woke up with severe pain from sleep this morning.  Denies any n/v, no diarrhea or constipation.  No recent fever or chills.  Her pain is currently controlled.  She has no other acute complaints at this time.      History/Other:    Past Medical History:  Past Medical History:    Accelerated hypertension    Asthma (HCC)    Cough    Disorder of thyroid    Dyspnea    Dyspnea, unspecified type    Endocrine disorder    HYPOTHYROID    Osteoarthrosis, unspecified whether generalized or localized, unspecified site    Other and unspecified hyperlipidemia    Pneumonia, organism unspecified(486)    Severe persistent asthma with status asthmaticus (HCC)    Thyroid disease    Unspecified essential hypertension    Ventricular ectopy    Visual impairment     Past Surgical History:   Past Surgical History:   Procedure Laterality Date    Knee replacement surgery      Other surgical history      Total knee replacement Bilateral     16 yrs ago      Family History:   Family History   Problem Relation Age of Onset    Cancer Father         bladder    Heart Disorder Mother     Hypertension Mother     Diabetes Mother      Social History:    reports that she quit smoking about 32 years ago. Her smoking use included cigarettes. She started smoking about 62 years ago. She has a 75 pack-year smoking history. She has never used smokeless tobacco. She reports that she  does not currently use alcohol. She reports that she does not use drugs.     Allergies:   Allergies   Allergen Reactions    Metolazone PAIN     Pain in shoulders, felt very hot    Oxycodone HALLUCINATION    Bactrim [Sulfamethoxazole W/Trimethoprim] NAUSEA ONLY    Ace Inhibitors Coughing    Statins Coughing       Medications:    No current facility-administered medications on file prior to encounter.     Current Outpatient Medications on File Prior to Encounter   Medication Sig Dispense Refill    metoprolol tartrate 50 MG Oral Tab Take 1 tablet (50 mg total) by mouth 2 (two) times daily.      LOSARTAN 50 MG Oral Tab TAKE 1 TABLET(50 MG) BY MOUTH TWICE DAILY 180 tablet 0    BUDESONIDE 0.5 MG/2ML Inhalation Suspension USE 2 ML(0.5 MG) VIA NEBULIZER TWICE DAILY 360 mL 3    Meloxicam 7.5 MG Oral Tab Take 1 tablet (7.5 mg total) by mouth daily as needed for Pain.      predniSONE 10 MG Oral Tab Take1 tab PO daily 90 tablet 3    ipratropium-albuterol 0.5-2.5 (3) MG/3ML Inhalation Solution Take 3 mL by nebulization every 6 (six) hours as needed. 360 mL 3    levothyroxine 137 MCG Oral Tab Take 137 mcg by mouth before breakfast.      hydrALAZINE 25 MG Oral Tab Take 1 tablet (25 mg total) by mouth every morning AND 1 tablet (25 mg total) Every afternoon at 2:00 pm AND 1 tablet (25 mg total) every evening. Hold if SBP is <110.      torsemide 20 MG Oral Tab Take 2 tablets (40 mg total) by mouth 2 (two) times daily. 120 tablet 1    PREDNISONE 5 MG Oral Tab TAKE 1 TABLET(5 MG) BY MOUTH DAILY 90 tablet 3    potassium chloride 20 MEQ Oral Tab CR Take 1 tablet (20 mEq total) by mouth daily.      cyanocobalamin 250 MCG Oral Tab Take 1 tablet (250 mcg total) by mouth daily.      Cholecalciferol (VITAMIN D-3 OR) Take 1 capsule by mouth daily.      Cranberry 500 MG Oral Cap Take 500 mg by mouth daily.      Cod Liver Oil 1000 MG Oral Cap Take 1 capsule by mouth daily.      Calcium Carbonate-Vitamin D (CALTRATE 600+D OR) Take 1 tablet by  mouth daily.        Multiple Vitamins-Minerals (CENTRUM SILVER OR) Take 1 tablet by mouth daily.        OXYGEN-HELIUM IN 2 L by Nasal route at bedtime.      sodium chloride 0.9 % Inhalation Nebu Soln Take 3 mL by nebulization as needed for Wheezing. 360 mL 3    acetaminophen 500 MG Oral Tab Take 1 tablet (500 mg total) by mouth every 6 (six) hours. 30 tablet 0    albuterol (PROAIR HFA) 108 (90 Base) MCG/ACT Inhalation Aero Soln Inhale 2 puffs into the lungs every 6 (six) hours as needed for Wheezing or Shortness of Breath. 1 each 11       Review of Systems:   A comprehensive review of systems was completed.    Pertinent positives and negatives noted in the HPI.    Objective:   Physical Exam:    /56   Pulse 109   Temp 97.4 °F (36.3 °C) (Temporal)   Resp 25   Wt 200 lb (90.7 kg)   SpO2 100%   BMI 44.84 kg/m²   General: No acute distress, Alert, pleasant   Respiratory: No rhonchi, no wheezes  Cardiovascular: S1, S2. Regular rate and rhythm  Abdomen: Soft, Non-tender, non-distended, positive bowel sounds  Neuro: No new focal deficits  Extremities: No edema    Results:    Labs:      Labs Last 24 Hours:    Recent Labs   Lab 05/20/24  0611   RBC 3.99   HGB 13.5   HCT 38.1   MCV 95.5   MCH 33.8   MCHC 35.4   RDW 14.1   NEPRELIM 9.49*   WBC 12.5*   .0       Recent Labs   Lab 05/15/24  1444 05/20/24  0611   * 212*   BUN 82* 52*   CREATSERUM 2.31* 1.52*   EGFRCR 19* 32*   CA 9.1 9.3   ALB  --  3.2*   * 131*   K 4.5 4.8   CL 89* 94*   CO2 29.0 26.0   ALKPHO  --  57   AST  --  19   ALT  --  23   BILT  --  0.5   TP  --  6.9       Lab Results   Component Value Date    INR 1.03 03/07/2023    INR 0.97 12/30/2019    INR 0.90 08/04/2016       Recent Labs   Lab 05/20/24  0611   TROPHS 52       No results for input(s): \"TROP\", \"PBNP\" in the last 168 hours.    No results for input(s): \"PCT\" in the last 168 hours.    Imaging: Imaging data reviewed in Epic.    Assessment & Plan:      #Perforated Peptic  Ulcer  CT scan noted large amount of free air and likely perforation of peptic ulcer  NPO, IVFs, pain control, anti-emetics  Zosyn, PPI  Gen surgery consult  Patient not a surgical candidate, plan for medical mgmt at this time    #UTI  WBC 12.5, afebrile  U/a reviewed  CXR clear  F/u blood and urine cultures  Zosyn for now    #Lactic acidosis - likely 2/2 perforation and UTI, fluids     #DM Type 2 - Degludec, carb coverage    #CKD IV - Cr around baseline, avoid nephrotoxic agents, monitor UOP    #HFpEF - Echo march 2023 with normal EF, G2DD  EKG with LBBB, similar to previous  Trop 52    #Hypothyroidism - Synthroid when able to take PO  #Asthma - Budesonide, prn nebs  #Essential HTN - losartan, metoprolol, torsemide, hydralazine on hold given hypotension and NPO status     #Compression deformity - L2, noted on CT    16 minutes spent with advanced care planning.  This was a voluntary face to face discussion.  We discussed code status, confirmed that patient is a full DNR.  I discussed patients current clinical status including her perforated viscus, hypotension, episodes of confusion, worsening lactic acidosis.  I discussed patient having a poor overall prognosis given above as well as multiple underlying comorbidities and age.  Family does not want hospice at this time.  They also would not want her to be transferred to the ICU, they would not want pressors added.  They want to focus more on comfort, but continue fluids and antibiotics.  They would like to speak with the palliative team, consult placed.          Plan of care discussed with patient, er physician, nurse, family     Nicolas Amaro MD    Supplementary Documentation:     The 21st Century Cures Act makes medical notes like these available to patients in the interest of transparency. Please be advised this is a medical document. Medical documents are intended to carry relevant information, facts as evident, and the clinical opinion of the practitioner. The  medical note is intended as peer to peer communication and may appear blunt or direct. It is written in medical language and may contain abbreviations or verbiage that are unfamiliar.               **Certification      PHYSICIAN Certification of Need for Inpatient Hospitalization - Initial Certification    Patient will require inpatient services that will reasonably be expected to span two midnight's based on the clinical documentation in H+P.   Based on patients current state of illness, I anticipate that, after discharge, patient will require TBD.

## 2024-05-20 NOTE — PROGRESS NOTES
NURSING ADMISSION NOTE      Patient admitted via Cart  Oriented to room.  Safety precautions initiated.  Bed in low position.  Call light in reach.  Pt admitted from ED, aaox3-4, complains of generalized pain, Dr. Gillespie seen the pt and said no surgical intervention but will continue on IV abtx and to inform Dr. Amaro that family is looking for DNR/DNI, Dr. Amaro have seen pt and discussed POC with family, Dr. Oliva met with the family, BP 84/46, bolus 500ml given, see flowsheet for latest BP, monitor pt.

## 2024-05-20 NOTE — ED QUICK NOTES
Orders for admission, patient is aware of plan and ready to go upstairs. Any questions, please call ED RN Jay at extension [phone].     Patient Covid vaccination status: Fully vaccinated     COVID Test Ordered in ED: None    COVID Suspicion at Admission: N/A    Running Infusions:    sodium chloride 125 mL/hr (05/20/24 0620)        Mental Status/LOC at time of transport: A&Ox4    Other pertinent information: Pt on 2L ox O2  CIWA score: N/A   NIH score:  N/A

## 2024-05-21 NOTE — PLAN OF CARE
Received patient lethargic, responsive when called and with tactile stimuli, moans/ appears to have pain when repositioned. VSS and afebrile. Kept NPO. O2 at 2L/NC on high 90s and HOB elevated. POA at bedside. Upper and lower extremities swollen. Needs attended to. Call light within reach. Daughter at bedside. Meds given per MAR.    417 Patient observed to have no spontaneous breathing, no palpable pulse on all extremities, no heart rate, pupils dilated/ non reactive. POA at bedside, confirmed code status, does not want any intervention. MD notified.     0550 POA and brother at bedside, to decide of  home, waiting for more family members. Report of death and tracking form completed, declined autopsy and organ donor.  called. Provided public safety and charge RN phone number.    Problem: Diabetes/Glucose Control  Goal: Glucose maintained within prescribed range  Description: INTERVENTIONS:  - Monitor Blood Glucose as ordered  - Assess for signs and symptoms of hyperglycemia and hypoglycemia  - Administer ordered medications to maintain glucose within target range  - Assess barriers to adequate nutritional intake and initiate nutrition consult as needed  - Instruct patient on self management of diabetes  Outcome: Progressing

## 2024-05-21 NOTE — PLAN OF CARE
Transported to the [facility] via stretcher.  Paper works completed and sent with the patient.

## 2024-05-21 NOTE — DISCHARGE SUMMARY
[place] HOSPITALIST  DISCHARGE SUMMARY     Nancy Hensley Patient Status:  Inpatient    1932 MRN [id number]   Location [facility] 4NW-A Attending No att. 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  Patient has had a few abdominal pain attacks a few days ago that family states she felt was a \"gallbladder attack\", which she has suffered from in the past.  She woke up with severe pain from sleep this morning.  Denies any n/v, no diarrhea or constipation.  No recent fever or chills.  Her pain is currently controlled.  She has no other acute complaints at this time.      Brief Synopsis:   Patient presented with abdominal pain.  Found to have perforated peptic ulcer.  Patient was deemed not a surgical candidate due to multiple comorbidities.  General surgery was following.  Attempted medical management with fluids/antibiotics which failed.  Patient passed on 2024 at 4:20 AM.    Lace+ Score: 73  59-90 High Risk  29-58 Medium Risk  0-28   Low Risk       TCM Follow-Up Recommendation:  LACE > 58: High Risk of readmission after discharge from the hospital.  **Certification    Admission date was 2024.  Inpatient stay was shorter than expected.  Patient's Acute gastric ulcer with perforation (HCC) was initially serious enough to expect a more lengthy hospitalization but patient improved faster than expected.                 Procedures during hospitalization:   None    Incidental or significant findings and recommendations (brief descriptions):  None    Lab/Test  results pending at Discharge:   N/a    Consultants:  General surgery, palliative care           -----------------------------------------------------------------------------------------------  PATIENT DISCHARGE INSTRUCTIONS: See electronic chart    Nicolas Amaro MD      The 21st Century Cures Act makes medical notes like these available to patients in the interest of transparency. Please be advised this is a medical document. Medical documents are intended to carry relevant information, facts as evident, and the clinical opinion of the practitioner. The medical note is intended as peer to peer communication and may appear blunt or direct. It is written in medical language and may contain abbreviations or verbiage that are unfamiliar.

## 2024-05-23 NOTE — CDS QUERY
CLINICAL DOCUMENTATION CLARIFICATION FORM  Dear Dr. Amaro,   In the medical record there is the diagnosis of peritonitis can this diagnosis be further specified.   [  x ] Peritonitis with sepsis   [   ] Peritonitis without sepsis   [   ]  Other (please specify)_________________________________________   Clinical Indicators   >H&P- She woke up with severe pain from sleep this morning.  Assessment & Plan:  Perforated Peptic Ulcer CT scan noted large amount of free air and likely perforation of peptic ulcer  NPO, IVFs, pain control, anti-emetics, Zosyn, PPI, Gen surgery consult.  Lactic acidosis - likely 2/2 perforation and UTI, fluids  >05/20/24 General surgery- There are foci of free air especially in the upper abdomen. Patient has  peritonitis on exam. Scribes pain that is 10 out of 10 especially in the upper quadrants. Patient and her family declined surgery at this time. To continue with IV antibiotics in hopes that this viscus patient will seal off on its own.  >05/20/24 RN note BP 84/46, bolus 500ml given, see flowsheet for latest BP, monitor pt.  >05/20/24 RN note patient lethargic, responsive when called and with tactile stimuli, moans/ appears to have pain when repositioned. 0417 Patient observed to have no spontaneous breathing, no palpable pulse on all extremities, no heart rate, pupils dilated/ non reactive  -05/20/24 CT abd/pelvis - There is a large amount of free air consistent with perforated viscus.   -Vital signs per Epic Flowsheet:05/20/24 @ 0604  temp 36.3  HR 91  RR 24 /49  2L 97%                                                                    @ 1124 temp 36.5  HR 84  RR 16  BP 84/46    Latest Reference Range & Units 05/20/24 09:56 05/20/24 13:23 05/20/24 17:21   LACTIC ACID 0.4 - 2.0 mmol/L 2.3 (H) 4.1 (HH) 2.6 (H)    Latest Reference Range & Units 05/20/24 06:11   WBC 4.0 - 11.0 x10(3) uL 12.5 (H)     Risk Factors Advanced age, DM2, CKD IV, CHF, COPD on  home O2  Treatment IV  Zosyn , IVF , Surgery consult, recommended surgery family decided on palliative care    Use of terms such as suspected, possible, or probable (associated with a specific diagnosis that is being evaluated, monitored, or treated as if it exists) are acceptable and can be coded in the inpatient setting, when documented at the time of discharge.   Please add any additional documentation to your progress note and continue to document this through discharge.  If you have any questions, please contact Clinical : Tessa Mota RN /JOSEN @ [phone] or email [email or web address]  Thank You!                                                 THIS FORM IS A PERMANENT PART OF THE MEDICAL RECORD

## 2025-02-11 NOTE — PLAN OF CARE
Assumed care of patient @ 0730 patient resting in bed, AOX4, reports no pain. Lung sounds diminished bilaterally, O2 saturation maintained on room air, requiring 1L at night, not new. No complaints of shortness of breath or chest pain. Sinus rhythm with bundle branch block on tele, S1-S2 present, no adventitious sounds noted. Bowel sounds present and active in all quadrants, last BM 5/9. Patient voiding with increased frequency d/t diuretics. Pt ambulating in brambila with standby assist and walker. Sacrum clean, dry, intact, redness resolved. Non-pitting edema to BL upper extremities. +3 Pitting edema to BL lower extremities. +2 Edema to BL feet. Plan of Care: IV Diuretics. I/O. Ambulate as tolerated. Scheduled nebs. Monitor BP     Discussed plan of care with patient, verbalized understanding. Call light within reach. Approx 0930 BP 100s/60s, hydralazine held this AM, cards directed. Hydralazine restarted at 1200. Pt not cleared for discharge d/t labile BP per cards. Approx 1645 paged cardiology for /60s. Per APN page for systolic>200.     Problem: PAIN - ADULT  Goal: Verbalizes/displays adequate comfort level or patient's stated pain goal  Description: INTERVENTIONS:  - Encourage pt to monitor pain and request assistance  - Assess pain using appropriate pain scale  - Administer analgesics based on type and severity of pain and evaluate response  - Implement non-pharmacological measures as appropriate and evaluate response  - Consider cultural and social influences on pain and pain management  - Manage/alleviate anxiety  - Utilize distraction and/or relaxation techniques  - Monitor for opioid side effects  - Notify MD/LIP if interventions unsuccessful or patient reports new pain  - Anticipate increased pain with activity and pre-medicate as appropriate  Outcome: Progressing     Problem: RISK FOR INFECTION - ADULT  Goal: Absence of fever/infection during anticipated neutropenic period  Description: INTERVENTIONS  - Monitor WBC  - Administer growth factors as ordered  - Implement neutropenic guidelines  Outcome: Progressing     Problem: SAFETY ADULT - FALL  Goal: Free from fall injury  Description: INTERVENTIONS:  - Assess pt frequently for physical needs  - Identify cognitive and physical deficits and behaviors that affect risk of falls. - Grahn fall precautions as indicated by assessment.  - Educate pt/family on patient safety including physical limitations  - Instruct pt to call for assistance with activity based on assessment  - Modify environment to reduce risk of injury  - Provide assistive devices as appropriate  - Consider OT/PT consult to assist with strengthening/mobility  - Encourage toileting schedule  Outcome: Progressing     Problem: CARDIOVASCULAR - ADULT  Goal: Maintains optimal cardiac output and hemodynamic stability  Description: INTERVENTIONS:  - Monitor vital signs, rhythm, and trends  - Monitor for bleeding, hypotension and signs of decreased cardiac output  - Evaluate effectiveness of vasoactive medications to optimize hemodynamic stability  - Monitor arterial and/or venous puncture sites for bleeding and/or hematoma  - Assess quality of pulses, skin color and temperature  - Assess for signs of decreased coronary artery perfusion - ex.  Angina  - Evaluate fluid balance, assess for edema, trend weights  Outcome: Progressing  Goal: Absence of cardiac arrhythmias or at baseline  Description: INTERVENTIONS:  - Continuous cardiac monitoring, monitor vital signs, obtain 12 lead EKG if indicated  - Evaluate effectiveness of antiarrhythmic and heart rate control medications as ordered  - Initiate emergency measures for life threatening arrhythmias  - Monitor electrolytes and administer replacement therapy as ordered  Outcome: Progressing     Problem: RESPIRATORY - ADULT  Goal: Achieves optimal ventilation and oxygenation  Description: INTERVENTIONS:  - Assess for changes in respiratory status  - Assess for changes in mentation and behavior  - Position to facilitate oxygenation and minimize respiratory effort  - Oxygen supplementation based on oxygen saturation or ABGs  - Provide Smoking Cessation handout, if applicable  - Encourage broncho-pulmonary hygiene including cough, deep breathe, Incentive Spirometry  - Assess the need for suctioning and perform as needed  - Assess and instruct to report SOB or any respiratory difficulty  - Respiratory Therapy support as indicated  - Manage/alleviate anxiety  - Monitor for signs/symptoms of CO2 retention  Outcome: Progressing     Problem: Patient/Family Goals  Goal: Patient/Family Long Term Goal  Description: Patient's Long Term Goal: stay out of hospital 5-9    Interventions:  - med compliance  - follow ups    - See additional Care Plan goals for specific interventions  Outcome: Progressing  Goal: Patient/Family Short Term Goal  Description: Patient's Short Term Goal: Increase activity 5-9    Interventions:   - Diuretics to decrease swelling  - Manage any pain  - Walk in halls with PCT/RN    - See additional Care Plan goals for specific interventions  Outcome: Progressing yes

## 2025-03-04 NOTE — PATIENT INSTRUCTIONS
Continue nebs as prescribed  May decrease prednisone to 5mg daily and if cough returns may try alternating 10mg and 5 mg  Followup in 4 weeks; telehealth is ok unless symptomatic
Vaccine status unknown

## (undated) NOTE — MR AVS SNAPSHOT
[address] [place] [address]  [address] [address]  [phone]               Thank you for choosing us for your health care visit with Claudia Myers DO.   We are glad to serve you and happy to provide you with this younger Take 1 capsule (300 mg total) by mouth 2 (two) times daily. Commonly known as:  OMNICEF           CENTRUM SILVER OR   Take 1 tablet by mouth daily. CloNIDine HCl 0.1 MG Tabs   Take 1 tablet (0.1 mg total) by mouth 2 (two) times daily.    Commonl Medications Administered in the Office Today     ipratropium-albuterol (DUONEB) nebulizer solution 3 mL                    MyChart                  Visit [facility] online at  [email or web address]

## (undated) NOTE — Clinical Note
February 28, 2017    [address] [place] [address]      Dear Paris Rose: It was a pleasure speaking with you over the phone recently.  To follow up, I wanted to send you my contact information to utilize when you hav

## (undated) NOTE — Clinical Note
I had the pleasure of seeing Heriberto Espinoza for lower extremity edema and cough x 3 weeks. I am concerned for CHF. She has echo and CXR scheduled for 4/7 which was ordered by pulm. She is already on zpack from pulm.  Unable to do appropriate workup in

## (undated) NOTE — MR AVS SNAPSHOT
[address] [place] [address]  [address] [facility], [facility] [address] [place] [id number] [phone]               Thank you for choosing us for your health care visit with Elaine Garibay DO.   We are glad to serve you and happy to provide you with this CloNIDine HCl 0.1 MG Tabs   Take 1 tablet (0.1 mg total) by mouth 2 (two) times daily. Commonly known as:  CATAPRES           Cod Liver Oil 1000 MG Caps   Take 1 capsule by mouth daily.            * KLOR-CON M20 20 MEQ Tbcr   Generic drug:  Potassium Chl ? Keep stairwells well lit with light switches at top and bottom. ? Install sturdy handrails on both sides. ? Make sure carpeting is secure. FLOORS:  ? Remove all loose wires, cords and throw rugs. ? Keep floors clear of clutter.   ? Make sure carpets a

## (undated) NOTE — MR AVS SNAPSHOT
[address] [place] [address]  [address] [facility], [address] [address] [phone]               Thank you for choosing us for your health care visit with Fany Alvarez DO.   We are glad to serve you and happy to provide you with this younger Cod Liver Oil 1000 MG Caps   Take 1 capsule by mouth daily.            * KLOR-CON M20 20 MEQ Tbcr   Generic drug:  Potassium Chloride ER   TAKE ONE TABLET BY MOUTH ONCE DAILY           * Potassium Chloride ER 20 MEQ Tbcr   Take 1 tablet (20 mEq total) by m

## (undated) NOTE — Clinical Note
Hi Dr. Shari Thomas, Just wanted to let you know I started her on SGLT2i today. Will see her again in 2-3 weeks.   Shravan Fossa

## (undated) NOTE — ED AVS SNAPSHOT
Elvis Thomason   MRN: [id number]    Department:  [facility] [place]   Date of Visit:  7/30/2017           Disclosure     Insurance plans vary and the physician(s) referred by the ER may not be covered by your plan.  Please con If you have been prescribed any medication(s), please fill your prescription right away and begin taking the medication(s) as directed    If the emergency physician has read X-rays, these will be re-interpreted by a radiologist.  If there is a significant

## (undated) NOTE — LETTER
Patient Name: Nataly Cameron  YOB: 1932          MRN number:  [id number]  Date:6/23/2021  Referring Physician:  Claudia Myers    Dear Dr. Juan Trimble Summary  Pt has attended 10, cancelled 0, and no shown 0 visits in Occupational T flesh color by end of each MLD session. * Has been w/o 3 layer bandages for 4 days, bilat lower leg lymphedema has increased by ~20% since last tx 6/16.   Measurements:     LE Circumferential Measurements (cm)     LEFT     RIGHT    25cm below SBP    Out of against the advice of Therapist)   Pt is interested in home use  of a pneumatic compression pump if Ins. allows. Goals:  (to be met in 21 visits)  1-  Pt will be independent in daily skin care.  2 sessions MET  2-  Pt will tolerate bilat modified compre

## (undated) NOTE — LETTER
Date: 4/12/2024    Patient Name: Nancy Hensley        To Whom it may concern:     The above patient was seen at the [facility] for treatment of a medical condition.     Nancy is not able to get to the bank due to physical limitations.  Please allow her daughter Dilma Hensley to handle her finances.           Sincerely,          Dr. Thomas Henning, DO

## (undated) NOTE — LETTER
Date: 2/27/2024    Patient Name: Nancy Hensley          To Whom it may concern:    The above patient was seen at the [facility] for treatment of a medical condition.    Nancy is not able to get to the bank due to physical limitations.  Please allow her daughter Dilma Hensley to handle her finances.        Sincerely,    Dr. Thomas Henning, DO

## (undated) NOTE — Clinical Note
Pt will be coming in for f/u on 2/15/19. She is refusing sooner appt at this time. TE sent to  to f/u. Thank you!

## (undated) NOTE — IP AVS SNAPSHOT
[address]            (For Outpatient Use Only) Initial Admit Date: 2023   Inpt/Obs Admit Date: Inpt: 23 / Obs: N/A   Discharge Date:    Kelvin Langston:  [de-identified]   MRN: [de-identified]   CSN: [id number]   CEID: [id number]        ENCOUNTER  Patient Class: Inpatient Admitting Provider: Annel Mcintyre MD Unit: [address] [facility] 3SW-A   Hospital Service: Ortho/Spine Attending Provider: Naya Bosch MD   Bed: 381-A   Visit Type:   Referring Physician: No ref. provider found Billing Flag:    Admit Diagnosis: Hyponatremia [E87.1]      PATIENT  Legal Name:   Akua Parker   Legal Sex: Female  Gender ID:              [address] [facility] Name:    PCP:  Shannen Vitale DO Home: [phone]   Address:  [facility] : 1932 (89 yrs) Mobile: [phone]         City/[facility]/Zip: [facility] [address] Marital:  Language: [address] [place] [address] Drive: Will SSN4: [id number] Holiness: [address]*     Race: White Ethnicity: Non  Or  O*   EMERGENCY CONTACT   Name Relationship Legal Guardian? Home Phone Work Phone Mobile Phone   1. Dilma Hensley (POA)  2.  [facility] Daughter  Son    [phone]     GUARANTOR  Guarantor: Elise Koo : 1932 Home Phone: [phone]   Address: [facility]  Sex: Female Work Phone:    City[place] [facility]    Rel. to Patient: Self Guarantor ID: [id number]   GUARANTOR EMPLOYER   Employer:  Status: RETIRED     COVERAGE  PRIMARY INSURANCE   Payor: MEDICARE Plan: MEDICARE PART A&B   Group Number:  Insurance Type: INDEMNITY   Subscriber Name: Cristi Rangel : 1932   Subscriber ID: [id number] Pt Rel to Subscriber: Self   SECONDARY INSURANCE   Payor: [place] PPO Plan: [facility]   Group Number: [id number] Insurance Type: [address] Name: Cristi Rangel : 1932   Subscriber ID: [id number] Pt Rel to Subscriber: SELF   TERTIARY INSURANCE   Payor:  Plan:    Group Number:  Insurance Type:    Subscriber Name:  Subscriber :    Subscriber ID:  Pt Rel to Subscriber:    Hospital Account Financial Class: Medicare    2023

## (undated) NOTE — LETTER
08/18/21        Manda Hilton 13 [id number]      Dear Racheal Arnold records indicate that you have outstanding lab work and or testing that was ordered for you and has not yet been completed:  Orders Placed This [place]

## (undated) NOTE — Clinical Note
TCM call completed. A TE was sent to the office for an appointment and pain management assistance request. The daughter reported pain is not well controlled with Rx tramadol. Thank you.

## (undated) NOTE — MR AVS SNAPSHOT
[address] [place] [address]  [address] [address] [phone]               Thank you for choosing us for your health care visit with Kalina Mcgee DO.   We are glad to serve you and happy to provide you with this Take 1 tablet (0.1 mg total) by mouth 2 (two) times daily. Commonly known as:  CATAPRES           Cod Liver Oil 1000 MG Caps   Take 1 capsule by mouth daily.            * KLOR-CON M20 20 MEQ Tbcr   Generic drug:  Potassium Chloride ER   TAKE ONE TABLET BY